# Patient Record
Sex: MALE | Race: WHITE | Employment: FULL TIME | URBAN - NONMETROPOLITAN AREA
[De-identification: names, ages, dates, MRNs, and addresses within clinical notes are randomized per-mention and may not be internally consistent; named-entity substitution may affect disease eponyms.]

---

## 2017-06-14 PROBLEM — I83.90 VARICOSE VEIN OF LEG: Status: ACTIVE | Noted: 2017-06-14

## 2017-12-04 ENCOUNTER — HOSPITAL ENCOUNTER (OUTPATIENT)
Dept: NON INVASIVE DIAGNOSTICS | Age: 52
Discharge: HOME | End: 2017-12-04
Payer: OTHER GOVERNMENT

## 2017-12-04 ENCOUNTER — TELEPHONE (OUTPATIENT)
Dept: CARDIOLOGY | Age: 52
End: 2017-12-04

## 2017-12-04 DIAGNOSIS — I35.9 AORTIC VALVE DISORDER: ICD-10-CM

## 2017-12-04 DIAGNOSIS — R01.1 HEART MURMUR: ICD-10-CM

## 2017-12-04 DIAGNOSIS — Z98.890 H/O ASCENDING AORTA REPAIR: ICD-10-CM

## 2017-12-04 LAB
LV EF: 53 %
LVEF MODALITY: NORMAL

## 2017-12-04 PROCEDURE — 93306 TTE W/DOPPLER COMPLETE: CPT

## 2017-12-18 ENCOUNTER — OFFICE VISIT (OUTPATIENT)
Dept: CARDIOLOGY | Age: 52
End: 2017-12-18
Payer: OTHER GOVERNMENT

## 2017-12-18 VITALS
WEIGHT: 199 LBS | OXYGEN SATURATION: 98 % | HEIGHT: 69 IN | RESPIRATION RATE: 20 BRPM | DIASTOLIC BLOOD PRESSURE: 71 MMHG | SYSTOLIC BLOOD PRESSURE: 127 MMHG | BODY MASS INDEX: 29.47 KG/M2 | HEART RATE: 70 BPM

## 2017-12-18 DIAGNOSIS — I35.1 NONRHEUMATIC AORTIC VALVE INSUFFICIENCY: ICD-10-CM

## 2017-12-18 DIAGNOSIS — Z98.890 H/O ASCENDING AORTA REPAIR: ICD-10-CM

## 2017-12-18 DIAGNOSIS — I48.91 ATRIAL FIBRILLATION, CONTROLLED (HCC): Primary | ICD-10-CM

## 2017-12-18 PROCEDURE — 99213 OFFICE O/P EST LOW 20 MIN: CPT | Performed by: INTERNAL MEDICINE

## 2017-12-18 PROCEDURE — 93000 ELECTROCARDIOGRAM COMPLETE: CPT | Performed by: INTERNAL MEDICINE

## 2017-12-18 NOTE — PROGRESS NOTES
SHON Gant am scribing for and in the presence of Dr. Georgette Stinson     Patient: Jose Pascal  : 1965  Date of Visit: 2017    REASON FOR VISIT / CONSULTATION: 1 Year Follow Up (ECG in office today, Echo done 17, Hx Afib/flutter, Ascending aorta repair, ablation [facility] 8/19/15, Denies CP/pressure, papitations, sob, dizziness/lighheadedness. )      Dear Dr. Lucia Naik MD    I had the opportunity to see Jose Pascal at the office today for follow up. He has a history of thoracic aorta surgery at the [facility] in . He said he had a repair with no artificial grafts put in. He had a heart catheterization prior to the aorta surgery and was told his coronary arteries were fine. No history of MI or angina. He does have a history apparently of atrial fibrillation/Atypical flutter which predates his thoracic aorta surgery but after his thoracic aorta surgery he had a couple of recurrences, multiple cardioversion and finally underwent atypical flutter ablation on 2015 at [facility]. He did great over the last year. Denies any chest pain, pressure or tightness. No  shortness of breath. No orthopnea or PND. No palpitations, dizziness or lightheadedness,syncope or near syncope He is sleeping fine without his CPAP. Still smoking he is planing to quit. Drinks about 2 cups of coffee a day and does drink some alcohol. ECG today showed sinus rhythm with left atrial enlargement and incomplete right bundle branch block. Single PVC. No definite ischemic changes.       Past Medical History:   Diagnosis Date    Aortic valve disorders     Atrial fibrillation (HCC)     Atrial flutter ([address]     Encounter for cardioversion procedure 2015    Dr Joanne Solo    Encounter for cardioversion procedure 2015    Airam Garcia/Juanis    Frequent urination     Pt states he's developed frequent urination with 3 times in last yr noting blood in urine as well.  H/O echocardiogram 12/04/2017    EF 17-23% Global LV systolic function appears preserved. Mildly increaseed LV wall thinckness with normal LV carvity size, No definite specific wall motion abnormalities identified, LA is moderately dilated (34-39) with LA volume index of 36ml/m2, Mild mitral and tricuspid regurgitation, moderate aortic regurgitation, Evidence of mild diastolic dysfunction seen    Hemorrhage of rectum and anus     History of 24 hour EKG monitoring 4/3/14    Unremarkable except for mild increased number of PVC's with 3 ventricular couplets, asymptomatic     History of echocardiogram 4/10/14    EF 60%, mild to mod LVH, moderate AI    History of heart surgery 2008    enlarged aorta    History of PFTs 6/25/15    Consistent with mild obstructive ventilatory impairment. Lung volumes are normal,except mild increase in residual volume,which could be suggestive of airway trapping.  Diffusion capacity is normal.    Hx of transesophageal echocardiography (SKYLAR) for monitoring 5/5/2015    Dr Edgardo Contreras    Impaired fasting glucose     Lipoma     Obstructive sleep apnea (adult) (pediatric)     Paroxysmal supraventricular tachycardia (HCC)     Tobacco use disorder     Unspecified essential hypertension     Urinary urgency        Past Surgical History:   Procedure Laterality Date    ACHILLES TENDON SURGERY  2010    CARDIAC SURGERY  2008    Enlarged aorta    COLONOSCOPY  06/13/2013    FOOT SURGERY      reconstructive for club foot (right)    VASECTOMY         Family History   Problem Relation Age of Onset    Colon Cancer Mother     Heart Surgery Father      enlarged aorta    Kidney Cancer Father        Social History   Substance Use Topics    Smoking status: Current Every Day Smoker     Packs/day: 1.00     Years: 20.00     Types: Cigarettes     Last attempt to quit: 2/13/2013    Smokeless tobacco: Never Used    Alcohol use Yes      Comment: 3-4 times

## 2017-12-27 ENCOUNTER — HOSPITAL ENCOUNTER (OUTPATIENT)
Dept: VASCULAR LAB | Age: 52
Discharge: HOME | End: 2017-12-27
Payer: OTHER GOVERNMENT

## 2017-12-27 DIAGNOSIS — M79.604 PAIN OF RIGHT LOWER EXTREMITY: ICD-10-CM

## 2017-12-27 PROCEDURE — 93971 EXTREMITY STUDY: CPT

## 2018-06-29 PROBLEM — I83.91 VARICOSE VEINS OF RIGHT LOWER EXTREMITY: Status: ACTIVE | Noted: 2017-06-14

## 2018-10-05 PROBLEM — K42.9 UMBILICAL HERNIA WITHOUT OBSTRUCTION AND WITHOUT GANGRENE: Status: ACTIVE | Noted: 2018-10-05

## 2018-10-08 ENCOUNTER — OFFICE VISIT (OUTPATIENT)
Dept: SURGERY | Age: 53
End: 2018-10-08
Payer: OTHER GOVERNMENT

## 2018-10-08 VITALS
HEIGHT: 68 IN | TEMPERATURE: 98.8 F | SYSTOLIC BLOOD PRESSURE: 125 MMHG | RESPIRATION RATE: 18 BRPM | BODY MASS INDEX: 31.27 KG/M2 | DIASTOLIC BLOOD PRESSURE: 81 MMHG | WEIGHT: 206.3 LBS | HEART RATE: 75 BPM

## 2018-10-08 DIAGNOSIS — Z13.0 SCREENING FOR DISORDER OF BLOOD AND BLOOD-FORMING ORGANS: ICD-10-CM

## 2018-10-08 DIAGNOSIS — K42.9 UMBILICAL HERNIA WITHOUT OBSTRUCTION AND WITHOUT GANGRENE: Primary | ICD-10-CM

## 2018-10-08 PROCEDURE — 99203 OFFICE O/P NEW LOW 30 MIN: CPT | Performed by: SURGERY

## 2018-10-08 NOTE — PROGRESS NOTES
with Betadine soap daily for the time being and finish his antibiotics preoperatively. Plan:  Scheduled for umbilical hernia repair    1.  Umbilical hernia without obstruction and without gangrene          Electronically signed by Ravinder Stinson MD on 10/8/2018 at 12:16 PM

## 2018-10-18 ENCOUNTER — HOSPITAL ENCOUNTER (OUTPATIENT)
Dept: PREADMISSION TESTING | Age: 53
Discharge: HOME | End: 2018-10-22
Payer: OTHER GOVERNMENT

## 2018-10-18 VITALS
WEIGHT: 207 LBS | SYSTOLIC BLOOD PRESSURE: 132 MMHG | HEART RATE: 68 BPM | DIASTOLIC BLOOD PRESSURE: 70 MMHG | RESPIRATION RATE: 18 BRPM | BODY MASS INDEX: 31.37 KG/M2 | HEIGHT: 68 IN | TEMPERATURE: 98 F | OXYGEN SATURATION: 98 %

## 2018-10-18 DIAGNOSIS — K42.9 UMBILICAL HERNIA WITHOUT OBSTRUCTION AND WITHOUT GANGRENE: ICD-10-CM

## 2018-10-18 DIAGNOSIS — Z13.0 SCREENING FOR DISORDER OF BLOOD AND BLOOD-FORMING ORGANS: ICD-10-CM

## 2018-10-18 LAB
ANION GAP SERPL CALCULATED.3IONS-SCNC: 11 MMOL/L (ref 9–17)
BUN BLDV-MCNC: 21 MG/DL (ref 6–20)
BUN/CREAT BLD: 27 (ref 9–20)
CALCIUM SERPL-MCNC: 8.9 MG/DL (ref 8.6–10.4)
CHLORIDE BLD-SCNC: 97 MMOL/L (ref 98–107)
CO2: 27 MMOL/L (ref 20–31)
CREAT SERPL-MCNC: 0.79 MG/DL (ref 0.7–1.2)
EKG ATRIAL RATE: 54 BPM
EKG P AXIS: 31 DEGREES
EKG P-R INTERVAL: 158 MS
EKG Q-T INTERVAL: 466 MS
EKG QRS DURATION: 100 MS
EKG QTC CALCULATION (BAZETT): 441 MS
EKG R AXIS: -5 DEGREES
EKG T AXIS: 32 DEGREES
EKG VENTRICULAR RATE: 54 BPM
GFR AFRICAN AMERICAN: >60 ML/MIN
GFR NON-AFRICAN AMERICAN: >60 ML/MIN
GFR SERPL CREATININE-BSD FRML MDRD: ABNORMAL ML/MIN/{1.73_M2}
GFR SERPL CREATININE-BSD FRML MDRD: ABNORMAL ML/MIN/{1.73_M2}
GLUCOSE BLD-MCNC: 112 MG/DL (ref 70–99)
HCT VFR BLD CALC: 44.1 % (ref 40.7–50.3)
HEMOGLOBIN: 14.8 G/DL (ref 13–17)
MCH RBC QN AUTO: 31.8 PG (ref 25.2–33.5)
MCHC RBC AUTO-ENTMCNC: 33.6 G/DL (ref 28.4–34.8)
MCV RBC AUTO: 94.6 FL (ref 82.6–102.9)
NRBC AUTOMATED: 0 PER 100 WBC
PDW BLD-RTO: 12.1 % (ref 11.8–14.4)
PLATELET # BLD: 197 K/UL (ref 138–453)
PMV BLD AUTO: 9.7 FL (ref 8.1–13.5)
POTASSIUM SERPL-SCNC: 4.8 MMOL/L (ref 3.7–5.3)
RBC # BLD: 4.66 M/UL (ref 4.21–5.77)
SODIUM BLD-SCNC: 135 MMOL/L (ref 135–144)
WBC # BLD: 5.8 K/UL (ref 3.5–11.3)

## 2018-10-18 PROCEDURE — 93005 ELECTROCARDIOGRAM TRACING: CPT

## 2018-10-18 PROCEDURE — 80048 BASIC METABOLIC PNL TOTAL CA: CPT

## 2018-10-18 PROCEDURE — 36415 COLL VENOUS BLD VENIPUNCTURE: CPT

## 2018-10-18 PROCEDURE — 85027 COMPLETE CBC AUTOMATED: CPT

## 2018-10-18 NOTE — PROGRESS NOTES
Location Verified: Yes  Patient Language: English  Medical History Reviewed: Yes  NPO Status Reinforced: Yes  Ride and Caregiver Arranged: Yes  Ride Caregiver Provider: friend  Patient Knows to Bring Current Medications: Yes    Pre-AdmissionTesting Checklist  Patient has been to this health system before?: Yes, W/in last 6 months  Does patient refuse blood?: No  Pre-existing DNR Comfort Care/DNR Arrest/DNI Order: No  Healthcare Directive: No, patient does not have an advance directive for healthcare treatment   needed: No  Patient can read and write?: Yes  History given by: Patient  Providing self care at home?: Yes  Discharge transport (for same day patients): Family    Patient instructed on the pre-operative, intra-operative, and post-operative process? Yes  Medication instructions reviewed with patient? Yes  Pre operative instruction sheet reviewed and given to patient in PAT?   Yes

## 2018-10-23 ENCOUNTER — ANESTHESIA EVENT (OUTPATIENT)
Dept: OPERATING ROOM | Age: 53
End: 2018-10-23
Payer: OTHER GOVERNMENT

## 2018-10-24 ENCOUNTER — ANESTHESIA (OUTPATIENT)
Dept: OPERATING ROOM | Age: 53
End: 2018-10-24
Payer: OTHER GOVERNMENT

## 2018-10-24 ENCOUNTER — HOSPITAL ENCOUNTER (OUTPATIENT)
Age: 53
Setting detail: OUTPATIENT SURGERY
Discharge: HOME | End: 2018-10-24
Attending: SURGERY | Admitting: SURGERY
Payer: OTHER GOVERNMENT

## 2018-10-24 VITALS
DIASTOLIC BLOOD PRESSURE: 52 MMHG | OXYGEN SATURATION: 97 % | TEMPERATURE: 98.8 F | SYSTOLIC BLOOD PRESSURE: 100 MMHG | RESPIRATION RATE: 1 BRPM

## 2018-10-24 VITALS
RESPIRATION RATE: 16 BRPM | SYSTOLIC BLOOD PRESSURE: 124 MMHG | DIASTOLIC BLOOD PRESSURE: 68 MMHG | HEIGHT: 68 IN | TEMPERATURE: 98.6 F | HEART RATE: 65 BPM | OXYGEN SATURATION: 97 % | BODY MASS INDEX: 31.37 KG/M2 | WEIGHT: 207 LBS

## 2018-10-24 DIAGNOSIS — G89.18 POSTOPERATIVE PAIN: Primary | ICD-10-CM

## 2018-10-24 PROCEDURE — 6370000000 HC RX 637 (ALT 250 FOR IP): Performed by: SURGERY

## 2018-10-24 PROCEDURE — 7100000010 HC PHASE II RECOVERY - FIRST 15 MIN: Performed by: SURGERY

## 2018-10-24 PROCEDURE — 3600000003 HC SURGERY LEVEL 3 BASE: Performed by: SURGERY

## 2018-10-24 PROCEDURE — 3600000013 HC SURGERY LEVEL 3 ADDTL 15MIN: Performed by: SURGERY

## 2018-10-24 PROCEDURE — 88302 TISSUE EXAM BY PATHOLOGIST: CPT

## 2018-10-24 PROCEDURE — 7100000011 HC PHASE II RECOVERY - ADDTL 15 MIN: Performed by: SURGERY

## 2018-10-24 PROCEDURE — 49585 REPAIR UMBILICAL HERN,5+Y/O,REDUC: CPT | Performed by: SURGERY

## 2018-10-24 PROCEDURE — 2500000003 HC RX 250 WO HCPCS: Performed by: SURGERY

## 2018-10-24 PROCEDURE — 6360000002 HC RX W HCPCS: Performed by: SURGERY

## 2018-10-24 PROCEDURE — 6360000002 HC RX W HCPCS: Performed by: NURSE ANESTHETIST, CERTIFIED REGISTERED

## 2018-10-24 PROCEDURE — 2500000003 HC RX 250 WO HCPCS: Performed by: NURSE ANESTHETIST, CERTIFIED REGISTERED

## 2018-10-24 PROCEDURE — 2709999900 HC NON-CHARGEABLE SUPPLY: Performed by: SURGERY

## 2018-10-24 PROCEDURE — 7100000000 HC PACU RECOVERY - FIRST 15 MIN: Performed by: SURGERY

## 2018-10-24 PROCEDURE — 7100000001 HC PACU RECOVERY - ADDTL 15 MIN: Performed by: SURGERY

## 2018-10-24 PROCEDURE — 3700000001 HC ADD 15 MINUTES (ANESTHESIA): Performed by: SURGERY

## 2018-10-24 PROCEDURE — 2580000003 HC RX 258: Performed by: SURGERY

## 2018-10-24 PROCEDURE — 3700000000 HC ANESTHESIA ATTENDED CARE: Performed by: SURGERY

## 2018-10-24 PROCEDURE — 88304 TISSUE EXAM BY PATHOLOGIST: CPT

## 2018-10-24 RX ORDER — SODIUM CHLORIDE, SODIUM LACTATE, POTASSIUM CHLORIDE, CALCIUM CHLORIDE 600; 310; 30; 20 MG/100ML; MG/100ML; MG/100ML; MG/100ML
INJECTION, SOLUTION INTRAVENOUS CONTINUOUS
Status: DISCONTINUED | OUTPATIENT
Start: 2018-10-24 | End: 2018-10-24 | Stop reason: HOSPADM

## 2018-10-24 RX ORDER — LIDOCAINE HYDROCHLORIDE 10 MG/ML
INJECTION, SOLUTION EPIDURAL; INFILTRATION; INTRACAUDAL; PERINEURAL PRN
Status: DISCONTINUED | OUTPATIENT
Start: 2018-10-24 | End: 2018-10-24 | Stop reason: HOSPADM

## 2018-10-24 RX ORDER — FENTANYL CITRATE 50 UG/ML
INJECTION, SOLUTION INTRAMUSCULAR; INTRAVENOUS PRN
Status: DISCONTINUED | OUTPATIENT
Start: 2018-10-24 | End: 2018-10-24 | Stop reason: SDUPTHER

## 2018-10-24 RX ORDER — PROPOFOL 10 MG/ML
INJECTION, EMULSION INTRAVENOUS PRN
Status: DISCONTINUED | OUTPATIENT
Start: 2018-10-24 | End: 2018-10-24 | Stop reason: SDUPTHER

## 2018-10-24 RX ORDER — MEPERIDINE HYDROCHLORIDE 50 MG/ML
12.5 INJECTION INTRAMUSCULAR; INTRAVENOUS; SUBCUTANEOUS EVERY 5 MIN PRN
Status: DISCONTINUED | OUTPATIENT
Start: 2018-10-24 | End: 2018-10-24 | Stop reason: HOSPADM

## 2018-10-24 RX ORDER — DIMENHYDRINATE 50 MG/1
50 TABLET ORAL ONCE
Status: COMPLETED | OUTPATIENT
Start: 2018-10-24 | End: 2018-10-24

## 2018-10-24 RX ORDER — FENTANYL CITRATE 50 UG/ML
50 INJECTION, SOLUTION INTRAMUSCULAR; INTRAVENOUS EVERY 5 MIN PRN
Status: DISCONTINUED | OUTPATIENT
Start: 2018-10-24 | End: 2018-10-24 | Stop reason: HOSPADM

## 2018-10-24 RX ORDER — BUPIVACAINE HYDROCHLORIDE 5 MG/ML
INJECTION, SOLUTION EPIDURAL; INTRACAUDAL PRN
Status: DISCONTINUED | OUTPATIENT
Start: 2018-10-24 | End: 2018-10-24 | Stop reason: HOSPADM

## 2018-10-24 RX ORDER — ONDANSETRON 2 MG/ML
4 INJECTION INTRAMUSCULAR; INTRAVENOUS
Status: DISCONTINUED | OUTPATIENT
Start: 2018-10-24 | End: 2018-10-24 | Stop reason: HOSPADM

## 2018-10-24 RX ORDER — FENTANYL CITRATE 50 UG/ML
25 INJECTION, SOLUTION INTRAMUSCULAR; INTRAVENOUS EVERY 5 MIN PRN
Status: DISCONTINUED | OUTPATIENT
Start: 2018-10-24 | End: 2018-10-24 | Stop reason: HOSPADM

## 2018-10-24 RX ORDER — LIDOCAINE HYDROCHLORIDE 20 MG/ML
INJECTION, SOLUTION INFILTRATION; PERINEURAL PRN
Status: DISCONTINUED | OUTPATIENT
Start: 2018-10-24 | End: 2018-10-24 | Stop reason: SDUPTHER

## 2018-10-24 RX ORDER — HYDROCODONE BITARTRATE AND ACETAMINOPHEN 5; 325 MG/1; MG/1
1 TABLET ORAL EVERY 6 HOURS PRN
Qty: 20 TABLET | Refills: 0 | Status: SHIPPED | OUTPATIENT
Start: 2018-10-24 | End: 2018-10-29

## 2018-10-24 RX ORDER — LABETALOL HYDROCHLORIDE 5 MG/ML
5 INJECTION, SOLUTION INTRAVENOUS EVERY 10 MIN PRN
Status: DISCONTINUED | OUTPATIENT
Start: 2018-10-24 | End: 2018-10-24 | Stop reason: HOSPADM

## 2018-10-24 RX ORDER — DEXAMETHASONE SODIUM PHOSPHATE 4 MG/ML
INJECTION, SOLUTION INTRA-ARTICULAR; INTRALESIONAL; INTRAMUSCULAR; INTRAVENOUS; SOFT TISSUE PRN
Status: DISCONTINUED | OUTPATIENT
Start: 2018-10-24 | End: 2018-10-24 | Stop reason: SDUPTHER

## 2018-10-24 RX ORDER — ACETAMINOPHEN 325 MG/1
650 TABLET ORAL ONCE
Status: COMPLETED | OUTPATIENT
Start: 2018-10-24 | End: 2018-10-24

## 2018-10-24 RX ORDER — ONDANSETRON 2 MG/ML
INJECTION INTRAMUSCULAR; INTRAVENOUS PRN
Status: DISCONTINUED | OUTPATIENT
Start: 2018-10-24 | End: 2018-10-24 | Stop reason: SDUPTHER

## 2018-10-24 RX ORDER — HYDROCODONE BITARTRATE AND ACETAMINOPHEN 5; 325 MG/1; MG/1
2 TABLET ORAL ONCE
Status: COMPLETED | OUTPATIENT
Start: 2018-10-24 | End: 2018-10-24

## 2018-10-24 RX ADMIN — HYDROCODONE BITARTRATE AND ACETAMINOPHEN 2 TABLET: 5; 325 TABLET ORAL at 13:22

## 2018-10-24 RX ADMIN — DEXAMETHASONE SODIUM PHOSPHATE 4 MG: 4 INJECTION, SOLUTION INTRAMUSCULAR; INTRAVENOUS at 10:38

## 2018-10-24 RX ADMIN — DIMENHYDRINATE 50 MG: 50 TABLET ORAL at 08:49

## 2018-10-24 RX ADMIN — ONDANSETRON 4 MG: 2 INJECTION INTRAMUSCULAR; INTRAVENOUS at 10:42

## 2018-10-24 RX ADMIN — ACETAMINOPHEN 650 MG: 325 TABLET ORAL at 08:50

## 2018-10-24 RX ADMIN — FENTANYL CITRATE 100 MCG: 50 INJECTION INTRAMUSCULAR; INTRAVENOUS at 10:34

## 2018-10-24 RX ADMIN — LIDOCAINE HYDROCHLORIDE 60 MG: 20 INJECTION, SOLUTION INFILTRATION; PERINEURAL at 10:37

## 2018-10-24 RX ADMIN — FENTANYL CITRATE 25 MCG: 50 INJECTION INTRAMUSCULAR; INTRAVENOUS at 13:00

## 2018-10-24 RX ADMIN — PROPOFOL 180 MG: 10 INJECTION, EMULSION INTRAVENOUS at 10:37

## 2018-10-24 RX ADMIN — SODIUM CHLORIDE, POTASSIUM CHLORIDE, SODIUM LACTATE AND CALCIUM CHLORIDE: 600; 310; 30; 20 INJECTION, SOLUTION INTRAVENOUS at 09:04

## 2018-10-24 ASSESSMENT — PULMONARY FUNCTION TESTS
PIF_VALUE: 3
PIF_VALUE: 1
PIF_VALUE: 3
PIF_VALUE: 1
PIF_VALUE: 3
PIF_VALUE: 3
PIF_VALUE: 4
PIF_VALUE: 4
PIF_VALUE: 3
PIF_VALUE: 4
PIF_VALUE: 2
PIF_VALUE: 3
PIF_VALUE: 19
PIF_VALUE: 3
PIF_VALUE: 3
PIF_VALUE: 4
PIF_VALUE: 3
PIF_VALUE: 3
PIF_VALUE: 20
PIF_VALUE: 3
PIF_VALUE: 4
PIF_VALUE: 3
PIF_VALUE: 29
PIF_VALUE: 3
PIF_VALUE: 3
PIF_VALUE: 4
PIF_VALUE: 3
PIF_VALUE: 3
PIF_VALUE: 4
PIF_VALUE: 2
PIF_VALUE: 3
PIF_VALUE: 1
PIF_VALUE: 3
PIF_VALUE: 3
PIF_VALUE: 4
PIF_VALUE: 4
PIF_VALUE: 3
PIF_VALUE: 0
PIF_VALUE: 3
PIF_VALUE: 4
PIF_VALUE: 3
PIF_VALUE: 3
PIF_VALUE: 4
PIF_VALUE: 3
PIF_VALUE: 1
PIF_VALUE: 1
PIF_VALUE: 19
PIF_VALUE: 3
PIF_VALUE: 4
PIF_VALUE: 22
PIF_VALUE: 3
PIF_VALUE: 0
PIF_VALUE: 1
PIF_VALUE: 3
PIF_VALUE: 20
PIF_VALUE: 3
PIF_VALUE: 2
PIF_VALUE: 3
PIF_VALUE: 1
PIF_VALUE: 4
PIF_VALUE: 3
PIF_VALUE: 3
PIF_VALUE: 12
PIF_VALUE: 3
PIF_VALUE: 4
PIF_VALUE: 3
PIF_VALUE: 3
PIF_VALUE: 12
PIF_VALUE: 3
PIF_VALUE: 3
PIF_VALUE: 4
PIF_VALUE: 4
PIF_VALUE: 3
PIF_VALUE: 3
PIF_VALUE: 14
PIF_VALUE: 3
PIF_VALUE: 1
PIF_VALUE: 3
PIF_VALUE: 4
PIF_VALUE: 4
PIF_VALUE: 3
PIF_VALUE: 22
PIF_VALUE: 13
PIF_VALUE: 3
PIF_VALUE: 4
PIF_VALUE: 3
PIF_VALUE: 17
PIF_VALUE: 3
PIF_VALUE: 4
PIF_VALUE: 19
PIF_VALUE: 19
PIF_VALUE: 3
PIF_VALUE: 4
PIF_VALUE: 25
PIF_VALUE: 1

## 2018-10-24 ASSESSMENT — PAIN DESCRIPTION - LOCATION
LOCATION: ABDOMEN

## 2018-10-24 ASSESSMENT — PAIN DESCRIPTION - PAIN TYPE
TYPE: SURGICAL PAIN

## 2018-10-24 ASSESSMENT — PAIN DESCRIPTION - ORIENTATION
ORIENTATION: MID

## 2018-10-24 ASSESSMENT — PAIN DESCRIPTION - PROGRESSION
CLINICAL_PROGRESSION: GRADUALLY IMPROVING
CLINICAL_PROGRESSION: GRADUALLY IMPROVING

## 2018-10-24 ASSESSMENT — PAIN DESCRIPTION - DESCRIPTORS
DESCRIPTORS: SHARP
DESCRIPTORS: SORE

## 2018-10-24 ASSESSMENT — PAIN SCALES - GENERAL
PAINLEVEL_OUTOF10: 3
PAINLEVEL_OUTOF10: 5
PAINLEVEL_OUTOF10: 7
PAINLEVEL_OUTOF10: 5
PAINLEVEL_OUTOF10: 7
PAINLEVEL_OUTOF10: 7

## 2018-10-24 ASSESSMENT — PAIN - FUNCTIONAL ASSESSMENT: PAIN_FUNCTIONAL_ASSESSMENT: 0-10

## 2018-10-24 ASSESSMENT — LIFESTYLE VARIABLES: SMOKING_STATUS: 1

## 2018-10-24 ASSESSMENT — COPD QUESTIONNAIRES: CAT_SEVERITY: MILD

## 2018-10-24 ASSESSMENT — PAIN DESCRIPTION - FREQUENCY: FREQUENCY: CONTINUOUS

## 2018-10-24 NOTE — OP NOTE
Postoperative Note    Margarita Williamson  YOB: 1965  [id number]    Pre-operative Diagnosis: Umbilical hernia without obstruction    Post-operative Diagnosis: Umbilical hernia and supraumbilical hernia with incarcerated omentum    Procedure: Repair of umbilical hernia, partial omentectomy    Anesthesia: General    Surgeons/Assistants: Jocelyn Dickinson    Estimated Blood Loss: Less than 25 ML    Complications: None    Specimens: Was Obtained: Umbilical hernia sac, omentum, umbilical skin. Findings: There were 2 defects the larger one is 4 cm in diameter through the umbilical ring and a smaller defect above and left to the umbilicus approximately 2 cm in diameter both contain basal omentum attached to the inner surface of the hernia sac. The overall  Umbilical skin was stretched and the past to the umbilical sac and part of it has to be removed to reconstruct the umbilicus. The procedure: The patient was put in the supine position and given general anesthesia with LMA and the abdomen was prepped with chlorhexidine alcohol prep and draped in usual manner as exposing the hernia the umbilicus in the midline above and below. The skin soft and she she were infiltrated with 1% Xylocaine solution 0.5% Marcaine solution in 1-1 proportion utilizing approximately 25 ML. Skin incision was made on the midline above the umbilicus approximately 2 cm and extended left to the umbilicus and below it for approximately 2 cm. The subcutaneous tissue was opened and the hernia sac was identified and the umbilical area and extending below the umbilicus the second hernia sac was identified in the subcutaneous tissue extending from the defect above the left to the umbilicus. Both hernia sacs were dissected free of the subcutaneous tissue down to the fascia and the 2 defects were connected above the umbilicus and 2 hernia sacs were removed.   During the removal of the hernia sac it was necessary to remove parts of the incarcerated omentum by clamping and dividing and tying the omentum with 2-0 Vicryl ligatures. The omentum was reduced into the abdominal cavity and the hernia defect was closed in an up-and-down fashion utilizing #1 continuous Vicryl suture applied to the peritoneum and interrupted #0 Nurolon figure-of-eight sutures for the fascia. The repair was reinforced utilizing a continuous interlocking #0 Vicryl suture. Past factory repair was obtained in this fashion. The subcutaneous tissue was closed with a continuous #3-0 Vicryl suture and the skin was closed with a continuous #4-0 nylon suture proper sterile dry dressing was applied and the patient was transferred to the recovery room in satisfactory condition. Recommendations: Use abdominal binder. He is discharged on David Soda. Every 6 hours when necessary for pain and he may take Advil in between the Norco doses. He is to ambulate at home avoid driving for the first week and avoid heavy lifting for 6 weeks postop. He may shower and leave the dressing on until he comes back after 1 week for removal of the skin sutures. .    Electronically signed by Louis Perez MD on 10/24/2018 at 12:39 PM

## 2018-10-24 NOTE — ANESTHESIA POSTPROCEDURE EVALUATION
Department of Anesthesiology  Postprocedure Note    Patient: Jerry Quiñonez  MRN: [id number]  YOB: 1965  Date of evaluation: 10/24/2018  Time:  1:52 PM     Procedure Summary     Date:  10/24/18 Room / Location:  [address] [place]    Anesthesia Start:  1034 Anesthesia Stop:  4377    Procedure: HERNIA UMBILICAL REPAIR (N/A Abdomen) Diagnosis:  (UMBILICAL HERNIA)    Surgeon:  José Luis Herrera MD Responsible Provider:  AMADOR Melchor CRNA    Anesthesia Type:  general ASA Status:  3          Anesthesia Type: general    Kirsty Phase I: Kirsty Score: 9    Kirsty Phase II:      Last vitals: Reviewed and per EMR flowsheets.        Anesthesia Post Evaluation    Patient location during evaluation: PACU  Patient participation: complete - patient participated  Level of consciousness: awake and alert  Pain score: 5  Airway patency: patent  Nausea & Vomiting: no nausea and no vomiting  Complications: no  Cardiovascular status: hemodynamically stable  Respiratory status: acceptable and spontaneous ventilation  Hydration status: stable

## 2018-10-26 LAB — SURGICAL PATHOLOGY REPORT: NORMAL

## 2018-11-01 ENCOUNTER — OFFICE VISIT (OUTPATIENT)
Dept: SURGERY | Age: 53
End: 2018-11-01

## 2018-11-01 VITALS
WEIGHT: 210 LBS | TEMPERATURE: 96.5 F | DIASTOLIC BLOOD PRESSURE: 79 MMHG | SYSTOLIC BLOOD PRESSURE: 123 MMHG | BODY MASS INDEX: 31.83 KG/M2 | HEART RATE: 71 BPM | HEIGHT: 68 IN | RESPIRATION RATE: 20 BRPM

## 2018-11-01 DIAGNOSIS — Z09 STATUS POST UMBILICAL HERNIA REPAIR, FOLLOW-UP EXAM: Primary | ICD-10-CM

## 2018-11-01 PROCEDURE — 99024 POSTOP FOLLOW-UP VISIT: CPT | Performed by: SURGERY

## 2018-11-01 RX ORDER — ACETAMINOPHEN 500 MG
500 TABLET ORAL EVERY 6 HOURS PRN
Status: ON HOLD | COMMUNITY
End: 2020-03-06 | Stop reason: HOSPADM

## 2018-12-20 ENCOUNTER — OFFICE VISIT (OUTPATIENT)
Dept: CARDIOLOGY | Age: 53
End: 2018-12-20
Payer: OTHER GOVERNMENT

## 2018-12-20 VITALS
OXYGEN SATURATION: 98 % | BODY MASS INDEX: 31.73 KG/M2 | HEIGHT: 69 IN | SYSTOLIC BLOOD PRESSURE: 132 MMHG | RESPIRATION RATE: 16 BRPM | HEART RATE: 77 BPM | DIASTOLIC BLOOD PRESSURE: 71 MMHG | WEIGHT: 214.2 LBS

## 2018-12-20 DIAGNOSIS — Z86.79 HISTORY OF ATRIAL FIBRILLATION: ICD-10-CM

## 2018-12-20 DIAGNOSIS — Z98.890 HISTORY OF CARDIAC RADIOFREQUENCY ABLATION: ICD-10-CM

## 2018-12-20 DIAGNOSIS — Z72.0 TOBACCO ABUSE: ICD-10-CM

## 2018-12-20 DIAGNOSIS — Z71.6 TOBACCO ABUSE COUNSELING: ICD-10-CM

## 2018-12-20 DIAGNOSIS — Z98.890 H/O ASCENDING AORTA REPAIR: ICD-10-CM

## 2018-12-20 DIAGNOSIS — E66.9 OBESITY (BMI 30.0-34.9): ICD-10-CM

## 2018-12-20 DIAGNOSIS — I35.9 AORTIC VALVE DISORDER: Primary | ICD-10-CM

## 2018-12-20 PROCEDURE — 99213 OFFICE O/P EST LOW 20 MIN: CPT | Performed by: INTERNAL MEDICINE

## 2018-12-20 RX ORDER — METOPROLOL SUCCINATE 100 MG/1
TABLET, EXTENDED RELEASE ORAL
Qty: 90 TABLET | Refills: 3 | Status: SHIPPED | OUTPATIENT
Start: 2018-12-20 | End: 2019-04-12 | Stop reason: SDUPTHER

## 2019-01-07 ENCOUNTER — HOSPITAL ENCOUNTER (OUTPATIENT)
Dept: NON INVASIVE DIAGNOSTICS | Age: 54
Discharge: HOME | DRG: 310 | End: 2019-01-07
Payer: OTHER GOVERNMENT

## 2019-01-07 ENCOUNTER — HOSPITAL ENCOUNTER (INPATIENT)
Age: 54
LOS: 1 days | Discharge: HOME | DRG: 310 | End: 2019-01-08
Attending: EMERGENCY MEDICINE | Admitting: FAMILY MEDICINE
Payer: OTHER GOVERNMENT

## 2019-01-07 ENCOUNTER — APPOINTMENT (OUTPATIENT)
Dept: GENERAL RADIOLOGY | Age: 54
DRG: 310 | End: 2019-01-07
Payer: OTHER GOVERNMENT

## 2019-01-07 DIAGNOSIS — Z98.890 HISTORY OF CARDIAC RADIOFREQUENCY ABLATION: ICD-10-CM

## 2019-01-07 DIAGNOSIS — Z86.79 HISTORY OF ATRIAL FIBRILLATION: ICD-10-CM

## 2019-01-07 DIAGNOSIS — I48.3 TYPICAL ATRIAL FLUTTER (HCC): Primary | ICD-10-CM

## 2019-01-07 PROBLEM — I48.92 ATRIAL FLUTTER WITH RAPID VENTRICULAR RESPONSE (HCC): Status: ACTIVE | Noted: 2019-01-07

## 2019-01-07 LAB
ABSOLUTE EOS #: 0.26 K/UL (ref 0–0.44)
ABSOLUTE IMMATURE GRANULOCYTE: <0.03 K/UL (ref 0–0.3)
ABSOLUTE LYMPH #: 1.6 K/UL (ref 1.1–3.7)
ABSOLUTE MONO #: 0.62 K/UL (ref 0.1–1.2)
ALBUMIN SERPL-MCNC: 3.7 G/DL (ref 3.5–5.2)
ALBUMIN/GLOBULIN RATIO: 1.6 (ref 1–2.5)
ALP BLD-CCNC: 82 U/L (ref 40–129)
ALT SERPL-CCNC: 19 U/L (ref 5–41)
ANION GAP SERPL CALCULATED.3IONS-SCNC: 10 MMOL/L (ref 9–17)
ANION GAP SERPL CALCULATED.3IONS-SCNC: 13 MMOL/L (ref 9–17)
AST SERPL-CCNC: 16 U/L
BASOPHILS # BLD: 1 % (ref 0–2)
BASOPHILS ABSOLUTE: 0.05 K/UL (ref 0–0.2)
BILIRUB SERPL-MCNC: 0.5 MG/DL (ref 0.3–1.2)
BNP INTERPRETATION: ABNORMAL
BUN BLDV-MCNC: 25 MG/DL (ref 6–20)
BUN BLDV-MCNC: 26 MG/DL (ref 6–20)
BUN/CREAT BLD: 27 (ref 9–20)
BUN/CREAT BLD: 28 (ref 9–20)
CALCIUM SERPL-MCNC: 8.8 MG/DL (ref 8.6–10.4)
CALCIUM SERPL-MCNC: 9 MG/DL (ref 8.6–10.4)
CHLORIDE BLD-SCNC: 103 MMOL/L (ref 98–107)
CHLORIDE BLD-SCNC: 99 MMOL/L (ref 98–107)
CO2: 25 MMOL/L (ref 20–31)
CO2: 26 MMOL/L (ref 20–31)
CREAT SERPL-MCNC: 0.92 MG/DL (ref 0.7–1.2)
CREAT SERPL-MCNC: 0.93 MG/DL (ref 0.7–1.2)
DIFFERENTIAL TYPE: NORMAL
EOSINOPHILS RELATIVE PERCENT: 4 % (ref 1–4)
GFR AFRICAN AMERICAN: >60 ML/MIN
GFR AFRICAN AMERICAN: >60 ML/MIN
GFR NON-AFRICAN AMERICAN: >60 ML/MIN
GFR NON-AFRICAN AMERICAN: >60 ML/MIN
GFR SERPL CREATININE-BSD FRML MDRD: ABNORMAL ML/MIN/{1.73_M2}
GLUCOSE BLD-MCNC: 118 MG/DL (ref 70–99)
GLUCOSE BLD-MCNC: 85 MG/DL (ref 70–99)
HCT VFR BLD CALC: 49.6 % (ref 40.7–50.3)
HEMOGLOBIN: 17 G/DL (ref 13–17)
IMMATURE GRANULOCYTES: 0 %
LV EF: 50 %
LVEF MODALITY: NORMAL
LYMPHOCYTES # BLD: 24 % (ref 24–43)
MCH RBC QN AUTO: 31.6 PG (ref 25.2–33.5)
MCHC RBC AUTO-ENTMCNC: 34.3 G/DL (ref 28.4–34.8)
MCV RBC AUTO: 92.2 FL (ref 82.6–102.9)
MONOCYTES # BLD: 9 % (ref 3–12)
NRBC AUTOMATED: 0 PER 100 WBC
PDW BLD-RTO: 11.9 % (ref 11.8–14.4)
PLATELET # BLD: 226 K/UL (ref 138–453)
PLATELET ESTIMATE: NORMAL
PMV BLD AUTO: 9.6 FL (ref 8.1–13.5)
POTASSIUM SERPL-SCNC: 3.8 MMOL/L (ref 3.7–5.3)
POTASSIUM SERPL-SCNC: 4.4 MMOL/L (ref 3.7–5.3)
PRO-BNP: 1568 PG/ML
RBC # BLD: 5.38 M/UL (ref 4.21–5.77)
RBC # BLD: NORMAL 10*6/UL
SEG NEUTROPHILS: 62 % (ref 36–65)
SEGMENTED NEUTROPHILS ABSOLUTE COUNT: 4.26 K/UL (ref 1.5–8.1)
SODIUM BLD-SCNC: 137 MMOL/L (ref 135–144)
SODIUM BLD-SCNC: 139 MMOL/L (ref 135–144)
THYROXINE, FREE: 1.31 NG/DL (ref 0.93–1.7)
TOTAL PROTEIN: 6 G/DL (ref 6.4–8.3)
TROPONIN INTERP: NORMAL
TROPONIN T: <0.03 NG/ML
TROPONIN, HIGH SENSITIVITY: NORMAL NG/L (ref 0–22)
TSH SERPL DL<=0.05 MIU/L-ACNC: 0.99 MIU/L (ref 0.3–5)
WBC # BLD: 6.8 K/UL (ref 3.5–11.3)
WBC # BLD: NORMAL 10*3/UL

## 2019-01-07 PROCEDURE — 84484 ASSAY OF TROPONIN QUANT: CPT

## 2019-01-07 PROCEDURE — 84443 ASSAY THYROID STIM HORMONE: CPT

## 2019-01-07 PROCEDURE — 93005 ELECTROCARDIOGRAM TRACING: CPT

## 2019-01-07 PROCEDURE — 36415 COLL VENOUS BLD VENIPUNCTURE: CPT

## 2019-01-07 PROCEDURE — 80048 BASIC METABOLIC PNL TOTAL CA: CPT

## 2019-01-07 PROCEDURE — 93225 XTRNL ECG REC<48 HRS REC: CPT

## 2019-01-07 PROCEDURE — 96372 THER/PROPH/DIAG INJ SC/IM: CPT

## 2019-01-07 PROCEDURE — 80053 COMPREHEN METABOLIC PANEL: CPT

## 2019-01-07 PROCEDURE — 2000000000 HC ICU R&B

## 2019-01-07 PROCEDURE — 6370000000 HC RX 637 (ALT 250 FOR IP): Performed by: INTERNAL MEDICINE

## 2019-01-07 PROCEDURE — 2580000003 HC RX 258: Performed by: PHYSICIAN ASSISTANT

## 2019-01-07 PROCEDURE — 71045 X-RAY EXAM CHEST 1 VIEW: CPT

## 2019-01-07 PROCEDURE — 2580000003 HC RX 258: Performed by: EMERGENCY MEDICINE

## 2019-01-07 PROCEDURE — 99285 EMERGENCY DEPT VISIT HI MDM: CPT

## 2019-01-07 PROCEDURE — 96365 THER/PROPH/DIAG IV INF INIT: CPT

## 2019-01-07 PROCEDURE — 85025 COMPLETE CBC W/AUTO DIFF WBC: CPT

## 2019-01-07 PROCEDURE — 2500000003 HC RX 250 WO HCPCS: Performed by: EMERGENCY MEDICINE

## 2019-01-07 PROCEDURE — 96366 THER/PROPH/DIAG IV INF ADDON: CPT

## 2019-01-07 PROCEDURE — 6360000002 HC RX W HCPCS: Performed by: EMERGENCY MEDICINE

## 2019-01-07 PROCEDURE — 2500000003 HC RX 250 WO HCPCS: Performed by: PHYSICIAN ASSISTANT

## 2019-01-07 PROCEDURE — 96376 TX/PRO/DX INJ SAME DRUG ADON: CPT

## 2019-01-07 PROCEDURE — 6370000000 HC RX 637 (ALT 250 FOR IP): Performed by: PHYSICIAN ASSISTANT

## 2019-01-07 PROCEDURE — 83880 ASSAY OF NATRIURETIC PEPTIDE: CPT

## 2019-01-07 PROCEDURE — 99253 IP/OBS CNSLTJ NEW/EST LOW 45: CPT | Performed by: INTERNAL MEDICINE

## 2019-01-07 PROCEDURE — 84439 ASSAY OF FREE THYROXINE: CPT

## 2019-01-07 RX ORDER — ASPIRIN 81 MG/1
81 TABLET, CHEWABLE ORAL DAILY
Status: DISCONTINUED | OUTPATIENT
Start: 2019-01-07 | End: 2019-01-08 | Stop reason: HOSPADM

## 2019-01-07 RX ORDER — IBUPROFEN 200 MG
400 TABLET ORAL EVERY 6 HOURS PRN
Status: ON HOLD | COMMUNITY
End: 2019-01-08 | Stop reason: HOSPADM

## 2019-01-07 RX ORDER — SODIUM CHLORIDE 9 MG/ML
250 INJECTION, SOLUTION INTRAVENOUS CONTINUOUS
Status: DISCONTINUED | OUTPATIENT
Start: 2019-01-07 | End: 2019-01-07

## 2019-01-07 RX ORDER — ONDANSETRON 2 MG/ML
4 INJECTION INTRAMUSCULAR; INTRAVENOUS EVERY 6 HOURS PRN
Status: DISCONTINUED | OUTPATIENT
Start: 2019-01-07 | End: 2019-01-08 | Stop reason: HOSPADM

## 2019-01-07 RX ORDER — SODIUM CHLORIDE 0.9 % (FLUSH) 0.9 %
10 SYRINGE (ML) INJECTION EVERY 12 HOURS SCHEDULED
Status: DISCONTINUED | OUTPATIENT
Start: 2019-01-07 | End: 2019-01-08 | Stop reason: HOSPADM

## 2019-01-07 RX ORDER — DILTIAZEM HYDROCHLORIDE 5 MG/ML
10 INJECTION INTRAVENOUS ONCE
Status: COMPLETED | OUTPATIENT
Start: 2019-01-07 | End: 2019-01-07

## 2019-01-07 RX ORDER — SODIUM CHLORIDE 9 MG/ML
250 INJECTION, SOLUTION INTRAVENOUS CONTINUOUS
Status: DISCONTINUED | OUTPATIENT
Start: 2019-01-07 | End: 2019-01-07 | Stop reason: ALTCHOICE

## 2019-01-07 RX ORDER — SODIUM CHLORIDE 0.9 % (FLUSH) 0.9 %
10 SYRINGE (ML) INJECTION PRN
Status: DISCONTINUED | OUTPATIENT
Start: 2019-01-07 | End: 2019-01-08 | Stop reason: HOSPADM

## 2019-01-07 RX ORDER — ACETAMINOPHEN 500 MG
500 TABLET ORAL EVERY 6 HOURS PRN
Status: DISCONTINUED | OUTPATIENT
Start: 2019-01-07 | End: 2019-01-08 | Stop reason: HOSPADM

## 2019-01-07 RX ORDER — METOPROLOL SUCCINATE 100 MG/1
100 TABLET, EXTENDED RELEASE ORAL DAILY
Status: DISCONTINUED | OUTPATIENT
Start: 2019-01-08 | End: 2019-01-08 | Stop reason: HOSPADM

## 2019-01-07 RX ORDER — ASPIRIN 81 MG/1
81 TABLET ORAL DAILY
Status: DISCONTINUED | OUTPATIENT
Start: 2019-01-07 | End: 2019-01-07

## 2019-01-07 RX ADMIN — DILTIAZEM HYDROCHLORIDE 10 MG: 5 INJECTION INTRAVENOUS at 08:53

## 2019-01-07 RX ADMIN — DILTIAZEM HYDROCHLORIDE 5 MG/HR: 5 INJECTION INTRAVENOUS at 19:25

## 2019-01-07 RX ADMIN — SODIUM CHLORIDE 250 ML/HR: 9 INJECTION, SOLUTION INTRAVENOUS at 08:46

## 2019-01-07 RX ADMIN — SODIUM CHLORIDE 250 ML/HR: 9 INJECTION, SOLUTION INTRAVENOUS at 13:06

## 2019-01-07 RX ADMIN — DILTIAZEM HYDROCHLORIDE 30 MG: 30 TABLET, FILM COATED ORAL at 20:07

## 2019-01-07 RX ADMIN — DILTIAZEM HYDROCHLORIDE 10 MG/HR: 5 INJECTION INTRAVENOUS at 08:55

## 2019-01-07 RX ADMIN — APIXABAN 5 MG: 5 TABLET, FILM COATED ORAL at 20:07

## 2019-01-07 RX ADMIN — ENOXAPARIN SODIUM 100 MG: 100 INJECTION SUBCUTANEOUS at 08:57

## 2019-01-07 ASSESSMENT — PAIN SCALES - GENERAL
PAINLEVEL_OUTOF10: 0
PAINLEVEL_OUTOF10: 0

## 2019-01-07 ASSESSMENT — ENCOUNTER SYMPTOMS
ABDOMINAL PAIN: 0
COLOR CHANGE: 0
ABDOMINAL DISTENTION: 0
WHEEZING: 0
TROUBLE SWALLOWING: 0
COUGH: 0
DIARRHEA: 0
BACK PAIN: 0
NAUSEA: 0
SHORTNESS OF BREATH: 1
CONSTIPATION: 0
CHOKING: 0

## 2019-01-08 ENCOUNTER — APPOINTMENT (OUTPATIENT)
Dept: NON INVASIVE DIAGNOSTICS | Age: 54
DRG: 310 | End: 2019-01-08
Payer: OTHER GOVERNMENT

## 2019-01-08 VITALS
BODY MASS INDEX: 31.46 KG/M2 | HEART RATE: 81 BPM | SYSTOLIC BLOOD PRESSURE: 101 MMHG | TEMPERATURE: 98.4 F | OXYGEN SATURATION: 94 % | HEIGHT: 69 IN | RESPIRATION RATE: 14 BRPM | DIASTOLIC BLOOD PRESSURE: 65 MMHG | WEIGHT: 212.4 LBS

## 2019-01-08 LAB
ABSOLUTE EOS #: 0.28 K/UL (ref 0–0.44)
ABSOLUTE IMMATURE GRANULOCYTE: <0.03 K/UL (ref 0–0.3)
ABSOLUTE LYMPH #: 1.89 K/UL (ref 1.1–3.7)
ABSOLUTE MONO #: 0.53 K/UL (ref 0.1–1.2)
ALBUMIN SERPL-MCNC: 3.8 G/DL (ref 3.5–5.2)
ALBUMIN/GLOBULIN RATIO: 1.5 (ref 1–2.5)
ALP BLD-CCNC: 78 U/L (ref 40–129)
ALT SERPL-CCNC: 19 U/L (ref 5–41)
ANION GAP SERPL CALCULATED.3IONS-SCNC: 10 MMOL/L (ref 9–17)
AST SERPL-CCNC: 14 U/L
BASOPHILS # BLD: 1 % (ref 0–2)
BASOPHILS ABSOLUTE: 0.05 K/UL (ref 0–0.2)
BILIRUB SERPL-MCNC: 0.32 MG/DL (ref 0.3–1.2)
BUN BLDV-MCNC: 27 MG/DL (ref 6–20)
BUN/CREAT BLD: 31 (ref 9–20)
CALCIUM SERPL-MCNC: 8.9 MG/DL (ref 8.6–10.4)
CHLORIDE BLD-SCNC: 101 MMOL/L (ref 98–107)
CO2: 29 MMOL/L (ref 20–31)
CREAT SERPL-MCNC: 0.88 MG/DL (ref 0.7–1.2)
DIFFERENTIAL TYPE: ABNORMAL
EKG ATRIAL RATE: 282 BPM
EKG ATRIAL RATE: 284 BPM
EKG P AXIS: -93 DEGREES
EKG P AXIS: -98 DEGREES
EKG Q-T INTERVAL: 274 MS
EKG Q-T INTERVAL: 422 MS
EKG QRS DURATION: 124 MS
EKG QRS DURATION: 96 MS
EKG QTC CALCULATION (BAZETT): 419 MS
EKG QTC CALCULATION (BAZETT): 458 MS
EKG R AXIS: -11 DEGREES
EKG R AXIS: -18 DEGREES
EKG T AXIS: -60 DEGREES
EKG T AXIS: 70 DEGREES
EKG VENTRICULAR RATE: 141 BPM
EKG VENTRICULAR RATE: 71 BPM
EOSINOPHILS RELATIVE PERCENT: 5 % (ref 1–4)
GFR AFRICAN AMERICAN: >60 ML/MIN
GFR NON-AFRICAN AMERICAN: >60 ML/MIN
GFR SERPL CREATININE-BSD FRML MDRD: ABNORMAL ML/MIN/{1.73_M2}
GFR SERPL CREATININE-BSD FRML MDRD: ABNORMAL ML/MIN/{1.73_M2}
GLUCOSE BLD-MCNC: 104 MG/DL (ref 70–99)
HCT VFR BLD CALC: 46.6 % (ref 40.7–50.3)
HEMOGLOBIN: 15.2 G/DL (ref 13–17)
IMMATURE GRANULOCYTES: 0 %
LV EF: 50 %
LVEF MODALITY: NORMAL
LYMPHOCYTES # BLD: 31 % (ref 24–43)
MAGNESIUM: 2.3 MG/DL (ref 1.6–2.6)
MCH RBC QN AUTO: 30.6 PG (ref 25.2–33.5)
MCHC RBC AUTO-ENTMCNC: 32.6 G/DL (ref 28.4–34.8)
MCV RBC AUTO: 94 FL (ref 82.6–102.9)
MONOCYTES # BLD: 9 % (ref 3–12)
NRBC AUTOMATED: 0 PER 100 WBC
PDW BLD-RTO: 11.9 % (ref 11.8–14.4)
PLATELET # BLD: 183 K/UL (ref 138–453)
PLATELET ESTIMATE: ABNORMAL
PMV BLD AUTO: 9.7 FL (ref 8.1–13.5)
POTASSIUM SERPL-SCNC: 3.9 MMOL/L (ref 3.7–5.3)
RBC # BLD: 4.96 M/UL (ref 4.21–5.77)
RBC # BLD: ABNORMAL 10*6/UL
SEG NEUTROPHILS: 54 % (ref 36–65)
SEGMENTED NEUTROPHILS ABSOLUTE COUNT: 3.37 K/UL (ref 1.5–8.1)
SODIUM BLD-SCNC: 140 MMOL/L (ref 135–144)
TOTAL PROTEIN: 6.3 G/DL (ref 6.4–8.3)
WBC # BLD: 6.1 K/UL (ref 3.5–11.3)
WBC # BLD: ABNORMAL 10*3/UL

## 2019-01-08 PROCEDURE — 6360000002 HC RX W HCPCS: Performed by: INTERNAL MEDICINE

## 2019-01-08 PROCEDURE — 2580000003 HC RX 258: Performed by: INTERNAL MEDICINE

## 2019-01-08 PROCEDURE — 93312 ECHO TRANSESOPHAGEAL: CPT

## 2019-01-08 PROCEDURE — 5A2204Z RESTORATION OF CARDIAC RHYTHM, SINGLE: ICD-10-PCS | Performed by: INTERNAL MEDICINE

## 2019-01-08 PROCEDURE — 80053 COMPREHEN METABOLIC PANEL: CPT

## 2019-01-08 PROCEDURE — 6370000000 HC RX 637 (ALT 250 FOR IP): Performed by: PHYSICIAN ASSISTANT

## 2019-01-08 PROCEDURE — 93312 ECHO TRANSESOPHAGEAL: CPT | Performed by: INTERNAL MEDICINE

## 2019-01-08 PROCEDURE — 36415 COLL VENOUS BLD VENIPUNCTURE: CPT

## 2019-01-08 PROCEDURE — 6370000000 HC RX 637 (ALT 250 FOR IP): Performed by: INTERNAL MEDICINE

## 2019-01-08 PROCEDURE — B24BZZ4 ULTRASONOGRAPHY OF HEART WITH AORTA, TRANSESOPHAGEAL: ICD-10-PCS | Performed by: INTERNAL MEDICINE

## 2019-01-08 PROCEDURE — 2580000003 HC RX 258: Performed by: PHYSICIAN ASSISTANT

## 2019-01-08 PROCEDURE — 85025 COMPLETE CBC W/AUTO DIFF WBC: CPT

## 2019-01-08 PROCEDURE — 92960 CARDIOVERSION ELECTRIC EXT: CPT | Performed by: INTERNAL MEDICINE

## 2019-01-08 PROCEDURE — 83735 ASSAY OF MAGNESIUM: CPT

## 2019-01-08 PROCEDURE — 6370000000 HC RX 637 (ALT 250 FOR IP): Performed by: FAMILY MEDICINE

## 2019-01-08 RX ORDER — MIDAZOLAM HYDROCHLORIDE 1 MG/ML
INJECTION INTRAMUSCULAR; INTRAVENOUS
Status: COMPLETED | OUTPATIENT
Start: 2019-01-08 | End: 2019-01-08

## 2019-01-08 RX ORDER — SODIUM CHLORIDE 0.9 % (FLUSH) 0.9 %
10 SYRINGE (ML) INJECTION EVERY 12 HOURS SCHEDULED
Status: DISCONTINUED | OUTPATIENT
Start: 2019-01-08 | End: 2019-01-08 | Stop reason: HOSPADM

## 2019-01-08 RX ORDER — FENTANYL CITRATE 50 UG/ML
INJECTION, SOLUTION INTRAMUSCULAR; INTRAVENOUS
Status: COMPLETED | OUTPATIENT
Start: 2019-01-08 | End: 2019-01-08

## 2019-01-08 RX ORDER — SODIUM CHLORIDE 9 MG/ML
INJECTION, SOLUTION INTRAVENOUS CONTINUOUS
Status: DISCONTINUED | OUTPATIENT
Start: 2019-01-08 | End: 2019-01-08 | Stop reason: HOSPADM

## 2019-01-08 RX ADMIN — Medication 10 ML: at 10:27

## 2019-01-08 RX ADMIN — MIDAZOLAM HYDROCHLORIDE 1 MG: 1 INJECTION, SOLUTION INTRAMUSCULAR; INTRAVENOUS at 12:40

## 2019-01-08 RX ADMIN — ASPIRIN 81 MG CHEWABLE TABLET 81 MG: 81 TABLET CHEWABLE at 08:22

## 2019-01-08 RX ADMIN — Medication 10 ML: at 08:22

## 2019-01-08 RX ADMIN — SODIUM CHLORIDE: 9 INJECTION, SOLUTION INTRAVENOUS at 10:28

## 2019-01-08 RX ADMIN — MIDAZOLAM HYDROCHLORIDE 1 MG: 1 INJECTION, SOLUTION INTRAMUSCULAR; INTRAVENOUS at 12:35

## 2019-01-08 RX ADMIN — MIDAZOLAM HYDROCHLORIDE 1 MG: 1 INJECTION, SOLUTION INTRAMUSCULAR; INTRAVENOUS at 12:32

## 2019-01-08 RX ADMIN — METOPROLOL SUCCINATE 100 MG: 100 TABLET, FILM COATED, EXTENDED RELEASE ORAL at 08:22

## 2019-01-08 RX ADMIN — BENZOCAINE 1 EACH: 200 SPRAY DENTAL; ORAL; PERIODONTAL at 12:26

## 2019-01-08 RX ADMIN — FENTANYL CITRATE 25 MCG: 50 INJECTION INTRAMUSCULAR; INTRAVENOUS at 12:29

## 2019-01-08 RX ADMIN — DILTIAZEM HYDROCHLORIDE 30 MG: 30 TABLET, FILM COATED ORAL at 07:02

## 2019-01-08 RX ADMIN — BENZOCAINE 1 EACH: 200 SPRAY DENTAL; ORAL; PERIODONTAL at 12:27

## 2019-01-08 RX ADMIN — MIDAZOLAM HYDROCHLORIDE 1 MG: 1 INJECTION, SOLUTION INTRAMUSCULAR; INTRAVENOUS at 12:31

## 2019-01-08 RX ADMIN — MIDAZOLAM HYDROCHLORIDE 1 MG: 1 INJECTION, SOLUTION INTRAMUSCULAR; INTRAVENOUS at 12:28

## 2019-01-08 RX ADMIN — APIXABAN 5 MG: 5 TABLET, FILM COATED ORAL at 08:22

## 2019-01-08 ASSESSMENT — PAIN SCALES - GENERAL
PAINLEVEL_OUTOF10: 0

## 2019-01-09 LAB
EKG ATRIAL RATE: 79 BPM
EKG P AXIS: 9 DEGREES
EKG P-R INTERVAL: 154 MS
EKG Q-T INTERVAL: 408 MS
EKG QRS DURATION: 98 MS
EKG QTC CALCULATION (BAZETT): 467 MS
EKG R AXIS: -19 DEGREES
EKG T AXIS: 34 DEGREES
EKG VENTRICULAR RATE: 79 BPM

## 2019-10-02 ENCOUNTER — HOSPITAL ENCOUNTER (OUTPATIENT)
Age: 54
Discharge: HOME | End: 2019-10-04
Payer: OTHER GOVERNMENT

## 2019-10-02 ENCOUNTER — HOSPITAL ENCOUNTER (OUTPATIENT)
Dept: GENERAL RADIOLOGY | Age: 54
Discharge: HOME | End: 2019-10-04
Payer: OTHER GOVERNMENT

## 2019-10-02 DIAGNOSIS — M25.562 ACUTE PAIN OF LEFT KNEE: ICD-10-CM

## 2019-10-02 PROCEDURE — 73562 X-RAY EXAM OF KNEE 3: CPT

## 2019-11-01 ENCOUNTER — HOSPITAL ENCOUNTER (OUTPATIENT)
Dept: MRI IMAGING | Age: 54
Discharge: HOME | End: 2019-11-03
Payer: OTHER GOVERNMENT

## 2019-11-01 DIAGNOSIS — M25.562 ACUTE PAIN OF LEFT KNEE: ICD-10-CM

## 2019-11-01 PROCEDURE — 73721 MRI JNT OF LWR EXTRE W/O DYE: CPT

## 2020-01-09 ENCOUNTER — TELEPHONE (OUTPATIENT)
Dept: CARDIOLOGY | Age: 55
End: 2020-01-09

## 2020-01-10 ENCOUNTER — OFFICE VISIT (OUTPATIENT)
Dept: SURGERY | Age: 55
End: 2020-01-10
Payer: OTHER GOVERNMENT

## 2020-01-10 VITALS
HEIGHT: 68 IN | SYSTOLIC BLOOD PRESSURE: 108 MMHG | DIASTOLIC BLOOD PRESSURE: 75 MMHG | BODY MASS INDEX: 33.34 KG/M2 | HEART RATE: 91 BPM | WEIGHT: 220 LBS | TEMPERATURE: 98.1 F

## 2020-01-10 PROCEDURE — 99203 OFFICE O/P NEW LOW 30 MIN: CPT | Performed by: SURGERY

## 2020-01-10 NOTE — PROGRESS NOTES
used smokeless tobacco. He reports current alcohol use. He reports that he does not use drugs. Family History: family history includes Colon Cancer in his mother; Heart Surgery in his father; Kidney Cancer in his father. Review of Systems:   General: Denies fever, chills, night sweats, weight loss, malaise, fatigue  HEENT: Denies sore throat, sinus problems, allergic rhinosinusitis  Card: Denies chest pain, palpitations, orthopnea/PND. HTN. Afib/flutter/dysrhythmias, CV/ablations  Pulm: Denies cough, shortness of breath, dyspnea on exertion. Smoker. MARIA TERESA  GI:  denies  : Denies polyuria, dysuria, hematuria. Hx urinary issues  Endo: denies  Heme: neg  Neuro: Denies h/o CVA, TIA  Skin: Denies rashes, ulcers  Musculoskeletal: no gross motor dysfunction    Allergies: Patient has no known allergies. Current Meds:  Current Outpatient Medications:     apixaban (ELIQUIS) 5 MG TABS tablet, Take 1 tablet by mouth 2 times daily, Disp: 60 tablet, Rfl: 2    metoprolol succinate (TOPROL XL) 100 MG extended release tablet, TAKE 1 TABLET BY MOUTH ONCE DAILY, Disp: 90 tablet, Rfl: 3    acetaminophen (TYLENOL) 500 MG tablet, Take 500 mg by mouth every 6 hours as needed for Pain, Disp: , Rfl:     aspirin EC 81 MG EC tablet, Take 81 mg by mouth, Disp: , Rfl:     Physical Exam:  Vital signs and Nurse's note reviewed  Gen:  A&Ox3, NAD. Pleasant and cooperative.   HEENT: PERRLA, EOMI, no scleral icterus  Neck:  no goiter  CVS: Regular rate, hx afib/flutter  Resp: Good bilateral air entry, no active wheezing, no labored breathing  Abd: soft, recurrent ventral incisional hernia with contents herniating to the right of the incision, partially reducible, no peritonitis, umbilicus has been modified from last surgery, BMI 33.4  Ext: Moves all extremities, no gross focal motor deficits  Skin: No erythema or ulcerations     Labs:   Lab Results   Component Value Date    WBC 6.1 01/08/2019    HGB 15.2 01/08/2019    HCT 46.6 01/08/2019

## 2020-01-11 ENCOUNTER — HOSPITAL ENCOUNTER (INPATIENT)
Age: 55
LOS: 2 days | Discharge: HOME | DRG: 310 | End: 2020-01-13
Attending: EMERGENCY MEDICINE | Admitting: INTERNAL MEDICINE
Payer: OTHER GOVERNMENT

## 2020-01-11 ENCOUNTER — APPOINTMENT (OUTPATIENT)
Dept: GENERAL RADIOLOGY | Age: 55
DRG: 310 | End: 2020-01-11
Payer: OTHER GOVERNMENT

## 2020-01-11 PROBLEM — I48.91 ATRIAL FIBRILLATION WITH RAPID VENTRICULAR RESPONSE (HCC): Status: ACTIVE | Noted: 2020-01-11

## 2020-01-11 LAB
ABSOLUTE EOS #: 0.18 K/UL (ref 0–0.44)
ABSOLUTE IMMATURE GRANULOCYTE: <0.03 K/UL (ref 0–0.3)
ABSOLUTE LYMPH #: 1.57 K/UL (ref 1.1–3.7)
ABSOLUTE MONO #: 0.55 K/UL (ref 0.1–1.2)
ANION GAP SERPL CALCULATED.3IONS-SCNC: 13 MMOL/L (ref 9–17)
BASOPHILS # BLD: 0 % (ref 0–2)
BASOPHILS ABSOLUTE: 0.03 K/UL (ref 0–0.2)
BUN BLDV-MCNC: 23 MG/DL (ref 6–20)
BUN/CREAT BLD: 31 (ref 9–20)
CALCIUM SERPL-MCNC: 9.2 MG/DL (ref 8.6–10.4)
CHLORIDE BLD-SCNC: 101 MMOL/L (ref 98–107)
CO2: 24 MMOL/L (ref 20–31)
CREAT SERPL-MCNC: 0.74 MG/DL (ref 0.7–1.2)
DIFFERENTIAL TYPE: ABNORMAL
EKG ATRIAL RATE: 141 BPM
EKG Q-T INTERVAL: 324 MS
EKG QRS DURATION: 104 MS
EKG QTC CALCULATION (BAZETT): 463 MS
EKG R AXIS: -16 DEGREES
EKG T AXIS: 66 DEGREES
EKG VENTRICULAR RATE: 123 BPM
EOSINOPHILS RELATIVE PERCENT: 3 % (ref 1–4)
GFR AFRICAN AMERICAN: >60 ML/MIN
GFR NON-AFRICAN AMERICAN: >60 ML/MIN
GFR SERPL CREATININE-BSD FRML MDRD: ABNORMAL ML/MIN/{1.73_M2}
GFR SERPL CREATININE-BSD FRML MDRD: ABNORMAL ML/MIN/{1.73_M2}
GLUCOSE BLD-MCNC: 123 MG/DL (ref 70–99)
HCT VFR BLD CALC: 46.7 % (ref 40.7–50.3)
HEMOGLOBIN: 15.7 G/DL (ref 13–17)
IMMATURE GRANULOCYTES: 0 %
LYMPHOCYTES # BLD: 21 % (ref 24–43)
MCH RBC QN AUTO: 31.2 PG (ref 25.2–33.5)
MCHC RBC AUTO-ENTMCNC: 33.6 G/DL (ref 28.4–34.8)
MCV RBC AUTO: 92.7 FL (ref 82.6–102.9)
MONOCYTES # BLD: 8 % (ref 3–12)
NRBC AUTOMATED: 0 PER 100 WBC
PDW BLD-RTO: 12 % (ref 11.8–14.4)
PLATELET # BLD: 219 K/UL (ref 138–453)
PLATELET ESTIMATE: ABNORMAL
PMV BLD AUTO: 10 FL (ref 8.1–13.5)
POTASSIUM SERPL-SCNC: 4.5 MMOL/L (ref 3.7–5.3)
RBC # BLD: 5.04 M/UL (ref 4.21–5.77)
RBC # BLD: ABNORMAL 10*6/UL
SEG NEUTROPHILS: 68 % (ref 36–65)
SEGMENTED NEUTROPHILS ABSOLUTE COUNT: 4.98 K/UL (ref 1.5–8.1)
SODIUM BLD-SCNC: 138 MMOL/L (ref 135–144)
TROPONIN INTERP: NORMAL
TROPONIN T: <0.03 NG/ML
TROPONIN, HIGH SENSITIVITY: NORMAL NG/L (ref 0–22)
WBC # BLD: 7.3 K/UL (ref 3.5–11.3)
WBC # BLD: ABNORMAL 10*3/UL

## 2020-01-11 PROCEDURE — 2580000003 HC RX 258: Performed by: INTERNAL MEDICINE

## 2020-01-11 PROCEDURE — 84484 ASSAY OF TROPONIN QUANT: CPT

## 2020-01-11 PROCEDURE — 2580000003 HC RX 258: Performed by: EMERGENCY MEDICINE

## 2020-01-11 PROCEDURE — 2000000000 HC ICU R&B

## 2020-01-11 PROCEDURE — 99285 EMERGENCY DEPT VISIT HI MDM: CPT

## 2020-01-11 PROCEDURE — 93010 ELECTROCARDIOGRAM REPORT: CPT | Performed by: FAMILY MEDICINE

## 2020-01-11 PROCEDURE — 85025 COMPLETE CBC W/AUTO DIFF WBC: CPT

## 2020-01-11 PROCEDURE — 6370000000 HC RX 637 (ALT 250 FOR IP): Performed by: INTERNAL MEDICINE

## 2020-01-11 PROCEDURE — 71045 X-RAY EXAM CHEST 1 VIEW: CPT

## 2020-01-11 PROCEDURE — 96376 TX/PRO/DX INJ SAME DRUG ADON: CPT

## 2020-01-11 PROCEDURE — 80048 BASIC METABOLIC PNL TOTAL CA: CPT

## 2020-01-11 PROCEDURE — 96365 THER/PROPH/DIAG IV INF INIT: CPT

## 2020-01-11 PROCEDURE — 2500000003 HC RX 250 WO HCPCS: Performed by: EMERGENCY MEDICINE

## 2020-01-11 PROCEDURE — 93005 ELECTROCARDIOGRAM TRACING: CPT | Performed by: EMERGENCY MEDICINE

## 2020-01-11 PROCEDURE — 96361 HYDRATE IV INFUSION ADD-ON: CPT

## 2020-01-11 RX ORDER — ACETAMINOPHEN 500 MG
500 TABLET ORAL EVERY 6 HOURS PRN
Status: DISCONTINUED | OUTPATIENT
Start: 2020-01-11 | End: 2020-01-13 | Stop reason: HOSPADM

## 2020-01-11 RX ORDER — FAMOTIDINE 20 MG/1
20 TABLET, FILM COATED ORAL 2 TIMES DAILY
Status: DISCONTINUED | OUTPATIENT
Start: 2020-01-11 | End: 2020-01-13 | Stop reason: HOSPADM

## 2020-01-11 RX ORDER — ASPIRIN 81 MG/1
81 TABLET ORAL DAILY
Status: DISCONTINUED | OUTPATIENT
Start: 2020-01-11 | End: 2020-01-13 | Stop reason: HOSPADM

## 2020-01-11 RX ORDER — METOPROLOL SUCCINATE 100 MG/1
100 TABLET, EXTENDED RELEASE ORAL DAILY
Status: DISCONTINUED | OUTPATIENT
Start: 2020-01-11 | End: 2020-01-13 | Stop reason: HOSPADM

## 2020-01-11 RX ORDER — SODIUM CHLORIDE 0.9 % (FLUSH) 0.9 %
10 SYRINGE (ML) INJECTION PRN
Status: DISCONTINUED | OUTPATIENT
Start: 2020-01-11 | End: 2020-01-13 | Stop reason: HOSPADM

## 2020-01-11 RX ORDER — ONDANSETRON 2 MG/ML
4 INJECTION INTRAMUSCULAR; INTRAVENOUS EVERY 6 HOURS PRN
Status: DISCONTINUED | OUTPATIENT
Start: 2020-01-11 | End: 2020-01-13 | Stop reason: HOSPADM

## 2020-01-11 RX ORDER — IBUPROFEN 200 MG
400 TABLET ORAL EVERY MORNING
COMMUNITY
End: 2020-03-04 | Stop reason: ALTCHOICE

## 2020-01-11 RX ORDER — DILTIAZEM HYDROCHLORIDE 5 MG/ML
10 INJECTION INTRAVENOUS ONCE
Status: COMPLETED | OUTPATIENT
Start: 2020-01-11 | End: 2020-01-11

## 2020-01-11 RX ORDER — SODIUM CHLORIDE 9 MG/ML
INJECTION, SOLUTION INTRAVENOUS CONTINUOUS
Status: DISCONTINUED | OUTPATIENT
Start: 2020-01-11 | End: 2020-01-13 | Stop reason: HOSPADM

## 2020-01-11 RX ORDER — SODIUM CHLORIDE 0.9 % (FLUSH) 0.9 %
10 SYRINGE (ML) INJECTION EVERY 12 HOURS SCHEDULED
Status: DISCONTINUED | OUTPATIENT
Start: 2020-01-11 | End: 2020-01-13 | Stop reason: HOSPADM

## 2020-01-11 RX ADMIN — SODIUM CHLORIDE: 9 INJECTION, SOLUTION INTRAVENOUS at 14:57

## 2020-01-11 RX ADMIN — FAMOTIDINE 20 MG: 20 TABLET ORAL at 20:22

## 2020-01-11 RX ADMIN — SODIUM CHLORIDE 500 ML: 9 INJECTION, SOLUTION INTRAVENOUS at 14:22

## 2020-01-11 RX ADMIN — SODIUM CHLORIDE: 9 INJECTION, SOLUTION INTRAVENOUS at 23:47

## 2020-01-11 RX ADMIN — SODIUM CHLORIDE 500 ML: 9 INJECTION, SOLUTION INTRAVENOUS at 16:00

## 2020-01-11 RX ADMIN — DILTIAZEM HYDROCHLORIDE 10 MG: 5 INJECTION INTRAVENOUS at 14:26

## 2020-01-11 RX ADMIN — DILTIAZEM HYDROCHLORIDE 10 MG/HR: 5 INJECTION INTRAVENOUS at 14:33

## 2020-01-11 RX ADMIN — APIXABAN 5 MG: 5 TABLET, FILM COATED ORAL at 20:22

## 2020-01-11 ASSESSMENT — ENCOUNTER SYMPTOMS
WHEEZING: 0
DIARRHEA: 0
ABDOMINAL DISTENTION: 0
BACK PAIN: 0
SHORTNESS OF BREATH: 0
CHEST TIGHTNESS: 0
TROUBLE SWALLOWING: 0
ABDOMINAL PAIN: 0
COLOR CHANGE: 0
VOMITING: 0
CHOKING: 0
COUGH: 0
CONSTIPATION: 0
NAUSEA: 0

## 2020-01-11 ASSESSMENT — PAIN SCALES - GENERAL
PAINLEVEL_OUTOF10: 0

## 2020-01-11 NOTE — ED NOTES
Dr. Cait Meyers called per Dr. Singh Mccrary, left message on answering service.      Sangeetha Rosado  01/11/20 1709

## 2020-01-11 NOTE — ED NOTES
Provider aware of pt BP readings. Pt denies feelings of dizziness, lightheadness or chest pain.       Lise Pandey, RN  01/11/20 1320 [facility], RN  01/11/20 5641

## 2020-01-11 NOTE — ED PROVIDER NOTES
Forced sexual activity: None   Other Topics Concern    None   Social History Narrative    None       SCREENINGS    Izaiah Coma Scale  Eye Opening: Spontaneous  Best Verbal Response: Oriented  Best Motor Response: Obeys commands  [place] Coma Scale Score: 15        PHYSICAL EXAM  (up to 7 for level 4, 8 or more for level 5)     ED Triage Vitals   BP Temp Temp src Pulse Resp SpO2 Height Weight   01/11/20 1409 01/11/20 1408 -- 01/11/20 1409 01/11/20 1408 01/11/20 1409 01/11/20 1408 01/11/20 1408   137/81 98 °F (36.7 °C)  125 18 100 % 5' 8\" (1.727 m) 215 lb (97.5 kg)       Physical Exam  Constitutional:       General: He is not in acute distress. Appearance: Normal appearance. He is normal weight. He is not ill-appearing, toxic-appearing or diaphoretic. HENT:      Head: Normocephalic and atraumatic. Eyes:      Extraocular Movements: Extraocular movements intact. Conjunctiva/sclera: Conjunctivae normal.      Pupils: Pupils are equal, round, and reactive to light. Neck:      Musculoskeletal: Normal range of motion and neck supple. Cardiovascular:      Rate and Rhythm: Tachycardia present. Rhythm irregular. Pulses: Normal pulses. Heart sounds: Normal heart sounds. Pulmonary:      Effort: Pulmonary effort is normal.      Breath sounds: Normal breath sounds. Abdominal:      General: Abdomen is flat. Bowel sounds are normal.      Palpations: Abdomen is soft. Tenderness: There is no tenderness. There is no guarding. Musculoskeletal: Normal range of motion. General: No swelling or tenderness. Right lower leg: No edema. Left lower leg: No edema. Skin:     General: Skin is warm and dry. Neurological:      General: No focal deficit present. Mental Status: He is alert and oriented to person, place, and time. Psychiatric:         Mood and Affect: Mood normal.         Behavior: Behavior normal.         Thought Content:  Thought content normal.         Judgment:

## 2020-01-12 PROBLEM — K42.9 UMBILICAL HERNIA WITHOUT OBSTRUCTION AND WITHOUT GANGRENE: Status: RESOLVED | Noted: 2018-10-05 | Resolved: 2020-01-12

## 2020-01-12 PROBLEM — I48.3 TYPICAL ATRIAL FLUTTER (HCC): Status: RESOLVED | Noted: 2019-01-07 | Resolved: 2020-01-12

## 2020-01-12 LAB
ABSOLUTE EOS #: 0.23 K/UL (ref 0–0.44)
ABSOLUTE IMMATURE GRANULOCYTE: <0.03 K/UL (ref 0–0.3)
ABSOLUTE LYMPH #: 1.53 K/UL (ref 1.1–3.7)
ABSOLUTE MONO #: 0.44 K/UL (ref 0.1–1.2)
ANION GAP SERPL CALCULATED.3IONS-SCNC: 8 MMOL/L (ref 9–17)
BASOPHILS # BLD: 1 % (ref 0–2)
BASOPHILS ABSOLUTE: 0.03 K/UL (ref 0–0.2)
BUN BLDV-MCNC: 17 MG/DL (ref 6–20)
BUN/CREAT BLD: 24 (ref 9–20)
CALCIUM SERPL-MCNC: 8.2 MG/DL (ref 8.6–10.4)
CHLORIDE BLD-SCNC: 103 MMOL/L (ref 98–107)
CO2: 25 MMOL/L (ref 20–31)
CREAT SERPL-MCNC: 0.72 MG/DL (ref 0.7–1.2)
DIFFERENTIAL TYPE: NORMAL
EOSINOPHILS RELATIVE PERCENT: 4 % (ref 1–4)
GFR AFRICAN AMERICAN: >60 ML/MIN
GFR NON-AFRICAN AMERICAN: >60 ML/MIN
GFR SERPL CREATININE-BSD FRML MDRD: ABNORMAL ML/MIN/{1.73_M2}
GFR SERPL CREATININE-BSD FRML MDRD: ABNORMAL ML/MIN/{1.73_M2}
GLUCOSE BLD-MCNC: 106 MG/DL (ref 70–99)
HCT VFR BLD CALC: 41.8 % (ref 40.7–50.3)
HEMOGLOBIN: 13.9 G/DL (ref 13–17)
IMMATURE GRANULOCYTES: 0 %
LYMPHOCYTES # BLD: 29 % (ref 24–43)
MCH RBC QN AUTO: 31.1 PG (ref 25.2–33.5)
MCHC RBC AUTO-ENTMCNC: 33.3 G/DL (ref 28.4–34.8)
MCV RBC AUTO: 93.5 FL (ref 82.6–102.9)
MONOCYTES # BLD: 8 % (ref 3–12)
NRBC AUTOMATED: 0 PER 100 WBC
PDW BLD-RTO: 12 % (ref 11.8–14.4)
PLATELET # BLD: 155 K/UL (ref 138–453)
PLATELET ESTIMATE: NORMAL
PMV BLD AUTO: 9.7 FL (ref 8.1–13.5)
POTASSIUM SERPL-SCNC: 4.1 MMOL/L (ref 3.7–5.3)
RBC # BLD: 4.47 M/UL (ref 4.21–5.77)
RBC # BLD: NORMAL 10*6/UL
SEG NEUTROPHILS: 58 % (ref 36–65)
SEGMENTED NEUTROPHILS ABSOLUTE COUNT: 2.98 K/UL (ref 1.5–8.1)
SODIUM BLD-SCNC: 136 MMOL/L (ref 135–144)
WBC # BLD: 5.2 K/UL (ref 3.5–11.3)
WBC # BLD: NORMAL 10*3/UL

## 2020-01-12 PROCEDURE — 36415 COLL VENOUS BLD VENIPUNCTURE: CPT

## 2020-01-12 PROCEDURE — 85025 COMPLETE CBC W/AUTO DIFF WBC: CPT

## 2020-01-12 PROCEDURE — 2580000003 HC RX 258: Performed by: INTERNAL MEDICINE

## 2020-01-12 PROCEDURE — 80048 BASIC METABOLIC PNL TOTAL CA: CPT

## 2020-01-12 PROCEDURE — 6370000000 HC RX 637 (ALT 250 FOR IP): Performed by: INTERNAL MEDICINE

## 2020-01-12 PROCEDURE — 2000000000 HC ICU R&B

## 2020-01-12 RX ADMIN — FAMOTIDINE 20 MG: 20 TABLET ORAL at 20:07

## 2020-01-12 RX ADMIN — SODIUM CHLORIDE: 9 INJECTION, SOLUTION INTRAVENOUS at 07:42

## 2020-01-12 RX ADMIN — APIXABAN 5 MG: 5 TABLET, FILM COATED ORAL at 20:07

## 2020-01-12 RX ADMIN — APIXABAN 5 MG: 5 TABLET, FILM COATED ORAL at 08:06

## 2020-01-12 RX ADMIN — ASPIRIN 81 MG: 81 TABLET, COATED ORAL at 08:06

## 2020-01-12 RX ADMIN — FAMOTIDINE 20 MG: 20 TABLET ORAL at 08:06

## 2020-01-12 RX ADMIN — METOPROLOL SUCCINATE 100 MG: 100 TABLET, EXTENDED RELEASE ORAL at 08:06

## 2020-01-12 RX ADMIN — SODIUM CHLORIDE: 9 INJECTION, SOLUTION INTRAVENOUS at 15:31

## 2020-01-12 ASSESSMENT — PAIN SCALES - GENERAL
PAINLEVEL_OUTOF10: 0

## 2020-01-12 NOTE — H&P
Michelle Franco M.D. Internal Medicine History and Physical 1/12/20    Patient:  Omega Berger  MRN: [id number]    Chief Complaint:  palpitations    History Obtained From:  patient, electronic medical record  PCP: June Fuentes MD    History of Present Illness: The patient is a 54 y.o. male with known h/o atrial fibrillation and atrial flutter, on Metoprolol for rate control and Eliquis for stroke prophylaxis, presented to the ER with palpitations which started 4 days ago. He also complained of becoming easily fatigued but denied chest pain, shortness of breath, syncope or near-syncope. He denied any GI or  symptoms. In ER he was found to be in atrial fibrillation with RVR. He was started on a diltiazem drip and HR responded down to the 70's-90's fairly quickly. Past Medical History:        Diagnosis Date    Aortic valve disorders     Atrial fibrillation (HCC)     Atrial flutter ([address]     Encounter for cardioversion procedure 5/5/2015    Dr Vladislav Zimmerman    Encounter for cardioversion procedure 6/5/2015    Airam Garcia/Juanis    Frequent urination     Pt states he's developed frequent urination with 3 times in last yr noting blood in urine as well.  H/O echocardiogram 12/04/2017    EF 97-96% Global LV systolic function appears preserved.  Mildly increaseed LV wall thinckness with normal LV carvity size, No definite specific wall motion abnormalities identified, LA is moderately dilated (34-39) with LA volume index of 36ml/m2, Mild mitral and tricuspid regurgitation, moderate aortic regurgitation, Evidence of mild diastolic dysfunction seen    Hemorrhage of rectum and anus     History of 24 hour EKG monitoring 4/3/14    Unremarkable except for mild increased number of PVC's with 3 ventricular couplets, asymptomatic     History of echocardiogram 4/10/14    EF 60%, mild to mod LVH, moderate AI    History of heart surgery 2008    enlarged aorta    History of PFTs 6/25/15 Consistent with mild obstructive ventilatory impairment. Lung volumes are normal,except mild increase in residual volume,which could be suggestive of airway trapping. Diffusion capacity is normal.    Hx of transesophageal echocardiography (SKYLAR) for monitoring 5/5/2015    Dr Risa Blas    Hypertension     Impaired fasting glucose     Lipoma     Obesity (BMI 30.0-34. 9)     Obstructive sleep apnea (adult) (pediatric)     Paroxysmal supraventricular tachycardia ([address]     Tobacco use disorder     Urinary urgency        Past Surgical History:        Procedure Laterality Date    ABLATION OF DYSRHYTHMIC FOCUS  06/2015    ACHILLES TENDON SURGERY Right 2010   [facility] CARDIAC SURGERY  2008    Enlarged aorta; repaired aortic valve    COLONOSCOPY  06/13/2013    FOOT SURGERY      reconstructive for club foot (right)    HERNIA REPAIR      REPAIR UMBILICAL XCNN,6+I/H,DFGRC N/A 10/81/3604    HERNIA UMBILICAL REPAIR performed by Arabella Alvarez MD at . [facility]  10/24/2018    VASECTOMY         Medications Prior to Admission:    Prior to Admission medications    Medication Sig Start Date End Date Taking? Authorizing Provider   ibuprofen (MOTRIN IB) 200 MG tablet Take 400 mg by mouth every morning   Yes Historical Provider, MD   apixaban (ELIQUIS) 5 MG TABS tablet Take 1 tablet by mouth 2 times daily 7/18/19  Yes Emma Huang MD   metoprolol succinate (TOPROL XL) 100 MG extended release tablet TAKE 1 TABLET BY MOUTH ONCE DAILY  Patient taking differently: Take 100 mg by mouth daily TAKE 1 TABLET BY MOUTH ONCE DAILY 7/18/19  Yes Emma Huang MD   aspirin EC 81 MG EC tablet Take 81 mg by mouth 2/5/16  Yes Historical Provider, MD   acetaminophen (TYLENOL) 500 MG tablet Take 500 mg by mouth every 6 hours as needed for Pain    Historical Provider, MD       Allergies:  Patient has no known allergies. Social History:   TOBACCO:   reports that he has been smoking cigarettes.

## 2020-01-12 NOTE — PLAN OF CARE
Able to verbalize need to for assistance, and encourage to express needs/concerns. Will continue to assist with daily care as verbalized and encourage independence with out sacrificing safety. Call light and personal belongings within reach. Aileen Vasquez RN      Problem: Pain:  Goal: Patient's pain/discomfort is manageable  Description  Patient's pain/discomfort is manageable  Outcome: Ongoing  Note:   Assess patients pain every four hours and as needed using the 1-10 pain scale. Patient encouraged to reposition every two hours and as needed. Medications as prescribed by physician. Alternatives to pain medications provided as tolerated. Will continue to monitor.

## 2020-01-12 NOTE — PLAN OF CARE
lightheadedness, dyspnea, or palpitations. No peripheral edema. 1/12/2020 0403 by Sita Chau RN  Outcome: Ongoing  Goal: Hemodynamic stability will improve  Description  Hemodynamic stability will improve  1/12/2020 0934 by Phillip Alston RN  Outcome: Ongoing  Note:   Monitoring telemetry and frequent blood pressures. MAP within acceptable limits. 1/12/2020 0403 by Sita Chau RN  Outcome: Ongoing     Problem: Safety:  Goal: Free from accidental physical injury  Description  Free from accidental physical injury  1/12/2020 0934 by Phillip Aslton RN  Outcome: Ongoing  Note:   Joseline Velez fall score calculated on admission and daily at midnight and shows pt at 20 risk for fall. Bed in lowest position at all times with wheels locked. Call light and bedside table with in reach. ID information verified as correct and visible. Will continue to monitor and intervene as necessary. Phillip Alston RN   1/12/2020 0403 by Sita Chau RN  Outcome: Ongoing  Note:   Patient is alert and oriented and has demonstrated the use of using the call light for assistance before getting up. Education has been provided to defer the use of the bed alarm and/or restraint free alarm as the patient has shown competency in calling for assistance and verbalizing the risk. Falling star program in use with fall band in place. Non skid socks are worn by patient. Call light, and bed side table along with personal belongings are within reach of patient. Will continue to monitor. Problem: Cardiac:  Goal: Risk factors for ineffective tissue perfusion will decrease  Description  Risk factors for ineffective tissue perfusion will decrease  1/12/2020 0934 by Phillip Alston RN  Outcome: Completed  Note:   Heart rate controlled with home medications.   Cardizem drip off since 01:30.  1/12/2020 0403 by Sita Chau RN  Outcome: Ongoing     Problem: Safety:  Goal: Free from intentional harm  Description  Free from intentional harm  Outcome: Completed  Note:   Not at risk for intentional harm. Problem: Pain:  Goal: Patient's pain/discomfort is manageable  Description  Patient's pain/discomfort is manageable  1/12/2020 0934 by Jameel Weiner RN  Outcome: Completed  Note:   Patient denies pain. 1/12/2020 0403 by Estela Colorado RN  Outcome: Ongoing  Note:   Assess patients pain every four hours and as needed using the 1-10 pain scale. Patient encouraged to reposition every two hours and as needed. Medications as prescribed by physician. Alternatives to pain medications provided as tolerated. Will continue to monitor.

## 2020-01-13 ENCOUNTER — APPOINTMENT (OUTPATIENT)
Dept: NON INVASIVE DIAGNOSTICS | Age: 55
DRG: 310 | End: 2020-01-13
Payer: OTHER GOVERNMENT

## 2020-01-13 VITALS
HEART RATE: 69 BPM | HEIGHT: 68 IN | BODY MASS INDEX: 33.95 KG/M2 | DIASTOLIC BLOOD PRESSURE: 65 MMHG | RESPIRATION RATE: 18 BRPM | WEIGHT: 224 LBS | SYSTOLIC BLOOD PRESSURE: 106 MMHG | TEMPERATURE: 98 F | OXYGEN SATURATION: 97 %

## 2020-01-13 LAB
EKG ATRIAL RATE: 286 BPM
EKG ATRIAL RATE: 71 BPM
EKG P AXIS: -84 DEGREES
EKG P AXIS: 41 DEGREES
EKG P-R INTERVAL: 174 MS
EKG Q-T INTERVAL: 416 MS
EKG Q-T INTERVAL: 428 MS
EKG QRS DURATION: 102 MS
EKG QRS DURATION: 96 MS
EKG QTC CALCULATION (BAZETT): 442 MS
EKG QTC CALCULATION (BAZETT): 465 MS
EKG R AXIS: -19 DEGREES
EKG R AXIS: -5 DEGREES
EKG T AXIS: 23 DEGREES
EKG T AXIS: 9 DEGREES
EKG VENTRICULAR RATE: 68 BPM
EKG VENTRICULAR RATE: 71 BPM

## 2020-01-13 PROCEDURE — 93005 ELECTROCARDIOGRAM TRACING: CPT | Performed by: INTERNAL MEDICINE

## 2020-01-13 PROCEDURE — 93312 ECHO TRANSESOPHAGEAL: CPT

## 2020-01-13 PROCEDURE — 5A2204Z RESTORATION OF CARDIAC RHYTHM, SINGLE: ICD-10-PCS | Performed by: INTERNAL MEDICINE

## 2020-01-13 PROCEDURE — 6370000000 HC RX 637 (ALT 250 FOR IP): Performed by: INTERNAL MEDICINE

## 2020-01-13 PROCEDURE — 93312 ECHO TRANSESOPHAGEAL: CPT | Performed by: INTERNAL MEDICINE

## 2020-01-13 PROCEDURE — 93010 ELECTROCARDIOGRAM REPORT: CPT | Performed by: INTERNAL MEDICINE

## 2020-01-13 PROCEDURE — B24BZZ4 ULTRASONOGRAPHY OF HEART WITH AORTA, TRANSESOPHAGEAL: ICD-10-PCS | Performed by: INTERNAL MEDICINE

## 2020-01-13 PROCEDURE — 92960 CARDIOVERSION ELECTRIC EXT: CPT

## 2020-01-13 PROCEDURE — 99222 1ST HOSP IP/OBS MODERATE 55: CPT | Performed by: INTERNAL MEDICINE

## 2020-01-13 PROCEDURE — 6360000002 HC RX W HCPCS: Performed by: INTERNAL MEDICINE

## 2020-01-13 PROCEDURE — 92960 CARDIOVERSION ELECTRIC EXT: CPT | Performed by: INTERNAL MEDICINE

## 2020-01-13 RX ORDER — MIDAZOLAM HYDROCHLORIDE 1 MG/ML
INJECTION INTRAMUSCULAR; INTRAVENOUS DAILY PRN
Status: COMPLETED | OUTPATIENT
Start: 2020-01-13 | End: 2020-01-13

## 2020-01-13 RX ORDER — ASPIRIN 81 MG/1
81 TABLET ORAL DAILY
Qty: 30 TABLET | Refills: 3 | Status: SHIPPED | OUTPATIENT
Start: 2020-01-14 | End: 2021-02-25

## 2020-01-13 RX ORDER — FENTANYL CITRATE 50 UG/ML
INJECTION, SOLUTION INTRAMUSCULAR; INTRAVENOUS DAILY PRN
Status: COMPLETED | OUTPATIENT
Start: 2020-01-13 | End: 2020-01-13

## 2020-01-13 RX ADMIN — MIDAZOLAM 1 MG: 1 INJECTION INTRAMUSCULAR; INTRAVENOUS at 12:37

## 2020-01-13 RX ADMIN — METOPROLOL SUCCINATE 100 MG: 100 TABLET, EXTENDED RELEASE ORAL at 09:29

## 2020-01-13 RX ADMIN — FENTANYL CITRATE 50 MCG: 50 INJECTION INTRAMUSCULAR; INTRAVENOUS at 12:26

## 2020-01-13 RX ADMIN — APIXABAN 5 MG: 5 TABLET, FILM COATED ORAL at 09:29

## 2020-01-13 RX ADMIN — ASPIRIN 81 MG: 81 TABLET, COATED ORAL at 09:29

## 2020-01-13 RX ADMIN — MIDAZOLAM 2 MG: 1 INJECTION INTRAMUSCULAR; INTRAVENOUS at 12:30

## 2020-01-13 RX ADMIN — FAMOTIDINE 20 MG: 20 TABLET ORAL at 09:29

## 2020-01-13 RX ADMIN — MIDAZOLAM 2 MG: 1 INJECTION INTRAMUSCULAR; INTRAVENOUS at 12:25

## 2020-01-13 NOTE — OP NOTE
SKYLAR Post-op Note    Patient: Mr. Ayad Irvin      Procedure(s) Performed: SKYLAR    Anesthesia type: Conscious sedation    Patient location: ICU    Post-op pain: Adequate analgesia    Post-op assessment: no apparent anesthetic complications    Post-op Diagnosis: No evidence of left instrumentation thrombi. See Echo report for full details. Last Vitals:   Vitals:    01/13/20 1242   BP: (!) 174/76   Pulse: 70   Resp: 14   Temp:    SpO2: 98%       Post-op vital signs: stable    Level of consciousness: awake    Complications: none    Deann Corea MD, MS, F.A.C.C.   [facility]) [facility], [address] [facility], [address]  Phone: [phone], Fax: [phone]

## 2020-01-13 NOTE — PLAN OF CARE
Problem: Cardiac:  Goal: Ability to maintain an adequate cardiac output will improve  Description  Ability to maintain an adequate cardiac output will improve  1/12/2020 2016 by Lake Martinez RN  Note:   Denies any chest pain. HR in the 80's. Denies any SOB skin color normal, strong radial pulses. Problem: Cardiac:  Goal: Complications related to the disease process, condition or treatment will be avoided or minimized  Description  Complications related to the disease process, condition or treatment will be avoided or minimized  1/12/2020 0934 by Oliver Baker RN  Outcome: Ongoing  Note:   Monitor shows atrial flutter with controlled rate. Patient denies chest discomfort, dizziness or lightheadedness, dyspnea, or palpitations. No peripheral edema. Problem: Cardiac:  Goal: Hemodynamic stability will improve  Description  Hemodynamic stability will improve  1/12/2020 2016 by Lake Martinez RN  Note:   A- flutter rates in the 80's SBP trends in the low 100's     Problem: Fluid Volume:  Goal: Will show no signs or symptoms of fluid imbalance  Description  Will show no signs or symptoms of fluid imbalance  1/12/2020 2016 by Lake Martinez RN  Note:   Taking PO fluids has a maint IV at 125ml/hr.

## 2020-01-13 NOTE — SEDATION DOCUMENTATION
Cardioversion done at this time. Patient shocked 200 J and synchronized. Patient converted to NSR. EKG done to confirm. Dr. Minh Jones at bedside to verify. Patient tolerated fairly well. Patient awake and alert but drowsy.

## 2020-01-13 NOTE — SEDATION DOCUMENTATION
Patient in bed at this time. Consents signed and procedures explained by Dr. Yair Araya, RN supervisor, Aleyda Álvarez, RT, and writer at bedside. Cardiac monitor and patches in place. Vital signs stable.

## 2020-01-13 NOTE — DISCHARGE SUMMARY
01/12/2020     01/12/2020    CO2 25 01/12/2020    LABGLOM >60 01/12/2020       Lab Results   Component Value Date    WBCUA 0 TO 2 03/30/2015    RBCUA 0 TO 2 03/30/2015    EPITHUA 0 TO 2 03/30/2015    LEUKOCYTESUR NEGATIVE 03/30/2015    SPECGRAV 1.015 03/30/2015    GLUCOSEU NEGATIVE 03/30/2015    KETUA NEGATIVE 03/30/2015    PROTEINU NEGATIVE 03/30/2015    HGBUR NEGATIVE 03/30/2015    CASTUA NOT REPORTED 03/30/2015    CRYSTUA NOT REPORTED 03/30/2015    BACTERIA NOT REPORTED 03/30/2015    YEAST NOT REPORTED 03/30/2015       Xr Chest 1 Vw    Result Date: 1/11/2020  EXAMINATION: ONE XRAY VIEW OF THE CHEST 1/11/2020 2:50 pm COMPARISON: Chest x-ray 01/07/2019 HISTORY: ORDERING SYSTEM PROVIDED HISTORY: Atrial fibrillation TECHNOLOGIST PROVIDED HISTORY: Atrial fibrillation FINDINGS: The lungs are clear. Costophrenic angles are clear. Cardiac silhouette is unchanged. Sternotomy wires are present. Old right rib fractures. No evidence of acute cardiopulmonary disease.        Discharge Diagnoses:    Principal Problem:    Atrial fibrillation with rapid ventricular response (HCC)  Active Problems:    Typical atrial flutter (HCC)    Tobacco abuse    Impaired fasting glucose    Hypertension  Resolved Problems:    Paroxysmal atrial fibrillation with [facility])      Active Hospital Problems    Diagnosis Date Noted    Impaired fasting glucose [R73.01]     Hypertension [I10]     Atrial fibrillation with rapid ventricular response ([address] [I48.91] 01/11/2020    Tobacco abuse [Z72.0]     Typical atrial flutter ([address] [I48.3] 01/07/2019       Discharge Medications:       Mechele Matters   Home Medication Instructions LVF:[id number]    Printed on:01/13/20 3812   Medication Information                      acetaminophen (TYLENOL) 500 MG tablet  Take 500 mg by mouth every 6 hours as needed for Pain             apixaban (ELIQUIS) 5 MG TABS tablet  Take 1 tablet by mouth 2 times daily             aspirin 81 MG EC tablet  Take 1 tablet by mouth daily             ibuprofen (MOTRIN IB) 200 MG tablet  Take 400 mg by mouth every morning             metoprolol succinate (TOPROL XL) 100 MG extended release tablet  TAKE 1 TABLET BY MOUTH ONCE DAILY                 Patient Instructions: Activity: activity as tolerated  Diet: cardiac diet and low fat, low cholesterol diet  Wound Care: none needed  Other: Continue Eliquis for 6 weeks per Dr. Ngoc Dela Cruz. Remain on preadmission Toprol XL 100mg daily. He will follow up with Cardiology in 1 month.        Disposition:   Discharge to Home    Follow up:  Patient will be followed by June Fuentes MD in 1-2 weeks    CORE MEASURES on Discharge (if applicable)  ACE/ARB in CHF: NA  Statin in MI: NA  ASA in MI: NA  Statin in CVA: NA  Antiplatelet in CVA: NA    Total time spent on discharge services: 45 minutes    Including the following activities:  Evaluation and Management of patient  Discussion with patient and/or surrogate about current care plan  Coordination with Case Management and/or   Coordination of care with Consultants (if applicable)   Coordination of care with Receiving Facility Physician (if applicable)  Completion of DME forms (if applicable)  Preparation of Discharge Summary  Preparation of Medication Reconciliation  Preparation of Discharge Prescriptions    Signed:  AMADOR Herrera, NP-C  1/13/2020, 2:31 PM

## 2020-01-13 NOTE — PROGRESS NOTES
Beau Hernandez notified of cardiology consult. No new orders presently.   Attempted to fax cardiology consult fax sheet to office, got busy signal.
Dr. Erika Reid at bedside.
Instructed patient on policy and procedure of a SKYLAR.
Patient discharged at this time. Reviewed medications with patient and made follow up appointments.
Patient resting soundly at this time. HR 60-70's while at rest in atrial flutter. Respirations are easy and unlabored. Will continue to monitor.
Progress Note    SUBJECTIVE:  Follow up on irregular heartbeat    OBJECTIVE:  Resting in bed watching TV. Denies CP, SOB. Denies feeling palpitations this time. Awaiting Dr. Nam Brewster to evaluate for possible cardioversion today. Vitals:   Vitals:    01/13/20 0700   BP: (!) 130/93   Pulse: 70   Resp: 18   Temp: 97.8 °F (36.6 °C)   SpO2: 95%     Weight: 224 lb (101.6 kg)   Height: 5' 8\" (172.7 cm)   -----------------------------------------------------------------  Exam:    CONSTITUTIONAL:  awake, alert, cooperative, no apparent distress, and appears stated age  EYES:  Lids and lashes normal, pupils equal, round and reactive to light, extra ocular muscles intact, sclera clear, conjunctiva normal  NECK:  Supple, symmetrical, trachea midline, no adenopathy, thyroid symmetric, not enlarged and no tenderness, skin normal  HEMATOLOGIC/LYMPHATICS:  no cervical lymphadenopathy and no supraclavicular lymphadenopathy  BACK:  Symmetric, no curvature, spinous processes are non-tender on palpation, paraspinous muscles are non-tender on palpation, no costal vertebral tenderness  LUNGS:  No increased work of breathing, good air exchange, clear to auscultation bilaterally, no crackles or wheezing  CARDIOVASCULAR:  normal S1 and S2, no edema and irregular HR--Atrial Flutter  ABDOMEN:  No scars, normal bowel sounds, soft, non-distended, non-tender, no masses palpated, no hepatosplenomegally  MUSCULOSKELETAL:  There is no redness, warmth, or swelling of the joints. Full range of motion noted. Motor strength is 5 out of 5 all extremities bilaterally. Tone is normal.  NEUROLOGIC:  Awake, alert, oriented to name, place and time.   Gait is normal.  SKIN:  no bruising or bleeding, normal skin color, texture, turgor, no redness, warmth, or swelling, no rashes and no lesions  EXT:     no cyanosis, clubbing or edema present    -----------------------------------------------------------------  Diagnostic Data: Reviewed    ASSESSMENT:
Classification: BMI 30.0 - 34.9 Obese Class I    Lab Results   Component Value Date     01/12/2020    K 4.1 01/12/2020     01/12/2020    CO2 25 01/12/2020    BUN 17 01/12/2020    CREATININE 0.72 01/12/2020    GLUCOSE 106 (H) 01/12/2020    CALCIUM 8.2 (L) 01/12/2020    PROT 6.3 (L) 01/08/2019    LABALBU 3.8 01/08/2019    BILITOT 0.32 01/08/2019    ALKPHOS 78 01/08/2019    AST 14 01/08/2019    ALT 19 01/08/2019    LABGLOM >60 01/12/2020    GFRAA >60 01/12/2020    GLOB NOT REPORTED 04/03/2014     Nutrition Interventions:   Continue current diet  Continued Inpatient Monitoring, Coordination of Care, Education Initiated    Nutrition Evaluation:   · Evaluation: Goals set   · Goals: PO>75% meals with heart healthy choices    · Monitoring: Meal Intake, Pertinent Labs, Weight, I&O, Patient/Family Education    Electronically signed by Mallika Barry RD, LD on 1/13/20 at 1:08 PM    Contact Number: [id number]
No

## 2020-01-13 NOTE — CONSULTS
Patient: Yovany Winslow  : 1965  Date of Admission: 2020  Primary Care Physician: Shaggy Lizama  Today's Date: 2020    REASON FOR CONSULTATION: Palpitations (Onset 4 days ago . )    Mr. Rebecca Muhammad is 47year-old male, known to me from prior office visits his cardiovascular history includes,    1-A history of thoracic aorta surgery at the [facility] clinic in . He said he had a repair with no artificial grafts put in. He had a heart catheterization prior to the aorta surgery and was told his coronary arteries were fine. No history of MI or angina.     2-History of atrial fibrillation/Atypical flutter which predates his thoracic aorta surgery but after his thoracic aorta surgery he had a couple of recurrences, multiple cardioversion and finally underwent atypical 3-ablation on 2015 at [facility]. He is not on anticoagulation since his ablation procedure back in . 3-An admission to [facility] on 2019 with persistent atrial flutter, underwent SKYLAR and cardioversion. 4-He presented to the emergency room on 2020 with palpitations for the past 4 days. Found to have atrial flutter with rapid ventricular response. Was complaining of easy fatigue and exertional shortness of breath. Denied any chest pain, pressure or tightness. No dizziness or lightheadedness. No history of syncope or near syncopal episodes. He is currently heart rate controlled with medications. Feeling fine at rest but complaining of exertional shortness of breath and fatigue as a stated above. Denies any chest pain, pressure or tightness. complaining of shortness of breath with exertion. No orthopnea or PND. No dizziness or lightheadedness, no syncope or near syncope He is sleeping fine without his CPAP. Still smoking he is planing to quit. He drinks occasionally.   He is not sure if his drinking is causing the palpitations.     Currently heart rate is controlled with abuse counseling     Paroxysmal atrial flutter ([facility] 01/07/2019    Varicose veins of right lower extremity 06/14/2017    Aneurysm of thoracic aorta ([facility]     H/O ascending aorta repair 04/03/2014    Hx of amiodarone therapy 04/03/2014    Family history of colon cancer 06/24/2013       PLAN:  Long history of atrial fibrillation/flutter. Multiple cardioversion and ablation ×2. Admitted with atrial flutter with rapid ventricular response. Not on anticoagulation \"low CHDS-Vasc score  Symptoms started 4 days prior to hospital admission. I had long discussion with the patient regarding optimal management of his atrial flutter. We discussed rate control and referral to [facility] clinic for redo ablation procedure versus SKYLAR/cardioversion. Patient elected to go for cardioversion and outpatient referral for EP consultation. Counseled extensively regarding possible trigger for his atrial fibrillation, particularly smoking and alcohol intake. Discussed details of the transesophageal echo and cardioversion procedure. Discussed benefits, risks and alternatives. Patient is well aware of both procedures and agreeing to proceed. We will arrange for the procedure today afternoon. Once again, thank you for allowing me to participate in this patients care. Please do not hesitate to contact me could I be of further assistance. Sincerely,  Vince Arteaga MD, MS, F.A.C.C.   [facility]) [facility], [address] [facility], [address]  Phone: [phone], Fax: [phone]

## 2020-01-15 ENCOUNTER — HOSPITAL ENCOUNTER (OUTPATIENT)
Dept: CT IMAGING | Age: 55
Discharge: HOME | End: 2020-01-17
Payer: OTHER GOVERNMENT

## 2020-01-15 PROCEDURE — 74176 CT ABD & PELVIS W/O CONTRAST: CPT

## 2020-01-16 ENCOUNTER — TELEPHONE (OUTPATIENT)
Dept: SURGERY | Age: 55
End: 2020-01-16

## 2020-01-16 NOTE — TELEPHONE ENCOUNTER
----- Message from Shakira Martinez DO sent at 1/16/2020  7:07 AM EST -----  I reviewed his CT scan. His hernia is more involved compared to his examination. He will need to come in to the office to further discuss options and I want to show him his CT scan images in the office.

## 2020-01-20 ENCOUNTER — TELEPHONE (OUTPATIENT)
Dept: SURGERY | Age: 55
End: 2020-01-20

## 2020-01-20 NOTE — TELEPHONE ENCOUNTER
----- Message from Abdoulaye Moore DO sent at 1/16/2020  7:07 AM EST -----  I reviewed his CT scan. His hernia is more involved compared to his examination. He will need to come in to the office to further discuss options and I want to show him his CT scan images in the office.

## 2020-01-22 ENCOUNTER — OFFICE VISIT (OUTPATIENT)
Dept: SURGERY | Age: 55
End: 2020-01-22
Payer: OTHER GOVERNMENT

## 2020-01-22 VITALS
BODY MASS INDEX: 33.8 KG/M2 | DIASTOLIC BLOOD PRESSURE: 82 MMHG | WEIGHT: 223 LBS | SYSTOLIC BLOOD PRESSURE: 124 MMHG | HEIGHT: 68 IN

## 2020-01-22 PROCEDURE — 99213 OFFICE O/P EST LOW 20 MIN: CPT | Performed by: SURGERY

## 2020-01-22 NOTE — Clinical Note
Bonita/Coretta- this is a [facility] patient that has [facility]. Dr. Nohemi Wetzel already knows about this patient. Seeing him for ventral hernia.  Please call patient to schedule office appt with Dr. Young Saravia, thank you

## 2020-01-22 NOTE — PROGRESS NOTES
Organs: Liver, spleen pancreas, adrenal glands and gallbladder show no   significant abnormalities.  7 mm posterior subcapsular right lobe of liver   hypodensity is unchanged from 2013 favoring cyst.       GI/Bowel: There is limited evaluation due to absence of oral contrast.       The stomach shows no focal lesions.  Small bowel loops normal in caliber   showing no focal abnormalities.       Normal appendix. Evaluation of the colon shows no acute process.       Pelvis: Urinary bladder grossly normal.  No suspicious pelvic mass.       Peritoneum/Retroperitoneum: No free intraperitoneal fluid or significant   lymphadenopathy.  Atherosclerotic disease.       Bones/Soft Tissues: No acute bony abnormality.  There is   umbilical/supraumbilical hernia with 1 neck and 2 sacs containing   nonobstructed small bowel loops.  Interval increase in size of the sac   compared to 2013 and additionally the previous exam only contained fat and no   bowel loops.           Impression   1. Interval increase in size of umbilical/supraumbilical hernia with 1 large   neck and 2 sacs now containing multiple nonobstructed small bowel loops   whereas previously it contains only fat. 2. No acute infective or inflammatory process. -----------------------------------------------------------------------------------    Original office visit 1/10/2020    GENERAL SURGERY CONSULTATION/OFFICE VISIT      Patient's Name/ Date of Birth/ Gender: Denis Arroyo / 1965 (54 y.o.) / male     PCP: Angel Ruiz MD    History of present Illness:  Patient is a pleasant 54 y.o. male  kindly referred by Angel Ruiz MD for ventral hernia. He has a history of incarcerated ventral hernia involving umbilicus and extending superiorly, he had open repair primarily without mesh by Dr. Zuly Brown in 2018. He has recurred. Risk factors discussed. Hernia is getting larger and symptomatic again.  He is on blood thinners for a-fib, had 7 cardioversions, polyuria, dysuria, hematuria. Hx urinary issues  Endo: denies  Heme: neg  Neuro: Denies h/o CVA, TIA  Skin: Denies rashes, ulcers  Musculoskeletal: no gross motor dysfunction    Allergies: Patient has no known allergies. Current Meds:  Current Outpatient Medications:     apixaban (ELIQUIS) 5 MG TABS tablet, Take 1 tablet by mouth 2 times daily Indications: Atrial Flutter Electrically Shocked to Normal Rhythm, Continue for 6 weeks per Dr. Rosalie Perez, Disp: 60 tablet, Rfl: 2    aspirin 81 MG EC tablet, Take 1 tablet by mouth daily, Disp: 30 tablet, Rfl: 3    ibuprofen (MOTRIN IB) 200 MG tablet, Take 400 mg by mouth every morning, Disp: , Rfl:     metoprolol succinate (TOPROL XL) 100 MG extended release tablet, TAKE 1 TABLET BY MOUTH ONCE DAILY (Patient taking differently: Take 100 mg by mouth daily TAKE 1 TABLET BY MOUTH ONCE DAILY), Disp: 90 tablet, Rfl: 3    acetaminophen (TYLENOL) 500 MG tablet, Take 500 mg by mouth every 6 hours as needed for Pain, Disp: , Rfl:     Physical Exam:  Vital signs and Nurse's note reviewed  Gen:  A&Ox3, NAD. Pleasant and cooperative.   HEENT: PERRLA, EOMI, no scleral icterus  Neck:  no goiter  CVS: Regular rate, hx afib/flutter  Resp: Good bilateral air entry, no active wheezing, no labored breathing  Abd: soft, recurrent ventral incisional hernia with contents herniating to the right of the incision, partially reducible, no peritonitis, umbilicus has been modified from last surgery, BMI 33.4  Ext: Moves all extremities, no gross focal motor deficits  Skin: No erythema or ulcerations     Labs:   Lab Results   Component Value Date    WBC 5.2 01/12/2020    HGB 13.9 01/12/2020    HCT 41.8 01/12/2020    MCV 93.5 01/12/2020     01/12/2020     Lab Results   Component Value Date     01/12/2020    K 4.1 01/12/2020     01/12/2020    CO2 25 01/12/2020    BUN 17 01/12/2020    CREATININE 0.72 01/12/2020    GLUCOSE 106 01/12/2020    GLUCOSE 115 10/01/2011    CALCIUM 8.2 01/12/2020     Lab Results   Component Value Date    ALKPHOS 78 01/08/2019    ALT 19 01/08/2019    AST 14 01/08/2019    PROT 6.3 01/08/2019    BILITOT 0.32 01/08/2019    BILIDIR <0.08 10/30/2014    LABALBU 3.8 01/08/2019    LABALBU 4.9 10/01/2011     No results found for: LACTA  Lab Results   Component Value Date    AMYLASE 40 12/03/2014     Lab Results   Component Value Date    LIPASE 32 12/03/2014     Lab Results   Component Value Date    INR 1.7 12/16/2015     [facility]  YOB: 1965  [id number]     Pre-operative Diagnosis: Umbilical hernia without obstruction     Post-operative Diagnosis: Umbilical hernia and supraumbilical hernia with incarcerated omentum     Procedure: Repair of umbilical hernia, partial omentectomy     Surgeons/Assistants: Kimberly Tam     Specimens: Was Obtained: Umbilical hernia sac, omentum, umbilical skin.     Findings: There were 2 defects the larger one is 4 cm in diameter through the umbilical ring and a smaller defect above and left to the umbilicus approximately 2 cm in diameter both contain basal omentum attached to the inner surface of the hernia sac. The overall  Umbilical skin was stretched and the past to the umbilical sac and part of it has to be removed to reconstruct the umbilicus.     Skin incision was made on the midline above the umbilicus approximately 2 cm and extended left to the umbilicus and below it for approximately 2 cm. The subcutaneous tissue was opened and the hernia sac was identified and the umbilical area and extending below the umbilicus the second hernia sac was identified in the subcutaneous tissue extending from the defect above the left to the umbilicus. Both hernia sacs were dissected free of the subcutaneous tissue down to the fascia and the 2 defects were connected above the umbilicus and 2 hernia sacs were removed.   During the removal of the hernia sac it was necessary to remove parts of the incarcerated omentum by clamping and dividing and tying the omentum with 2-0 Vicryl ligatures. The omentum was reduced into the abdominal cavity and the hernia defect was closed in an up-and-down fashion utilizing #1 continuous Vicryl suture applied to the peritoneum and interrupted #0 Nurolon figure-of-eight sutures for the fascia. The repair was reinforced utilizing a continuous interlocking #0 Vicryl suture. Past factory repair was obtained in this fashion. The subcutaneous tissue was closed with a continuous #3-0 Vicryl suture and the skin was closed with a continuous #4-0 nylon suture proper sterile dry dressing was applied and the patient was transferred to the recovery room in satisfactory condition.       Electronically signed by Jenn Marcial MD on 10/24/2018 at 12:39 PM                   Radiologic Studies:  ordered    Impressions/Recommendations:     Recurrent ventral incisional hernia,large. Reducible. Prior repair without mesh Dr. Noel To 2 years ago. Smoker. BMI 33.4. . On eliquis and asa for heart dysrhythmias. CT scan ordered to assess defects. Robotic repair considered but if larger, may need the [place] model in [place] as it can accommodate larger hernias more efficiently. May need open repair. Will call after CT reviewed. Cardiac clearance for holding blood thinners. Check BMP and magnesium. May need lovenox bridge. Thank you Nuris Hernandez MD for allowing me to participate in the care of your patients.      Peter Funes DO, MPH, [facility] [place] [address] Surgery, [address] [place] [address] office [phone]  [place] office [phone]

## 2020-02-13 ENCOUNTER — OFFICE VISIT (OUTPATIENT)
Dept: CARDIOLOGY | Age: 55
End: 2020-02-13
Payer: OTHER GOVERNMENT

## 2020-02-13 VITALS
WEIGHT: 223 LBS | SYSTOLIC BLOOD PRESSURE: 115 MMHG | BODY MASS INDEX: 33.8 KG/M2 | DIASTOLIC BLOOD PRESSURE: 66 MMHG | OXYGEN SATURATION: 99 % | HEIGHT: 68 IN | RESPIRATION RATE: 18 BRPM | HEART RATE: 77 BPM

## 2020-02-13 PROCEDURE — 99214 OFFICE O/P EST MOD 30 MIN: CPT | Performed by: INTERNAL MEDICINE

## 2020-02-13 PROCEDURE — 93000 ELECTROCARDIOGRAM COMPLETE: CPT | Performed by: INTERNAL MEDICINE

## 2020-02-13 NOTE — PROGRESS NOTES
Jhonatan Walters am scribing for and in the presence of Seema Miller MD, F.A.C.C..    Patient: Shannan Espino  : 1965  Date of Visit: 2020    REASON FOR VISIT / CONSULTATION: 1 Month Follow-Up (HX:Aortic valve disorder, H/O AAA repair, AFib,Tobacco, obesity Pt was admitted on / for AFib he had cardioversion he states he is feeling well since discharge. He is having hernia surgery eith Dr Marisol Yanez due to bowel coming through hernia not scheduled. Denies: CP,SOB,lightheaded/dizziness,palpitations); Follow-Up from Hospital; and Cardiac Clearance      Dear Dr. Joao Washington MD and Dr Marisol Yanez,    I had the opportunity to see Shannan Espino at the office today for follow up. He has a history of thoracic aorta surgery at the [facility] clinic in . He said he had a repair with no artificial grafts put in. He had a heart catheterization prior to the aorta surgery and was told his coronary arteries were fine. No history of MI or angina. He does have a history of atrial fibrillation/Atypical flutter which predates his thoracic aorta surgery but after his thoracic aorta surgery he had a couple of recurrences, multiple cardioversion and finally [facility]. Mr. Travis Jessica is here for recent hospital admission on 2020 for A-Fib and cardioversion. He states he is doing well since discharge. He did prior recent hernia repair by Dr Girish Wheat in 2018 and now needs to have it repaired due to bowel coming through hernia. Denies any chest pain, pressure or tightness. No  shortness of breath. No orthopnea or PND. No palpitations, dizziness or lightheadedness, no syncope or near syncope He is sleeping fine without his CPAP. Still smoking but trying  Quit. Continues to drink alcohol daily. Most likely his atrial fibrillation episodes are precipitated by alcohol intake.     Echocardiogram done 17 EF 55-60% Mildly increased left transesophageal echocardiography (SKYLAR) for monitoring 2015    Dr Curtis Soulier    Hypertension     Impaired fasting glucose     Lipoma     Obesity (BMI 30.0-34. 9)     Obstructive sleep apnea (adult) (pediatric)     Paroxysmal supraventricular tachycardia ([address]     Tobacco use disorder     Urinary urgency        Past Surgical History:   Procedure Laterality Date    ABLATION OF DYSRHYTHMIC FOCUS  2015    ACHILLES TENDON SURGERY Right 2010   Lorence Sartorius CARDIAC SURGERY  2008    Enlarged aorta; repaired aortic valve    COLONOSCOPY  2013    FOOT SURGERY      reconstructive for club foot (right)    HERNIA REPAIR      REPAIR UMBILICAL YZUO,9+O/C,GYODJ N/A     HERNIA UMBILICAL REPAIR performed by Nasreen Jung MD at [address]  10/24/2018    VASECTOMY         Family History   Problem Relation Age of Onset    Colon Cancer Mother     Heart Surgery Father         enlarged aorta    Kidney Cancer Father        Social History     Tobacco Use    Smoking status: Current Every Day Smoker     Packs/day: 0.75     Years: 20.00     Pack years: 15.00     Types: Cigarettes     Last attempt to quit: 2013     Years since quittin.0    Smokeless tobacco: Never Used   Substance Use Topics    Alcohol use: Yes     Comment: 3-4 times / week   \"A few beers\".     Drug use: No       Current Outpatient Medications   Medication Sig Dispense Refill    apixaban (ELIQUIS) 5 MG TABS tablet Take 1 tablet by mouth 2 times daily Indications: Atrial Flutter Electrically Shocked to Normal Rhythm, Continue for 6 weeks per Dr. Isabella Lehman 60 tablet 2    aspirin 81 MG EC tablet Take 1 tablet by mouth daily 30 tablet 3    ibuprofen (MOTRIN IB) 200 MG tablet Take 400 mg by mouth every morning      metoprolol succinate (TOPROL XL) 100 MG extended release tablet TAKE 1 TABLET BY MOUTH ONCE DAILY (Patient taking differently: Take 100 mg by mouth daily TAKE 1 TABLET BY MOUTH ONCE DAILY) 90 tablet 3    acetaminophen (TYLENOL) 500 MG tablet Take 500 mg by mouth every 6 hours as needed for Pain       No current facility-administered medications for this visit. No Known Allergies    ROS: 14 systems reviewed and negative except for pertinent positives as noted above. PHYSICAL EXAM:   /66 (Site: Left Upper Arm, Position: Sitting, Cuff Size: Medium Adult)   Pulse 77   Resp 18   Ht 5' 8\" (1.727 m)   Wt 223 lb (101.2 kg)   SpO2 99%   BMI 33.91 kg/m²  Body mass index is 33.91 kg/m². Constitutional: He is oriented to person, place, and time. He appears well-developed and well-nourished. In no acute distress. Obese   HEENT: Normocephalic and atraumatic. No JVD present. Carotid bruit is not present. No mass and no thyromegaly present. No lymphadenopathy present. Cardiovascular: Normal rate, regular rhythm, normal heart sounds. Exam reveals no gallop and no friction rubs. 2/6 short systolic murmur and 2/6 diastolic murmur heard best at 2nd Right upper sternal border. Pulmonary/Chest: Effort normal and breath sounds normal. No respiratory distress. He has no wheezes, rhonchi or rales. Abdominal: Soft, non-tender. Bowel sounds and aorta are normal. He exhibits no organomegaly, mass or bruit. Extremities: No edema. No cyanosis and no clubbing. Pulses are 2+ radial and carotid pulses. 2+ dorsalis pedis and posterior tibial pulses bilaterally. Neurological: He is alert and oriented to person, place, and time. No evidence of gross cranial nerve deficit. Coordination appeared normal.   Skin: Skin is warm and dry. There is no rash or diaphoresis. Psychiatric: He has a normal mood and affect.  His speech is normal and behavior is normal.          Lab Results   Component Value Date    WBC 5.2 01/12/2020    HGB 13.9 01/12/2020    HCT 41.8 01/12/2020     01/12/2020    CHOL 163 12/22/2016    TRIG 41 12/22/2016    HDL 96 12/22/2016    ALT 19 01/08/2019    AST 14 01/08/2019     01/12/2020    K 4.1 01/12/2020     01/12/2020    CREATININE 0.72 01/12/2020    BUN 17 01/12/2020    CO2 25 01/12/2020    TSH 0.99 01/07/2019    PSA 1.58 12/22/2016    INR 1.7 12/16/2015        Diagnosis Orders   1. PAF (paroxysmal atrial fibrillation) ([facility])  EKG 12 lead   2. Preop cardiovascular exam     3. Moderate aortic regurgitation     4. Tobacco abuse counseling       [facility]  [facility] Fibrillation:  S/P ablation of atypical atrial flutter on 8/19/2015. S/P SKYLAR and cardioversion of atrial flutter on 1/11/2020. Currently maintaining sinus rhythm. Beta Blocker: Continue metoprolol succinate (Toprol XL) 100 mg once daily. Stroke Risk: His CHADS2-VASc score is 0/9 ( 0% stroke risk)  Anticoagulation: Not indicated , Continue aspirin 81 mg daily. · Moderate aortic regurgitation:  · Currently asymptomatic. No  Signs of volume overload. Will consider repeat Echo on follow up. · Pre-Op Clearance:   · Pre-Operative Risk assessment using 2014 ACC/AHA guidelines   · Emergent procedure No  · Active Cardiac Condition No (decompensated HF, Arrhythmia, MI <3 weeks, severe valve disease)  · Risk Level of Procedure Intermediate Risk (intraperitoneal, intrathoracic, HENT, orthopedic, or carotid endarterectomy, etc.)  · Revised Cardiac Risk Index Risk factors: None  · Measurement of Exercise Tolerance before Surgery >4 Yes  · According to the 2014 ACC/AHA pre-operative risk assessment guidelines Aiden Lee is at low to intermediate risk for major cardiac complications during a intermediate risk procedure and may continue as planned. Specific medication recommendations are listed below. Medications recommended to continue should be taken with a sip of water even when NPO.  Medical management to reduce perioperative risk:   Antiplatelet Agent: STOP aspirin 5 days before procedure.  Anticoagulant Agent: Discontinue Eliquis as outlined above.   Patient anticoagulated for 4 weeks post

## 2020-02-13 NOTE — PATIENT INSTRUCTIONS
SURVEY:    You may be receiving a survey from [employer] regarding your visit today. Please complete the survey to enable us to provide the highest quality of care to you and your family. If you cannot score us a very good on any question, please call the office to discuss how we could have made your experience a very good one. Thank you.

## 2020-03-06 ENCOUNTER — ANESTHESIA (OUTPATIENT)
Dept: OPERATING ROOM | Age: 55
End: 2020-03-06
Payer: OTHER GOVERNMENT

## 2020-03-06 ENCOUNTER — ANESTHESIA EVENT (OUTPATIENT)
Dept: OPERATING ROOM | Age: 55
End: 2020-03-06
Payer: OTHER GOVERNMENT

## 2020-03-06 ENCOUNTER — HOSPITAL ENCOUNTER (OUTPATIENT)
Age: 55
Setting detail: OUTPATIENT SURGERY
Discharge: HOME | End: 2020-03-06
Attending: SURGERY | Admitting: SURGERY
Payer: OTHER GOVERNMENT

## 2020-03-06 VITALS — OXYGEN SATURATION: 98 % | TEMPERATURE: 97.5 F | SYSTOLIC BLOOD PRESSURE: 149 MMHG | DIASTOLIC BLOOD PRESSURE: 76 MMHG

## 2020-03-06 VITALS
WEIGHT: 228 LBS | HEART RATE: 75 BPM | TEMPERATURE: 98.2 F | HEIGHT: 68 IN | OXYGEN SATURATION: 93 % | SYSTOLIC BLOOD PRESSURE: 174 MMHG | BODY MASS INDEX: 34.56 KG/M2 | RESPIRATION RATE: 16 BRPM | DIASTOLIC BLOOD PRESSURE: 70 MMHG

## 2020-03-06 LAB
GFR NON-AFRICAN AMERICAN: >60 ML/MIN
GFR SERPL CREATININE-BSD FRML MDRD: >60 ML/MIN
GFR SERPL CREATININE-BSD FRML MDRD: NORMAL ML/MIN/{1.73_M2}
GLUCOSE BLD-MCNC: 111 MG/DL (ref 74–100)
POC CREATININE: 0.92 MG/DL (ref 0.51–1.19)
POC POTASSIUM: 3.9 MMOL/L (ref 3.5–4.5)

## 2020-03-06 PROCEDURE — 6360000002 HC RX W HCPCS: Performed by: SURGERY

## 2020-03-06 PROCEDURE — C1760 CLOSURE DEV, VASC: HCPCS | Performed by: SURGERY

## 2020-03-06 PROCEDURE — 2580000003 HC RX 258: Performed by: SURGERY

## 2020-03-06 PROCEDURE — 7100000000 HC PACU RECOVERY - FIRST 15 MIN: Performed by: SURGERY

## 2020-03-06 PROCEDURE — 2580000003 HC RX 258

## 2020-03-06 PROCEDURE — 3700000001 HC ADD 15 MINUTES (ANESTHESIA): Performed by: SURGERY

## 2020-03-06 PROCEDURE — 7100000041 HC SPAR PHASE II RECOVERY - ADDTL 15 MIN: Performed by: SURGERY

## 2020-03-06 PROCEDURE — 2500000003 HC RX 250 WO HCPCS

## 2020-03-06 PROCEDURE — C1781 MESH (IMPLANTABLE): HCPCS | Performed by: SURGERY

## 2020-03-06 PROCEDURE — 6370000000 HC RX 637 (ALT 250 FOR IP): Performed by: ANESTHESIOLOGY

## 2020-03-06 PROCEDURE — 2709999900 HC NON-CHARGEABLE SUPPLY: Performed by: SURGERY

## 2020-03-06 PROCEDURE — 7100000040 HC SPAR PHASE II RECOVERY - FIRST 15 MIN: Performed by: SURGERY

## 2020-03-06 PROCEDURE — 6360000002 HC RX W HCPCS: Performed by: ANESTHESIOLOGY

## 2020-03-06 PROCEDURE — 82565 ASSAY OF CREATININE: CPT

## 2020-03-06 PROCEDURE — 7100000001 HC PACU RECOVERY - ADDTL 15 MIN: Performed by: SURGERY

## 2020-03-06 PROCEDURE — 6360000002 HC RX W HCPCS

## 2020-03-06 PROCEDURE — 82947 ASSAY GLUCOSE BLOOD QUANT: CPT

## 2020-03-06 PROCEDURE — 84132 ASSAY OF SERUM POTASSIUM: CPT

## 2020-03-06 PROCEDURE — 3600000019 HC SURGERY ROBOT ADDTL 15MIN: Performed by: SURGERY

## 2020-03-06 PROCEDURE — 2500000003 HC RX 250 WO HCPCS: Performed by: SURGERY

## 2020-03-06 PROCEDURE — 3700000000 HC ANESTHESIA ATTENDED CARE: Performed by: SURGERY

## 2020-03-06 PROCEDURE — S2900 ROBOTIC SURGICAL SYSTEM: HCPCS | Performed by: SURGERY

## 2020-03-06 PROCEDURE — 3600000009 HC SURGERY ROBOT BASE: Performed by: SURGERY

## 2020-03-06 DEVICE — MESH SURG DIA6IN CIR W ECHO 2 POS SYS VENTRALIGHT: Type: IMPLANTABLE DEVICE | Site: ABDOMEN | Status: FUNCTIONAL

## 2020-03-06 RX ORDER — MIDAZOLAM HYDROCHLORIDE 1 MG/ML
INJECTION INTRAMUSCULAR; INTRAVENOUS PRN
Status: DISCONTINUED | OUTPATIENT
Start: 2020-03-06 | End: 2020-03-06 | Stop reason: SDUPTHER

## 2020-03-06 RX ORDER — OXYCODONE HYDROCHLORIDE 5 MG/1
5 TABLET ORAL
Status: COMPLETED | OUTPATIENT
Start: 2020-03-06 | End: 2020-03-06

## 2020-03-06 RX ORDER — IBUPROFEN 400 MG/1
800 TABLET ORAL EVERY 8 HOURS PRN
Qty: 40 TABLET | Refills: 0 | Status: SHIPPED | OUTPATIENT
Start: 2020-03-06 | End: 2021-03-05

## 2020-03-06 RX ORDER — BUPIVACAINE HYDROCHLORIDE 2.5 MG/ML
INJECTION, SOLUTION EPIDURAL; INFILTRATION; INTRACAUDAL PRN
Status: DISCONTINUED | OUTPATIENT
Start: 2020-03-06 | End: 2020-03-06 | Stop reason: ALTCHOICE

## 2020-03-06 RX ORDER — FENTANYL CITRATE 50 UG/ML
25 INJECTION, SOLUTION INTRAMUSCULAR; INTRAVENOUS EVERY 5 MIN PRN
Status: DISCONTINUED | OUTPATIENT
Start: 2020-03-06 | End: 2020-03-06 | Stop reason: HOSPADM

## 2020-03-06 RX ORDER — ONDANSETRON 2 MG/ML
4 INJECTION INTRAMUSCULAR; INTRAVENOUS
Status: DISCONTINUED | OUTPATIENT
Start: 2020-03-06 | End: 2020-03-06 | Stop reason: HOSPADM

## 2020-03-06 RX ORDER — DEXAMETHASONE SODIUM PHOSPHATE 10 MG/ML
INJECTION, SOLUTION INTRAMUSCULAR; INTRAVENOUS PRN
Status: DISCONTINUED | OUTPATIENT
Start: 2020-03-06 | End: 2020-03-06 | Stop reason: SDUPTHER

## 2020-03-06 RX ORDER — GLYCOPYRROLATE 1 MG/5 ML
SYRINGE (ML) INTRAVENOUS PRN
Status: DISCONTINUED | OUTPATIENT
Start: 2020-03-06 | End: 2020-03-06 | Stop reason: SDUPTHER

## 2020-03-06 RX ORDER — OXYCODONE HYDROCHLORIDE 5 MG/1
5 TABLET ORAL EVERY 6 HOURS PRN
Qty: 12 TABLET | Refills: 0 | Status: SHIPPED | OUTPATIENT
Start: 2020-03-06 | End: 2020-03-09

## 2020-03-06 RX ORDER — SODIUM CHLORIDE, SODIUM LACTATE, POTASSIUM CHLORIDE, CALCIUM CHLORIDE 600; 310; 30; 20 MG/100ML; MG/100ML; MG/100ML; MG/100ML
INJECTION, SOLUTION INTRAVENOUS CONTINUOUS
Status: DISCONTINUED | OUTPATIENT
Start: 2020-03-06 | End: 2020-03-06 | Stop reason: HOSPADM

## 2020-03-06 RX ORDER — ROCURONIUM BROMIDE 10 MG/ML
INJECTION, SOLUTION INTRAVENOUS PRN
Status: DISCONTINUED | OUTPATIENT
Start: 2020-03-06 | End: 2020-03-06 | Stop reason: SDUPTHER

## 2020-03-06 RX ORDER — PROPOFOL 10 MG/ML
INJECTION, EMULSION INTRAVENOUS PRN
Status: DISCONTINUED | OUTPATIENT
Start: 2020-03-06 | End: 2020-03-06 | Stop reason: SDUPTHER

## 2020-03-06 RX ORDER — MEPERIDINE HYDROCHLORIDE 50 MG/ML
12.5 INJECTION INTRAMUSCULAR; INTRAVENOUS; SUBCUTANEOUS EVERY 5 MIN PRN
Status: DISCONTINUED | OUTPATIENT
Start: 2020-03-06 | End: 2020-03-06 | Stop reason: HOSPADM

## 2020-03-06 RX ORDER — ONDANSETRON 2 MG/ML
INJECTION INTRAMUSCULAR; INTRAVENOUS PRN
Status: DISCONTINUED | OUTPATIENT
Start: 2020-03-06 | End: 2020-03-06 | Stop reason: SDUPTHER

## 2020-03-06 RX ORDER — DIAZEPAM 2 MG/1
2 TABLET ORAL EVERY 8 HOURS PRN
Qty: 21 TABLET | Refills: 0 | Status: SHIPPED | OUTPATIENT
Start: 2020-03-06 | End: 2020-03-13

## 2020-03-06 RX ORDER — BUPIVACAINE HYDROCHLORIDE AND EPINEPHRINE 5; 5 MG/ML; UG/ML
INJECTION, SOLUTION EPIDURAL; INTRACAUDAL; PERINEURAL PRN
Status: DISCONTINUED | OUTPATIENT
Start: 2020-03-06 | End: 2020-03-06 | Stop reason: ALTCHOICE

## 2020-03-06 RX ORDER — NEOSTIGMINE METHYLSULFATE 5 MG/5 ML
SYRINGE (ML) INTRAVENOUS PRN
Status: DISCONTINUED | OUTPATIENT
Start: 2020-03-06 | End: 2020-03-06 | Stop reason: SDUPTHER

## 2020-03-06 RX ORDER — IBUPROFEN 200 MG
400 TABLET ORAL DAILY PRN
Status: ON HOLD | COMMUNITY
End: 2020-03-06 | Stop reason: HOSPADM

## 2020-03-06 RX ORDER — FENTANYL CITRATE 50 UG/ML
50 INJECTION, SOLUTION INTRAMUSCULAR; INTRAVENOUS EVERY 5 MIN PRN
Status: COMPLETED | OUTPATIENT
Start: 2020-03-06 | End: 2020-03-06

## 2020-03-06 RX ORDER — MAGNESIUM HYDROXIDE 1200 MG/15ML
LIQUID ORAL CONTINUOUS PRN
Status: COMPLETED | OUTPATIENT
Start: 2020-03-06 | End: 2020-03-06

## 2020-03-06 RX ORDER — DOCUSATE SODIUM 100 MG/1
100 CAPSULE, LIQUID FILLED ORAL 2 TIMES DAILY PRN
Qty: 20 CAPSULE | Refills: 0 | Status: SHIPPED | OUTPATIENT
Start: 2020-03-06 | End: 2021-04-26

## 2020-03-06 RX ORDER — FENTANYL CITRATE 50 UG/ML
INJECTION, SOLUTION INTRAMUSCULAR; INTRAVENOUS PRN
Status: DISCONTINUED | OUTPATIENT
Start: 2020-03-06 | End: 2020-03-06 | Stop reason: SDUPTHER

## 2020-03-06 RX ORDER — SODIUM CHLORIDE, SODIUM LACTATE, POTASSIUM CHLORIDE, CALCIUM CHLORIDE 600; 310; 30; 20 MG/100ML; MG/100ML; MG/100ML; MG/100ML
INJECTION, SOLUTION INTRAVENOUS CONTINUOUS PRN
Status: DISCONTINUED | OUTPATIENT
Start: 2020-03-06 | End: 2020-03-06 | Stop reason: SDUPTHER

## 2020-03-06 RX ORDER — LIDOCAINE HYDROCHLORIDE 10 MG/ML
INJECTION, SOLUTION EPIDURAL; INFILTRATION; INTRACAUDAL; PERINEURAL PRN
Status: DISCONTINUED | OUTPATIENT
Start: 2020-03-06 | End: 2020-03-06 | Stop reason: SDUPTHER

## 2020-03-06 RX ORDER — PHENYLEPHRINE HYDROCHLORIDE 10 MG/ML
INJECTION INTRAVENOUS PRN
Status: DISCONTINUED | OUTPATIENT
Start: 2020-03-06 | End: 2020-03-06 | Stop reason: SDUPTHER

## 2020-03-06 RX ADMIN — Medication 0.4 MG: at 11:13

## 2020-03-06 RX ADMIN — FENTANYL CITRATE 50 MCG: 50 INJECTION, SOLUTION INTRAMUSCULAR; INTRAVENOUS at 09:21

## 2020-03-06 RX ADMIN — FENTANYL CITRATE 50 MCG: 50 INJECTION, SOLUTION INTRAMUSCULAR; INTRAVENOUS at 11:45

## 2020-03-06 RX ADMIN — FENTANYL CITRATE 50 MCG: 50 INJECTION, SOLUTION INTRAMUSCULAR; INTRAVENOUS at 08:48

## 2020-03-06 RX ADMIN — DEXAMETHASONE SODIUM PHOSPHATE 10 MG: 10 INJECTION, SOLUTION INTRAMUSCULAR; INTRAVENOUS at 10:00

## 2020-03-06 RX ADMIN — FENTANYL CITRATE 50 MCG: 50 INJECTION, SOLUTION INTRAMUSCULAR; INTRAVENOUS at 11:50

## 2020-03-06 RX ADMIN — PHENYLEPHRINE HYDROCHLORIDE 100 MCG: 10 INJECTION INTRAVENOUS at 09:04

## 2020-03-06 RX ADMIN — CEFAZOLIN 2 G: 1 INJECTION, POWDER, FOR SOLUTION INTRAMUSCULAR; INTRAVENOUS at 08:59

## 2020-03-06 RX ADMIN — FENTANYL CITRATE 50 MCG: 50 INJECTION, SOLUTION INTRAMUSCULAR; INTRAVENOUS at 11:40

## 2020-03-06 RX ADMIN — FENTANYL CITRATE 50 MCG: 50 INJECTION, SOLUTION INTRAMUSCULAR; INTRAVENOUS at 09:27

## 2020-03-06 RX ADMIN — FENTANYL CITRATE 50 MCG: 50 INJECTION, SOLUTION INTRAMUSCULAR; INTRAVENOUS at 10:19

## 2020-03-06 RX ADMIN — OXYCODONE HYDROCHLORIDE 5 MG: 5 TABLET ORAL at 13:28

## 2020-03-06 RX ADMIN — ROCURONIUM BROMIDE 20 MG: 10 INJECTION INTRAVENOUS at 10:13

## 2020-03-06 RX ADMIN — Medication 3 MG: at 11:13

## 2020-03-06 RX ADMIN — FENTANYL CITRATE 50 MCG: 50 INJECTION, SOLUTION INTRAMUSCULAR; INTRAVENOUS at 10:01

## 2020-03-06 RX ADMIN — MIDAZOLAM HYDROCHLORIDE 2 MG: 1 INJECTION, SOLUTION INTRAMUSCULAR; INTRAVENOUS at 08:43

## 2020-03-06 RX ADMIN — PROPOFOL 200 MG: 10 INJECTION, EMULSION INTRAVENOUS at 08:48

## 2020-03-06 RX ADMIN — ROCURONIUM BROMIDE 20 MG: 10 INJECTION INTRAVENOUS at 09:49

## 2020-03-06 RX ADMIN — SODIUM CHLORIDE, POTASSIUM CHLORIDE, SODIUM LACTATE AND CALCIUM CHLORIDE: 600; 310; 30; 20 INJECTION, SOLUTION INTRAVENOUS at 08:42

## 2020-03-06 RX ADMIN — SODIUM CHLORIDE, POTASSIUM CHLORIDE, SODIUM LACTATE AND CALCIUM CHLORIDE: 600; 310; 30; 20 INJECTION, SOLUTION INTRAVENOUS at 12:12

## 2020-03-06 RX ADMIN — PHENYLEPHRINE HYDROCHLORIDE 100 MCG: 10 INJECTION INTRAVENOUS at 09:09

## 2020-03-06 RX ADMIN — FENTANYL CITRATE 50 MCG: 50 INJECTION, SOLUTION INTRAMUSCULAR; INTRAVENOUS at 10:34

## 2020-03-06 RX ADMIN — FENTANYL CITRATE 50 MCG: 50 INJECTION, SOLUTION INTRAMUSCULAR; INTRAVENOUS at 09:13

## 2020-03-06 RX ADMIN — ROCURONIUM BROMIDE 30 MG: 10 INJECTION INTRAVENOUS at 09:20

## 2020-03-06 RX ADMIN — LIDOCAINE HYDROCHLORIDE 50 MG: 10 INJECTION, SOLUTION EPIDURAL; INFILTRATION; INTRACAUDAL; PERINEURAL at 08:48

## 2020-03-06 RX ADMIN — PHENYLEPHRINE HYDROCHLORIDE 100 MCG: 10 INJECTION INTRAVENOUS at 09:16

## 2020-03-06 RX ADMIN — FENTANYL CITRATE 50 MCG: 50 INJECTION, SOLUTION INTRAMUSCULAR; INTRAVENOUS at 11:35

## 2020-03-06 RX ADMIN — SODIUM CHLORIDE, POTASSIUM CHLORIDE, SODIUM LACTATE AND CALCIUM CHLORIDE: 600; 310; 30; 20 INJECTION, SOLUTION INTRAVENOUS at 08:02

## 2020-03-06 RX ADMIN — ONDANSETRON 4 MG: 2 INJECTION, SOLUTION INTRAMUSCULAR; INTRAVENOUS at 11:06

## 2020-03-06 RX ADMIN — ROCURONIUM BROMIDE 50 MG: 10 INJECTION INTRAVENOUS at 08:48

## 2020-03-06 ASSESSMENT — PULMONARY FUNCTION TESTS
PIF_VALUE: 22
PIF_VALUE: 29
PIF_VALUE: 26
PIF_VALUE: 30
PIF_VALUE: 22
PIF_VALUE: 16
PIF_VALUE: 22
PIF_VALUE: 22
PIF_VALUE: 15
PIF_VALUE: 23
PIF_VALUE: 21
PIF_VALUE: 29
PIF_VALUE: 26
PIF_VALUE: 30
PIF_VALUE: 0
PIF_VALUE: 1
PIF_VALUE: 26
PIF_VALUE: 0
PIF_VALUE: 2
PIF_VALUE: 35
PIF_VALUE: 29
PIF_VALUE: 16
PIF_VALUE: 30
PIF_VALUE: 29
PIF_VALUE: 22
PIF_VALUE: 24
PIF_VALUE: 29
PIF_VALUE: 26
PIF_VALUE: 16
PIF_VALUE: 27
PIF_VALUE: 30
PIF_VALUE: 26
PIF_VALUE: 22
PIF_VALUE: 26
PIF_VALUE: 30
PIF_VALUE: 30
PIF_VALUE: 1
PIF_VALUE: 16
PIF_VALUE: 23
PIF_VALUE: 21
PIF_VALUE: 16
PIF_VALUE: 26
PIF_VALUE: 26
PIF_VALUE: 0
PIF_VALUE: 30
PIF_VALUE: 26
PIF_VALUE: 23
PIF_VALUE: 29
PIF_VALUE: 26
PIF_VALUE: 22
PIF_VALUE: 26
PIF_VALUE: 26
PIF_VALUE: 16
PIF_VALUE: 24
PIF_VALUE: 23
PIF_VALUE: 29
PIF_VALUE: 26
PIF_VALUE: 23
PIF_VALUE: 22
PIF_VALUE: 29
PIF_VALUE: 30
PIF_VALUE: 22
PIF_VALUE: 15
PIF_VALUE: 30
PIF_VALUE: 29
PIF_VALUE: 28
PIF_VALUE: 16
PIF_VALUE: 1
PIF_VALUE: 26
PIF_VALUE: 29
PIF_VALUE: 30
PIF_VALUE: 16
PIF_VALUE: 29
PIF_VALUE: 22
PIF_VALUE: 23
PIF_VALUE: 22
PIF_VALUE: 16
PIF_VALUE: 27
PIF_VALUE: 15
PIF_VALUE: 27
PIF_VALUE: 30
PIF_VALUE: 16
PIF_VALUE: 30
PIF_VALUE: 29
PIF_VALUE: 22
PIF_VALUE: 21
PIF_VALUE: 29
PIF_VALUE: 30
PIF_VALUE: 22
PIF_VALUE: 26
PIF_VALUE: 26
PIF_VALUE: 23
PIF_VALUE: 23
PIF_VALUE: 26
PIF_VALUE: 22
PIF_VALUE: 21
PIF_VALUE: 22
PIF_VALUE: 26
PIF_VALUE: 26
PIF_VALUE: 5
PIF_VALUE: 16
PIF_VALUE: 22
PIF_VALUE: 26
PIF_VALUE: 25
PIF_VALUE: 22
PIF_VALUE: 22
PIF_VALUE: 30
PIF_VALUE: 29
PIF_VALUE: 24
PIF_VALUE: 27
PIF_VALUE: 29
PIF_VALUE: 22
PIF_VALUE: 26
PIF_VALUE: 23
PIF_VALUE: 27
PIF_VALUE: 22
PIF_VALUE: 0
PIF_VALUE: 27
PIF_VALUE: 26
PIF_VALUE: 16
PIF_VALUE: 24
PIF_VALUE: 17
PIF_VALUE: 22
PIF_VALUE: 1
PIF_VALUE: 22
PIF_VALUE: 26
PIF_VALUE: 1
PIF_VALUE: 16
PIF_VALUE: 27
PIF_VALUE: 22
PIF_VALUE: 22
PIF_VALUE: 16
PIF_VALUE: 22
PIF_VALUE: 26
PIF_VALUE: 20
PIF_VALUE: 26
PIF_VALUE: 24
PIF_VALUE: 21
PIF_VALUE: 23
PIF_VALUE: 28
PIF_VALUE: 26
PIF_VALUE: 30
PIF_VALUE: 3
PIF_VALUE: 29
PIF_VALUE: 27
PIF_VALUE: 22
PIF_VALUE: 25
PIF_VALUE: 1
PIF_VALUE: 30
PIF_VALUE: 26
PIF_VALUE: 5
PIF_VALUE: 25
PIF_VALUE: 26
PIF_VALUE: 26
PIF_VALUE: 27
PIF_VALUE: 22
PIF_VALUE: 23
PIF_VALUE: 30

## 2020-03-06 ASSESSMENT — PAIN DESCRIPTION - LOCATION
LOCATION: ABDOMEN

## 2020-03-06 ASSESSMENT — COPD QUESTIONNAIRES: CAT_SEVERITY: MILD

## 2020-03-06 ASSESSMENT — PAIN DESCRIPTION - PAIN TYPE
TYPE: SURGICAL PAIN

## 2020-03-06 ASSESSMENT — PAIN SCALES - GENERAL
PAINLEVEL_OUTOF10: 7
PAINLEVEL_OUTOF10: 9
PAINLEVEL_OUTOF10: 6
PAINLEVEL_OUTOF10: 4
PAINLEVEL_OUTOF10: 9

## 2020-03-06 ASSESSMENT — PAIN - FUNCTIONAL ASSESSMENT: PAIN_FUNCTIONAL_ASSESSMENT: 0-10

## 2020-03-06 ASSESSMENT — LIFESTYLE VARIABLES: SMOKING_STATUS: 1

## 2020-03-06 ASSESSMENT — PAIN DESCRIPTION - DESCRIPTORS: DESCRIPTORS: STABBING

## 2020-03-06 NOTE — ANESTHESIA PRE PROCEDURE
mouth every 8 hours as needed (Muscle spasm) for up to 7 days. 21 tablet 0    oxyCODONE (ROXICODONE) 5 MG immediate release tablet Take 1 tablet by mouth every 6 hours as needed for Pain for up to 3 days. Intended supply: 3 days. Take lowest dose possible to manage pain 12 tablet 0    docusate sodium (COLACE) 100 MG capsule Take 1 capsule by mouth 2 times daily as needed for Constipation 20 capsule 0     Facility-Administered Medications Ordered in Other Visits   Medication Dose Route Frequency Provider Last Rate Last Dose    ceFAZolin (ANCEF) 2 g in dextrose 5 % 50 mL IVPB  2 g Intravenous Once [employer] IV, DO        lactated ringers infusion   Intravenous Continuous Omega Pako Canos IV, DO           Allergies:  No Known Allergies    Problem List:    Patient Active Problem List   Diagnosis Code    Family history of colon cancer Z80.0    H/O ascending aorta repair Z98.890    Hx of amiodarone therapy Z92.29    Aneurysm of thoracic aorta (HCC) I71.2    Varicose veins of right lower extremity I83.91    Typical atrial flutter (HCC) I48.3    Paroxysmal atrial flutter (HCC) I48.92    Moderate aortic regurgitation I35.1    Tobacco abuse Z72.0    Tobacco abuse counseling Z71.6    Atrial fibrillation with rapid ventricular response (HCC) I48.91    Impaired fasting glucose R73.01    Hypertension I10       Past Medical History:        Diagnosis Date    Aortic valve disorders     dr Chiara Calloway cardiologist appt cc 2/13/20    Atrial fibrillation (HCC)     Atrial flutter ([address]     Encounter for cardioversion procedure 5/5/2015    Dr Davion Childers Encounter for cardioversion procedure     [facility]/ Dr. Kevon Arana, states 7 times, most recent 1/2020    Frequent urination     Pt states he's developed frequent urination with 3 times in last yr noting blood in urine as well.  H/O echocardiogram 12/04/2017    EF 43-54% Global LV systolic function appears preserved.  Mildly increaseed LV ventricular size and systolic function, GJ>64%. No evidence of left atrial appendage thrombi. S/P Cardioversion, resumed sinus rhythm with no immediate post procedure  Complication      EKG 0/6169  SR with occasional PVC's      Anesthesia Evaluation  Patient summary reviewed and Nursing notes reviewed no history of anesthetic complications:   Airway: Mallampati: III  TM distance: >3 FB   Neck ROM: full  Mouth opening: > = 3 FB Dental: normal exam         Pulmonary: breath sounds clear to auscultation  (+) COPD (per PFTs): mild,  sleep apnea: on noncompliant,  current smoker          Patient smoked on day of surgery. Cardiovascular:  Exercise tolerance: good (>4 METS),   (+) hypertension: mild, valvular problems/murmurs (mild TR): AI and MR, dysrhythmias: atrial fibrillation, SVT and atrial flutter, CHF: diastolic, murmur,         Rhythm: regular  Rate: abnormal           Beta Blocker:  Dose within 24 Hrs      ROS comment: S/p Ascending Aortic repair     EF 50-55%    PE comment: bradycardia   Neuro/Psych:   Negative Neuro/Psych ROS              GI/Hepatic/Renal:            ROS comment: Umbilical hernia . Endo/Other: Negative Endo/Other ROS                    Abdominal:   (+) obese,     Abdomen: soft. Vascular:   + PVD, aortic or cerebral (severe venous insufficiency ), . Anesthesia Plan      general and regional     ASA 3     (Patient is for TAP block)  Induction: intravenous. MIPS: Postoperative opioids intended and Prophylactic antiemetics administered. Anesthetic plan and risks discussed with patient. Plan discussed with CRNA.               Hung Hobson MD   3/6/2020

## 2020-03-06 NOTE — H&P
General Surgery: H&P        PATIENT NAME: Jayla SEVERINO [facility]   YOB: 1965    ADMISSION DATE: 3/6/2020  6:49 AM     TODAY'S DATE: 3/6/2020    Chief Complaint:  Ventral hernia    HISTORY OF PRESENT ILLNESS:  The patient is a 54 y.o. male who presents for robotic repair of ventral hernia. Patient has primary repair of umbilical hernia a year ago. Hernia recurred shortly after surgery. Patient is started to complain of sharp abdominal pain. Denies nausea or vomiting. Denies fevers, chills, or sweats. Denies changes in bowel habits. Past Medical History:        Diagnosis Date    Aortic valve disorders     dr Linette Ordaz cardiologist appt cc 2/13/20    Atrial fibrillation ([address]     Atrial flutter ([address]     Encounter for cardioversion procedure 5/5/2015    Dr Katie Carrington Encounter for cardioversion procedure     [facility]/ Dr. Wilkinson Greek, states 7 times, most recent 1/2020    Frequent urination     Pt states he's developed frequent urination with 3 times in last yr noting blood in urine as well.  H/O echocardiogram 12/04/2017    EF 51-92% Global LV systolic function appears preserved. Mildly increaseed LV wall thinckness with normal LV carvity size, No definite specific wall motion abnormalities identified, LA is moderately dilated (34-39) with LA volume index of 36ml/m2, Mild mitral and tricuspid regurgitation, moderate aortic regurgitation, Evidence of mild diastolic dysfunction seen    Hemorrhage of rectum and anus 2013    polypectomy    History of 24 hour EKG monitoring 4/3/14    Unremarkable except for mild increased number of PVC's with 3 ventricular couplets, asymptomatic     History of echocardiogram 4/10/14    EF 60%, mild to mod LVH, moderate AI    History of heart surgery 2008    enlarged aorta    History of PFTs 6/25/15    Consistent with mild obstructive ventilatory impairment.  Lung volumes are normal,except mild increase in residual volume,which could be palpation; reducible umbilical hernia   MUSCULOSKELETAL: Muscle strength intact in all extremities bilaterally. NEUROLOGIC: CN II- XII intact. Gross motor intact without focal weakness. SKIN: No cyanosis, rashes, or edema noted. Pertinent Radiology:   CT OF THE ABDOMEN AND PELVIS WITHOUT CONTRAST 1/15/2020 2:02 pm       TECHNIQUE:   CT of the abdomen and pelvis was performed without the administration of   intravenous contrast. Multiplanar reformatted images are provided for review. Dose modulation, iterative reconstruction, and/or weight based adjustment of   the mA/kV was utilized to reduce the radiation dose to as low as reasonably   achievable.       COMPARISON:   06/13/2013       HISTORY:   ORDERING SYSTEM PROVIDED HISTORY: Ventral hernia, recurrent   TECHNOLOGIST PROVIDED HISTORY:       no oral or IV contrast needed. large ventral hernia evaluation. abdominal   pain right abd wall       FINDINGS:   Lower Chest: The visualized heart and lungs show no acute abnormalities.       Organs: Liver, spleen pancreas, adrenal glands and gallbladder show no   significant abnormalities.  7 mm posterior subcapsular right lobe of liver   hypodensity is unchanged from 2013 favoring cyst.       GI/Bowel: There is limited evaluation due to absence of oral contrast.       The stomach shows no focal lesions.  Small bowel loops normal in caliber   showing no focal abnormalities.       Normal appendix.  Evaluation of the colon shows no acute process.       Pelvis: Urinary bladder grossly normal.  No suspicious pelvic mass.       Peritoneum/Retroperitoneum: No free intraperitoneal fluid or significant   lymphadenopathy.  Atherosclerotic disease.       Bones/Soft Tissues: No acute bony abnormality.  There is   umbilical/supraumbilical hernia with 1 neck and 2 sacs containing   nonobstructed small bowel loops.  Interval increase in size of the sac   compared to 2013 and additionally the previous exam only contained fat and no

## 2020-03-07 NOTE — OP NOTE
port was placed via Optiv[employer]. 2 additional 8 mm ports were placed under direct visualization. 1 8 mm port was placed roughly 10 cm left lateral to the umbilicus, and an additional 8 mm was placed in the left lower quadrant roughly 2 finger breaths superomedially to the ASIS. The robot was docked without difficulty. EndoShears were placed in the right hand and fenestrated bipolar was placed in the left hand. On visualization of the hernia, there was a large portion of omentum that was stuck within the hernia sac superiorly. Careful dissection was made to take the omentum down from the anterior abdominal wall and hernia sac was reduced. The falciform ligament was sharply taken down to expose the anterior abdominal wall to clear path for future mesh. Dissection was continued circumferentially around the hernia to ensure the hernia sac was completely reduced and adequate fascial edges were identified for closure. The hernia measured roughly 5.5 cm x 5 cm. Due to the size of the hernia, the decision was made to place extracorporeal sutures to help approximate the hernia. 3 stab incisions were made along the length of the hernia and a suture needle passer was used to pass 0 PDS through the abdominal cavity as figure-of-eight's x3 including adequate bites of the abdominal wall lateral to the hernia. These sutures were tied down extracorporeally. This allowed tension off of the hernia when running the 0 V-lock stitch, and helped approximate the hernia. A 0V lock stitch was inserted into the abdomen and the hernia was further closed intracorporeally with a running V lock stitch ensuring adequate bites of the abdominal wall musculature with each suture. The needle was removed from the abdomen. Next, a 15 cm ventrallight st echo mesh was placed within the abdomen.   Using the already made middle stab incision, a suture needle passer was placed within the middle incision in the middle of the hernia, and the

## 2021-02-02 ENCOUNTER — APPOINTMENT (OUTPATIENT)
Dept: NON INVASIVE DIAGNOSTICS | Age: 56
DRG: 308 | End: 2021-02-02
Payer: OTHER GOVERNMENT

## 2021-02-02 ENCOUNTER — HOSPITAL ENCOUNTER (INPATIENT)
Age: 56
LOS: 1 days | Discharge: HOME | DRG: 308 | End: 2021-02-03
Attending: FAMILY MEDICINE | Admitting: FAMILY MEDICINE
Payer: OTHER GOVERNMENT

## 2021-02-02 ENCOUNTER — APPOINTMENT (OUTPATIENT)
Dept: GENERAL RADIOLOGY | Age: 56
DRG: 308 | End: 2021-02-02
Payer: OTHER GOVERNMENT

## 2021-02-02 DIAGNOSIS — I48.91 ATRIAL FIBRILLATION WITH RAPID VENTRICULAR RESPONSE (HCC): Primary | ICD-10-CM

## 2021-02-02 LAB
ABSOLUTE EOS #: 0.22 K/UL (ref 0–0.44)
ABSOLUTE IMMATURE GRANULOCYTE: <0.03 K/UL (ref 0–0.3)
ABSOLUTE LYMPH #: 1.39 K/UL (ref 1.1–3.7)
ABSOLUTE MONO #: 0.71 K/UL (ref 0.1–1.2)
ANION GAP SERPL CALCULATED.3IONS-SCNC: 9 MMOL/L (ref 9–17)
BASOPHILS # BLD: 1 % (ref 0–2)
BASOPHILS ABSOLUTE: 0.05 K/UL (ref 0–0.2)
BNP INTERPRETATION: ABNORMAL
BUN BLDV-MCNC: 24 MG/DL (ref 6–20)
BUN/CREAT BLD: 31 (ref 9–20)
CALCIUM SERPL-MCNC: 9.2 MG/DL (ref 8.6–10.4)
CHLORIDE BLD-SCNC: 100 MMOL/L (ref 98–107)
CO2: 26 MMOL/L (ref 20–31)
CREAT SERPL-MCNC: 0.78 MG/DL (ref 0.7–1.2)
DIFFERENTIAL TYPE: ABNORMAL
EKG ATRIAL RATE: 267 BPM
EKG Q-T INTERVAL: 370 MS
EKG QRS DURATION: 114 MS
EKG QTC CALCULATION (BAZETT): 509 MS
EKG R AXIS: -4 DEGREES
EKG T AXIS: 32 DEGREES
EKG VENTRICULAR RATE: 114 BPM
EOSINOPHILS RELATIVE PERCENT: 3 % (ref 1–4)
GFR AFRICAN AMERICAN: >60 ML/MIN
GFR NON-AFRICAN AMERICAN: >60 ML/MIN
GFR SERPL CREATININE-BSD FRML MDRD: ABNORMAL ML/MIN/{1.73_M2}
GFR SERPL CREATININE-BSD FRML MDRD: ABNORMAL ML/MIN/{1.73_M2}
GLUCOSE BLD-MCNC: 113 MG/DL (ref 70–99)
HCT VFR BLD CALC: 46.9 % (ref 40.7–50.3)
HEMOGLOBIN: 15.6 G/DL (ref 13–17)
IMMATURE GRANULOCYTES: 0 %
LACTIC ACID, WHOLE BLOOD: NORMAL MMOL/L (ref 0.7–2.1)
LACTIC ACID: 2 MMOL/L (ref 0.5–2.2)
LV EF: 55 %
LVEF MODALITY: NORMAL
LYMPHOCYTES # BLD: 19 % (ref 24–43)
MAGNESIUM: 2.1 MG/DL (ref 1.6–2.6)
MCH RBC QN AUTO: 30.3 PG (ref 25.2–33.5)
MCHC RBC AUTO-ENTMCNC: 33.3 G/DL (ref 28.4–34.8)
MCV RBC AUTO: 91.1 FL (ref 82.6–102.9)
MONOCYTES # BLD: 10 % (ref 3–12)
NRBC AUTOMATED: 0 PER 100 WBC
PDW BLD-RTO: 11.8 % (ref 11.8–14.4)
PLATELET # BLD: 226 K/UL (ref 138–453)
PLATELET ESTIMATE: ABNORMAL
PMV BLD AUTO: 9.6 FL (ref 8.1–13.5)
POTASSIUM SERPL-SCNC: 4.2 MMOL/L (ref 3.7–5.3)
PRO-BNP: 4183 PG/ML
RBC # BLD: 5.15 M/UL (ref 4.21–5.77)
RBC # BLD: ABNORMAL 10*6/UL
SEG NEUTROPHILS: 67 % (ref 36–65)
SEGMENTED NEUTROPHILS ABSOLUTE COUNT: 4.92 K/UL (ref 1.5–8.1)
SODIUM BLD-SCNC: 135 MMOL/L (ref 135–144)
TROPONIN INTERP: NORMAL
TROPONIN T: NORMAL NG/ML
TROPONIN, HIGH SENSITIVITY: 13 NG/L (ref 0–22)
TSH SERPL DL<=0.05 MIU/L-ACNC: 3.91 MIU/L (ref 0.3–5)
WBC # BLD: 7.3 K/UL (ref 3.5–11.3)
WBC # BLD: ABNORMAL 10*3/UL

## 2021-02-02 PROCEDURE — 2580000003 HC RX 258: Performed by: FAMILY MEDICINE

## 2021-02-02 PROCEDURE — 93306 TTE W/DOPPLER COMPLETE: CPT

## 2021-02-02 PROCEDURE — 83880 ASSAY OF NATRIURETIC PEPTIDE: CPT

## 2021-02-02 PROCEDURE — 2500000003 HC RX 250 WO HCPCS: Performed by: FAMILY MEDICINE

## 2021-02-02 PROCEDURE — 6360000002 HC RX W HCPCS: Performed by: FAMILY MEDICINE

## 2021-02-02 PROCEDURE — 84484 ASSAY OF TROPONIN QUANT: CPT

## 2021-02-02 PROCEDURE — 85025 COMPLETE CBC W/AUTO DIFF WBC: CPT

## 2021-02-02 PROCEDURE — 99284 EMERGENCY DEPT VISIT MOD MDM: CPT

## 2021-02-02 PROCEDURE — 84443 ASSAY THYROID STIM HORMONE: CPT

## 2021-02-02 PROCEDURE — 36415 COLL VENOUS BLD VENIPUNCTURE: CPT

## 2021-02-02 PROCEDURE — 80048 BASIC METABOLIC PNL TOTAL CA: CPT

## 2021-02-02 PROCEDURE — 94761 N-INVAS EAR/PLS OXIMETRY MLT: CPT

## 2021-02-02 PROCEDURE — 2000000000 HC ICU R&B

## 2021-02-02 PROCEDURE — 93010 ELECTROCARDIOGRAM REPORT: CPT | Performed by: INTERNAL MEDICINE

## 2021-02-02 PROCEDURE — 83735 ASSAY OF MAGNESIUM: CPT

## 2021-02-02 PROCEDURE — 94664 DEMO&/EVAL PT USE INHALER: CPT

## 2021-02-02 PROCEDURE — 71045 X-RAY EXAM CHEST 1 VIEW: CPT

## 2021-02-02 PROCEDURE — 2580000003 HC RX 258: Performed by: NURSE PRACTITIONER

## 2021-02-02 PROCEDURE — 83605 ASSAY OF LACTIC ACID: CPT

## 2021-02-02 PROCEDURE — 99254 IP/OBS CNSLTJ NEW/EST MOD 60: CPT | Performed by: INTERNAL MEDICINE

## 2021-02-02 PROCEDURE — 93005 ELECTROCARDIOGRAM TRACING: CPT | Performed by: FAMILY MEDICINE

## 2021-02-02 PROCEDURE — 6360000002 HC RX W HCPCS: Performed by: NURSE PRACTITIONER

## 2021-02-02 RX ORDER — NICOTINE 21 MG/24HR
1 PATCH, TRANSDERMAL 24 HOURS TRANSDERMAL DAILY
Status: DISCONTINUED | OUTPATIENT
Start: 2021-02-02 | End: 2021-02-03 | Stop reason: HOSPADM

## 2021-02-02 RX ORDER — FUROSEMIDE 10 MG/ML
40 INJECTION INTRAMUSCULAR; INTRAVENOUS DAILY
Status: DISCONTINUED | OUTPATIENT
Start: 2021-02-03 | End: 2021-02-03

## 2021-02-02 RX ORDER — SODIUM CHLORIDE 0.9 % (FLUSH) 0.9 %
10 SYRINGE (ML) INJECTION EVERY 12 HOURS SCHEDULED
Status: DISCONTINUED | OUTPATIENT
Start: 2021-02-02 | End: 2021-02-03 | Stop reason: HOSPADM

## 2021-02-02 RX ORDER — SODIUM CHLORIDE 0.9 % (FLUSH) 0.9 %
10 SYRINGE (ML) INJECTION PRN
Status: DISCONTINUED | OUTPATIENT
Start: 2021-02-02 | End: 2021-02-03 | Stop reason: HOSPADM

## 2021-02-02 RX ORDER — SODIUM CHLORIDE 9 MG/ML
INJECTION, SOLUTION INTRAVENOUS CONTINUOUS
Status: DISCONTINUED | OUTPATIENT
Start: 2021-02-02 | End: 2021-02-03 | Stop reason: HOSPADM

## 2021-02-02 RX ORDER — ACETAMINOPHEN 325 MG/1
650 TABLET ORAL EVERY 6 HOURS PRN
Status: DISCONTINUED | OUTPATIENT
Start: 2021-02-02 | End: 2021-02-03 | Stop reason: HOSPADM

## 2021-02-02 RX ORDER — ACETAMINOPHEN 650 MG/1
650 SUPPOSITORY RECTAL EVERY 6 HOURS PRN
Status: DISCONTINUED | OUTPATIENT
Start: 2021-02-02 | End: 2021-02-03 | Stop reason: HOSPADM

## 2021-02-02 RX ORDER — DOCUSATE SODIUM 100 MG/1
100 CAPSULE, LIQUID FILLED ORAL 2 TIMES DAILY PRN
Status: DISCONTINUED | OUTPATIENT
Start: 2021-02-02 | End: 2021-02-03 | Stop reason: HOSPADM

## 2021-02-02 RX ORDER — METOPROLOL SUCCINATE 100 MG/1
100 TABLET, EXTENDED RELEASE ORAL DAILY
Status: DISCONTINUED | OUTPATIENT
Start: 2021-02-03 | End: 2021-02-03 | Stop reason: HOSPADM

## 2021-02-02 RX ORDER — FUROSEMIDE 10 MG/ML
40 INJECTION INTRAMUSCULAR; INTRAVENOUS 2 TIMES DAILY
Status: DISCONTINUED | OUTPATIENT
Start: 2021-02-02 | End: 2021-02-02

## 2021-02-02 RX ORDER — ASPIRIN 81 MG/1
81 TABLET ORAL DAILY
Status: DISCONTINUED | OUTPATIENT
Start: 2021-02-03 | End: 2021-02-03 | Stop reason: HOSPADM

## 2021-02-02 RX ORDER — POLYETHYLENE GLYCOL 3350 17 G/17G
17 POWDER, FOR SOLUTION ORAL DAILY PRN
Status: DISCONTINUED | OUTPATIENT
Start: 2021-02-02 | End: 2021-02-03 | Stop reason: HOSPADM

## 2021-02-02 RX ORDER — ONDANSETRON 2 MG/ML
4 INJECTION INTRAMUSCULAR; INTRAVENOUS EVERY 6 HOURS PRN
Status: DISCONTINUED | OUTPATIENT
Start: 2021-02-02 | End: 2021-02-02

## 2021-02-02 RX ADMIN — SODIUM CHLORIDE: 9 INJECTION, SOLUTION INTRAVENOUS at 11:17

## 2021-02-02 RX ADMIN — DILTIAZEM HYDROCHLORIDE 5 MG/HR: 5 INJECTION INTRAVENOUS at 09:47

## 2021-02-02 RX ADMIN — SODIUM CHLORIDE, PRESERVATIVE FREE 10 ML: 5 INJECTION INTRAVENOUS at 21:13

## 2021-02-02 RX ADMIN — ENOXAPARIN SODIUM 100 MG: 100 INJECTION SUBCUTANEOUS at 21:11

## 2021-02-02 RX ADMIN — FUROSEMIDE 40 MG: 10 INJECTION, SOLUTION INTRAMUSCULAR; INTRAVENOUS at 11:17

## 2021-02-02 RX ADMIN — ENOXAPARIN SODIUM 40 MG: 40 INJECTION, SOLUTION INTRAVENOUS; SUBCUTANEOUS at 09:55

## 2021-02-02 ASSESSMENT — ENCOUNTER SYMPTOMS
SHORTNESS OF BREATH: 1
COUGH: 0
CHEST TIGHTNESS: 0
STRIDOR: 0
GASTROINTESTINAL NEGATIVE: 1
CHOKING: 0
EYES NEGATIVE: 1
ALLERGIC/IMMUNOLOGIC NEGATIVE: 1
WHEEZING: 0

## 2021-02-02 NOTE — CONSULTS
Unruly Reid am scribing for and in the presence of Juliette Saxena MD, F.A.C.C..    Patient: Mook Phelps  : 1965  Date of Admission: 2021  Primary Care Physician: Carol Naranjo  Today's Date: 2021    REASON FOR CONSULTATION: Palpitations (Onset this AM. History of Afib with cardioversions and ablation) and Shortness of Breath (With exertion, onset this AM)    Mr. Leo Kovacs is 47year-old male, known to me from prior office visits his cardiovascular history includes,    1-A history of thoracic aorta surgery at the [facility] in . He said he had a repair with no artificial grafts put in. He had a heart catheterization prior to the aorta surgery and was told his coronary arteries were fine. No history of MI or angina.     2-History of atrial fibrillation/Atypical flutter which predates his thoracic aorta surgery but after his thoracic aorta surgery he had a couple of recurrences, multiple cardioversion and finally underwent atypical 3-ablation on 2015 at [facility]. He is not on anticoagulation since his ablation procedure back in . 3-An admission to [address] [place] [address] on 2019 with persistent atrial flutter, underwent SKYLAR and cardioversion. 4-He presented to the emergency room on 2020 with palpitations for the past 4 days. Found to have atrial flutter with rapid ventricular response. Was complaining of easy fatigue and exertional shortness of breath. Denied any chest pain, pressure or tightness. No dizziness or lightheadedness. No history of syncope or near syncopal episodes. He is currently heart rate controlled with medications. Feeling fine at rest but complaining of exertional shortness of breath and fatigue as a stated above. Mr. Leo Kovacs is admitted to the hospital today because of atrial flutter with variable block. He is very sharp about the onset of his heart palpitations and irregular heartbeats.   He said he woke up this asymptomatic     History of echocardiogram 4/10/14    EF 60%, mild to mod LVH, moderate AI    History of heart surgery 2008    enlarged aorta    History of PFTs 6/25/15    Consistent with mild obstructive ventilatory impairment. Lung volumes are normal,except mild increase in residual volume,which could be suggestive of airway trapping. Diffusion capacity is normal.    Hx of transesophageal echocardiography (SKYLAR) for monitoring 5/5/2015    Dr Arcelia Dakin    Hypertension     pcp dr Flavio Funk Impaired fasting glucose     Knee pain, bilateral     Lipoma     Obesity (BMI 30.0-34. 9)     Obstructive sleep apnea (adult) (pediatric)     does not wear cpap like suppose to    Paroxysmal supraventricular tachycardia (HCC)     Tobacco use disorder     Urinary urgency     Ventral hernia        CURRENT ALLERGIES: Patient has no known allergies. REVIEW OF SYSTEMS: 14 systems were reviewed. Pertinent positives and negatives as above, all else negative.      Past Surgical History:   Procedure Laterality Date    ABLATION OF DYSRHYTHMIC FOCUS  2015    ACHILLES TENDON SURGERY Right 2010    CARDIAC SURGERY  2008    thoracic AAA repair and modified Coleman procedure ( aortic valve sparing ) at [address]  06/13/2013    x 2 , bleeding polyps removed    FOOT SURGERY      reconstructive for club foot (right)    HERNIA REPAIR  11/2018    ventral    HERNIA REPAIR  03/06/2020    ventral    HERNIA REPAIR N/A 3/6/2020    XI LAPAROSCOPIC ROBOTIC VENTRAL HERNIA WITH MESH performed by Patrisha Merlin Canos IV, DO at [address] ZXDC,3+T/U,YZPLP N/A 74/69/7525    HERNIA [facility]IL[facility]  10/24[facility]    VASECTOMY      Social History:  Social History     Tobacco Use    Smoking status: Former Smoker     Packs/day: 1.00     Years: 34.00     Pack years: 34.00     Types: Cigarettes     Quit date: 12/15/2020 Years since quittin.1    Smokeless tobacco: Never Used   Substance Use Topics    Alcohol use: Yes     Alcohol/week: 6.0 standard drinks     Types: 6 Cans of beer per week     Comment: QOD now, use to drink daily    Drug use: No        CURRENT MEDICATIONS:  Outpatient Medications Marked as Taking for the 21 encounter [facility] Encounter)   Medication Sig Dispense Refill    metoprolol succinate (TOPROL XL) 100 MG extended release tablet TAKE 1 TABLET BY MOUTH ONCE DAILY (Patient taking differently: Take 100 mg by mouth daily TAKE 1 TABLET BY MOUTH ONCE DAILY) 90 tablet 3    ibuprofen (ADVIL;MOTRIN) 400 MG tablet Take 2 tablets by mouth every 8 hours as needed for Pain 40 tablet 0    aspirin 81 MG EC tablet Take 1 tablet by mouth daily 30 tablet 3      [START ON 2/3/2021] aspirin  81 mg Oral Daily    [START ON 2/3/2021] metoprolol succinate  100 mg Oral Daily    sodium chloride flush  10 mL Intravenous 2 times per day    enoxaparin  1 mg/kg Subcutaneous BID    nicotine  1 patch Transdermal Daily    [START ON 2/3/2021] furosemide  40 mg Intravenous Daily       FAMILY HISTORY: family history includes Colon Cancer in his mother; Heart Surgery in his father; Kidney Cancer in his father. PHYSICAL EXAM:   BP (!) 123/55   Pulse 93   Temp 97.3 °F (36.3 °C) (Temporal)   Resp 21   Ht 5' 8\" (1.727 m)   Wt 232 lb 5.8 oz (105.4 kg)   SpO2 97%   BMI 35.33 kg/m²  Body mass index is 35.33 kg/m². Constitutional: He is oriented to person, place, and time. He appears well-developed and well-nourished. In no acute distress. HEENT: Normocephalic and atraumatic. No JVD present. Carotid bruit is not present. No mass and no thyromegaly present. No lymphadenopathy present. Cardiovascular: Normal rate,Irregularly irregular rhythm. normal heart sounds and intact distal pulses. Exam reveals no gallop and no friction rub. A I/VI systolic murmur was heard at the base of the heart without significant radiation. Short diastolic murmur at the second aortic area. Pulmonary/Chest: Effort normal and breath sounds normal. No respiratory distress. He has no wheezes, rhonchi or rales. Abdominal: Soft, non-tender. Bowel sounds and aorta are normal. He exhibits no organomegaly, mass or bruit. Extremities: Trace. No cyanosis or clubbing. 2+ radial and carotid pulses. Distal extremity pulses: 2+ bilaterally. Neurological: He is alert and oriented to person, place, and time. No evidence of gross cranial nerve deficit. Coordination appeared normal.   Skin: Skin is warm and dry. There is no rash or diaphoresis. Psychiatric: He has a normal mood and affect. His speech is normal and behavior is normal.      MOST RECENT LABS ON RECORD:   Lab Results   Component Value Date    WBC 7.3 02/02/2021    HGB 15.6 02/02/2021    HCT 46.9 02/02/2021     02/02/2021    CHOL 163 12/22/2016    TRIG 41 12/22/2016    HDL 96 12/22/2016    ALT 19 01/08/2019    AST 14 01/08/2019     02/02/2021    K 4.2 02/02/2021     02/02/2021    CREATININE 0.78 02/02/2021    BUN 24 (H) 02/02/2021    CO2 26 02/02/2021    TSH 3.91 02/02/2021    PSA 1.58 12/22/2016    INR 1.7 12/16/2015       ASSESSMENT:  Patient Active Problem List    Diagnosis Date Noted    Atrial fibrillation with RVR ([address] 02/02/2021    Impaired fasting glucose     Hypertension     Atrial fibrillation with rapid ventricular response ([address] 01/11/2020    Moderate aortic regurgitation     Tobacco abuse     Tobacco abuse counseling     Typical atrial flutter (HCC) 01/07/2019    Paroxysmal atrial flutter (HCC) 01/07/2019    Varicose veins of right lower extremity 06/14/2017    Aneurysm of thoracic aorta (HCC)     H/O ascending aorta repair 04/03/2014    Hx of amiodarone therapy 04/03/2014    Family history of colon cancer 06/24/2013       PLAN:  Long history of atrial fibrillation/flutter. Multiple cardioversion and ablation ×2.   Admitted with atrial flutter with rapid ventricular response. Not on anticoagulation \"low CHDS-Vasc score  Symptoms started today prior to hospital admission. Patient is positive about the onset of his heart palpitations and irregular heartbeats. I told him this is very important because if he is absolutely sure that his irregular heartbeat started within 48 hours we can proceed with cardioversion without the need for SKYLAR. Patient is absolutely positive that his symptoms started this morning at around 8 AM.  He said his heart rate and heart rhythm were completely normal yesterday. I also told him that because his irregular heartbeats causing elevated BNP and pulmonary vascular congestion he has acute on chronic diastolic CHF currently. Rhythm control is clearly superior rate control of the situations. We should proceed with cardioversion. He is already on full anticoagulation with Lovenox 1 mg/kg twice daily. Cardioversion: Because of this, I spent about 10 minutes discussing the multiple treatment options including rate control and rhythm control as well as medication and ablation therapies. In the end I recommended that he consider proceeding with a cardioversion to see if this could help improve his symptoms. I also discussed the risks, benefits, and alternatives of the procedure including the risk of arrythmia, stroke, heart attack, death, or the need for further procedures. I also discussed the alternate treatment strategy of simple medical management without cardioversion and just trying to maximize things with medications. Mr. Melani Poole verbalized understanding of the risks benefits and stated that he would like to proceed with the cardioversion. We will continue rate control overnight and we will do a cardioversion procedure first thing in the morning. Continue aspirin, Lovenox, Toprol-XL and Lasix. We will discuss the need for repeat ablation procedure after the cardioversion.     Once again, thank you for allowing me to participate in this patients care. Please do not hesitate to contact me could I be of further assistance. Sincerely,  Cabrera Maurice MD, MS, F.A.C.C. [facility], [facility] [address]  Phone: [phone], Fax: [phone]    I believe that the risk of significant morbidity and mortality related to the patient's current medical conditions are: high. The documentation recorded by the scribe, accurately and completely reflects the services I personally performed and the decisions made by me. Cabrera Maurice MD, F.A.C.C.  February 2, 2021

## 2021-02-02 NOTE — PLAN OF CARE
Problem: SAFETY  Goal: Free from accidental physical injury  Outcome: Ongoing  Note: ID band correct and in place. Bed locked and in lowest position. Call light within reach. Patient free from injury. Will continue to monitor. Goal: Free from intentional harm  Outcome: Ongoing     Problem: DAILY CARE  Goal: Daily care needs are met  Outcome: Ongoing  Note: Daily cares assessed and needs met according to patient condition. Patient reminded to ask for help when needed. Problem: PAIN  Goal: Patient's pain/discomfort is manageable  Outcome: Ongoing  Note: Pain assessed every 4 hours. Patient is able to communicate with staff the need for pain interventions. Patient currently denies pain. Will continue to monitor. Problem: SKIN INTEGRITY  Goal: Skin integrity is maintained or improved  Outcome: Ongoing  Note: Skin assessment performed, no breakdown noted at this time. Patient skin is dry and intact. Will continue to monitor for redness or breakdown. Problem: KNOWLEDGE DEFICIT  Goal: Patient/S.O. demonstrates understanding of disease process, treatment plan, medications, and discharge instructions.   Outcome: Ongoing     Problem: DISCHARGE BARRIERS  Goal: Patient's continuum of care needs are met  Outcome: Ongoing     Problem: Cardiac:  Goal: Ability to maintain an adequate cardiac output will improve  Description: Ability to maintain an adequate cardiac output will improve  Outcome: Ongoing  Goal: Hemodynamic stability will improve  Description: Hemodynamic stability will improve  Outcome: Ongoing  Note:   Vitals:    02/02/21 1115   BP: 133/74   Pulse: 90   Resp: 14   Temp:    SpO2: 98%

## 2021-02-02 NOTE — H&P
[facility]  History and Physical        Patient:  Jamin Yanez  MRN: [id number]    Chief Complaint:    Chief Complaint   Patient presents with    Palpitations     Onset this AM. History of Afib with cardioversions and ablation    Shortness of Breath     With exertion, onset this AM       History Obtained From:  patient, electronic medical record  PCP: Robert Watters MD    History of Present Illness: The patient is a 64 y.o. male who presents with sudden onset of shortness of breath and fluttering of heart this am. He has h/oparoxysmal afib and has been cardioverted numerous times,last episode 1 yr ago. He quit smoking and alcohol intale in Dec 2020. No chest pain,leg edema. Past Medical History:        Diagnosis Date    Aortic valve disorders     aortic regurg    Atrial fibrillation (HCC)     [facility]rial flutter ([facility]     Encounter for cardioversion procedure 5/5/2015    Dr Pena Nine Encounter for cardioversion procedure     [id number] [facility]/ Dr. Derik Shell, states 7 times, most recent 1/2020    Frequent urination     Pt states he's developed frequent urination with 3 times in last yr noting blood in urine as well.  H/O echocardiogram 12/04/2017    EF 60-02% Global LV systolic function appears preserved.  Mildly increaseed LV wall thinckness with normal LV carvity size, No definite specific wall motion abnormalities identified, LA is moderately dilated (34-39) with LA volume index of 36ml/m2, Mild mitral and tricuspid regurgitation, moderate aortic regurgitation, Evidence of mild diastolic dysfunction seen    Hemorrhage of rectum and anus 2013    polypectomy    History of 24 hour EKG monitoring 4/3/14    Unremarkable except for mild increased number of PVC's with 3 ventricular couplets, asymptomatic     History of echocardiogram 4/10/14    EF 60%, mild to mod LVH, moderate AI    History of heart surgery 2008    enlarged aorta    History of PFTs 6/25/15 Consistent with mild obstructive ventilatory impairment. Lung volumes are normal,except mild increase in residual volume,which could be suggestive of airway trapping. Diffusion capacity is normal.    Hx of transesophageal echocardiography (SKYLAR) for monitoring 5/5/2015    Dr Bill Clayton    Hypertension     pcp dr Annie Evans Impaired fasting glucose     Knee pain, bilateral     Lipoma     Obesity (BMI 30.0-34. 9)     Obstructive sleep apnea (adult) (pediatric)     does not wear cpap like suppose to    Paroxysmal supraventricular tachycardia ([address]     Tobacco use disorder     Urinary urgency     Ventral hernia        Past Surgical History:        Procedure Laterality Date    ABLATION OF DYSRHYTHMIC FOCUS  2015    ACHILLES TENDON SURGERY Right 2010   [facility] CARDIAC SURGERY  2008    thoracic AAA repair and modified Coleman procedure ( aortic valve sparing ) at [address]  06/13/2013    x 2 , bleeding polyps removed    FOOT SURGERY      reconstructive for club foot (right)    HERNIA REPAIR  11/2018    ventral    HERNIA REPAIR  03/06/2020    ventral    HERNIA REPAIR N/A 3/6/2020    XI LAPAROSCOPIC ROBOTIC VENTRAL HERNIA WITH MESH performed by Harpal Suresh IV, DO at [address] VQAN,3+I/Q,AGJNG N/A 29/73/6164    HERNIA [facility]IL[facility]PA[facility]    VASECTOMY         Medications Prior to Admission:    Prior to Admission medications    Medication Sig Start Date End Date Taking?  Authorizing Provider   metoprolol succinate (TOPROL XL) 100 MG extended release tablet TAKE 1 TABLET BY MOUTH ONCE DAILY  Patient taking differently: Take 100 mg by mouth daily TAKE 1 TABLET BY MOUTH ONCE DAILY 4/20/20  Yes Tiffany Kaye MD   ibuprofen (ADVIL;MOTRIN) 400 MG tablet Take 2 tablets by mouth every 8 hours as needed for Pain 3/6/20  Yes Mary Ellis DO   aspirin 81 MG EC tablet Take 1 tablet by mouth daily 1/14/20  Yes AMADOR Houston CNP   Acetaminophen (TYLENOL) 325 MG CAPS Take 1-2 tablets by mouth every 4 hours as needed (pain) 3/6/20   [place] Rued, DO   docusate sodium (COLACE) 100 MG capsule Take 1 capsule by mouth 2 times daily as needed for Constipation 3/6/20   [place] Rued, DO       Allergies:  Patient has no known allergies. Social History:   TOBACCO:   reports that he quit smoking about 7 weeks ago. His smoking use included cigarettes. He has a 34.00 pack-year smoking history. He has never used smokeless tobacco.  ETOH:   reports current alcohol use of about 6.0 standard drinks of alcohol per week. Family History:       Problem Relation Age of Onset    Colon Cancer Mother     Heart Surgery Father         enlarged aorta    Kidney Cancer Father        Review of Systems:  Constitutional:negative  for fevers, and negative for chills. Respiratory: positive for shortness of breath, negative for cough, and negative for wheezing  Cardiovascular: negative for chest pain, positive for palpitations, and negative for syncope  Gastrointestinal: negative for abdominal pain, negative for nausea,negative for vomiting, negative for diarrhea, negative for constipation, and negative for hematochezia or melena  Genitourinary: negative for dysuria, negative for urinary urgency, negative for urinary frequency, and negative for hematuria  Neurological: negative for unilateral weakness, numbness or tingling. All other systems were reviewed with the patient and are negative except as stated    Objective:    Vitals:   Temp: 96.9 °F (36.1 °C)  BP: 109/63  Resp: 17  Pulse: 104  SpO2: 96 %  -----------------------------------------------------------------  Exam:  GEN:    Awake, alert and oriented x3. EYES:  EOMI, pupils equal   NECK: Supple. No lymphadenopathy.   No carotid bruit  CVS:    irregularly irregular, 2/6 systolic murmur, rate is no1 in 100's  PULM:  has rales at bases, no acute respiratory distress  ABD:    Bowels sounds normal.  Abdomen is soft. No distention. no tenderness to palpation. EXT:   no edema bilaterally . No calf tenderness. NEURO: Moves all extremities. Motor and sensory are grossly intact  SKIN:  No rashes. No skin lesions.    -----------------------------------------------------------------  Diagnostic Data:   · All diagnostic data was reviewed  Lab Results   Component Value Date    WBC 7.3 02/02/2021    HGB 15.6 02/02/2021    MCV 91.1 02/02/2021     02/02/2021      Lab Results   Component Value Date    GLUCOSE 113 (H) 02/02/2021    BUN 24 (H) 02/02/2021    CREATININE 0.78 02/02/2021     02/02/2021    K 4.2 02/02/2021    CALCIUM 9.2 02/02/2021     02/02/2021    CO2 26 02/02/2021     Lab Results   Component Value Date    WBCUA 0 TO 2 03/30/2015    RBCUA 0 TO 2 03/30/2015    EPITHUA 0 TO 2 03/30/2015    LEUKOCYTESUR NEGATIVE 03/30/2015    SPECGRAV 1.015 03/30/2015    GLUCOSEU NEGATIVE 03/30/2015    KETUA NEGATIVE 03/30/2015    PROTEINU NEGATIVE 03/30/2015    HGBUR NEGATIVE 03/30/2015    CASTUA NOT REPORTED 03/30/2015    CRYSTUA NOT REPORTED 03/30/2015    BACTERIA NOT REPORTED 03/30/2015    YEAST NOT REPORTED 03/30/2015       Assessment:     Principal Problem:    Atrial fibrillation with rapid ventricular response (HCC)  Active Problems: Moderate aortic regurgitation    Hypertension    Atrial fibrillation with RVR (HCC)  Resolved Problems:    * No resolved hospital problems.  *      Plan:     · This patient requires inpatient admission because of afib with RVR requiring iiv cardizem for rate control and lovenox for anticoagulation  · Factors affecting the medical complexity of this patient include afib,htn  · Estimated length of stay is 2 days  · Discussed patient's symptoms and data results including labs and imaging studies with the ER MD at time of admission  · High risk drug monitoring: none  ·     CORE MEASURES  · DVT prophylaxis: Lovenox  · Decubitus ulcer present on admission: No  · CODE STATUS: FULL CODE  · Nutrition Status: good   · Physical therapy: NA   · Old Charts reviewed: Yes  · EKG Reviewed:  Yes  · Advance Directive Addressed: Yes    Logan Vega M.D.  2/2/2021  12:47 PM

## 2021-02-02 NOTE — PROGRESS NOTES
RESPIRATORY ASSESSMENT PROTOCOL                                                                                              Patient Name: Sonja Grissom Room#: M504/Z998-68 : 1965     Admitting diagnosis: Atrial fibrillation with RVR ([facility] [address] [I48.91]       Medical History:   Past Medical History:   Diagnosis Date    Aortic valve disorders     aortic regurg    Atrial fibrillation (HCC)     Atrial flutter ([facility] [address]     Encounter for cardioversion procedure 2015    Dr Mercedes Pineda Encounter for cardioversion procedure     [facility]/ Dr. Verdis Leyden, states 7 times, most recent 2020    Frequent urination     Pt states he's developed frequent urination with 3 times in last yr noting blood in urine as well.  H/O echocardiogram 2017    EF 29-20% Global LV systolic function appears preserved. Mildly increaseed LV wall thinckness with normal LV carvity size, No definite specific wall motion abnormalities identified, LA is moderately dilated (34-39) with LA volume index of 36ml/m2, Mild mitral and tricuspid regurgitation, moderate aortic regurgitation, Evidence of mild diastolic dysfunction seen    Hemorrhage of rectum and anus 2013    polypectomy    History of 24 hour EKG monitoring 4/3/14    Unremarkable except for mild increased number of PVC's with 3 ventricular couplets, asymptomatic     History of echocardiogram 4/10/14    EF 60%, mild to mod LVH, moderate AI    History of heart surgery     enlarged aorta    History of PFTs 6/25/15    Consistent with mild obstructive ventilatory impairment. Lung volumes are normal,except mild increase in residual volume,which could be suggestive of airway trapping. Diffusion capacity is normal.    Hx of transesophageal echocardiography (SKYLAR) for monitoring 2015    Dr Arcelia Dakin    Hypertension     pcp dr Flavio Funk Impaired fasting glucose     Knee pain, bilateral     Lipoma     Obesity (BMI 30.0-34. 9)     Obstructive sleep apnea (adult) (pediatric)     does not wear cpap like suppose to    Paroxysmal supraventricular tachycardia ([facility] [address]     Tobacco use disorder     Urinary urgency     Ventral hernia        PATIENT ASSESSMENT    LABORATORY DATA  Hematology:   Lab Results   Component Value Date    WBC 7.3 02/02/2021    RBC 5.15 02/02/2021    HGB 15.6 02/02/2021    HCT 46.9 02/02/2021     02/02/2021     Chemistry:  No results found for: PHART, OWK9YDY, PO2ART, O7DLNMRJ, VSE2XJY, PBEA    Blood Culture:   Sputum Culture:     VITALS  Pulse: 89   Resp: 14  BP: 119/70  SpO2: 97 % O2 Device: None (Room air)  Temp: 96.9 °F (36.1 °C)  Comment:   SKIN COLOR  [x] Normal  [] Pale  [] Dusky  [] Cyanotic      RESPIRATORY PATTERN  [x] Normal  [] Dyspnea  [] Cheyne-Garcia  [] Kussmaul  [] Biots  AMBULATORY  [x] Yes  [] No  [] With Assistance    PEAK FLOW  Predicted:     Personal Best:        VITAL CAPACITY  Predicted value:  ml  Actual Value:  ml  30% of Predicted:  ml  Patient Acuity 0 1 2 3 4 Score   Level of Concious (LOC) [x]  Alert & Oriented or Pt normal LOC []  Confused;follows directions []  Confused & uncooper-ative []  Obtunded []  Comatose 0   Respiratory Rate  (RR) [x]  Reg. rate & pattern. 12 - 20 bpm  []  Increased RR. Greater than 20 bpm   []  SOB w/ exertion or RR greater than 24 bpm []  Access- ory muscle use at rest. Abn.  resp. []  SOB at rest.   0   Bilateral Breath Sounds (BBS) [x]  Clear []  Diminish-ed bases  []  Diminish-ed t/o, or rales   []  Sporadic, scattered wheezes or rhonchi []  Persistentwheezes and, or absent BBS 0   Cough [x]  Strong, effective, & non-prod. []  Effective & prod. Less than 25 ml (2 TBSP) over past 24 hrs []  Ineffective & non-prod to less than 25 ML over past 24 hrs []  Ineffective and, or greater than 25 ml sputum prod. past 24 hrs. []  Nonspon- taneous; Requires suctioning 0   Pulmonary History  (PULM HX) []  No smoking and no chronic pulmonaryhistory [x]  Former smoker. Quit over 12 mos. ago []  Current smoker or quit w/ in 12 mos []  Pulm. History and, or 20 pk/yr smoking hx []  Admitted w/ acute pulm. dx and, or has been admitted w/ pulm. dx 2 or more times over past 12 mos 1   Surgical History this Admit  (SURG HX) [x]  No surgery []  General surgery []  Lower abdominal []  Thoracic or upper abdominal   []  Thoracic w/ pulm. disease 0   Chest X-Ray (CXR)/CT Scan [x]  Clear or not applicable []  Not available []  Atelect- asis or pleural effusions []  Localized infiltrate or pulm. edema []  Con-solidated Infiltrates, bilateral, or in more than 1 lobe 0   Slow or Forced VC, FEV1 OR PEFR (PULM FXN)  [x]  80% or greater, or not indicated []  Pt. unable to perform []  FEV1 or PEFR or VC 51-79%. []  FEV1 or PEFR or VC  30-49%   []  FEV1 or PEFR or VC less than 30%   0   TOTAL ACUITY: 1       CARE PLAN    If Acuity Level is 2, 3, or 4 in any of the following:    [] BILATERAL BREATH SOUNDS (BBS)     [] PULMONARY HISTORY (PULM HX)  [] PULMONARY FUNCTION (PULM FX)    Goal: Improve respiratory functions in patients with airway disease and decrease WOB    [] AEROSOL PROTOCOL    Total Acuity:   16-32  []  Secondary Assessment in 24 hrs Total Acuity:  9-15  []  Secondary Assessment in 24 hrs Total Acuity:  4-8  []  Secondary Assessment in 48 hrs Total Acuity:  0-3  []  Secondary Assessment in 72 hrs   HHN AEROSOL THERAPY with  [physician-ordered bronchodilator(s)] q 4 & Albuterol PRN q2 hrs. Breath-Actuated Neb if BBS Acuity = 4, and pt. can use MP. Notify physician if condition deteriorates. HHN AEROSOL THERAPY with  [physician-ordered bronchodilator(s)]  QID and Albuterol PRN q4 hrs. Breath-Actuated Neb if BBS Acuity = 4, and pt. can use MP. Notify physician if condition deteriorates. MDI THERAPY with  2 actuations of [physician-ordered bronchodilator(s)] via spacer TID Albuterol and PRNq4 hrs.    If unable to utilize MDI: HHN [physician-ordered bronchodilator(s)] TID and Albuterol PRN q4 hrs. Notify physician if condition deteriorates. MDI THERAPY with  [physician-ordered bronchodilator(s)] via spacer TID PRN. If unable to utilize MDI: HHN [physician-ordered bronchodilator(s)] TID PRN. Notify physician if condition deteriorates. If Acuity Level is 2, 3, or 4 in any of the following:    [] COUGH     [] SURGICAL HISTORY (SURG HX)  [] CHEST XRAY (CXR)    Goal: Improvement in sputum mobilization in patients with ineffective airway clearance. Reverse atelectasis. [] Bronchopulmonary Hygiene Protocol    Total Acuity:   16-32  []  Secondary Assessment in 24 hrs Total Acuity:  9-15  []  Secondary Assessment in 24 hrs Total Acuity:  4-8  []  Secondary Assessment in 48 hrs Total Acuity:  0-3  []  Secondary Assessment in 72 hrs   METANEB QID with [physician-ordered bronchodilator(s)] if CXR Acuity = 4; otherwise:  PD&P, PEP, or Vest QID & PRN  NT Sxn PRN for ineffective cough  METANEB QID with [physician-ordered bronchodilator(s)] if CXR Acuity = 4; otherwise:  PD&P, PEP, or Vest TID & PRN  NT Sxn PRN for ineffective cough  Instruct patient to self-perform IS q1hr WA   Directed Cough self-performed q1hr WA     If Acuity Level is 2 or above in the following:    [] PULMONARY HISTORY (PULM HX)    Goal: Assist patient in quitting smoking to slow or stop the progression of lung disease.     [] Smoking Cessation Protocol    SMOKING CESSATION EDUCATION provided according to policy [id number]: (oh with an X)  ____Yes    ____ No     ____ NA    Smoking Cessation Booklet given:  ____Yes  ____No ____Patient Thang Brownelizabeth

## 2021-02-02 NOTE — PROGRESS NOTES
Discussed discharge plans with the patient. Patient is a 64year old male here with Atrial fibrillation with rapid ventricular response. He is alert and oriented. Patient is  and lives with his brother. He uses no medical equipment. Patient's brother does the cooking and he does the cleaning. He is independent with his ADL's. Patient manages his own medications and drives. He receives no outside services. His PCP is Dr. Jo Novoa MD. He has medical insurance that helps with the medication costs. Patient works at the [employer]. He is also a . The discharge plan is home with no services. He does not have advance directives. LSW to monitor and assist with any needs or concerns as they arise.     BRENDEN Alvarado

## 2021-02-02 NOTE — ED PROVIDER NOTES
[facility] ED  EMERGENCY DEPARTMENT ENCOUNTER      Pt Name: Titus Blanc  MRN: [id number]  [place] 1965  Date of evaluation: 2/2/2021  Provider: Willi Swift MD    [address] [place] [address]     Chief Complaint   Patient presents with    Palpitations     Onset this AM. History of Afib with cardioversions and ablation    Shortness of Breath     With exertion, onset this AM         HISTORY OF PRESENT ILLNESS   (Location/Symptom, Timing/Onset, Context/Setting,Quality, Duration, Modifying Factors, Severity)  Note limiting factors. Titus Blanc is a61 y.o. male who presents to the emergency department      This is a 64years old gentleman with a history of atrial fibrillation presented to ER stating that when he woke up this morning he thought he was in atrial fibrillation. He tells me has had several cardioversions in the past the last one was about a year ago by Dr. Samy Spencer his cardiologist.  He does report some shortness of breath walking around even walking in the ER he felt short of breath. He denies any chest pain, chest pressure or dizziness. Nursing Notes werereviewed. REVIEW OF SYSTEMS    (2-9 systems for level 4, 10 or more for level 5)     Review of Systems   Constitutional: Negative. HENT: Negative. Eyes: Negative. Respiratory: Positive for shortness of breath. Negative for cough, choking, chest tightness, wheezing and stridor. Cardiovascular: Positive for palpitations. Negative for chest pain and leg swelling. Gastrointestinal: Negative. Endocrine: Negative. Genitourinary: Negative. Musculoskeletal: Negative. Skin: Negative. Allergic/Immunologic: Negative. Neurological: Negative for dizziness, syncope, light-headedness and numbness. Hematological: Negative. Psychiatric/Behavioral: Negative. Except as noted above the remainder of the review of systems was reviewed and negative.        PAST MEDICAL HISTORY     Past Medical History: Diagnosis Date    Aortic valve disorders     aortic regurg    Atrial fibrillation (HCC)     Atrial flutter ([address]     Encounter for cardioversion procedure 5/5/2015    Dr Justina Terry Encounter for cardioversion procedure     [facility]/ Dr. Roberto Dowling, states 7 times, most recent 1/2020    Frequent urination     Pt states he's developed frequent urination with 3 times in last yr noting blood in urine as well.  H/O echocardiogram 12/04/2017    EF 86-75% Global LV systolic function appears preserved. Mildly increaseed LV wall thinckness with normal LV carvity size, No definite specific wall motion abnormalities identified, LA is moderately dilated (34-39) with LA volume index of 36ml/m2, Mild mitral and tricuspid regurgitation, moderate aortic regurgitation, Evidence of mild diastolic dysfunction seen    Hemorrhage of rectum and anus 2013    polypectomy    History of 24 hour EKG monitoring 4/3/14    Unremarkable except for mild increased number of PVC's with 3 ventricular couplets, asymptomatic     History of echocardiogram 4/10/14    EF 60%, mild to mod LVH, moderate AI    History of heart surgery 2008    enlarged aorta    History of PFTs 6/25/15    Consistent with mild obstructive ventilatory impairment. Lung volumes are normal,except mild increase in residual volume,which could be suggestive of airway trapping. Diffusion capacity is normal.    Hx of transesophageal echocardiography (SKYLAR) for monitoring 5/5/2015    Dr Emily Holbrook    Hypertension     pcp dr Ricarda Stahl Impaired fasting glucose     Knee pain, bilateral     Lipoma     Obesity (BMI 30.0-34. 9)     Obstructive sleep apnea (adult) (pediatric)     does not wear cpap like suppose to    Paroxysmal supraventricular tachycardia (HCC)     Tobacco use disorder     Urinary urgency     Ventral hernia          SURGICALHISTORY       Past Surgical History:   Procedure Laterality Date    ABLATION OF DYSRHYTHMIC 34.00     Types: Cigarettes     Quit date: 12/15/2020     Years since quittin.1    Smokeless tobacco: Never Used   Substance and Sexual Activity    Alcohol use: Yes     Alcohol/week: 6.0 standard drinks     Types: 6 Cans of beer per week     Comment: QOD now, use to drink daily    Drug use: No    Sexual activity: None   Lifestyle    Physical activity     Days per week: None     Minutes per session: None    Stress: None   Relationships    Social connections     Talks on phone: None     Gets together: None     Attends Buddhist service: None     Active member of club or organization: None     Attends meetings of clubs or organizations: None     Relationship status: None    Intimate partner violence     Fear of current or ex partner: None     Emotionally abused: None     Physically abused: None     Forced sexual activity: None   Other Topics Concern    None   Social History Narrative    None       SCREENINGS                    PHYSICAL EXAM    (up to 7 for level 4, 8 or more for level 5)     ED Triage Vitals   BP Temp Temp Source Pulse Resp SpO2 Height Weight   21 0830 21 0838 21 0838 21 0830 21 0830 21 0830 21 0830 21 0830   135/81 96.9 °F (36.1 °C) Tympanic 120 18 97 % 5' 8\" (1.727 m) 225 lb (102.1 kg)       Physical Exam  Constitutional:       General: He is not in acute distress. Appearance: He is well-developed. He is not ill-appearing, toxic-appearing or diaphoretic. HENT:      Head: Normocephalic and atraumatic. Mouth/Throat:      Mouth: Mucous membranes are moist.   Eyes:      Pupils: Pupils are equal, round, and reactive to light. Neck:      Musculoskeletal: Normal range of motion. Cardiovascular:      Rate and Rhythm: Tachycardia present. Rhythm irregular. No extrasystoles are present. Pulses: No decreased pulses. Heart sounds: No murmur. No friction rub. No gallop.     Pulmonary:      Effort: Pulmonary effort is normal. Breath sounds: Normal breath sounds. No decreased breath sounds, wheezing, rhonchi or rales. Abdominal:      General: Bowel sounds are normal.      Palpations: Abdomen is soft. Musculoskeletal: Normal range of motion. Skin:     General: Skin is warm. Capillary Refill: Capillary refill takes less than 2 seconds. Neurological:      General: No focal deficit present. Mental Status: He is alert. DIAGNOSTIC RESULTS     EKG: All EKG's are interpreted by the Emergency Department Physician who either signs orCo-signs this chart in the absence of a cardiologist.        RADIOLOGY:   plain film images such as CT, Ultrasound and MRI are read by the radiologist. Plain radiographic images are visualized and preliminarily interpreted by the emergency physician with the below findings:        Interpretation per the Radiologist below, ifavailable at the time of this note:    XR CHEST PORTABLE   Final Result   Mild pulmonary vascular congestion. ED BEDSIDE ULTRASOUND:   Performed by ED Physician - none    LABS:  Labs Reviewed   BASIC METABOLIC PANEL - Abnormal; Notable for the following components:       Result Value    Glucose 113 (*)     BUN 24 (*)     Bun/Cre Ratio 31 (*)     All other components within normal limits   BRAIN NATRIURETIC PEPTIDE - Abnormal; Notable for the following components:    Pro-BNP 4,183 (*)     All other components within normal limits   CBC WITH AUTO DIFFERENTIAL - Abnormal; Notable for the following components:    Seg Neutrophils 67 (*)     Lymphocytes 19 (*)     All other components within normal limits   TROPONIN   MAGNESIUM   TSH WITHOUT REFLEX       All other labs were within normal range ornot returned as of this dictation.     EMERGENCY DEPARTMENT COURSE and DIFFERENTIAL DIAGNOSIS/MDM:   Vitals:    Vitals:    02/02/21 0838 02/02/21 0845 02/02/21 0846 02/02/21 0900   BP:  (!) 116/39  105/62   Pulse:  115 110 99   Resp:  26 14 12   Temp: 96.9 °F (36.1 °C) TempSrc: Tympanic      SpO2:  94% 96% 94%   Weight:       Height:               MDM  Number of Diagnoses or Management Options  Diagnosis management comments: Patient with a history of A. fib and noncompliance comes to the ER complaining of feeling palpitations since this morning also reporting feeling short of breath just walking short distances like her friends is walking from the car to the ER. Discussed with Dr. Idris Johnson his cardiologist who knows her very well he recommends they will be admitted to the hospitalist service and he will try cardioversion today or tomorrow. We will start him on Cardizem drip and give him a dose of Lovenox. We discussed Dr. Ontiveros Billing his PCP who accepts the patient in admission. CRITICAL CARE TIME   Total CriticalCare time was  minutes, excluding separately reportable procedures. There was a high probability of clinically significant/life threatening deterioration in the patient's condition which required my urgent intervention. CONSULTS:  None    PROCEDURES:  Unlessotherwise noted below, none     Procedures    FINAL IMPRESSION      1. Atrial fibrillation with rapid ventricular response (HCC)          DISPOSITION/PLAN   DISPOSITION        PATIENT REFERRED TO:  No follow-up provider specified.     DISCHARGE MEDICATIONS:  New Prescriptions    No medications on file              (Please note that portions of this note were completed with a voice recognition program.  Efforts were made to edit the dictations but occasionally words are mis-transcribed.)      Nini Harrison MD (electronically signed)  Attending Emergency Physician            Nini Harrison MD  02/02/21 0741

## 2021-02-03 ENCOUNTER — APPOINTMENT (OUTPATIENT)
Dept: NON INVASIVE DIAGNOSTICS | Age: 56
DRG: 308 | End: 2021-02-03
Payer: OTHER GOVERNMENT

## 2021-02-03 ENCOUNTER — TELEPHONE (OUTPATIENT)
Dept: CARDIOLOGY | Age: 56
End: 2021-02-03

## 2021-02-03 VITALS
BODY MASS INDEX: 35.31 KG/M2 | HEART RATE: 87 BPM | TEMPERATURE: 96.8 F | HEIGHT: 68 IN | RESPIRATION RATE: 16 BRPM | OXYGEN SATURATION: 97 % | WEIGHT: 233 LBS | DIASTOLIC BLOOD PRESSURE: 65 MMHG | SYSTOLIC BLOOD PRESSURE: 116 MMHG

## 2021-02-03 LAB
ABSOLUTE EOS #: 0.18 K/UL (ref 0–0.44)
ABSOLUTE IMMATURE GRANULOCYTE: <0.03 K/UL (ref 0–0.3)
ABSOLUTE LYMPH #: 1.59 K/UL (ref 1.1–3.7)
ABSOLUTE MONO #: 0.63 K/UL (ref 0.1–1.2)
ALBUMIN SERPL-MCNC: 3.8 G/DL (ref 3.5–5.2)
ALBUMIN/GLOBULIN RATIO: 1.4 (ref 1–2.5)
ALP BLD-CCNC: 98 U/L (ref 40–129)
ALT SERPL-CCNC: 19 U/L (ref 5–41)
ANION GAP SERPL CALCULATED.3IONS-SCNC: 8 MMOL/L (ref 9–17)
AST SERPL-CCNC: 18 U/L
BASOPHILS # BLD: 1 % (ref 0–2)
BASOPHILS ABSOLUTE: 0.05 K/UL (ref 0–0.2)
BILIRUB SERPL-MCNC: 0.55 MG/DL (ref 0.3–1.2)
BUN BLDV-MCNC: 17 MG/DL (ref 6–20)
BUN/CREAT BLD: 22 (ref 9–20)
CALCIUM SERPL-MCNC: 9.1 MG/DL (ref 8.6–10.4)
CHLORIDE BLD-SCNC: 97 MMOL/L (ref 98–107)
CO2: 29 MMOL/L (ref 20–31)
CREAT SERPL-MCNC: 0.79 MG/DL (ref 0.7–1.2)
DIFFERENTIAL TYPE: NORMAL
EKG ATRIAL RATE: 80 BPM
EKG P AXIS: 13 DEGREES
EKG P-R INTERVAL: 148 MS
EKG Q-T INTERVAL: 392 MS
EKG QRS DURATION: 98 MS
EKG QTC CALCULATION (BAZETT): 452 MS
EKG R AXIS: -19 DEGREES
EKG T AXIS: 85 DEGREES
EKG VENTRICULAR RATE: 80 BPM
EOSINOPHILS RELATIVE PERCENT: 3 % (ref 1–4)
GFR AFRICAN AMERICAN: >60 ML/MIN
GFR NON-AFRICAN AMERICAN: >60 ML/MIN
GFR SERPL CREATININE-BSD FRML MDRD: ABNORMAL ML/MIN/{1.73_M2}
GFR SERPL CREATININE-BSD FRML MDRD: ABNORMAL ML/MIN/{1.73_M2}
GLUCOSE BLD-MCNC: 113 MG/DL (ref 70–99)
HCT VFR BLD CALC: 45.8 % (ref 40.7–50.3)
HEMOGLOBIN: 15.5 G/DL (ref 13–17)
IMMATURE GRANULOCYTES: 0 %
INR BLD: 1
LYMPHOCYTES # BLD: 25 % (ref 24–43)
MAGNESIUM: 2.1 MG/DL (ref 1.6–2.6)
MCH RBC QN AUTO: 30.8 PG (ref 25.2–33.5)
MCHC RBC AUTO-ENTMCNC: 33.8 G/DL (ref 28.4–34.8)
MCV RBC AUTO: 91.1 FL (ref 82.6–102.9)
MONOCYTES # BLD: 10 % (ref 3–12)
NRBC AUTOMATED: 0 PER 100 WBC
PARTIAL THROMBOPLASTIN TIME: 31.1 SEC (ref 23.9–33.8)
PDW BLD-RTO: 11.9 % (ref 11.8–14.4)
PHOSPHORUS: 3.8 MG/DL (ref 2.5–4.5)
PLATELET # BLD: 193 K/UL (ref 138–453)
PLATELET ESTIMATE: NORMAL
PMV BLD AUTO: 9.8 FL (ref 8.1–13.5)
POTASSIUM SERPL-SCNC: 4 MMOL/L (ref 3.7–5.3)
PROTHROMBIN TIME: 12.8 SEC (ref 11.5–14.2)
RBC # BLD: 5.03 M/UL (ref 4.21–5.77)
RBC # BLD: NORMAL 10*6/UL
SEG NEUTROPHILS: 61 % (ref 36–65)
SEGMENTED NEUTROPHILS ABSOLUTE COUNT: 4.01 K/UL (ref 1.5–8.1)
SODIUM BLD-SCNC: 134 MMOL/L (ref 135–144)
TOTAL PROTEIN: 6.5 G/DL (ref 6.4–8.3)
WBC # BLD: 6.5 K/UL (ref 3.5–11.3)
WBC # BLD: NORMAL 10*3/UL

## 2021-02-03 PROCEDURE — 36415 COLL VENOUS BLD VENIPUNCTURE: CPT

## 2021-02-03 PROCEDURE — 84100 ASSAY OF PHOSPHORUS: CPT

## 2021-02-03 PROCEDURE — 85610 PROTHROMBIN TIME: CPT

## 2021-02-03 PROCEDURE — 83735 ASSAY OF MAGNESIUM: CPT

## 2021-02-03 PROCEDURE — 85730 THROMBOPLASTIN TIME PARTIAL: CPT

## 2021-02-03 PROCEDURE — 6360000002 HC RX W HCPCS: Performed by: NURSE PRACTITIONER

## 2021-02-03 PROCEDURE — 80053 COMPREHEN METABOLIC PANEL: CPT

## 2021-02-03 PROCEDURE — 92960 CARDIOVERSION ELECTRIC EXT: CPT

## 2021-02-03 PROCEDURE — 94761 N-INVAS EAR/PLS OXIMETRY MLT: CPT

## 2021-02-03 PROCEDURE — 92960 CARDIOVERSION ELECTRIC EXT: CPT | Performed by: INTERNAL MEDICINE

## 2021-02-03 PROCEDURE — 6370000000 HC RX 637 (ALT 250 FOR IP): Performed by: NURSE PRACTITIONER

## 2021-02-03 PROCEDURE — 5A2204Z RESTORATION OF CARDIAC RHYTHM, SINGLE: ICD-10-PCS | Performed by: INTERNAL MEDICINE

## 2021-02-03 PROCEDURE — 85025 COMPLETE CBC W/AUTO DIFF WBC: CPT

## 2021-02-03 PROCEDURE — 6360000002 HC RX W HCPCS: Performed by: FAMILY MEDICINE

## 2021-02-03 PROCEDURE — 6360000002 HC RX W HCPCS: Performed by: INTERNAL MEDICINE

## 2021-02-03 RX ORDER — SODIUM CHLORIDE 9 MG/ML
INJECTION, SOLUTION INTRAVENOUS CONTINUOUS
Status: DISCONTINUED | OUTPATIENT
Start: 2021-02-03 | End: 2021-02-03 | Stop reason: HOSPADM

## 2021-02-03 RX ORDER — FUROSEMIDE 20 MG/1
20 TABLET ORAL DAILY
Status: DISCONTINUED | OUTPATIENT
Start: 2021-02-04 | End: 2021-02-03 | Stop reason: HOSPADM

## 2021-02-03 RX ORDER — SODIUM CHLORIDE 0.9 % (FLUSH) 0.9 %
10 SYRINGE (ML) INJECTION EVERY 12 HOURS SCHEDULED
Status: DISCONTINUED | OUTPATIENT
Start: 2021-02-03 | End: 2021-02-03 | Stop reason: HOSPADM

## 2021-02-03 RX ORDER — MIDAZOLAM HYDROCHLORIDE 1 MG/ML
INJECTION INTRAMUSCULAR; INTRAVENOUS DAILY PRN
Status: COMPLETED | OUTPATIENT
Start: 2021-02-03 | End: 2021-02-03

## 2021-02-03 RX ORDER — FENTANYL CITRATE 50 UG/ML
INJECTION, SOLUTION INTRAMUSCULAR; INTRAVENOUS DAILY PRN
Status: COMPLETED | OUTPATIENT
Start: 2021-02-03 | End: 2021-02-03

## 2021-02-03 RX ORDER — SODIUM CHLORIDE 0.9 % (FLUSH) 0.9 %
10 SYRINGE (ML) INJECTION PRN
Status: DISCONTINUED | OUTPATIENT
Start: 2021-02-03 | End: 2021-02-03 | Stop reason: HOSPADM

## 2021-02-03 RX ADMIN — FUROSEMIDE 40 MG: 10 INJECTION, SOLUTION INTRAMUSCULAR; INTRAVENOUS at 08:52

## 2021-02-03 RX ADMIN — METOPROLOL SUCCINATE 100 MG: 100 TABLET, EXTENDED RELEASE ORAL at 09:45

## 2021-02-03 RX ADMIN — ENOXAPARIN SODIUM 100 MG: 100 INJECTION SUBCUTANEOUS at 08:51

## 2021-02-03 RX ADMIN — MIDAZOLAM 1 MG: 1 INJECTION INTRAMUSCULAR; INTRAVENOUS at 08:16

## 2021-02-03 RX ADMIN — MIDAZOLAM 2 MG: 1 INJECTION INTRAMUSCULAR; INTRAVENOUS at 08:10

## 2021-02-03 RX ADMIN — FENTANYL CITRATE 50 MCG: 50 INJECTION, SOLUTION INTRAMUSCULAR; INTRAVENOUS at 08:11

## 2021-02-03 RX ADMIN — ASPIRIN 81 MG: 81 TABLET, COATED ORAL at 08:51

## 2021-02-03 NOTE — PROGRESS NOTES
Patient is resting in bed watching television. Heart rate is in the mid to high 90s. Denies needs at this time.

## 2021-02-03 NOTE — PROGRESS NOTES
Patient went into a-flutter around 1900 2/2/21 and remains in a-flutter. Heart rate has been staying 70s-90s. Patient's blood pressures decreased so cardizem was decreased to 2.5 at 0033. Pressures continue to be taken every fifteen minutes. Will continue to monitor and assess.

## 2021-02-03 NOTE — TELEPHONE ENCOUNTER
Eliquis samples given to patient before discharge from hospital (2 boxes) Lot #[id number] Exp: 1/23. Pt aware how to take them.

## 2021-02-03 NOTE — PLAN OF CARE
Problem: SAFETY  Goal: Free from accidental physical injury  2/3/2021 0906 by Fernie Anaya RN  Outcome: Ongoing     Problem: DAILY CARE  Goal: Daily care needs are met  2/3/2021 0906 by Fernie Anaya RN  Outcome: Ongoing     Problem: PAIN  Goal: Patient's pain/discomfort is manageable  2/3/2021 0906 by Fernie Anaya RN  Outcome: Ongoing     Problem: SKIN INTEGRITY  Goal: Skin integrity is maintained or improved  2/3/2021 0906 by Fernie Anaya RN  Outcome: Ongoing

## 2021-02-03 NOTE — PLAN OF CARE
Problem: SAFETY  Goal: Free from accidental physical injury  2/3/2021 0120 by Tamica Eldridge RN  Outcome: Ongoing  Note: Pt A&Ox4. Pt uses call light appropriately and call light and bedside table remain in reach. Rails up x2 bed in lowest position. Bed alarm is on. Pt wears nonskid socks when standing or ambulating. Fall sign in place. Room remains free of clutter. Problem: SAFETY  Goal: Free from intentional harm  2/3/2021 0120 by Tamica Eldridge RN  Outcome: Ongoing     Problem: DAILY CARE  Goal: Daily care needs are met  2/3/2021 0120 by Tamica Eldridge RN  Outcome: Ongoing  Note: Daily care needs are being met. Items for personal hygiene are supplied and help provided with bathing. Problem: PAIN  Goal: Patient's pain/discomfort is manageable  2/3/2021 0120 by Tamica Eldridge RN  Outcome: Ongoing  Note: Pt's pain is assessed using 0-10 scale. Pt has PRN pain medications available. Patient has been educated on use and possible side effects of pain medications. Patient understands to ask for pain medications before pain becomes too unbearable but has also been educated and nonpharmacological options such as deep breathing, distraction, and repositioning. Problem: SKIN INTEGRITY  Goal: Skin integrity is maintained or improved  2/3/2021 0120 by Tamica Eldridge RN  Outcome: Ongoing  Note: Patient is educated on the importance of turning and repositioning often. Skin remains warm dry and intact with no skin issues noted. Tung scale is assessed each shift. Will continue to monitor. Problem: KNOWLEDGE DEFICIT  Goal: Patient/S.O. demonstrates understanding of disease process, treatment plan, medications, and discharge instructions.   2/3/2021 0120 by Tamica Eldridge RN  Outcome: Ongoing    Problem: DISCHARGE BARRIERS  Goal: Patient's continuum of care needs are met  2/3/2021 0120 by Tamica Eldridge RN  Outcome: Ongoing     Problem: Cardiac:  Goal: Ability to maintain an adequate cardiac output will improve  Description: Ability to maintain an adequate cardiac output will improve  2/3/2021 0120 by Anita Clement RN  Outcome: Ongoing  Note: Vitals and pulses are checked twice per shift and PRN. Patient remains on Telemetry. Labs are being drawn daily. Cardiology has been consulted. Problem: Cardiac:  Goal: Hemodynamic stability will improve  Description: Hemodynamic stability will improve  2/3/2021 0120 by Anita Clement RN  Outcome: Ongoing  Note: Vitals and pulses are checked twice per shift and PRN. Patient remains on Telemetry. IV fluids ordered. Labs being drawn daily. Cardiology on consult.

## 2021-02-03 NOTE — PROCEDURES
Postoperative Note    Mr. Hinton   YOB: 1965   [id number]    Procedure: Cardioversion    Pre-operative Diagnosis: Atrial flutter with rapid ventricular response. Onset of atrial flutter less than 48 hours. Post-operative Diagnosis: Successful cardioversion to NSR with 200J biphasic    Anesthesia: conscious sedation    Surgeons/Assistants: Juanis    Estimated Blood Loss: None    Complications: None    I asked Mr. Hinton to call my office if he had any problems, but otherwise to follow up with me in about a month as well as follow up with his primary care physician as previously scheduled.

## 2021-02-03 NOTE — PRE SEDATION
Sedation Pre-Procedure Note    Patient Name: Charito Green [facility]   YOB: 1965  Room/Bed: I305/I305-01  Medical Record Number: [id number]  Date: 2/3/2021   Time: 8:56 AM       Indication: Atrial flutter with rapid ventricular response. Symptomatic with acute on chronic diastolic CHF    Consent: I have discussed with the patient and/or the patient representative the indication, alternatives, and the possible risks and/or complications of the planned procedure and the anesthesia methods. The patient and/or patient representative appear to understand and agree to proceed. Vital Signs:   Vitals:    02/03/21 0850   BP: (!) 103/55   Pulse: 92   Resp: 20   Temp:    SpO2:        Past Medical History:   has a past medical history of Aortic valve disorders, Atrial fibrillation (HCC), Atrial flutter ([address] Encounter for cardioversion procedure, Encounter for cardioversion procedure, Frequent urination, H/O echocardiogram, Hemorrhage of rectum and anus, History of 24 hour EKG monitoring, History of echocardiogram, History of heart surgery, History of PFTs, Hx of transesophageal echocardiography (SKYLAR) for monitoring, Hypertension, Impaired fasting glucose, Knee pain, bilateral, Lipoma, Obesity (BMI 30.0-34.9), Obstructive sleep apnea (adult) (pediatric), Paroxysmal supraventricular tachycardia ([address] Tobacco use disorder, Urinary urgency, and Ventral hernia. Past Surgical History:   has a past surgical history that includes Achilles tendon surgery (Right, 2010); Vasectomy; Foot surgery; ablation of dysrhythmic focus (2015); Umbilical hernia repair (62/65/3034); repair umbilical ZEAV,7+G/D,DKBYV (N/A, 10/24/2018); hernia repair (11/2018); Colonoscopy (06/13/2013); Cardiac surgery (2008); hernia repair (03/06/2020); and hernia repair (N/A, 3/6/2020).     Medications:   Scheduled Meds:    aspirin  81 mg Oral Daily    metoprolol succinate  100 mg Oral Daily    sodium chloride flush  10 mL Intravenous 2 times per day    enoxaparin  1 mg/kg Subcutaneous BID    nicotine  1 patch Transdermal Daily    furosemide  40 mg Intravenous Daily     Continuous Infusions:    dilTIAZem (CARDIZEM) 125 mg in dextrose 5% 125 mL infusion 2.5 mg/hr (02/03/21 0033)    sodium chloride 30 mL/hr at 02/02/21 1117     PRN Meds: docusate sodium, sodium chloride flush, polyethylene glycol, acetaminophen **OR** acetaminophen  Home Meds:   Prior to Admission medications    Medication Sig Start Date End Date Taking? Authorizing Provider   metoprolol succinate (TOPROL XL) 100 MG extended release tablet TAKE 1 TABLET BY MOUTH ONCE DAILY  Patient taking differently: Take 100 mg by mouth daily TAKE 1 TABLET BY MOUTH ONCE DAILY 4/20/20  Yes Dacia Pace MD   ibuprofen (ADVIL;MOTRIN) 400 MG tablet Take 2 tablets by mouth every 8 hours as needed for Pain 3/6/20  Yes Cameron Francois DO   aspirin 81 MG EC tablet Take 1 tablet by mouth daily 1/14/20  Yes AMADOR Kemp - CNP   Acetaminophen (TYLENOL) 325 MG CAPS Take 1-2 tablets by mouth every 4 hours as needed (pain) 3/6/20   Cameron Francois DO   docusate sodium (COLACE) 100 MG capsule Take 1 capsule by mouth 2 times daily as needed for Constipation 3/6/20   Cameron Francois DO     Coumadin Use Last 7 Days:  no  Antiplatelet drug therapy use last 7 days: yes -aspirin 81 mg daily  Other anticoagulant use last 7 days: no  Additional Medication Information: Please see medications above. Pre-Sedation Documentation and Exam:   I have personally completed a history, physical exam & review of systems for this patient (see notes).     Mallampati Airway Assessment:  Mallampati Class II - (soft palate, fauces & uvula are visible)    Prior History of Anesthesia Complications:   none    ASA Classification:  Class 2 - A normal healthy patient with mild systemic disease    Sedation/ Anesthesia Plan:   intravenous sedation    Medications Planned:   midazolam (Versed) intravenously and fentanyl intravenously    Patient is an appropriate candidate for plan of sedation: yes    Electronically signed by Ilya Espino MD on 2/3/2021

## 2021-02-03 NOTE — DISCHARGE SUMMARY
no cyanosis, clubbing or edema present    NEURO: follows commands, DAIGLE, no deficits  SKIN:  no rashes or significant lesions    Significant Diagnostic Studies:   Lab Results   Component Value Date    WBC 6.5 02/03/2021    HGB 15.5 02/03/2021     02/03/2021       Lab Results   Component Value Date    BUN 17 02/03/2021    CREATININE 0.79 02/03/2021     (L) 02/03/2021    K 4.0 02/03/2021    CALCIUM 9.1 02/03/2021    CL 97 (L) 02/03/2021    CO2 29 02/03/2021    LABGLOM >60 02/03/2021       Lab Results   Component Value Date    WBCUA 0 TO 2 03/30/2015    RBCUA 0 TO 2 03/30/2015    EPITHUA 0 TO 2 03/30/2015    LEUKOCYTESUR NEGATIVE 03/30/2015    SPECGRAV 1.015 03/30/2015    GLUCOSEU NEGATIVE 03/30/2015    KETUA NEGATIVE 03/30/2015    PROTEINU NEGATIVE 03/30/2015    HGBUR NEGATIVE 03/30/2015    CASTUA NOT REPORTED 03/30/2015    CRYSTUA NOT REPORTED 03/30/2015    BACTERIA NOT REPORTED 03/30/2015    YEAST NOT REPORTED 03/30/2015       Echocardiogram Complete 2d With Doppler With Color    Result Date: 2/2/2021  [facility] Transthoracic Echocardiography Report (TTE)  Patient Name [facility]     Date of Study               02/02/2021               Andreas Gusman   Date of      1965  Gender                      Male  Birth   Age          64 year(s)  Race                           Room Number  I305        Height:                     68 inch, 172.72 cm   Corporate ID [id number]    Weight:                     225 pounds, 102.1 kg  #   Patient Acct [de-identified]   BSA:          2.15 m^2      BMI:      34.21  #                                                              kg/m^2   MR #         [id number]      Sonographer                 Work,Promise   Accession #  [id number]  Interpreting Physician      Marco Olivarez   Fellow                   Referring Nurse             Brandi Stahl CNP                           Practitioner   Interpreting             Referring Physician  Fellow  Type of Study   TTE procedure:2D Echocardiogram, M-Mode, Doppler, Color Doppler. Procedure Date Date: 02/02/2021 Start: 11:15 AM Study Location: [facility] Indications:Atrial fibrillation. History / Tech. Comments: Dx: atrial fibrillation/ atrial flutter Hx: aortic valve repair, atrial fibrillation/atrial flutter, HTN, ablation, cardioversion, AAA repair Patient Status: Outpatient Height: 68 inches Weight: 225 pounds BSA: 2.15 m^2 BMI: 34.21 kg/m^2 BP: 107/63 mmHg CONCLUSIONS Summary Global left ventricular systolic function appears preserved with an estimated ejection fraction of 55%. Mildly increased left ventricular wall thickness with a normal left ventricular cavity size. The patient has a sigmoid interventricular septum. S/P aortic valve repair with mild to moderate aortic regurgitation. Diastology cannot be properly assessed due to the patients rhythm. The aortic root is mildly dilated when corrected for body surface area. Compared to the previous study of 1/7/2019, no significant change was seen. Signature ----------------------------------------------------------------------------  Electronically signed by Promise Farrell(Sonographer) on 02/02/2021 01:23 PM ---------------------------------------------------------------------------- ----------------------------------------------------------------------------  Electronically signed by Maira OlivarezInterpreting physician) on 02/02/2021  03:11 PM ---------------------------------------------------------------------------- FINDINGS Left Atrium Left atrium is normal in size. Left Ventricle Global left ventricular systolic function appears preserved with an estimated ejection fraction of 55%. Mildly increased left ventricular wall thickness with a normal left ventricular cavity size. The patient has a sigmoid interventricular septum. Right Atrium Right atrium is normal in size. Right Ventricle Normal right ventricular size and function.  Mitral Valve Normal mitral valve structure and function. Aortic Valve S/P aortic valve repair with mild to moderate aortic regurgitation. Tricuspid Valve Normal tricuspid valve structure and function. Pulmonic Valve Normal pulmonic valve structure with trivial pulmonic regurgitation. Miscellaneous Diastology cannot be properly assessed due to the patients rhythm. The aortic root is mildly dilated when corrected for body surface area. M-mode / 2D Measurements & Calculations:   LVIDd:5.39 cm(3.7 - 5.6 cm)      Diastolic LKNDOX:628.1 ml  LVIDs:4.09 cm(2.2 - 4.0 cm)      Systolic SXHBSS:78.64 ml  IVSd:1.58 cm(0.6 - 1.1 cm)       Aortic Root:4.26 cm(2.0 - 3.7 cm)  LVPWd:1.33 cm(0.6 - 1.1 cm)      LA Dimension: 3.76 cm(1.9 - 4.0 cm)  Fractional Shortenin.12 %    LA volume/Index: 48.4 ml /23m^2  Calculated LVEF (%): 57.1 %      AV Cusp Separation: 2.49 cm   Mitral:                               Aortic   Valve Area (P1/2-Time): 4 cm^2        Peak Velocity: 1.39 m/s  Peak E-Wave: 0.52 m/s                 Mean Velocity: 0.96 m/s                                        Peak Gradient: 7.77 mmHg  Peak Gradient: 1.09 mmHg              Mean Gradient: 4.15 mmHg   P1/2t: 54.99 msec                     Acceleration Time: 44.2 msec                                         AV VTI: 20.58 cm  Diastology / Tissue Doppler Lateral Wall E' velocity:0.20 m/s Lateral Wall E/E':2.68    Xr Chest Portable    Result Date: 2021  EXAMINATION: ONE XRAY VIEW OF THE CHEST 2021 8:38 am COMPARISON: 2020 HISTORY: ORDERING SYSTEM PROVIDED HISTORY: dyspnea TECHNOLOGIST PROVIDED HISTORY: dyspnea FINDINGS: Stable cardiomediastinal silhouette. No focal consolidation. Mild vascular prominence. No pleural effusion. No pneumothorax. Median sternotomy noted. Mild pulmonary vascular congestion.        Assessment and Plan:  Patient Active Problem List    Diagnosis Date Noted    Acute on chronic diastolic CHF (congestive heart failure), NYHA class 3 ([facility])      Priority: High    Atrial fibrillation with RVR ([facility] 02/02/2021    Impaired fasting glucose     Hypertension     [facility] with [facility] 01/11/2020    Moderate aortic regurgitation     Tobacco abuse     Tobacco abuse counseling     [facility] 01/07/2019    Atrial flutter with rapid ventricular response ([facility] 01/07/2019    Varicose veins of right lower extremity 06/14/2017    Aneurysm of thoracic aorta (HCC)     H/O ascending aorta repair 04/03/2014    Hx of amiodarone therapy 04/03/2014    Family history of colon cancer 06/24/2013        Discharge Medications:       Lauren Smith   Home Medication Instructions DTN:[id number]    Printed on:02/03/21 1142   Medication Information                      Acetaminophen (TYLENOL) 325 MG CAPS  Take 1-2 tablets by mouth every 4 hours as needed (pain)             apixaban (ELIQUIS) 5 MG TABS tablet  Take 1 tablet by mouth 2 times daily             aspirin 81 MG EC tablet  Take 1 tablet by mouth daily             docusate sodium (COLACE) 100 MG capsule  Take 1 capsule by mouth 2 times daily as needed for Constipation             ibuprofen (ADVIL;MOTRIN) 400 MG tablet  Take 2 tablets by mouth every 8 hours as needed for Pain             metoprolol succinate (TOPROL XL) 100 MG extended release tablet  TAKE 1 TABLET BY MOUTH ONCE DAILY                 Patient Instructions:    Activity: activity as tolerated  Diet: cardiac diet  Wound Care: none needed  Other: None    Disposition:   Discharge to Home    Follow up:  Patient will be followed by Dacia Pace MD in 1-2 weeks    CORE MEASURES on Discharge (if applicable)  ACE/ARB in CHF: NA  Statin in MI: NA  ASA in MI: NA  Statin in CVA: NA  Antiplatelet in CVA: NA    Total time spent on discharge services: 40 minutes    Including the following activities:  Evaluation and Management of patient  Discussion with patient and/or surrogate about current care plan  Coordination with Case Management and/or   Coordination of care with Consultants (if applicable)   Coordination of care with Receiving Facility Physician (if applicable)  Completion of DME forms (if applicable)  Preparation of Discharge Summary  Preparation of Medication Reconciliation  Preparation of Discharge Prescriptions    Signed:  AMADOR Montes, NP-C  2/3/2021, 11:42 AM

## 2021-02-03 NOTE — CONSULTS
disease; Total care; intake reduced; LOCfull/confusion  ___20%  Bed Bound; Extensive disease; Total care; intake minimal; Drowsy/coma  ___10%  Bed Bound; Extensive disease; Total care; Mouth care only; Drowsy/coma  ___0       Death    Readmission Risk:  7%    Notes:   Amos Jones is resting in bed during my visit. He is presently in ICU with atrial fib w/RVR. Had a cardioversion this morning. Tells me that this is his 10th cardioversion. Has had an ablation in the past in the [facility] that \"lasted 4 years\". States that he had had surgery in the past on his aorta and has issues with atrial fib since that time. Renetta Hug me that he is still working for the [employer]. Lives with his brother and has 3 adult children. Can tell when his heart goes out of rhythm. States Dr. Rody Jimenez wanting him to see a specialist in [facility] for perhaps another ablation. Willing to do so. Denies any issues. States that dietitian gave him some suggestions for meal planning. Tells me that he recently quit smoking in December and that \"I'm doing good with that, so time for another change\". Encouragement given. ACP discussion. States that his oldest son knows his wishes and that he completed the paperwork at the 's office. Will bring in a copy next appt in the hospital.  ACP note questions asked. Denies other needs or concerns. Encouragement and support given. Will follow and support as needed.      Palliative Care Plan:  Education/support to patient  Providing support for coping/adaptation/distress of patient  Continue with current plan of care  Code status clarified: Full Code  Palliative Care Goals:  live longer, improve or maintain function/quality of life, remain at home and preserve independence/autonomy/control  Visit focus:  Routine meeting  Discuss goals of care  Listen to patient/family concerns  Interdiscplinary collaboration  Build trust  Elicit patient's goals and values, and use these to establish or modify goals of care     Sanaz Dutta RN, [facility] Nurse Coordinator  2/3/2021 10:29 AM

## 2021-02-03 NOTE — ACP (ADVANCE CARE PLANNING)
Advance Care Planning     Advance Care Planning Activator (Inpatient)  Conversation Note      Date of ACP Conversation: 2/2/2021    Conversation Conducted with: Patient with Decision Making Capacity    ACP Activator: [address]  Decision Maker:     Current Designated Health Care Decision Maker:     Primary Decision Maker: Hammad [facility] - [phone]    Today we documented Decision Maker(s). The patient will provide ACP documents. Care Preferences    Ventilation: \"If you were in your present state of health and suddenly became very ill and were unable to breathe on your own, what would your preference be about the use of a ventilator (breathing machine) if it were available to you? \"      Would the patient desire the use of ventilator (breathing machine)?: yes    \"If your health worsens and it becomes clear that your chance of recovery is unlikely, what would your preference be about the use of a ventilator (breathing machine) if it were available to you? \"     Would the patient desire the use of ventilator (breathing machine)?: No      Resuscitation  \"CPR works best to restart the heart when there is a sudden event, like a heart attack, in someone who is otherwise healthy. Unfortunately, CPR does not typically restart the heart for people who have serious health conditions or who are very sick. \"    \"In the event your heart stopped as a result of an underlying serious health condition, would you want attempts to be made to restart your heart (answer \"yes\" for attempt to resuscitate) or would you prefer a natural death (answer \"no\" for do not attempt to resuscitate)? \" yes       [x] Yes   [] No   Educated Patient / Vicci Ores regarding differences between Advance Directives and portable DNR orders.     Length of ACP Conversation in minutes:      Conversation Outcomes:  [x] ACP discussion completed  [] Existing advance directive reviewed with patient; no changes to patient's previously recorded wishes  [] New Advance Directive completed  [] Portable Do Not Rescitate prepared for Provider review and signature  [] POLST/POST/MOLST/MOST prepared for Provider review and signature      Follow-up plan:    [] Schedule follow-up conversation to continue planning  [] Referred individual to Provider for additional questions/concerns   [] Advised patient/agent/surrogate to review completed ACP document and update if needed with changes in condition, patient preferences or care setting    [x] This note routed to one or more involved healthcare providers

## 2021-02-03 NOTE — PROGRESS NOTES
Progress Note  Ben Tian MD    OBJECTIVE:    Patient seen for f/u of Atrial fibrillation with rapid ventricular response ([address] He is on crdizem drip,continues to be in afib. Shortness of breath better     ROS:   Constitutional: negative  for fevers, and negative for chills. Respiratory: negative for shortness of breath, negative for cough, and negative for wheezing  Cardiovascular: negative for chest pain, and negative for palpitations  Gastrointestinal: negative for abdominal pain, negative for nausea,negative for vomiting, negative for diarrhea, and negative for constipation     All other systems were reviewed with the patient and are negative unless otherwise stated in HPI    OBJECTIVE:  Vitals:   Temp: 97.5 °F (36.4 °C)  BP: (!) 115/55  Resp: 15  Pulse: 109  SpO2: 96 %    24HR INTAKE/OUTPUT:      Intake/Output Summary (Last 24 hours) at 2/3/2021 0749  Last data filed at 2/2/2021 2357  Gross per 24 hour   Intake 472 ml   Output 2175 ml   Net -1703 ml     -----------------------------------------------------------------  Exam:  GEN:    Awake, alert and oriented x3. EYES:  EOMI, pupils equal   NECK: Supple. No lymphadenopathy. No carotid bruit  CVS:    irregularly irregular, 2/6 systolic murmur  PULM:  CTA, no wheezes, rales or rhonchi, no acute respiratory distress  ABD:    Bowels sounds normal.  Abdomen is soft. No distention. no tenderness to palpation. EXT:   no edema bilaterally . No calf tenderness. NEURO: Moves all extremities. Motor and sensory are grossly intact  SKIN:  No rashes.   No skin lesions.    -----------------------------------------------------------------  Diagnostic Data:    · All available data reviewed  Lab Results   Component Value Date    WBC 6.5 02/03/2021    HGB 15.5 02/03/2021    MCV 91.1 02/03/2021     02/03/2021      Lab Results   Component Value Date    GLUCOSE 113 (H) 02/03/2021    BUN 17 02/03/2021    CREATININE 0.79 02/03/2021     (L) 02/03/2021 K 4.0 02/03/2021    CALCIUM 9.1 02/03/2021    CL 97 (L) 02/03/2021    CO2 29 02/03/2021       PROBLEM LIST:  Principal Problem:    Atrial fibrillation with rapid ventricular response (HCC)  Active Problems:    Acute on chronic diastolic CHF (congestive heart failure), NYHA class 3 (HCC)    Atrial flutter with rapid ventricular response (HCC)    Moderate aortic regurgitation    Hypertension    Atrial fibrillation with RVR ([address]  Resolved Problems:    * No resolved hospital problems. *      ASSESSMENT / PLAN:  Atrial fibrillation with rapid ventricular response (HCC)  · Continue current therapy  · await cardioversion  · chf -improved,continue diuresis,monitor electrolytes  · Nutrition status:  Well developed, well nourished with no malnutrition  · DVT prophylaxis: Lovenox   · High risk medications: none   · Disposition:  Discharge plan is home    Loura Kocher , M.D.  2/3/2021  7:49 AM

## 2021-02-17 ENCOUNTER — OFFICE VISIT (OUTPATIENT)
Dept: CARDIOLOGY | Age: 56
End: 2021-02-17
Payer: OTHER GOVERNMENT

## 2021-02-17 VITALS
OXYGEN SATURATION: 98 % | WEIGHT: 235 LBS | RESPIRATION RATE: 16 BRPM | HEART RATE: 79 BPM | DIASTOLIC BLOOD PRESSURE: 78 MMHG | SYSTOLIC BLOOD PRESSURE: 136 MMHG | BODY MASS INDEX: 35.61 KG/M2 | HEIGHT: 68 IN

## 2021-02-17 DIAGNOSIS — I35.1 MODERATE AORTIC REGURGITATION: ICD-10-CM

## 2021-02-17 DIAGNOSIS — Z79.01 CURRENT USE OF LONG TERM ANTICOAGULATION: ICD-10-CM

## 2021-02-17 DIAGNOSIS — I48.92 PAROXYSMAL ATRIAL FLUTTER (HCC): Primary | ICD-10-CM

## 2021-02-17 DIAGNOSIS — I48.3 TYPICAL ATRIAL FLUTTER (HCC): ICD-10-CM

## 2021-02-17 DIAGNOSIS — G47.33 OSA (OBSTRUCTIVE SLEEP APNEA): ICD-10-CM

## 2021-02-17 PROCEDURE — 99214 OFFICE O/P EST MOD 30 MIN: CPT | Performed by: INTERNAL MEDICINE

## 2021-02-17 PROCEDURE — 93000 ELECTROCARDIOGRAM COMPLETE: CPT | Performed by: INTERNAL MEDICINE

## 2021-02-17 NOTE — PROGRESS NOTES
Then repeat Cardioversion done on 2/3/1096-Uoauxafgtf-gttoqkzucdv normal sinus rhythm    ECG today showed sinus rhythm     Mr. Hinton reports doing fairly well since he has been discharged from the hospital and denies any symptoms. He continues to work the [employer] and stays active with this. He also says that he has stopped smoking and has been breathing a lot better since this and has not smoked a cigarette since December 2020. She denies any chest pain, pressure or tightness. No  shortness of breath. No orthopnea or PND. No palpitations, dizziness or lightheadedness, no syncope or near syncope. Fairly active with no significant exertional symptoms. Past Medical History:   Diagnosis Date    Aortic valve disorders     aortic regurg    Atrial fibrillation (HCC)     Atrial flutter ([address]     Encounter for cardioversion procedure 5/5/2015    Dr Alfredito Ritter Encounter for cardioversion procedure     [facility]/ Dr. Tammie Antoine, states 7 times, most recent 1/2020    Frequent urination     Pt states he's developed frequent urination with 3 times in last yr noting blood in urine as well.  H/O echocardiogram 12/04/2017    EF 10-91% Global LV systolic function appears preserved.  Mildly increaseed LV wall thinckness with normal LV carvity size, No definite specific wall motion abnormalities identified, LA is moderately dilated (34-39) with LA volume index of 36ml/m2, Mild mitral and tricuspid regurgitation, moderate aortic regurgitation, Evidence of mild diastolic dysfunction seen    Hemorrhage of rectum and anus 2013    polypectomy    History of 24 hour EKG monitoring 4/3/14    Unremarkable except for mild increased number of PVC's with 3 ventricular couplets, asymptomatic     History of echocardiogram 4/10/14    EF 60%, mild to mod LVH, moderate AI    History of heart surgery 2008    enlarged aorta    History of PFTs 6/25/15 Consistent with mild obstructive ventilatory impairment. Lung volumes are normal,except mild increase in residual volume,which could be suggestive of airway trapping. Diffusion capacity is normal.    Hx of transesophageal echocardiography (SKYLAR) for monitoring 2015    Dr Jessenia Valente    Hypertension     pcp dr Sandi Wells Impaired fasting glucose     Knee pain, bilateral     Lipoma     Obesity (BMI 30.0-34. 9)     Obstructive sleep apnea (adult) (pediatric)     does not wear cpap like suppose to    Paroxysmal supraventricular tachycardia ([address]     Tobacco use disorder     Urinary urgency     Ventral hernia        Past Surgical History:   Procedure Laterality Date    ABLATION OF DYSRHYTHMIC FOCUS  2015    ACHILLES TENDON SURGERY Right 2010   [facility] CARDIAC SURGERY  2008    thoracic AAA repair and modified Coleman procedure ( aortic valve sparing ) at [address]  06/13/2013    x 2 , bleeding polyps removed    FOOT SURGERY      reconstructive for club foot (right)    HERNIA REPAIR  2018    ventral    HERNIA REPAIR  2020    ventral    HERNIA REPAIR N/A 3/6/2020    XI LAPAROSCOPIC ROBOTIC VENTRAL HERNIA WITH MESH performed by Louis Rg DO at [address] [facility] HSZP,9+S/L,MOPRQ N/A     HERNIA UMBILICAL REPAIR performed by Sandra Lyon MD at [address]  10/24/2018    VASECTOMY         Family History   Problem Relation Age of Onset    Colon Cancer Mother     Heart Surgery Father         enlarged aorta    Kidney Cancer Father        Social History     Tobacco Use    Smoking status: Former Smoker     Packs/day: 1.00     Years: 34.00     Pack years: 34.00     Types: Cigarettes     Quit date: 12/15/2020     Years since quittin.1    Smokeless tobacco: Never Used   Substance Use Topics    Alcohol use:  Yes     Alcohol/week: 6.0 standard drinks     Types: 6 Cans of beer per week Comment: QOD now, use to drink daily    Drug use: No       Current Outpatient Medications   Medication Sig Dispense Refill    apixaban (ELIQUIS) 5 MG TABS tablet Take 1 tablet by mouth 2 times daily 60 tablet 5    metoprolol succinate (TOPROL XL) 100 MG extended release tablet TAKE 1 TABLET BY MOUTH ONCE DAILY (Patient taking differently: Take 100 mg by mouth daily TAKE 1 TABLET BY MOUTH ONCE DAILY) 90 tablet 3    Acetaminophen (TYLENOL) 325 MG CAPS Take 1-2 tablets by mouth every 4 hours as needed (pain) 30 capsule 0    ibuprofen (ADVIL;MOTRIN) 400 MG tablet Take 2 tablets by mouth every 8 hours as needed for Pain 40 tablet 0    docusate sodium (COLACE) 100 MG capsule Take 1 capsule by mouth 2 times daily as needed for Constipation 20 capsule 0    aspirin 81 MG EC tablet Take 1 tablet by mouth daily (Patient not taking: Reported on 2/17/2021) 30 tablet 3     No current facility-administered medications for this visit. No Known Allergies    ROS: 14 systems reviewed and negative except for pertinent positives as noted above. PHYSICAL EXAM:   /78 (Site: Left Upper Arm, Position: Sitting, Cuff Size: Medium Adult)   Pulse 79   Resp 16   Ht 5' 8\" (1.727 m)   Wt 235 lb (106.6 kg)   SpO2 98%   BMI 35.73 kg/m²  Body mass index is 35.73 kg/m². Constitutional: He is oriented to person, place, and time. He appears well-developed and well-nourished. In no acute distress. Obese   HEENT: Normocephalic and atraumatic. No JVD present. Carotid bruit is not present. No mass and no thyromegaly present. No lymphadenopathy present. Cardiovascular: Normal rate, regular rhythm, normal heart sounds. Exam reveals no gallop and no friction rubs. 2/6 short systolic murmur and 2/6 diastolic murmur heard best at 2nd Right upper sternal border. Pulmonary/Chest: Effort normal and breath sounds normal. No respiratory distress. He has no wheezes, rhonchi or rales. Abdominal: Soft, non-tender. Bowel sounds and aorta are normal. He exhibits no organomegaly, mass or bruit. Extremities: No edema. No cyanosis and no clubbing. Pulses are 2+ radial and carotid pulses. 2+ dorsalis pedis and posterior tibial pulses bilaterally. Neurological: He is alert and oriented to person, place, and time. No evidence of gross cranial nerve deficit. Coordination appeared normal.   Skin: Skin is warm and dry. There is no rash or diaphoresis. Psychiatric: He has a normal mood and affect. His speech is normal and behavior is normal.          Lab Results   Component Value Date    WBC 6.5 02/03/2021    HGB 15.5 02/03/2021    HCT 45.8 02/03/2021     02/03/2021    CHOL 163 12/22/2016    TRIG 41 12/22/2016    HDL 96 12/22/2016    ALT 19 02/03/2021    AST 18 02/03/2021     (L) 02/03/2021    K 4.0 02/03/2021    CL 97 (L) 02/03/2021    CREATININE 0.79 02/03/2021    BUN 17 02/03/2021    CO2 29 02/03/2021    TSH 3.91 02/02/2021    PSA 1.58 12/22/2016    INR 1.0 02/03/2021        Diagnosis Orders   1. Paroxysmal atrial flutter (HCC)  EKG 12 lead   2. Moderate aortic regurgitation  EKG 12 lead   3. MARIA TERESA (obstructive sleep apnea)  EKG 12 lead   4. Typical atrial flutter ([address]     5. Current use of long term anticoagulation       Plan:   · Paroxymal Atrial Flutter/Atrial Fibrillation:Ablation done 8/19/15   · SKYLAR and cardioversion of atrial flutter on 1/11/2020. · Then repeat Cardioversion done on 2/3/7057-Icwyrwafjm-aavdwagbvlo normal sinus rhythm  Beta Blocker: Continue metoprolol succinate (Toprol XL) 100 mg once daily. Stroke Risk: His CHADS2-VASc score is 0/9 ( 0% stroke risk)  Anticoagulation: Continue Apixaban (Eliquis) 5 mg every 12 hours. Referred to Dr. Sena Camargo for ablation to be done at [facility]. Discussed over the phone with Dr. Sena Camargo, he will need both flutter and A. fib ablation. I discussed with the patient procedure details, benefits, risks and alternatives and answered all of his questions. · Moderate aortic regurgitation-Currently asymptomatic: No  Signs of volume overload      · Obstructive Sleep Apnea:  · Refuses CPAP machine. Finally, I recommended that he continue his other medications and follow up with you as previously scheduled. FOLLOW UP:  I told Mr. Hinton to call my office if he had any problems, but otherwise told him to Return in about 3 months (around 5/17/2021). However, I would be happy to see him sooner should the need arise. Sincerely,  Sallie Caldwell MD  [facility] [address], [address] [facility]  Phone: [phone]; Fax: [phone]    I believe that the risk of significant morbidity and mortality related to the patient's current medical conditions are: Intermediate. The documentation recorded by the scribe, accurately and completely reflects the services I personally performed and the decisions made by me. Sallie Caldwell MD, F.A.C.C.  February 17, 2021

## 2021-02-17 NOTE — PATIENT INSTRUCTIONS
SURVEY:    You may be receiving a survey from [employer] regarding your visit today. Please complete the survey to enable us to provide the highest quality of care to you and your family. If you cannot score us a very good on any question, please call the office to discuss how we could have made your experience a very good one. Thank you.

## 2021-02-25 ENCOUNTER — HOSPITAL ENCOUNTER (OUTPATIENT)
Age: 56
Setting detail: OBSERVATION
LOS: 1 days | Discharge: ANOTHER HOSPITAL | End: 2021-02-26
Attending: INTERNAL MEDICINE | Admitting: FAMILY MEDICINE
Payer: OTHER GOVERNMENT

## 2021-02-25 ENCOUNTER — HOSPITAL ENCOUNTER (OUTPATIENT)
Dept: NON INVASIVE DIAGNOSTICS | Age: 56
Discharge: HOME | End: 2021-02-25
Attending: INTERNAL MEDICINE
Payer: OTHER GOVERNMENT

## 2021-02-25 ENCOUNTER — OFFICE VISIT (OUTPATIENT)
Dept: CARDIOLOGY | Age: 56
End: 2021-02-25
Payer: OTHER GOVERNMENT

## 2021-02-25 VITALS
SYSTOLIC BLOOD PRESSURE: 109 MMHG | HEART RATE: 138 BPM | RESPIRATION RATE: 18 BRPM | DIASTOLIC BLOOD PRESSURE: 74 MMHG | BODY MASS INDEX: 35.46 KG/M2 | HEIGHT: 68 IN | WEIGHT: 234 LBS

## 2021-02-25 DIAGNOSIS — Z78.9 INTOLERANCE OF CONTINUOUS POSITIVE AIRWAY PRESSURE (CPAP) VENTILATION: ICD-10-CM

## 2021-02-25 DIAGNOSIS — I48.92 ATRIAL FLUTTER WITH RAPID VENTRICULAR RESPONSE (HCC): Primary | ICD-10-CM

## 2021-02-25 DIAGNOSIS — G47.33 OSA (OBSTRUCTIVE SLEEP APNEA): ICD-10-CM

## 2021-02-25 PROBLEM — E87.6 HYPOKALEMIA: Status: ACTIVE | Noted: 2021-02-25

## 2021-02-25 LAB
BNP INTERPRETATION: ABNORMAL
EKG ATRIAL RATE: 163 BPM
EKG ATRIAL RATE: 272 BPM
EKG P AXIS: -103 DEGREES
EKG P AXIS: 31 DEGREES
EKG P-R INTERVAL: 150 MS
EKG Q-T INTERVAL: 362 MS
EKG Q-T INTERVAL: 378 MS
EKG QRS DURATION: 148 MS
EKG QRS DURATION: 96 MS
EKG QTC CALCULATION (BAZETT): 462 MS
EKG QTC CALCULATION (BAZETT): 544 MS
EKG R AXIS: -2 DEGREES
EKG R AXIS: -3 DEGREES
EKG T AXIS: -58 DEGREES
EKG T AXIS: 69 DEGREES
EKG VENTRICULAR RATE: 136 BPM
EKG VENTRICULAR RATE: 90 BPM
PRO-BNP: 2762 PG/ML
TROPONIN INTERP: NORMAL
TROPONIN T: NORMAL NG/ML
TROPONIN, HIGH SENSITIVITY: 12 NG/L (ref 0–22)

## 2021-02-25 PROCEDURE — 6370000000 HC RX 637 (ALT 250 FOR IP): Performed by: FAMILY MEDICINE

## 2021-02-25 PROCEDURE — 6370000000 HC RX 637 (ALT 250 FOR IP): Performed by: INTERNAL MEDICINE

## 2021-02-25 PROCEDURE — 92960 CARDIOVERSION ELECTRIC EXT: CPT

## 2021-02-25 PROCEDURE — 83880 ASSAY OF NATRIURETIC PEPTIDE: CPT

## 2021-02-25 PROCEDURE — 93000 ELECTROCARDIOGRAM COMPLETE: CPT | Performed by: INTERNAL MEDICINE

## 2021-02-25 PROCEDURE — 94761 N-INVAS EAR/PLS OXIMETRY MLT: CPT

## 2021-02-25 PROCEDURE — 84484 ASSAY OF TROPONIN QUANT: CPT

## 2021-02-25 PROCEDURE — 96374 THER/PROPH/DIAG INJ IV PUSH: CPT

## 2021-02-25 PROCEDURE — 6360000002 HC RX W HCPCS: Performed by: INTERNAL MEDICINE

## 2021-02-25 PROCEDURE — 92960 CARDIOVERSION ELECTRIC EXT: CPT | Performed by: INTERNAL MEDICINE

## 2021-02-25 PROCEDURE — G0378 HOSPITAL OBSERVATION PER HR: HCPCS

## 2021-02-25 PROCEDURE — 2580000003 HC RX 258: Performed by: INTERNAL MEDICINE

## 2021-02-25 PROCEDURE — 93005 ELECTROCARDIOGRAM TRACING: CPT | Performed by: INTERNAL MEDICINE

## 2021-02-25 PROCEDURE — 93010 ELECTROCARDIOGRAM REPORT: CPT | Performed by: INTERNAL MEDICINE

## 2021-02-25 PROCEDURE — 36415 COLL VENOUS BLD VENIPUNCTURE: CPT

## 2021-02-25 PROCEDURE — 99214 OFFICE O/P EST MOD 30 MIN: CPT | Performed by: INTERNAL MEDICINE

## 2021-02-25 RX ORDER — ACETAMINOPHEN 325 MG/1
650 TABLET ORAL EVERY 6 HOURS PRN
Status: DISCONTINUED | OUTPATIENT
Start: 2021-02-25 | End: 2021-02-25

## 2021-02-25 RX ORDER — PROMETHAZINE HYDROCHLORIDE 25 MG/1
12.5 TABLET ORAL EVERY 6 HOURS PRN
Status: DISCONTINUED | OUTPATIENT
Start: 2021-02-25 | End: 2021-02-25

## 2021-02-25 RX ORDER — POLYETHYLENE GLYCOL 3350 17 G/17G
17 POWDER, FOR SOLUTION ORAL DAILY PRN
Status: DISCONTINUED | OUTPATIENT
Start: 2021-02-25 | End: 2021-02-26 | Stop reason: HOSPADM

## 2021-02-25 RX ORDER — FENTANYL CITRATE 50 UG/ML
INJECTION, SOLUTION INTRAMUSCULAR; INTRAVENOUS
Status: COMPLETED | OUTPATIENT
Start: 2021-02-25 | End: 2021-02-25

## 2021-02-25 RX ORDER — SODIUM CHLORIDE 0.9 % (FLUSH) 0.9 %
10 SYRINGE (ML) INJECTION PRN
Status: DISCONTINUED | OUTPATIENT
Start: 2021-02-25 | End: 2021-02-26 | Stop reason: HOSPADM

## 2021-02-25 RX ORDER — ONDANSETRON 2 MG/ML
4 INJECTION INTRAMUSCULAR; INTRAVENOUS EVERY 6 HOURS PRN
Status: DISCONTINUED | OUTPATIENT
Start: 2021-02-25 | End: 2021-02-25

## 2021-02-25 RX ORDER — SODIUM CHLORIDE 0.9 % (FLUSH) 0.9 %
10 SYRINGE (ML) INJECTION EVERY 12 HOURS SCHEDULED
Status: DISCONTINUED | OUTPATIENT
Start: 2021-02-25 | End: 2021-02-26 | Stop reason: HOSPADM

## 2021-02-25 RX ORDER — DOCUSATE SODIUM 100 MG/1
100 CAPSULE, LIQUID FILLED ORAL 2 TIMES DAILY PRN
Status: DISCONTINUED | OUTPATIENT
Start: 2021-02-25 | End: 2021-02-26 | Stop reason: HOSPADM

## 2021-02-25 RX ORDER — METOPROLOL SUCCINATE 100 MG/1
100 TABLET, EXTENDED RELEASE ORAL DAILY
Status: DISCONTINUED | OUTPATIENT
Start: 2021-02-26 | End: 2021-02-26 | Stop reason: HOSPADM

## 2021-02-25 RX ORDER — ACETAMINOPHEN 650 MG/1
650 SUPPOSITORY RECTAL EVERY 6 HOURS PRN
Status: DISCONTINUED | OUTPATIENT
Start: 2021-02-25 | End: 2021-02-25

## 2021-02-25 RX ORDER — ACETAMINOPHEN 650 MG/1
650 SUPPOSITORY RECTAL EVERY 6 HOURS PRN
Status: DISCONTINUED | OUTPATIENT
Start: 2021-02-25 | End: 2021-02-26 | Stop reason: HOSPADM

## 2021-02-25 RX ORDER — ACETAMINOPHEN 325 MG/1
650 TABLET ORAL EVERY 6 HOURS PRN
Status: DISCONTINUED | OUTPATIENT
Start: 2021-02-25 | End: 2021-02-26 | Stop reason: HOSPADM

## 2021-02-25 RX ORDER — SODIUM CHLORIDE 0.9 % (FLUSH) 0.9 %
10 SYRINGE (ML) INJECTION PRN
Status: DISCONTINUED | OUTPATIENT
Start: 2021-02-25 | End: 2021-02-25

## 2021-02-25 RX ORDER — PROMETHAZINE HYDROCHLORIDE 25 MG/1
12.5 TABLET ORAL EVERY 6 HOURS PRN
Status: DISCONTINUED | OUTPATIENT
Start: 2021-02-25 | End: 2021-02-26 | Stop reason: HOSPADM

## 2021-02-25 RX ORDER — MIDAZOLAM HYDROCHLORIDE 1 MG/ML
INJECTION INTRAMUSCULAR; INTRAVENOUS
Status: COMPLETED | OUTPATIENT
Start: 2021-02-25 | End: 2021-02-25

## 2021-02-25 RX ORDER — POLYETHYLENE GLYCOL 3350 17 G/17G
17 POWDER, FOR SOLUTION ORAL DAILY PRN
Status: DISCONTINUED | OUTPATIENT
Start: 2021-02-25 | End: 2021-02-25

## 2021-02-25 RX ORDER — ONDANSETRON 2 MG/ML
4 INJECTION INTRAMUSCULAR; INTRAVENOUS EVERY 6 HOURS PRN
Status: DISCONTINUED | OUTPATIENT
Start: 2021-02-25 | End: 2021-02-26 | Stop reason: HOSPADM

## 2021-02-25 RX ORDER — SODIUM CHLORIDE 0.9 % (FLUSH) 0.9 %
10 SYRINGE (ML) INJECTION EVERY 12 HOURS SCHEDULED
Status: DISCONTINUED | OUTPATIENT
Start: 2021-02-25 | End: 2021-02-25

## 2021-02-25 RX ADMIN — FENTANYL CITRATE 50 MCG: 50 INJECTION, SOLUTION INTRAMUSCULAR; INTRAVENOUS at 15:31

## 2021-02-25 RX ADMIN — MIDAZOLAM 1 MG: 1 INJECTION INTRAMUSCULAR; INTRAVENOUS at 15:30

## 2021-02-25 RX ADMIN — ACETAMINOPHEN 650 MG: 325 TABLET ORAL at 18:48

## 2021-02-25 RX ADMIN — AMIODARONE HYDROCHLORIDE 150 MG: 50 INJECTION, SOLUTION INTRAVENOUS at 16:15

## 2021-02-25 RX ADMIN — MIDAZOLAM 1 MG: 1 INJECTION INTRAMUSCULAR; INTRAVENOUS at 15:34

## 2021-02-25 RX ADMIN — MIDAZOLAM 1 MG: 1 INJECTION INTRAMUSCULAR; INTRAVENOUS at 15:38

## 2021-02-25 RX ADMIN — APIXABAN 5 MG: 5 TABLET, FILM COATED ORAL at 20:17

## 2021-02-25 RX ADMIN — AMIODARONE HYDROCHLORIDE 1 MG/MIN: 50 INJECTION, SOLUTION INTRAVENOUS at 16:26

## 2021-02-25 ASSESSMENT — PAIN SCALES - GENERAL
PAINLEVEL_OUTOF10: 5
PAINLEVEL_OUTOF10: 3
PAINLEVEL_OUTOF10: 0

## 2021-02-25 ASSESSMENT — PAIN DESCRIPTION - FREQUENCY: FREQUENCY: INTERMITTENT

## 2021-02-25 ASSESSMENT — PAIN DESCRIPTION - LOCATION: LOCATION: BACK

## 2021-02-25 NOTE — SEDATION DOCUMENTATION
on monitor, pt returned to a-fib on monitor. Pt denies any palpitations or SOB. Will update Dr Jessi Vergara.

## 2021-02-25 NOTE — PROGRESS NOTES
Call received from access center. Pt is being transferred to [place] [id number]. They will arrange transport.

## 2021-02-25 NOTE — PRE SEDATION
Sedation Pre-Procedure Note    Patient Name: Sasha Reynoso [facility]   YOB: 1965  Room/Bed: K806/Q630-46  Medical Record Number: [id number]  Date: 2/25/2021   Time: 5:32 PM       Indication: Atrial flutter with rapid ventricular response      Consent: I have discussed with the patient and/or the patient representative the indication, alternatives, and the possible risks and/or complications of the planned procedure and the anesthesia methods. The patient and/or patient representative appear to understand and agree to proceed. Vital Signs:   Vitals:    02/25/21 1645   BP: (!) 110/56   Pulse: 87   Resp: 14   Temp:    SpO2: 95%       Past Medical History:   has a past medical history of Aortic valve disorders, Atrial fibrillation (HCC), Atrial flutter ([address] Encounter for cardioversion procedure, Encounter for cardioversion procedure, Frequent urination, H/O echocardiogram, H/O echocardiogram, Hemorrhage of rectum and anus, History of 24 hour EKG monitoring, History of echocardiogram, History of heart surgery, History of PFTs, Hx of transesophageal echocardiography (SKYLAR) for monitoring, Hypertension, Impaired fasting glucose, Knee pain, bilateral, Lipoma, Obesity (BMI 30.0-34.9), Obstructive sleep apnea (adult) (pediatric), Paroxysmal supraventricular tachycardia ([address] Tobacco use disorder, Urinary urgency, and Ventral hernia. Past Surgical History:   has a past surgical history that includes Achilles tendon surgery (Right, 2010); Vasectomy; Foot surgery; ablation of dysrhythmic focus (2015); Umbilical hernia repair (59/90/2872); repair umbilical SNKR,6+S/P,JFHIU (N/A, 10/24/2018); hernia repair (11/2018); Colonoscopy (06/13/2013); Cardiac surgery (2008); hernia repair (03/06/2020); and hernia repair (N/A, 3/6/2020).     Medications:   Scheduled Meds:    sodium chloride flush  10 mL Intravenous 2 times per day    enoxaparin  40 mg Subcutaneous Daily     Continuous Infusions:    amiodarone 1 mg/min (02/25/21 5386)    Followed by   Taty Childers amiodarone       PRN Meds: sodium chloride flush, promethazine **OR** ondansetron, polyethylene glycol, acetaminophen **OR** acetaminophen  Home Meds:   Prior to Admission medications    Medication Sig Start Date End Date Taking? Authorizing Provider   apixaban (ELIQUIS) 5 MG TABS tablet Take 1 tablet by mouth 2 times daily 2/3/21  Yes AMADOR Swanson - CNP   metoprolol succinate (TOPROL XL) 100 MG extended release tablet TAKE 1 TABLET BY MOUTH ONCE DAILY  Patient taking differently: Take 100 mg by mouth daily TAKE 1 TABLET BY MOUTH ONCE DAILY 4/20/20  Yes Ascencion Leary MD   ibuprofen (ADVIL;MOTRIN) 400 MG tablet Take 2 tablets by mouth every 8 hours as needed for Pain 3/6/20  Yes Paloma Herman DO   docusate sodium (COLACE) 100 MG capsule Take 1 capsule by mouth 2 times daily as needed for Constipation 3/6/20  Yes Paloma Herman DO   Acetaminophen (TYLENOL) 325 MG CAPS Take 1-2 tablets by mouth every 4 hours as needed (pain)  Patient not taking: Reported on 2/25/2021 3/6/20   Paloma Herman DO     Coumadin Use Last 7 Days:  no  Antiplatelet drug therapy use last 7 days: no  Other anticoagulant use last 7 days: yes -Eliquis 5 mg twice daily  Additional Medication Information: Please see above medications      Pre-Sedation Documentation and Exam:   I have personally completed a history, physical exam & review of systems for this patient (see notes).     Mallampati Airway Assessment:  Mallampati Class II - (soft palate, fauces & uvula are visible)    Prior History of Anesthesia Complications:   none    ASA Classification:  Class 2 - A normal healthy patient with mild systemic disease    Sedation/ Anesthesia Plan:   intravenous sedation    Medications Planned:   midazolam (Versed) intravenously and fentanyl intravenously    Patient is an appropriate candidate for plan of sedation: yes    Electronically signed by Michelle Garces MD on 2/25/2021

## 2021-02-25 NOTE — H&P
(TOPROL XL) 100 MG extended release tablet TAKE 1 TABLET BY MOUTH ONCE DAILY  Patient taking differently: Take 100 mg by mouth daily TAKE 1 TABLET BY MOUTH ONCE DAILY 4/20/20  Yes Mary Sparks MD   ibuprofen (ADVIL;MOTRIN) 400 MG tablet Take 2 tablets by mouth every 8 hours as needed for Pain 3/6/20  Yes Sukhwinder Silverio DO   docusate sodium (COLACE) 100 MG capsule Take 1 capsule by mouth 2 times daily as needed for Constipation 3/6/20  Yes Sukhwinder Silverio DO   Acetaminophen (TYLENOL) 325 MG CAPS Take 1-2 tablets by mouth every 4 hours as needed (pain)  Patient not taking: Reported on 2/25/2021 3/6/20   Sukhwinder Silverio DO       Allergies:  Patient has no known allergies. Social History:   TOBACCO:   reports that he quit smoking about 2 months ago. His smoking use included cigarettes. He has a 34.00 pack-year smoking history. He has never used smokeless tobacco.  ETOH:   reports current alcohol use of about 6.0 standard drinks of alcohol per week. Family History:       Problem Relation Age of Onset    Colon Cancer Mother     Heart Surgery Father         enlarged aorta    Kidney Cancer Father        Review of Systems:  Constitutional:negative  for fevers, and negative for chills. Respiratory: negative for shortness of breath, negative for cough, and negative for wheezing. Has chest wall pain  Cardiovascular: negative for chest pain, positive for palpitations, and negative for syncope  Gastrointestinal: negative for abdominal pain, negative for nausea,negative for vomiting, negative for diarrhea, negative for constipation, and negative for hematochezia or melena  Genitourinary: negative for dysuria, negative for urinary urgency, negative for urinary frequency, and negative for hematuria  Neurological: negative for unilateral weakness, numbness or tingling.     All other systems were reviewed with the patient and are negative except as stated    Objective:    Vitals:   Temp: 97.5 °F (36.4 °C)  BP: 112/69  Resp: 25  Pulse: 135  SpO2: 95 %  -----------------------------------------------------------------  Exam:  GEN:    Awake, alert and oriented x3. EYES:  EOMI, pupils equal   NECK: Supple. No lymphadenopathy. No carotid bruit  CVS:    tachycardic, 2/6 systolic murmur  PULM:  CTA, no wheezes, rales or rhonchi, no acute respiratory distress, has tenderness of lt post chest,no crepitus  ABD:    Bowels sounds normal.  Abdomen is soft. No distention. no tenderness to palpation. EXT:   no edema bilaterally . No calf tenderness. NEURO: Moves all extremities. Motor and sensory are grossly intact  SKIN:  No rashes. No skin lesions.    -----------------------------------------------------------------  Diagnostic Data:   · All diagnostic data was reviewed  Lab Results   Component Value Date    WBC 6.5 02/03/2021    HGB 15.5 02/03/2021    MCV 91.1 02/03/2021     02/03/2021      Lab Results   Component Value Date    GLUCOSE 113 (H) 02/03/2021    BUN 17 02/03/2021    CREATININE 0.79 02/03/2021     (L) 02/03/2021    K 4.0 02/03/2021    CALCIUM 9.1 02/03/2021    CL 97 (L) 02/03/2021    CO2 29 02/03/2021     Lab Results   Component Value Date    WBCUA 0 TO 2 03/30/2015    RBCUA 0 TO 2 03/30/2015    EPITHUA 0 TO 2 03/30/2015    LEUKOCYTESUR NEGATIVE 03/30/2015    SPECGRAV 1.015 03/30/2015    GLUCOSEU NEGATIVE 03/30/2015    KETUA NEGATIVE 03/30/2015    PROTEINU NEGATIVE 03/30/2015    HGBUR NEGATIVE 03/30/2015    CASTUA NOT REPORTED 03/30/2015    CRYSTUA NOT REPORTED 03/30/2015    BACTERIA NOT REPORTED 03/30/2015    YEAST NOT REPORTED 03/30/2015       Assessment:     Principal Problem:    Atrial flutter with rapid ventricular response (HCC)  Active Problems: Moderate aortic regurgitation    Hypertension  Resolved Problems:    * No resolved hospital problems.  *      Plan:     · This patient requires overnight observation because of atrial flutter with rvr requiring iv amidarone and ablation  · Factors affecting the medical complexity of this patient include atrial flutter ,htn  · Estimated length of stay is 1 days  · Discussed patient's symptoms and data results including labs and imaging studies with the ER MD at time of admission  · High risk drug monitoring: amiodarone, eliquis  ·     CORE MEASURES  · DVT prophylaxis: already on chronic anticoagulation  · Decubitus ulcer present on admission: No  · CODE STATUS: FULL CODE  · Nutrition Status: good   · Physical therapy: NA   · Old Charts reviewed: Yes  · EKG Reviewed:  Yes  · Advance Directive Addressed: Yes    Consuelo Velasquez M.D.  2/25/2021  5:45 PM

## 2021-02-25 NOTE — PATIENT INSTRUCTIONS
SURVEY:    You may be receiving a survey from [employer] regarding your visit today. Please complete the survey to enable us to provide the highest quality of care to you and your family. If you cannot score us a very good on any question, please call the office to discuss how we could have made your experience a very good one. Thank you.

## 2021-02-25 NOTE — PROGRESS NOTES
Abdon Guillermo am scribing for and in the presence of Valeri Valenzuela MD, F.A.C.C..    Patient: Cali Carver  : 1965  Date of Visit: 2021    REASON FOR VISIT / CONSULTATION: Other (Pt fell on ice  and came to see PCP and EKG showed sinus tach he has pain behind heart all that way to back)      Dear Dr. Suraj Alvarado MD     I had the opportunity to see Cali Carver at the office today for follow up. 1-He has a history of thoracic aorta surgery at the [facility] in . He said he had a repair with no artificial grafts put in. He had a heart catheterization prior to the aorta surgery and was told his coronary arteries were fine. No history of MI or angina. 2-He does have a history of atrial fibrillation/Atypical flutter which predates his thoracic aorta surgery but after his thoracic aorta surgery he had a couple of recurrences, multiple cardioversion and finally underwent atypical flutter ablation on 2015 at [facility]. 3-Echocardiogram done 17 EF 55-60% Mildly increased left ventricular wall thickness with a normal left ventricular cavity size. No definite specific wall motion abnormalities were identified. The left atrium is moderately dilated (34-39) with a left atrial volume index of 36 ml/m2. Mild mitral and tricuspid regurgitation. Moderate aortic regurgitation. Evidence of mild diastolic dysfunction is seen     4-Another SKYLAR and cardioversion of atrial flutter on 2020. This episode seems to be precipitated by binge drinking. 5-he presented again to [facility] on 2021 with atrial flutter with rapid ventricular response. We ended up doing a cardioversion on 2/3/2021. No SKYLAR was needed because patient symptoms were less than 48 hours. Patient discharged home that day on anticoagulation. Mr. Elver Sandoval reports not doing very well. He did have a fall on , he was carrying a box of papers and fell.  He has been having chest pain and left lower rib pain. He thinks he cracked a rib. No shortness of breath. No orthopnea or PND. No palpitations, dizziness or lightheadedness, no syncope or near syncope. Fairly active with no significant exertional symptoms. EKG done in office today showed atrial flutter with heart rate of 135 to 140 bpm without any heart rate variability. Past Medical History:   Diagnosis Date    Aortic valve disorders     aortic regurg    Atrial fibrillation (HCC)     Atrial flutter ([address]     Encounter for cardioversion procedure 05/05/2015    Dr Alyce Markham Encounter for cardioversion procedure     [place]/ Dr. Claudene Simons, states 7 times, most recent 1/2020    Frequent urination     Pt states he's developed frequent urination with 3 times in last yr noting blood in urine as well.  H/O echocardiogram 12/04/2017    EF 63-65% Global LV systolic function appears preserved. Mildly increaseed LV wall thinckness with normal LV carvity size, No definite specific wall motion abnormalities identified, LA is moderately dilated (34-39) with LA volume index of 36ml/m2, Mild mitral and tricuspid regurgitation, moderate aortic regurgitation, Evidence of mild diastolic dysfunction seen    H/O echocardiogram 02/02/2021    EF 55% patient has sigmoid interventricular septim S/P aortic vlave repair with mild to mod aortic regurg Diastoligy cannot be properly assessed due to pts rhtyhm Aortic root is mildly dilated when corrected for body surface area    Hemorrhage of rectum and anus 2013    polypectomy    History of 24 hour EKG monitoring 04/03/2014    Unremarkable except for mild increased number of PVC's with 3 ventricular couplets, asymptomatic     History of echocardiogram 04/10/2014    EF 60%, mild to mod LVH, moderate AI    History of heart surgery 2008    enlarged aorta    History of PFTs 06/25/2015    Consistent with mild obstructive ventilatory impairment.  Lung volumes are normal,except mild increase in residual volume,which could be suggestive of airway trapping. Diffusion capacity is normal.    Hx of transesophageal echocardiography (SKYLAR) for monitoring 2015    Dr Wade Im    Hypertension     pcp dr Amilcar Balderas Impaired fasting glucose     Knee pain, bilateral     Lipoma     Obesity (BMI 30.0-34. 9)     Obstructive sleep apnea (adult) (pediatric)     does not wear cpap like suppose to    Paroxysmal supraventricular tachycardia ([address]     Tobacco use disorder     Urinary urgency     Ventral hernia        Past Surgical History:   Procedure Laterality Date    ABLATION OF DYSRHYTHMIC FOCUS  2015    ACHILLES TENDON SURGERY Right 2010   Polly Cushing CARDIAC SURGERY  2008    thoracic AAA repair and modified Coleman procedure ( aortic valve sparing ) at [address]  06/13/2013    x 2 , bleeding polyps removed    FOOT SURGERY      reconstructive for club foot (right)    HERNIA REPAIR  2018    ventral    HERNIA REPAIR  2020    ventral    HERNIA REPAIR N/A 3/6/2020    XI LAPAROSCOPIC ROBOTIC VENTRAL HERNIA WITH MESH performed by Alicia Shell DO at [address] [facility],7+S/O,JLI N/A     HERNIA UMBILICAL REPAIR performed by Martha Blanco MD at . [facility]  10/24/2018    VASECTOMY         Family History   Problem Relation Age of Onset    Colon Cancer Mother     Heart Surgery Father         enlarged aorta    Kidney Cancer Father        Social History     Tobacco Use    Smoking status: Former Smoker     Packs/day: 1.00     Years: 34.00     Pack years: 34.00     Types: Cigarettes     Quit date: 12/15/2020     Years since quittin.1    Smokeless tobacco: Never Used   Substance Use Topics    Alcohol use:  Yes     Alcohol/week: 6.0 standard drinks     Types: 6 Cans of beer per week     Comment: QOD now, use to drink daily    Drug use: No       Current Outpatient Medications Medication Sig Dispense Refill    apixaban (ELIQUIS) 5 MG TABS tablet Take 1 tablet by mouth 2 times daily 60 tablet 5    metoprolol succinate (TOPROL XL) 100 MG extended release tablet TAKE 1 TABLET BY MOUTH ONCE DAILY (Patient taking differently: Take 100 mg by mouth daily TAKE 1 TABLET BY MOUTH ONCE DAILY) 90 tablet 3    ibuprofen (ADVIL;MOTRIN) 400 MG tablet Take 2 tablets by mouth every 8 hours as needed for Pain 40 tablet 0    docusate sodium (COLACE) 100 MG capsule Take 1 capsule by mouth 2 times daily as needed for Constipation 20 capsule 0    Acetaminophen (TYLENOL) 325 MG CAPS Take 1-2 tablets by mouth every 4 hours as needed (pain) (Patient not taking: Reported on 2/25/2021) 30 capsule 0     No current facility-administered medications for this visit. No Known Allergies    ROS: 14 systems reviewed and negative except for pertinent positives as noted above. PHYSICAL EXAM:   /74 (Site: Left Upper Arm, Position: Sitting, Cuff Size: Large Adult)   Pulse 138   Resp 18   Ht 5' 8\" (1.727 m)   Wt 234 lb (106.1 kg)   BMI 35.58 kg/m²  Body mass index is 35.58 kg/m². Constitutional: He is oriented to person, place, and time. He appears well-developed and well-nourished. In no acute distress. Obese   HEENT: Normocephalic and atraumatic. No JVD present. Carotid bruit is not present. No mass and no thyromegaly present. No lymphadenopathy present. Cardiovascular: Normal rate, regular rhythm, normal heart sounds. Exam reveals no gallop and no friction rubs. 2/6 short systolic murmur and 2/6 diastolic murmur heard best at 2nd Right upper sternal border. Pulmonary/Chest: Effort normal and breath sounds normal. No respiratory distress. He has no wheezes, rhonchi or rales. Abdominal: Soft, non-tender. Bowel sounds and aorta are normal. He exhibits no organomegaly, mass or bruit. Extremities: No edema. No cyanosis and no clubbing. Pulses are 2+ radial and carotid pulses.  2+ dorsalis pedis of ablation procedure because he had an ablation done at [facility] back in 2015. · Moderate aortic regurgitation-Currently asymptomatic: No  Signs of volume overload    · Obstructive Sleep Apnea:  · Refuses CPAP machine. Finally, I recommended that he continue his other medications and follow up with you as previously scheduled. FOLLOW UP:  I told Mr. Hinton to call my office if he had any problems, but otherwise told him to No follow-ups on file. However, I would be happy to see him sooner should the need arise. Sincerely,  Marcelo Chauhan MD  [address], [facility], [address] [place] [address]  Phone: [phone]; Fax: [phone]    I believe that the risk of significant morbidity and mortality related to the patient's current medical conditions are: intermediate-high. >45 minutes were spent during prep work, discussion and exam of the patient, and follow up documentation and all of their questions were answered. The documentation recorded by the scribe, accurately and completely reflects the services I personally performed and the decisions made by me. Marcelo Chauhan MD, F.A.C.C.  February 25, 2021

## 2021-02-26 ENCOUNTER — HOSPITAL ENCOUNTER (INPATIENT)
Age: 56
LOS: 1 days | Discharge: HOME | DRG: 274 | End: 2021-02-27
Attending: NURSE PRACTITIONER | Admitting: INTERNAL MEDICINE
Payer: OTHER GOVERNMENT

## 2021-02-26 ENCOUNTER — APPOINTMENT (OUTPATIENT)
Dept: CARDIAC CATH/INVASIVE PROCEDURES | Age: 56
DRG: 274 | End: 2021-02-26
Attending: NURSE PRACTITIONER
Payer: OTHER GOVERNMENT

## 2021-02-26 VITALS
DIASTOLIC BLOOD PRESSURE: 61 MMHG | TEMPERATURE: 97.7 F | SYSTOLIC BLOOD PRESSURE: 106 MMHG | HEART RATE: 87 BPM | RESPIRATION RATE: 21 BRPM | OXYGEN SATURATION: 96 %

## 2021-02-26 PROBLEM — I48.91 A-FIB (HCC): Status: ACTIVE | Noted: 2021-02-26

## 2021-02-26 LAB
ABO: NORMAL
ANION GAP SERPL CALCULATED.3IONS-SCNC: 6 MEQ/L (ref 8–16)
ANION GAP SERPL CALCULATED.3IONS-SCNC: 8 MEQ/L (ref 8–16)
ANTIBODY SCREEN: NORMAL
APTT: 29.6 SECONDS (ref 22–38)
BUN BLDV-MCNC: 17 MG/DL (ref 7–22)
BUN BLDV-MCNC: 18 MG/DL (ref 7–22)
CALCIUM SERPL-MCNC: 8.6 MG/DL (ref 8.5–10.5)
CALCIUM SERPL-MCNC: 8.7 MG/DL (ref 8.5–10.5)
CHLORIDE BLD-SCNC: 101 MEQ/L (ref 98–111)
CHLORIDE BLD-SCNC: 102 MEQ/L (ref 98–111)
CO2: 26 MEQ/L (ref 23–33)
CO2: 29 MEQ/L (ref 23–33)
CREAT SERPL-MCNC: 0.6 MG/DL (ref 0.4–1.2)
CREAT SERPL-MCNC: 0.7 MG/DL (ref 0.4–1.2)
EKG ATRIAL RATE: 264 BPM
EKG Q-T INTERVAL: 438 MS
EKG QRS DURATION: 90 MS
EKG QTC CALCULATION (BAZETT): 505 MS
EKG R AXIS: 12 DEGREES
EKG T AXIS: 79 DEGREES
EKG VENTRICULAR RATE: 80 BPM
ERYTHROCYTE [DISTWIDTH] IN BLOOD BY AUTOMATED COUNT: 12.1 % (ref 11.5–14.5)
ERYTHROCYTE [DISTWIDTH] IN BLOOD BY AUTOMATED COUNT: 12.1 % (ref 11.5–14.5)
ERYTHROCYTE [DISTWIDTH] IN BLOOD BY AUTOMATED COUNT: 41.1 FL (ref 35–45)
ERYTHROCYTE [DISTWIDTH] IN BLOOD BY AUTOMATED COUNT: 41.6 FL (ref 35–45)
GFR SERPL CREATININE-BSD FRML MDRD: > 90 ML/MIN/1.73M2
GFR SERPL CREATININE-BSD FRML MDRD: > 90 ML/MIN/1.73M2
GLUCOSE BLD-MCNC: 106 MG/DL (ref 70–108)
GLUCOSE BLD-MCNC: 111 MG/DL (ref 70–108)
HCT VFR BLD CALC: 41.9 % (ref 42–52)
HCT VFR BLD CALC: 43.8 % (ref 42–52)
HEMOGLOBIN: 13.7 GM/DL (ref 14–18)
HEMOGLOBIN: 14.5 GM/DL (ref 14–18)
INR BLD: 1.02 (ref 0.85–1.13)
MCH RBC QN AUTO: 30.5 PG (ref 26–33)
MCH RBC QN AUTO: 30.5 PG (ref 26–33)
MCHC RBC AUTO-ENTMCNC: 32.7 GM/DL (ref 32.2–35.5)
MCHC RBC AUTO-ENTMCNC: 33.1 GM/DL (ref 32.2–35.5)
MCV RBC AUTO: 92.2 FL (ref 80–94)
MCV RBC AUTO: 93.3 FL (ref 80–94)
PLATELET # BLD: 187 THOU/MM3 (ref 130–400)
PLATELET # BLD: 189 THOU/MM3 (ref 130–400)
PMV BLD AUTO: 9.8 FL (ref 9.4–12.4)
PMV BLD AUTO: 9.8 FL (ref 9.4–12.4)
POTASSIUM SERPL-SCNC: 3.8 MEQ/L (ref 3.5–5.2)
POTASSIUM SERPL-SCNC: 3.9 MEQ/L (ref 3.5–5.2)
RBC # BLD: 4.49 MILL/MM3 (ref 4.7–6.1)
RBC # BLD: 4.75 MILL/MM3 (ref 4.7–6.1)
RH FACTOR: NORMAL
SODIUM BLD-SCNC: 136 MEQ/L (ref 135–145)
SODIUM BLD-SCNC: 136 MEQ/L (ref 135–145)
WBC # BLD: 6.1 THOU/MM3 (ref 4.8–10.8)
WBC # BLD: 6.5 THOU/MM3 (ref 4.8–10.8)

## 2021-02-26 PROCEDURE — 6360000002 HC RX W HCPCS: Performed by: INTERNAL MEDICINE

## 2021-02-26 PROCEDURE — 86901 BLOOD TYPING SEROLOGIC RH(D): CPT

## 2021-02-26 PROCEDURE — 6370000000 HC RX 637 (ALT 250 FOR IP): Performed by: INTERNAL MEDICINE

## 2021-02-26 PROCEDURE — 2140000000 HC CCU INTERMEDIATE R&B

## 2021-02-26 PROCEDURE — 93653 COMPRE EP EVAL TX SVT: CPT | Performed by: INTERNAL MEDICINE

## 2021-02-26 PROCEDURE — 80048 BASIC METABOLIC PNL TOTAL CA: CPT

## 2021-02-26 PROCEDURE — 2580000003 HC RX 258: Performed by: PHYSICIAN ASSISTANT

## 2021-02-26 PROCEDURE — 85730 THROMBOPLASTIN TIME PARTIAL: CPT

## 2021-02-26 PROCEDURE — C1732 CATH, EP, DIAG/ABL, 3D/VECT: HCPCS

## 2021-02-26 PROCEDURE — 93613 INTRACARDIAC EPHYS 3D MAPG: CPT | Performed by: INTERNAL MEDICINE

## 2021-02-26 PROCEDURE — C1894 INTRO/SHEATH, NON-LASER: HCPCS

## 2021-02-26 PROCEDURE — 86900 BLOOD TYPING SEROLOGIC ABO: CPT

## 2021-02-26 PROCEDURE — 85027 COMPLETE CBC AUTOMATED: CPT

## 2021-02-26 PROCEDURE — 99253 IP/OBS CNSLTJ NEW/EST LOW 45: CPT | Performed by: INTERNAL MEDICINE

## 2021-02-26 PROCEDURE — 6360000002 HC RX W HCPCS

## 2021-02-26 PROCEDURE — G0378 HOSPITAL OBSERVATION PER HR: HCPCS

## 2021-02-26 PROCEDURE — 93010 ELECTROCARDIOGRAM REPORT: CPT | Performed by: NUCLEAR MEDICINE

## 2021-02-26 PROCEDURE — 2709999900 HC NON-CHARGEABLE SUPPLY

## 2021-02-26 PROCEDURE — C1730 CATH, EP, 19 OR FEW ELECT: HCPCS

## 2021-02-26 PROCEDURE — 96376 TX/PRO/DX INJ SAME DRUG ADON: CPT

## 2021-02-26 PROCEDURE — 93005 ELECTROCARDIOGRAM TRACING: CPT | Performed by: INTERNAL MEDICINE

## 2021-02-26 PROCEDURE — 02583ZZ DESTRUCTION OF CONDUCTION MECHANISM, PERCUTANEOUS APPROACH: ICD-10-PCS | Performed by: INTERNAL MEDICINE

## 2021-02-26 PROCEDURE — 2500000003 HC RX 250 WO HCPCS: Performed by: INTERNAL MEDICINE

## 2021-02-26 PROCEDURE — 85610 PROTHROMBIN TIME: CPT

## 2021-02-26 PROCEDURE — 6370000000 HC RX 637 (ALT 250 FOR IP): Performed by: PHYSICIAN ASSISTANT

## 2021-02-26 PROCEDURE — 36415 COLL VENOUS BLD VENIPUNCTURE: CPT

## 2021-02-26 PROCEDURE — 2580000003 HC RX 258: Performed by: INTERNAL MEDICINE

## 2021-02-26 PROCEDURE — 2500000003 HC RX 250 WO HCPCS

## 2021-02-26 PROCEDURE — 99222 1ST HOSP IP/OBS MODERATE 55: CPT | Performed by: PHYSICIAN ASSISTANT

## 2021-02-26 PROCEDURE — 93005 ELECTROCARDIOGRAM TRACING: CPT | Performed by: PHYSICIAN ASSISTANT

## 2021-02-26 PROCEDURE — 86850 RBC ANTIBODY SCREEN: CPT

## 2021-02-26 RX ORDER — POLYETHYLENE GLYCOL 3350 17 G/17G
17 POWDER, FOR SOLUTION ORAL DAILY PRN
Status: DISCONTINUED | OUTPATIENT
Start: 2021-02-26 | End: 2021-02-27 | Stop reason: HOSPADM

## 2021-02-26 RX ORDER — POTASSIUM CHLORIDE 7.45 MG/ML
10 INJECTION INTRAVENOUS PRN
Status: DISCONTINUED | OUTPATIENT
Start: 2021-02-26 | End: 2021-02-27 | Stop reason: HOSPADM

## 2021-02-26 RX ORDER — SODIUM CHLORIDE 9 MG/ML
INJECTION, SOLUTION INTRAVENOUS CONTINUOUS
Status: DISCONTINUED | OUTPATIENT
Start: 2021-02-26 | End: 2021-02-27 | Stop reason: HOSPADM

## 2021-02-26 RX ORDER — ACETAMINOPHEN 325 MG/1
650 TABLET ORAL EVERY 6 HOURS PRN
Status: DISCONTINUED | OUTPATIENT
Start: 2021-02-26 | End: 2021-02-27 | Stop reason: HOSPADM

## 2021-02-26 RX ORDER — SODIUM CHLORIDE 0.9 % (FLUSH) 0.9 %
10 SYRINGE (ML) INJECTION PRN
Status: DISCONTINUED | OUTPATIENT
Start: 2021-02-26 | End: 2021-02-27 | Stop reason: HOSPADM

## 2021-02-26 RX ORDER — SODIUM CHLORIDE 0.9 % (FLUSH) 0.9 %
10 SYRINGE (ML) INJECTION EVERY 12 HOURS SCHEDULED
Status: DISCONTINUED | OUTPATIENT
Start: 2021-02-26 | End: 2021-02-27 | Stop reason: HOSPADM

## 2021-02-26 RX ORDER — POTASSIUM CHLORIDE 20 MEQ/1
40 TABLET, EXTENDED RELEASE ORAL PRN
Status: DISCONTINUED | OUTPATIENT
Start: 2021-02-26 | End: 2021-02-27 | Stop reason: HOSPADM

## 2021-02-26 RX ORDER — MAGNESIUM SULFATE IN WATER 40 MG/ML
2000 INJECTION, SOLUTION INTRAVENOUS PRN
Status: DISCONTINUED | OUTPATIENT
Start: 2021-02-26 | End: 2021-02-27 | Stop reason: HOSPADM

## 2021-02-26 RX ORDER — LIDOCAINE 4 G/G
1 PATCH TOPICAL DAILY PRN
Status: DISCONTINUED | OUTPATIENT
Start: 2021-02-26 | End: 2021-02-27 | Stop reason: HOSPADM

## 2021-02-26 RX ORDER — PROMETHAZINE HYDROCHLORIDE 25 MG/1
12.5 TABLET ORAL EVERY 6 HOURS PRN
Status: DISCONTINUED | OUTPATIENT
Start: 2021-02-26 | End: 2021-02-27 | Stop reason: HOSPADM

## 2021-02-26 RX ORDER — DIPHENHYDRAMINE HYDROCHLORIDE 50 MG/ML
50 INJECTION INTRAMUSCULAR; INTRAVENOUS ONCE
Status: DISCONTINUED | OUTPATIENT
Start: 2021-02-26 | End: 2021-02-27 | Stop reason: HOSPADM

## 2021-02-26 RX ORDER — SODIUM CHLORIDE 0.9 % (FLUSH) 0.9 %
10 SYRINGE (ML) INJECTION PRN
Status: DISCONTINUED | OUTPATIENT
Start: 2021-02-26 | End: 2021-02-26 | Stop reason: SDUPTHER

## 2021-02-26 RX ORDER — SODIUM CHLORIDE 0.9 % (FLUSH) 0.9 %
10 SYRINGE (ML) INJECTION EVERY 12 HOURS SCHEDULED
Status: DISCONTINUED | OUTPATIENT
Start: 2021-02-26 | End: 2021-02-26 | Stop reason: SDUPTHER

## 2021-02-26 RX ORDER — METOPROLOL SUCCINATE 100 MG/1
100 TABLET, EXTENDED RELEASE ORAL DAILY
Status: DISCONTINUED | OUTPATIENT
Start: 2021-02-26 | End: 2021-02-27 | Stop reason: HOSPADM

## 2021-02-26 RX ORDER — ACETAMINOPHEN 650 MG/1
650 SUPPOSITORY RECTAL EVERY 6 HOURS PRN
Status: DISCONTINUED | OUTPATIENT
Start: 2021-02-26 | End: 2021-02-27 | Stop reason: HOSPADM

## 2021-02-26 RX ORDER — ONDANSETRON 2 MG/ML
4 INJECTION INTRAMUSCULAR; INTRAVENOUS EVERY 6 HOURS PRN
Status: DISCONTINUED | OUTPATIENT
Start: 2021-02-26 | End: 2021-02-27 | Stop reason: HOSPADM

## 2021-02-26 RX ADMIN — AMIODARONE HYDROCHLORIDE 0.5 MG/MIN: 1.8 INJECTION, SOLUTION INTRAVENOUS at 05:52

## 2021-02-26 RX ADMIN — SODIUM CHLORIDE: 9 INJECTION, SOLUTION INTRAVENOUS at 04:45

## 2021-02-26 RX ADMIN — METOPROLOL SUCCINATE 100 MG: 100 TABLET, FILM COATED, EXTENDED RELEASE ORAL at 10:02

## 2021-02-26 RX ADMIN — ACETAMINOPHEN 650 MG: 325 TABLET ORAL at 05:58

## 2021-02-26 RX ADMIN — APIXABAN 5 MG: 5 TABLET, FILM COATED ORAL at 20:52

## 2021-02-26 RX ADMIN — APIXABAN 5 MG: 5 TABLET, FILM COATED ORAL at 10:02

## 2021-02-26 RX ADMIN — Medication 5 MG: at 21:25

## 2021-02-26 RX ADMIN — AMIODARONE HYDROCHLORIDE 0.5 MG/MIN: 50 INJECTION, SOLUTION INTRAVENOUS at 00:22

## 2021-02-26 RX ADMIN — SODIUM CHLORIDE, PRESERVATIVE FREE 10 ML: 5 INJECTION INTRAVENOUS at 20:52

## 2021-02-26 ASSESSMENT — PAIN SCALES - GENERAL: PAINLEVEL_OUTOF10: 5

## 2021-02-26 ASSESSMENT — PULMONARY FUNCTION TESTS
PIF_VALUE: 0

## 2021-02-26 ASSESSMENT — PAIN DESCRIPTION - ORIENTATION: ORIENTATION: LEFT

## 2021-02-26 ASSESSMENT — PAIN DESCRIPTION - DESCRIPTORS: DESCRIPTORS: STABBING

## 2021-02-26 ASSESSMENT — ENCOUNTER SYMPTOMS
ALLERGIC/IMMUNOLOGIC NEGATIVE: 1
RESPIRATORY NEGATIVE: 1
SHORTNESS OF BREATH: 0
GASTROINTESTINAL NEGATIVE: 1
EYES NEGATIVE: 1

## 2021-02-26 ASSESSMENT — PAIN DESCRIPTION - FREQUENCY: FREQUENCY: INTERMITTENT

## 2021-02-26 ASSESSMENT — PAIN DESCRIPTION - LOCATION: LOCATION: RIB CAGE

## 2021-02-26 NOTE — PROGRESS NOTES
Pt admitted to  3B30 via cart/stretcher from [facility].   Complaints: afib  IV amiodarone infusing into the left AC at a rate of 16.7mls/ hour. IV site free of s/s of infection or infiltration. Vital signs obtained. Assessment and data collection initiated. Two nurse skin assessment performed by Baron Yo SOLORIO and Rosas Story. Oriented to room. Policies and procedures for 3B explained. Sherly SOLORIO discussed hourly rounding with patient addressing 5 P's. Fall prevention and safety brochure discussed with patient. Bed alarm on. Call light in reach. Explained patients right to have family, representative or physician notified of their admission. Patient has Declined for physician to be notified as he is the one who sent him to ER. Patient has Declined for family/representative to be notified. All questions answered with no further questions at this time.

## 2021-02-26 NOTE — CONSULTS
[address] [facility]  Dept: [phone]          CARDIAC ELECTROPHYSIOLOGY: CONSULTATION NOTE    PATIENT DEMOGRAPHICS:  Date:   2/26/2021  Patient name:              Ramy Shell  YOB: 1965  Sex: male   MRN:   [id number]    PRIMARY CARE PHYSICIAN:   Katia Mixon MD    REFERRING PROVIDER:  Harika Akbar MD, [facility]    REASON FOR CONSULTATION:  Typical atrial flutter. HISTORY OF PRESENT ILLNESS:  Ace Ayala is a very pleasant 49-year-old gentleman with history of atrial fibrillation/atypical atrial flutter ablation at [facility] clinic on 8/19/2015. He remained well for about 4 years and started to have intermittent palpitation (1-2 times per month, brief (lasting for seconds to minutes) without associated chest pain, shortness of breath, dizziness or syncope. In December 2020 he fell on ice and during evaluation was noted to be in atrial flutter with rapid ventricular rate (130 bpm). He underwent electrocardioversion with restoration of the sinus rhythm and was started on Toprol XL. He had recurrences requiring repeat cardioversion on 2/3/2021 with early recurrences. The dose of metoprolol was increased. He presented again with palpitation yesterday and was noted to be in rapid atrial flutter. Was started on intravenous amiodarone and transferred here for evaluation of atrial flutter ablation. He is currently anticoagulated with apixaban. His medical comorbidities include: Thoracic aortic aneurysm repair in 2008 in [facility] [employer] clinic, obesity (BMI 31 kg/m²) and sleep apnea. He has normal left ankle size and function, ejection fraction 55 to 60%. REVIEW OF SYSTEMS:    Constitutional: Negative for chills and fever  HENT: Negative for congestion, sinus pressure, sneezing and sore throat. Eyes: Negative for pain, discharge, redness and itching.    Respiratory: Negative for apnea, cough  Gastrointestinal: Negative for blood in stool, constipation, diarrhea   Endocrine: Negative for cold intolerance, heat intolerance, polydipsia. Genitourinary: Negative for dysuria, enuresis, flank pain and hematuria. Musculoskeletal: Negative for arthralgias, joint swelling and neck pain. Neurological: Negative for numbness and headaches. Psychiatric/Behavioral: Negative for agitation, confusion, decreased concentration and dysphoric mood. PAST MEDICAL HISTORY:  Past Medical History:   Diagnosis Date    Aortic valve disorders     aortic regurg    Atrial fibrillation (HCC)     [address] fl[facility] ([facility]     Encounter for cardioversion procedure 05/05/2015    Dr Lupe Velasquez Encounter for cardioversion procedure     [id number] [facility] [place]/ Dr. Hao Whitten, states 7 times, most recent 1/2020    Frequent urination     Pt states he's developed frequent urination with 3 times in last yr noting blood in urine as well.  H/O echocardiogram 12/04/2017    EF 31-80% Global LV systolic function appears preserved.  Mildly increaseed LV wall thinckness with normal LV carvity size, No definite specific wall motion abnormalities identified, LA is moderately dilated (34-39) with LA volume index of 36ml/m2, Mild mitral and tricuspid regurgitation, moderate aortic regurgitation, Evidence of mild diastolic dysfunction seen    H/O echocardiogram 02/02/2021    EF 55% patient has sigmoid interventricular septim S/P aortic vlave repair with mild to mod aortic regurg Diastoligy cannot be properly assessed due to pts rhtyhm Aortic root is mildly dilated when corrected for body surface area    Hemorrhage of rectum and anus 2013    polypectomy    History of 24 hour EKG monitoring 04/03/2014    Unremarkable except for mild increased number of PVC's with 3 ventricular couplets, asymptomatic     History of echocardiogram 04/10/2014    EF 60%, mild to mod LVH, moderate AI    History of heart surgery 2008    enlarged aorta    History of PFTs 06/25/2015  Financial resource strain: None    Food insecurity     Worry: None     Inability: None    Transportation needs     Medical: None     Non-medical: None   Tobacco Use    Smoking status: Former Smoker     Packs/day: 1.00     Years: 34.00     Pack years: 34.00     Types: Cigarettes     Quit date: 12/15/2020     Years since quittin.2    Smokeless tobacco: Never Used   Substance and Sexual Activity    Alcohol use:  Yes     Alcohol/week: 6.0 standard drinks     Types: 6 Cans of beer per week     Comment: QOD now, use to drink daily    Drug use: No    Sexual activity: None   Lifestyle    Physical activity     Days per week: None     Minutes per session: None    Stress: None   Relationships    Social connections     Talks on phone: None     Gets together: None     Attends Rastafarian service: None     Active member of club or organization: None     Attends meetings of clubs or organizations: None     Relationship status: None    Intimate partner violence     Fear of current or ex partner: None     Emotionally abused: None     Physically abused: None     Forced sexual activity: None   Other Topics Concern    None   Social History Narrative    None        ALLERGY HISTORY:  No Known Allergies     MEDICATIONS:  Current Facility-Administered Medications   Medication Dose Route Frequency Provider Last Rate Last Admin    influenza quadrivalent split vaccine (FLUZONE;FLUARIX;FLULAVAL;AFLURIA) injection 0.5 mL  0.5 mL Intramuscular Prior to discharge Jerrica Bajwa MD        [Held by provider] apixaban (ELIQUIS) tablet 5 mg  5 mg Oral BID Annie South PA-C        metoprolol succinate (TOPROL XL) extended release tablet 100 mg  100 mg Oral Daily Annie South PA-C        sodium chloride flush 0.9 % injection 10 mL  10 mL Intravenous 2 times per day Annie South PA-C        sodium chloride flush 0.9 % injection 10 mL  10 mL Intravenous PRN Salome Herrera PA-C        promethazine (PHENERGAN) tablet 12.5 mg  12.5 mg Oral Q6H PRN Char Gelbayron, PA-C        Or    ondansetron (ZOFRAN) injection 4 mg  4 mg Intravenous Q6H PRN Salome Adame PA-C        polyethylene glycol (GLYCOLAX) packet 17 g  17 g Oral Daily PRN Char Gelineau, PA-C        acetaminophen (TYLENOL) tablet 650 mg  650 mg Oral Q6H PRN Char Gelineau, PA-C   650 mg at 02/26/21 2964    Or    acetaminophen (TYLENOL) suppository 650 mg  650 mg Rectal Q6H PRN Char Gelbayron, PA-C        0.9 % sodium chloride infusion   Intravenous Continuous Char Gelineau, PA-C 75 mL/hr at 02/26/21 0445 New Bag at 02/26/21 0445    potassium chloride (KLOR-CON M) extended release tablet 40 mEq  40 mEq Oral PRN Char Gelinearemigio, PA-C        Or    potassium bicarb-citric acid (EFFER-K) effervescent tablet 40 mEq  40 mEq Oral PRN Char Gelineau, PA-C        Or    potassium chloride 10 mEq/100 mL IVPB (Peripheral Line)  10 mEq Intravenous PRN Char Gelineau, PA-C        magnesium sulfate 2000 mg in 50 mL IVPB premix  2,000 mg Intravenous PRN TRESA Guajardo-MACARIO           PHYSICAL EXAM:  BP (!) 143/67   Pulse 65   Temp 97.5 °F (36.4 °C) (Oral)   Resp 20   Ht 5' 8\" (1.727 m)   Wt 236 lb 6.4 oz (107.2 kg)   SpO2 97%   BMI 35.94 kg/m²   No intake or output data in the 24 hours ending 02/26/21 0802  Patient Vitals for the past 96 hrs (Last 3 readings):   Weight   02/26/21 0251 236 lb 6.4 oz (107.2 kg)     GENERAL: Alert and oriented. No distress. EYES: No pallor or icterus. ENT: No cyanosis. No thyromegaly. VESSELS: No jugular venous distension or carotid bruits. HEART: Irregular heart sounds. No murmur, rub or gallop. LUNGS: Clear to auscultation. ABDOMEN: Soft and non-tender. EXTREMITIES: No lower extremity edema. Feet are warm. NEUROLOGICAL: Grossly intact.      LABORATORY DATA AND DIAGNOSTIC DATA:  I have personally reviewed and interpreted the results of the following diagnostic testing    Lab Results   Component Value Date    WBC 6.5 02/26/2021    HGB 13.7 (L) 02/26/2021    HCT 41.9 (L) 02/26/2021     02/26/2021    CHOL 163 12/22/2016    TRIG 41 12/22/2016    HDL 96 12/22/2016    ALT 19 02/03/2021    AST 18 02/03/2021     02/26/2021    K 3.9 02/26/2021     02/26/2021    CREATININE 0.7 02/26/2021    BUN 18 02/26/2021    CO2 26 02/26/2021    TSH 3.91 02/02/2021    INR 1.0 02/03/2021   Echocardiogram dated 2/2/2021 LVEF 55%. No significant valvular abnormalities. Mild dilated left atrium. ECG typical atrial flutter with controlled ventricular rate, 80 bpm.      IMPRESSION:  1. Typical atrial flutter, symptomatic and drug refractory. [id number] score 0. Currently anticoagulated with apixaban. 2.  Obesity, BMI 31 kg/m². 3.  Sleep apnea on CPAP. 4.  History of thoracic aortic aneurysm repair. 5.  History of atrial fibrillation/atypical atrial flutter ablation. ASSESSMENT AND RECOMMENDATIONS:  The patient is symptomatic from atrial flutter. He has failed medical therapy and has had at least 2 cardioversions over the past couple of months with early recurrences. I discussed the management options with the patient. I think he is a good candidate for catheter ablation as a curative option. The risk of the catheter ablation for atrial flutter including bleeding, vascular injury, pericardial fusion require emergency pericardiocentesis, AV rupert injury requiring permanent pacemaker and acute myocardial infarction and possible recurrences in future requiring antiarrhythmic therapy or repeat ablation were discussed with the patient. He wishes to proceed. His questions were answered. We will keep him n.p.o. for possible atrial flutter flutter ablation today.     Electronically signed by Anna Hurley MD, Ngozi Kat on 2/26/2021 at 8:02 AM

## 2021-02-26 NOTE — PROGRESS NOTES
This RN received phone call from Dr. Amelia Lyon who stated to [facility] and give a dose this morning, planning ablation this afternoon.

## 2021-02-26 NOTE — H&P
Hospitalist - History & Physical      Patient: Sindi Katz    Unit/Bed:3B-30/030-A  YOB: 1965  MRN: [id number]   Acct: [de-identified]   PCP: Albert Cadet MD      Assessment and Plan:        1. Paroxysmal atrial flutter: on amiodarone drip from [place], plan to see EP today and possible ablation per [place] notes, anticoagulated on Eliquis  2. Essential hypertension: continue home medication  3. Chronic diastolic CHF NYHA Class 3: monitor fluid balance; report any new shortness of breath, hypertension or hypotension      CC:  Atrial flutter  HPI: Patient transferred from [address] [place] [address] for possible ablation for paroxysmal atrial flutter. The patient states he has an ablation scheduled for two weeks at [facility]. Patient has a long history of atrial flutter and had an ablation a few years ago and has had 1 cardioversions. The patient's most recent cardioversion was 2/25/2021 at [address] [place] [address]. Patient had presented to his PCP for back pain following a fall and was found to be tachycardic and having 3:1 atrial flutter. He was taken to the inpatient unit and unsuccessfully cardioverted. Contact was made with Dr. Vida Irwin and the patient was transferred for ablation on 2/26/2021. Patient has no symptoms or complaints at this time. ROS: Review of Systems   Constitutional: Negative. HENT: Negative. Eyes: Negative. Respiratory: Negative. Negative for shortness of breath. Cardiovascular: Negative for chest pain and palpitations. Gastrointestinal: Negative. Endocrine: Negative. Genitourinary: Negative. Musculoskeletal: Negative. Skin: Negative. Allergic/Immunologic: Negative. Neurological: Negative. Hematological: Negative. Psychiatric/Behavioral: Negative.       PMH:    Past Medical History:   Diagnosis Date    Aortic valve disorders     aortic regurg    Atrial fibrillation ([address]     Atrial flutter ([address]     Encounter for cardioversion procedure 05/05/2015     [address] Encounter for cardioversion procedure     Airam Garcia/ Dr. Bailey Ruiz, states 7 times, most recent 1/2020    Frequent urination     Pt states he's developed frequent urination with 3 times in last yr noting blood in urine as well.  H/O echocardiogram 12/04/2017    EF 64-73% Global LV systolic function appears preserved. Mildly increaseed LV wall thinckness with normal LV carvity size, No definite specific wall motion abnormalities identified, LA is moderately dilated (34-39) with LA volume index of 36ml/m2, Mild mitral and tricuspid regurgitation, moderate aortic regurgitation, Evidence of mild diastolic dysfunction seen    H/O echocardiogram 02/02/2021    EF 55% patient has sigmoid interventricular septim S/P aortic vlave repair with mild to mod aortic regurg Diastoligy cannot be properly assessed due to pts rhtyhm Aortic root is mildly dilated when corrected for body surface area    Hemorrhage of rectum and anus 2013    polypectomy    History of 24 hour EKG monitoring 04/03/2014    Unremarkable except for mild increased number of PVC's with 3 ventricular couplets, asymptomatic     History of echocardiogram 04/10/2014    EF 60%, mild to mod LVH, moderate AI    History of heart surgery 2008    enlarged aorta    History of PFTs 06/25/2015    Consistent with mild obstructive ventilatory impairment. Lung volumes are normal,except mild increase in residual volume,which could be suggestive of airway trapping. Diffusion capacity is normal.    Hx of transesophageal echocardiography (SKYLAR) for monitoring 05/05/2015    Dr Chuy Brothers    Hypertension     pcp dr Latha Yuen Impaired fasting glucose     Knee pain, bilateral     Lipoma     Obesity (BMI 30.0-34. 9)     Obstructive sleep apnea (adult) (pediatric)     does not wear cpap like suppose to    Paroxysmal supraventricular tachycardia ([address]     Tobacco use disorder     Urinary urgency     Ventral hernia      SHX: Social History     Socioeconomic History    Marital status:      Spouse name: Not on file    Number of children: Not on file    Years of education: Not on file    Highest education level: Not on file   Occupational History    Not on file   Social Needs    Financial resource strain: Not on file    Food insecurity     Worry: Not on file     Inability: Not on file    Transportation needs     Medical: Not on file     Non-medical: Not on file   Tobacco Use    Smoking status: Former Smoker     Packs/day: 1.00     Years: 34.00     Pack years: 34.00     Types: Cigarettes     Quit date: 12/15/2020     Years since quittin.2    Smokeless tobacco: Never Used   Substance and Sexual Activity    Alcohol use:  Yes     Alcohol/week: 6.0 standard drinks     Types: 6 Cans of beer per week     Comment: QOD now, use to drink daily    Drug use: No    Sexual activity: Not on file   Lifestyle    Physical activity     Days per week: Not on file     Minutes per session: Not on file    Stress: Not on file   Relationships    Social connections     Talks on phone: Not on file     Gets together: Not on file     Attends Jehovah's witness service: Not on file     Active member of club or organization: Not on file     Attends meetings of clubs or organizations: Not on file     Relationship status: Not on file    Intimate partner violence     Fear of current or ex partner: Not on file     Emotionally abused: Not on file     Physically abused: Not on file     Forced sexual activity: Not on file   Other Topics Concern    Not on file   Social History Narrative    Not on file     FHX:   Family History   Problem Relation Age of Onset    Colon Cancer Mother     Heart Surgery Father         enlarged aorta    Kidney Cancer Father      Allergies: No Known Allergies  Medications:     sodium chloride        influenza virus vaccine  0.5 mL Intramuscular Prior to discharge    [Held by provider] apixaban  5 mg Oral BID    metoprolol succinate  100 mg Oral Daily    sodium chloride flush  10 mL Intravenous 2 times per day         sodium chloride flush, 10 mL, PRN      promethazine, 12.5 mg, Q6H PRN    Or      ondansetron, 4 mg, Q6H PRN      polyethylene glycol, 17 g, Daily PRN      acetaminophen, 650 mg, Q6H PRN    Or      acetaminophen, 650 mg, Q6H PRN      potassium chloride, 40 mEq, PRN    Or      potassium alternative oral replacement, 40 mEq, PRN    Or      potassium chloride, 10 mEq, PRN      magnesium sulfate, 2,000 mg, PRN        Labs:   Recent Results (from the past 24 hour(s))   EKG 12 Lead    Collection Time: 02/25/21  3:36 PM   Result Value Ref Range    Ventricular Rate 90 BPM    Atrial Rate 163 BPM    P-R Interval 150 ms    QRS Duration 96 ms    Q-T Interval 378 ms    QTc Calculation (Bazett) 462 ms    P Axis 31 degrees    R Axis -3 degrees    T Axis 69 degrees   EKG 12 Lead    Collection Time: 02/25/21  3:41 PM   Result Value Ref Range    Ventricular Rate 136 BPM    Atrial Rate 272 BPM    QRS Duration 148 ms    Q-T Interval 362 ms    QTc Calculation (Bazett) 544 ms    P Axis -103 degrees    R Axis -2 degrees    T Axis -58 degrees   Troponin    Collection Time: 02/25/21  6:35 PM   Result Value Ref Range    Troponin, High Sensitivity 12 0 - 22 ng/L    Troponin T NOT REPORTED <0.03 ng/mL    Troponin Interp NOT REPORTED    Brain natriuretic peptide    Collection Time: 02/25/21  6:35 PM   Result Value Ref Range    Pro-BNP 2,762 (H) <300 pg/mL    BNP Interpretation Pro-BNP Reference Range:          Vital Signs: T: 97.5F P: 65 RR: 20 B/P: 143/67: FiO2: Ra: O2 Sat:97%: I/O: No intake or output data in the 24 hours ending 02/26/21 0408      General:   Well appearing, no acute distress  HEENT:  normocephalic and atraumatic. No scleral icterus. PEARLA, mucous membranes moist  Neck: supple. Trachea midline. No JVD. Full ROM, no meningismus. Lungs: clear to auscultation. No retractions, no accessory muscle use.   Cardiac: RRR, no murmur, 2+ pulses  Abdomen: soft. Nontender. Bowel sounds active  Extremities:  No clubbing, cyanosis x 4, no edema    Vasculature: capillary refill < 3 seconds. Skin:  warm and dry. no visible rashes  Psych:  Alert and oriented x3. Affect appropriate  Lymph:  No supraclavicular adenopathy. Neurologic:  CN II-XII grossly intact. No focal deficit. Data: (All radiographs, tracings, PFTs, and imaging are personally viewed and interpreted unless otherwise noted).  Outside data reviewed at length   EKG: pending for this encounter        Electronically signed by  Gonzalo Mendez PA-C                                      Patients clinical record, labs and radiological imaging reviewed. I agree with clinical findings , provisional diagnosis and management.

## 2021-02-26 NOTE — PRE SEDATION
bilateral, Lipoma, Obesity (BMI 30.0-34.9), Obstructive sleep apnea (adult) (pediatric), Paroxysmal supraventricular tachycardia ([facility] Tobacco use disorder, Urinary urgency, and Ventral hernia. Past Surgical History:   has a past surgical history that includes Achilles tendon surgery (Right, 2010); Vasectomy; Foot surgery; ablation of dysrhythmic focus (2015); Umbilical hernia repair (17/53/7389); repair umbilical OMYY,2+S/I,NPSOD (N/A, 10/24/2018); hernia repair (11/2018); Colonoscopy (06/13/2013); Cardiac surgery (2008); hernia repair (03/06/2020); and hernia repair (N/A, 3/6/2020). Medications:   Scheduled Meds:    influenza virus vaccine  0.5 mL Intramuscular Prior to discharge    apixaban  5 mg Oral BID    metoprolol succinate  100 mg Oral Daily    sodium chloride flush  10 mL Intravenous 2 times per day    diphenhydrAMINE  50 mg Intravenous Once    hydrocortisone sodium succinate PF  200 mg Intravenous Once     Continuous Infusions:    sodium chloride 75 mL/hr at 02/26/21 0445    sodium chloride 75 mL/hr at 02/26/21 1001     PRN Meds: sodium chloride flush, promethazine **OR** ondansetron, polyethylene glycol, acetaminophen **OR** acetaminophen, potassium chloride **OR** potassium alternative oral replacement **OR** potassium chloride, magnesium sulfate, lidocaine  Home Meds:   Prior to Admission medications    Medication Sig Start Date End Date Taking?  Authorizing Provider   apixaban (ELIQUIS) 5 MG TABS tablet Take 1 tablet by mouth 2 times daily 2/3/21  Yes AMADOR Mesa CNP   metoprolol succinate (TOPROL XL) 100 MG extended release tablet TAKE 1 TABLET BY MOUTH ONCE DAILY 4/20/20  Yes Jose Pineda MD   ibuprofen (ADVIL;MOTRIN) 400 MG tablet Take 2 tablets by mouth every 8 hours as needed for Pain 3/6/20  Yes Leila Grewal DO   Acetaminophen (TYLENOL) 325 MG CAPS Take 1-2 tablets by mouth every 4 hours as needed (pain) 3/6/20   Leila Grewal DO   docusate sodium (COLACE) 100 MG capsule Take 1 capsule by mouth 2 times daily as needed for Constipation 3/6/20   Cameron Francois DO     Coumadin Use Last 7 Days:  no  Antiplatelet drug therapy use last 7 days: no  Other anticoagulant use last 7 days: yes - Apixaban  Additional Medication Information:  Per medical records. Pre-Sedation Documentation and Exam:   I have personally completed a history, physical exam & review of systems for this patient (see notes).     Mallampati Airway Assessment:  Mallampati Class I - (soft palate, fauces, uvula & anterior/posterior tonsillar pillars are visible)    Prior History of Anesthesia Complications:   none    ASA Classification:  Class 3 - A patient with severe systemic disease that limits activity but is not incapacitating    Sedation/ Anesthesia Plan:   intravenous sedation    Medications Planned:   midazolam (Versed) intravenously and fentanyl intravenously    Patient is an appropriate candidate for plan of sedation: yes    Electronically signed by José Manuel Plunkett MD on 2/26/2021 at 4:19 PM

## 2021-02-26 NOTE — PROGRESS NOTES
Pharmacy Medication History Note      List of current medications patient is taking is complete. Source of information: Patient     Changes made to medication list:  Medications removed (include reason, ex. therapy complete or physician discontinued):  · None    Medications added/doses adjusted:  · None    Other notes (ex. Recent course of antibiotics, Coumadin dosing):  · Denies use of other OTC or herbal medications.       Electronically signed by Valerio Can on 2/26/2021 at 1:31 PM

## 2021-02-26 NOTE — PROGRESS NOTES
Writer called for an updated ETA for pt transfer to [address] [place] [address]. New time of 0030. Writer informed pt and supervisor.

## 2021-02-26 NOTE — PROCEDURES
[facility]  Dept: [phone]       CARDIAC ELECTROPHYSIOLOGY: PROCEDURE NOTE  PATIENT DEMOGRAPHICS:  Date:   2/26/2021  Patient name:              Jenifer Otero  YOB: 1965  Sex: male   MRN:   [id number]      PRIMARY CARE PROVIDER:    Jose Pineda MD     CARDIOLOGIST:  Eleni Sam MD, [address] [facility]    PROCEDURE PLANNED:  Typical atrial flutter radiofrequency catheter ablation. INDICATION FOR PROCEDURE:  Drug refractory symptomatic typical atrial flutter. BRIEF MEDICAL HISTORY:  56/male with persistent typical atrial flutter, failed amiodarone and cardioversions was admitted for rate control. He presents electively for typical atrial flutter ablation. His symptoms typically include atrial fibrillation and exertional shortness of breath. He has prior history of atrial fibrillation/atypical atrial flutter ablation. Normal left ventricular size and function. Medical comorbidities include obesity, sleep apnea and thoracic aortic aneurysm repair. PROCEDURE PERFORMED:  1. Right and left atrial pacing and recording. 2. 3D electro anatomical mapping (CARTO). 3. Cavo-tricuspid isthmus radiofrequency catheter ablation. DESCRIPTION OF THE PROCEDURE:  The patient was brought to the electrophysiology laboratory is a fasting and non-sedated state. He was prepped and draped in the usual sterile fashion. Conscious sedation was administered. Lidocaine, 2% was injected into the right femoral region for local ansthesia. Vascular access was obtained under ultrasound guidance and hemostatic short sheaths placed over the guidewires (8.5 [place] and 7-Danish in the right femoral vein). Right atrial and coronary sinus fast anatomical map was created using 3.5 mm irrigated tip mapping/ablation (STSF D/F curve) catheter.  A 7-Danish deflectable bidirectional mapping catheter was advanced into right atrium and positioned into coronary sinus. At bassline the patient was in atrial flutter, cycle length of 320 ms. The ECG features suggestive of typical (counter-clockwise) atrial flutter. The activation on coronary sinus catheter was proximal to distal. The flutter terminated during catheter manipulation across tricuspid annulus. The flutter could not be induced by burst pacing. I decided to proceed with imperical cavotricuspid isthmus ablation. The ablation catheter was positioned across the cavo-tricuspid isthmus (CTI) at 6 o'clock position using electro anatomical map. Radio-frequency energy was delivered along the CTI till bi-directional block across the CTI was achieved. Wide double potentials were noted along the ablation line with inter-potential interval of 122 ms. Post ablation transannular conduction time was 162 ms. Bi-directional block was confirmed by differential pacing. The CTI remained blocked after 20 minutes of waiting time. At the end of procedure the catheters and sheaths were removed. Hemostasis was achieved by manual compression and vascular access sites were covered by a light sterile dressing. Post ablation, sinus cycle length was 764 ms,  ms, QRSd 93 ms and QT interval 389 ms. RADIOFREQUENCY SUMMARY:  Total number of RF lesions 17 and total RF time 3.5 minutes, maximum energy used 40 mann, mean catheter tip temperature 32 degree C and mean impedance 133 ohms. MEDICATIONS:  1. Midazolam 2 mg IV. 2. Fentanyl 100 mcg IV. FLUOROSCOPIC TIME:  Zero. BLOOD LOSS:  Minimal.     COMPLICATIONS:  None. IN/OUT:  200/0 cc    SUMMARY:  1. Typical atrial flutter (counterclockwise) per ECG. 2. Cavo-tricuspid isthmus radiofrequency catheter ablation with bidirectional block. RECOMMENDATIONS:    1. Observation for 6 hours. 2. Resume anticoagulation (for 4 weeks). Discontinue amiodarone. 3. Post-procedure care per protocol.       Electronically signed by Suzan Araujo Michelle ROCHE on 2/26/2021 at 5:57 PM

## 2021-02-26 NOTE — PLAN OF CARE
Problem: Pain:  Goal: Pain level will decrease  Description: Pain level will decrease  Outcome: Ongoing  Note: Patient was a fall on the ice last week and has pain to their back/rib cage from the recent fall. Patient able to rate pain using the pain rating scale 0-10 and verbalizes understanding. Educating patient that tylenol is available PRN to assist with pain control. Non pharmacological measures in place to assist with pain control. Problem: Falls - Risk of:  Goal: Will remain free from falls  Description: Will remain free from falls  Outcome: Ongoing  Note: Assessment & interventions provided throughout shift. Bed locked & in low position, call light in reach, side-rails up x2, bed/chair alarm utilized, non-slip socks on when ambulating, reminded patient to use call light to call for assistance. Patient remained free from falls throughout shift. Problem: Discharge Planning:  Goal: Participates in care planning  Description: Participates in care planning  Outcome: Ongoing  Note: Patient readily discusses care with the care team.  Patient from home with brother and plans to return there upon discharge. Educating patient regarding post ablation precautions. Problem: Cardiac Output - Decreased:  Goal: Hemodynamic stability will improve  Description: Hemodynamic stability will improve  Outcome: Ongoing  Note: Ongoing assessment & interventions provided throughout shift. Patient on continuous telemetry monitoring, heart tones, vitals & pulses checked at least 3 times per shift & PRN. Vitals within acceptable limits. Peripheral pulses palpable. Patient in atrial fib/flutter and planning ablation this afternoon with Dr. Thor Vaughn. Problem: Tissue Perfusion - Cardiopulmonary, Altered:  Goal: Absence of angina  Description: Absence of angina  Outcome: Ongoing  Note: No chest pain, pressure or shortness of breath noted. Reminded patient to report any of these to the nurse.     Care plan reviewed with patient. Patient verbalizes understanding of the care plan and contributed to goal setting.

## 2021-02-27 VITALS
HEIGHT: 68 IN | HEART RATE: 71 BPM | RESPIRATION RATE: 18 BRPM | TEMPERATURE: 97.7 F | DIASTOLIC BLOOD PRESSURE: 75 MMHG | BODY MASS INDEX: 35.83 KG/M2 | OXYGEN SATURATION: 96 % | WEIGHT: 236.4 LBS | SYSTOLIC BLOOD PRESSURE: 141 MMHG

## 2021-02-27 LAB
ANION GAP SERPL CALCULATED.3IONS-SCNC: 7 MEQ/L (ref 8–16)
BASOPHILS # BLD: 0.6 %
BASOPHILS ABSOLUTE: 0 THOU/MM3 (ref 0–0.1)
BUN BLDV-MCNC: 15 MG/DL (ref 7–22)
CALCIUM SERPL-MCNC: 8.6 MG/DL (ref 8.5–10.5)
CHLORIDE BLD-SCNC: 105 MEQ/L (ref 98–111)
CO2: 27 MEQ/L (ref 23–33)
CREAT SERPL-MCNC: 0.7 MG/DL (ref 0.4–1.2)
EKG ATRIAL RATE: 82 BPM
EKG P AXIS: 32 DEGREES
EKG P-R INTERVAL: 150 MS
EKG Q-T INTERVAL: 416 MS
EKG QRS DURATION: 98 MS
EKG QTC CALCULATION (BAZETT): 486 MS
EKG R AXIS: -5 DEGREES
EKG T AXIS: 96 DEGREES
EKG VENTRICULAR RATE: 82 BPM
EOSINOPHIL # BLD: 4 %
EOSINOPHILS ABSOLUTE: 0.3 THOU/MM3 (ref 0–0.4)
ERYTHROCYTE [DISTWIDTH] IN BLOOD BY AUTOMATED COUNT: 12 % (ref 11.5–14.5)
ERYTHROCYTE [DISTWIDTH] IN BLOOD BY AUTOMATED COUNT: 41.8 FL (ref 35–45)
GFR SERPL CREATININE-BSD FRML MDRD: > 90 ML/MIN/1.73M2
GLUCOSE BLD-MCNC: 99 MG/DL (ref 70–108)
HCT VFR BLD CALC: 42.5 % (ref 42–52)
HEMOGLOBIN: 13.7 GM/DL (ref 14–18)
IMMATURE GRANS (ABS): 0.02 THOU/MM3 (ref 0–0.07)
IMMATURE GRANULOCYTES: 0.3 %
LYMPHOCYTES # BLD: 19.9 %
LYMPHOCYTES ABSOLUTE: 1.3 THOU/MM3 (ref 1–4.8)
MCH RBC QN AUTO: 30.4 PG (ref 26–33)
MCHC RBC AUTO-ENTMCNC: 32.2 GM/DL (ref 32.2–35.5)
MCV RBC AUTO: 94.4 FL (ref 80–94)
MONOCYTES # BLD: 9.7 %
MONOCYTES ABSOLUTE: 0.6 THOU/MM3 (ref 0.4–1.3)
NUCLEATED RED BLOOD CELLS: 0 /100 WBC
PLATELET # BLD: 169 THOU/MM3 (ref 130–400)
PMV BLD AUTO: 9.8 FL (ref 9.4–12.4)
POTASSIUM REFLEX MAGNESIUM: 3.9 MEQ/L (ref 3.5–5.2)
RBC # BLD: 4.5 MILL/MM3 (ref 4.7–6.1)
SEG NEUTROPHILS: 65.5 %
SEGMENTED NEUTROPHILS ABSOLUTE COUNT: 4.3 THOU/MM3 (ref 1.8–7.7)
SODIUM BLD-SCNC: 139 MEQ/L (ref 135–145)
WBC # BLD: 6.5 THOU/MM3 (ref 4.8–10.8)

## 2021-02-27 PROCEDURE — 6370000000 HC RX 637 (ALT 250 FOR IP): Performed by: PHYSICIAN ASSISTANT

## 2021-02-27 PROCEDURE — APPNB15 APP NON BILLABLE TIME 0-15 MINS: Performed by: NURSE PRACTITIONER

## 2021-02-27 PROCEDURE — 85025 COMPLETE CBC W/AUTO DIFF WBC: CPT

## 2021-02-27 PROCEDURE — 93010 ELECTROCARDIOGRAM REPORT: CPT | Performed by: NUCLEAR MEDICINE

## 2021-02-27 PROCEDURE — 80048 BASIC METABOLIC PNL TOTAL CA: CPT

## 2021-02-27 PROCEDURE — 36415 COLL VENOUS BLD VENIPUNCTURE: CPT

## 2021-02-27 PROCEDURE — 6370000000 HC RX 637 (ALT 250 FOR IP): Performed by: INTERNAL MEDICINE

## 2021-02-27 PROCEDURE — 99238 HOSP IP/OBS DSCHRG MGMT 30/<: CPT | Performed by: INTERNAL MEDICINE

## 2021-02-27 PROCEDURE — 99999 PR OFFICE/OUTPT VISIT,PROCEDURE ONLY: CPT | Performed by: INTERNAL MEDICINE

## 2021-02-27 RX ADMIN — Medication 5 MG: at 09:08

## 2021-02-27 RX ADMIN — APIXABAN 5 MG: 5 TABLET, FILM COATED ORAL at 09:08

## 2021-02-27 RX ADMIN — METOPROLOL SUCCINATE 100 MG: 100 TABLET, FILM COATED, EXTENDED RELEASE ORAL at 09:08

## 2021-02-27 ASSESSMENT — PAIN DESCRIPTION - ORIENTATION: ORIENTATION: LEFT

## 2021-02-27 ASSESSMENT — PAIN DESCRIPTION - LOCATION: LOCATION: SHOULDER

## 2021-02-27 ASSESSMENT — PAIN DESCRIPTION - DIRECTION: RADIATING_TOWARDS: NO

## 2021-02-27 ASSESSMENT — PAIN DESCRIPTION - FREQUENCY: FREQUENCY: CONTINUOUS

## 2021-02-27 NOTE — DISCHARGE SUMMARY
Hospital Medicine Discharge Summary      Patient Identification:   Kamaljit Horta   : 1965  MRN: [id number]   Account: [de-identified]      Patient's PCP: Chuck Horan MD    Admit Date: 2021     Discharge Date:   21    Admitting Physician: James Fernandez MD     Discharge Physician: James Fernandez MD     Discharge Diagnoses: Paroxysmal Atrial Flutter with RVR s/p Successful Ablation     Active Hospital Problems    Diagnosis Date Noted    Acute on chronic diastolic CHF (congestive heart failure), NYHA class 3 (HCC) [I50.33]      Priority: High    A-fib ([address] [I48.91] 2021    Hypertension [I10]     Moderate aortic regurgitation [I35.1]        The patient was seen and examined on day of discharge and this discharge summary is in conjunction with any daily progress note from day of discharge. Hospital Course:   Kamaljit Horta is a 64 y.o. male admitted to [address] [place] [address] on 2021 as a transfer from [place] for atrial flutter ablation. The patient states he has an ablation scheduled for two weeks at [facility]. Patient has a long history of atrial flutter and had an ablation a few years ago and has had multiple failed cardioversions. The patient's most recent cardioversion was 2021 at [facility]. Patient had presented to his PCP for back pain following a fall and was found to be tachycardic and having 3:1 atrial flutter. He was taken to the inpatient unit and unsuccessfully cardioverted. Contact was made with Dr. Dale Monk (EP) and the patient was transferred for ablation to [facility]. Underwent successful ablation on 2021. Pt back in [facility]. Per EP, pt to continue [address] [place] [address] Road for 4 weeks and to continue Metoprolol. Pt will need close PCP and Cardiology follow-up.        Exam:     Vitals:  Vitals:    21 2300 21 0000 21 0448 21 0815   BP: (!) 112/53 (!) 113/57 (!) 116/59 (!) 141/75   Pulse: 84 81  71   Resp:   18 18   Temp:   97.7 °F (36.5 °C) TempSrc:   Oral Oral   SpO2: 96% 94% 96% 96%   Weight:       Height:         Weight: Weight: 236 lb 6.4 oz (107.2 kg)     24 hour intake/output:    Intake/Output Summary (Last 24 hours) at 2/27/2021 0954  Last data filed at 2/27/2021 0449  Gross per 24 hour   Intake 539.81 ml   Output 1050 ml   Net -510.19 ml         Labs: For convenience and continuity at follow-up the following most recent labs are provided:      CBC:    Lab Results   Component Value Date    WBC 6.5 02/27/2021    HGB 13.7 02/27/2021    HCT 42.5 02/27/2021     02/27/2021       Renal:    Lab Results   Component Value Date     02/27/2021    K 3.9 02/27/2021     02/27/2021    CO2 27 02/27/2021    BUN 15 02/27/2021    CREATININE 0.7 02/27/2021    CALCIUM 8.6 02/27/2021    PHOS 3.8 02/03/2021       Consults:     IP CONSULT TO CARDIOLOGY  IP CONSULT TO ELECTROPHYSIOLOGY    Disposition:    [x] Home       [] TCU       [] Rehab       [] Psych       [] SNF       [] [place]       [] Other-    Condition at Discharge: Stable    Code Status:  Full Code     Patient Instructions: Activity: activity as tolerated  Diet: DIET CARDIAC; Follow-up visits:   Carol Naranjo MD  [address]  [phone]                the office will call on Monday to schedule a follow up appt. needed with 7 days. If you do not hear from them by 12:00, call the office.     Reji Klein MD  [address]  [phone]               Discharge Medications:      Honorio Tejeda   Home Medication Instructions [id number]    Printed on:02/27/21 1000   Medication Information                      Acetaminophen (TYLENOL) 325 MG CAPS  Take 1-2 tablets by mouth every 4 hours as needed (pain)             apixaban (ELIQUIS) 5 MG TABS tablet  Take 1 tablet by mouth 2 times daily             docusate sodium (COLACE) 100 MG capsule  Take 1 capsule by mouth 2 times daily as needed for

## 2021-02-27 NOTE — DISCHARGE INSTR - ACTIVITY
No pushing, pulling, shoving, shoveling or lifting anything over 5 lbs. For 1 week  No driving for 2-3 days.

## 2021-02-27 NOTE — PROGRESS NOTES
The pt. Has been given discharge instructions, using teach back method. Medications, care of right groin ablation site and home going care explained. Follow up appointments were reviewed together. The pt. Has verbalized understanding and declined further questions. His brother is here to transport home. Taken by wheel chair to [facility]. Bayron Flores .

## 2021-03-31 ENCOUNTER — OFFICE VISIT (OUTPATIENT)
Dept: CARDIOLOGY | Age: 56
End: 2021-03-31
Payer: OTHER GOVERNMENT

## 2021-03-31 VITALS
SYSTOLIC BLOOD PRESSURE: 111 MMHG | OXYGEN SATURATION: 98 % | DIASTOLIC BLOOD PRESSURE: 64 MMHG | WEIGHT: 237.8 LBS | BODY MASS INDEX: 36.04 KG/M2 | HEART RATE: 80 BPM | RESPIRATION RATE: 17 BRPM | HEIGHT: 68 IN

## 2021-03-31 DIAGNOSIS — G47.33 OSA (OBSTRUCTIVE SLEEP APNEA): ICD-10-CM

## 2021-03-31 DIAGNOSIS — Z86.79 HISTORY OF ATRIAL FLUTTER: Primary | ICD-10-CM

## 2021-03-31 DIAGNOSIS — I35.1 MODERATE AORTIC REGURGITATION: ICD-10-CM

## 2021-03-31 PROCEDURE — 93000 ELECTROCARDIOGRAM COMPLETE: CPT | Performed by: INTERNAL MEDICINE

## 2021-03-31 PROCEDURE — 99213 OFFICE O/P EST LOW 20 MIN: CPT | Performed by: INTERNAL MEDICINE

## 2021-03-31 RX ORDER — ASPIRIN 325 MG
81 TABLET, DELAYED RELEASE (ENTERIC COATED) ORAL DAILY
Qty: 90 TABLET | Refills: 3 | Status: SHIPPED | OUTPATIENT
Start: 2021-03-31 | End: 2022-03-26 | Stop reason: SDUPTHER

## 2021-03-31 NOTE — PROGRESS NOTES
root is mildly dilated when corrected for body surface area    Hemorrhage of rectum and anus 2013    polypectomy    History of 24 hour EKG monitoring 04/03/2014    Unremarkable except for mild increased number of PVC's with 3 ventricular couplets, asymptomatic     History of echocardiogram 04/10/2014    EF 60%, mild to mod LVH, moderate AI    History of heart surgery 2008    enlarged aorta    History of PFTs 06/25/2015    Consistent with mild obstructive ventilatory impairment. Lung volumes are normal,except mild increase in residual volume,which could be suggestive of airway trapping. Diffusion capacity is normal.    Hx of transesophageal echocardiography (SKYLAR) for monitoring 05/05/2015    Dr Liam Norris    Hypertension     pcp dr Cristian Jessica Impaired fasting glucose     Knee pain, bilateral     Lipoma     Obesity (BMI 30.0-34. 9)     Obstructive sleep apnea (adult) (pediatric)     does not wear cpap like suppose to    Paroxysmal supraventricular tachycardia ([address]     Tobacco use disorder     Urinary urgency     Ventral hernia        Past Surgical History:   Procedure Laterality Date    ABLATION OF DYSRHYTHMIC FOCUS  2015    ACHILLES TENDON SURGERY Right 2010   Joan Horn CARDIAC SURGERY  2008    thoracic AAA repair and modified Coleman procedure ( aortic valve sparing ) at [address]  06/13/2013    x 2 , bleeding polyps removed    FOOT SURGERY      reconstructive for club foot (right)    HERNIA REPAIR  11/2018    ventral    HERNIA REPAIR  03/06/2020    ventral    HERNIA REPAIR N/A 3/6/2020    XI LAPAROSCOPIC ROBOTIC VENTRAL HERNIA WITH MESH performed by Chica Reece DO at [address] [facility],3+P/V,DJDOQ N/A 08/71/2469    HERNIA UMBILICAL REPAIR performed by Jonas Shah MD at [address]  10/24/2018    VASECTOMY         Family History   Problem Relation Age of Onset    Colon Cancer Mother     Heart Surgery Father respiratory distress. He has no wheezes, rhonchi or rales. Abdominal: Soft, non-tender. Bowel sounds and aorta are normal. He exhibits no organomegaly, mass or bruit. Extremities: No edema. No cyanosis and no clubbing. Pulses are 2+ radial and carotid pulses. 2+ dorsalis pedis and posterior tibial pulses bilaterally. Neurological: He is alert and oriented to person, place, and time. No evidence of gross cranial nerve deficit. Coordination appeared normal.   Skin: Skin is warm and dry. There is no rash or diaphoresis. Psychiatric: He has a normal mood and affect. His speech is normal and behavior is normal.          Lab Results   Component Value Date    WBC 6.5 02/27/2021    HGB 13.7 (L) 02/27/2021    HCT 42.5 02/27/2021     02/27/2021    CHOL 163 12/22/2016    TRIG 41 12/22/2016    HDL 96 12/22/2016    ALT 19 02/03/2021    AST 18 02/03/2021     02/27/2021    K 3.9 02/27/2021     02/27/2021    CREATININE 0.7 02/27/2021    BUN 15 02/27/2021    CO2 27 02/27/2021    TSH 3.91 02/02/2021    PSA 1.58 12/22/2016    INR 1.02 02/26/2021        Diagnosis Orders   1. History of atrial flutter  EKG 12 lead   2. Moderate aortic regurgitation     3. MARIA TERESA (obstructive sleep apnea)       Plan:     History of Atrial Flutter/Fibrillation: Rate Control Asymptomatic   Repeat Cardioversion on 2/26/2021 - Not successful - Was sent to [facility] for Ablation is Dr. Caroline Diaz. Currently in normal sinus rhythm shown on todays EKG  On 3/31/2021   Antiplatelet Agent: START Aspirin 325 mg daily. I also reminded them to watch for signs of bloody or black tarry stools and stop the medication immediately if this develops as this could be life threatening. · Beta Blocker: Continue Metoprolol succinate (Toprol XL) 100 mg daily.    [id number] Score for Atrial Fibrillation Stroke Risk   Risk   Factors  Component Value   C CHF Yes 1   H HTN Yes 1   A2 Age >= 75 No,  (60 y.o.) 0   D DM No 0   S2 Prior Stroke/TIA No 0   V Vascular Disease No 0   A Age 74-69 No,  (60 y.o.) 0   Sc Sex male 0    [id number]  Score  2   Score last updated 8/80/71 27:78 PM EDT  Click here for a link to the UpToDate guideline \"Atrial Fibrillation: Anticoagulation therapy to prevent embolization    Disclaimer: Risk Score calculation is dependent on accuracy of patient problem list and past encounter diagnosis. Stroke Risk: CHADS2-VASc Score: 2/9 (2.2% stroke risk)  Anticoagulation: STOP Apixaban (Eliquis). Patient maintaining sinus rhythm since ablation. Restart full dose aspirin. Additional Testing List: None    · Moderate Aortic Regurgitation:  · Currently asymptomatic  · No signs of volume overload  · We will continue to monitor. · Obstructive Sleep Apnea:  · In process of getting CPAP      Finally, I recommended that he continue his other medications and follow up with you as previously scheduled. FOLLOW UP:  I told Mr. Hinton to call my office if he had any problems, but otherwise told him to Return in about 1 year (around 3/31/2022). However, I would be happy to see him sooner should the need arise. Sincerely,  Christine Kebede MD  [facility] Cardiology [facility] Arvin Keith, [address] [place] [address] Avenue  Phone: [phone]; Fax: [phone]    I believe that the risk of significant morbidity and mortality related to the patient's current medical conditions are: Intermediate. >15 minutes were spent during prep work, discussion and exam of the patient, and follow up documentation and all of their questions were answered. The documentation recorded by the scribe, accurately and completely reflects the services I personally performed and the decisions made by me. Christine Kebede MD, F.A.C.C.  March 31, 2021

## 2021-03-31 NOTE — PATIENT INSTRUCTIONS
SURVEY:    You may be receiving a survey from [employer] regarding your visit today. Please complete the survey to enable us to provide the highest quality of care to you and your family. If you cannot score us a very good on any question, please call the office to discuss how we could have made your experience a very good one. Thank you.

## 2021-04-13 ENCOUNTER — OFFICE VISIT (OUTPATIENT)
Dept: PRIMARY CARE CLINIC | Age: 56
End: 2021-04-13
Payer: OTHER GOVERNMENT

## 2021-04-13 VITALS
SYSTOLIC BLOOD PRESSURE: 118 MMHG | RESPIRATION RATE: 16 BRPM | WEIGHT: 240 LBS | HEART RATE: 72 BPM | DIASTOLIC BLOOD PRESSURE: 74 MMHG | BODY MASS INDEX: 36.49 KG/M2

## 2021-04-13 DIAGNOSIS — M17.12 PRIMARY OSTEOARTHRITIS OF LEFT KNEE: Primary | ICD-10-CM

## 2021-04-13 PROCEDURE — 99213 OFFICE O/P EST LOW 20 MIN: CPT | Performed by: FAMILY MEDICINE

## 2021-04-13 NOTE — PROGRESS NOTES
normal.    Hx of transesophageal echocardiography (SKYLAR) for monitoring 2015    Dr Zaira Diaz    Hypertension     pcp dr Vasquez Daily Impaired fasting glucose     Knee pain, bilateral     Lipoma     Obesity (BMI 30.0-34. 9)     Obstructive sleep apnea (adult) (pediatric)     does not wear cpap like suppose to    Paroxysmal supraventricular tachycardia ([address]     Tobacco use disorder     Urinary urgency     Ventral hernia        Past Surgical History:    Past Surgical History:   Procedure Laterality Date    ABLATION OF DYSRHYTHMIC FOCUS  2015    ACHILLES TENDON SURGERY Right 2010   [facility] CARDIAC SURGERY  2008    thoracic AAA repair and modified Coleman procedure ( aortic valve sparing ) at [address]  06/13/2013    x 2 , bleeding polyps removed    FOOT SURGERY      reconstructive for club foot (right)    HERNIA REPAIR  2018    ventral    HERNIA REPAIR  2020    ventral    HERNIA REPAIR N/A 3/6/2020    XI LAPAROSCOPIC ROBOTIC VENTRAL HERNIA WITH MESH performed by Mirela Suresh IV, DO at [address] [facility],5+J/T,ELKTF N/A     HERNIA UMBILICAL REPAIR performed by Isha Deshpande MD at [address]  10/24/2018    VASECTOMY         Social History:   Social History     Tobacco Use    Smoking status: Former Smoker     Packs/day: 1.00     Years: 34.00     Pack years: 34.00     Types: Cigarettes     Quit date: 12/15/2020     Years since quittin.3    Smokeless tobacco: Never Used   Substance Use Topics    Alcohol use:  Yes     Alcohol/week: 6.0 standard drinks     Types: 6 Cans of beer per week     Comment: QOD now, use to drink daily       Family History:   Family History   Problem Relation Age of Onset    Colon Cancer Mother     Heart Surgery Father         enlarged aorta    Kidney Cancer Father        Review of Systems:  Constitutional: negative for fevers or chills  Eyes: negative for visual disturbance ENT: negative for sore throat or nasal congestion  Respiratory: negative for cough, shortness of breath and sputum  Cardiovascular: negative for chest pain or palpitations  Gastrointestinal: negative for abd pain, nausea, vomiting, diarrhea or constipation  Genitourinary: negative for dysuria, urgency or frequency  Musculoskeletal:has  Lt knee pain  Integument/breast: negative for skin rash or lesions  Neurological: negative for unilateral weakness, numbness or tingling. Psych: negative for anxiety, depression, suicidial ideation and suicidal attempt       Objective:  Physical Exam:  GEN:   A & O x3, no apparent distress  EXT: Extremities: + 2 pedal pulses, no edema or calf tenderness, and warm to touch. Normal nails without lesions  EXT:  LEFT knee: effusion, crepitus, medial joint line tenderness and lateral joint line tenderness, ACL stable, PCL stable, MCL stable and LCL stable. NEURO: Motor and sensory grossly intact  SKIN:  No skin lesions or rashes          Assessment:  1. Primary osteoarthritis of left knee          Plan:  · Ice to swollen joint 20 min on / 20 min off PRN swelling  Orders Placed This Encounter   Procedures    External Referral To Orthopedic Surgery     Referral Priority:   Routine     Referral Type:   Eval and Treat     Referral Reason:   Specialty Services Required     Referred to Provider:   Marisol Charles MD     Requested Specialty:   Orthopedic Surgery     Number of Visits Requested:   1     No follow-ups on file. No orders of the defined types were placed in this encounter.     Medication directions and side effects discussed      Electronically signed by Bethanie Lyman MD on 4/13/2021 at 11:38 AM

## 2021-04-26 ENCOUNTER — HOSPITAL ENCOUNTER (OUTPATIENT)
Dept: PREADMISSION TESTING | Age: 56
Discharge: HOME | End: 2021-04-30
Payer: OTHER GOVERNMENT

## 2021-04-26 VITALS
TEMPERATURE: 97.8 F | OXYGEN SATURATION: 97 % | SYSTOLIC BLOOD PRESSURE: 139 MMHG | HEART RATE: 82 BPM | WEIGHT: 239.5 LBS | BODY MASS INDEX: 38.49 KG/M2 | RESPIRATION RATE: 18 BRPM | HEIGHT: 66 IN | DIASTOLIC BLOOD PRESSURE: 72 MMHG

## 2021-04-26 LAB
ABSOLUTE EOS #: 0.17 K/UL (ref 0–0.44)
ABSOLUTE IMMATURE GRANULOCYTE: 0.03 K/UL (ref 0–0.3)
ABSOLUTE LYMPH #: 1.17 K/UL (ref 1.1–3.7)
ABSOLUTE MONO #: 0.54 K/UL (ref 0.1–1.2)
ANION GAP SERPL CALCULATED.3IONS-SCNC: 10 MMOL/L (ref 9–17)
BASOPHILS # BLD: 1 % (ref 0–2)
BASOPHILS ABSOLUTE: 0.04 K/UL (ref 0–0.2)
BUN BLDV-MCNC: 18 MG/DL (ref 6–20)
BUN/CREAT BLD: 19 (ref 9–20)
CALCIUM SERPL-MCNC: 9.8 MG/DL (ref 8.6–10.4)
CHLORIDE BLD-SCNC: 103 MMOL/L (ref 98–107)
CO2: 28 MMOL/L (ref 20–31)
CREAT SERPL-MCNC: 0.96 MG/DL (ref 0.7–1.2)
DIFFERENTIAL TYPE: ABNORMAL
EKG ATRIAL RATE: 77 BPM
EKG P AXIS: 41 DEGREES
EKG P-R INTERVAL: 156 MS
EKG Q-T INTERVAL: 410 MS
EKG QRS DURATION: 98 MS
EKG QTC CALCULATION (BAZETT): 463 MS
EKG R AXIS: 2 DEGREES
EKG T AXIS: 64 DEGREES
EKG VENTRICULAR RATE: 77 BPM
EOSINOPHILS RELATIVE PERCENT: 3 % (ref 1–4)
GFR AFRICAN AMERICAN: >60 ML/MIN
GFR NON-AFRICAN AMERICAN: >60 ML/MIN
GFR SERPL CREATININE-BSD FRML MDRD: NORMAL ML/MIN/{1.73_M2}
GFR SERPL CREATININE-BSD FRML MDRD: NORMAL ML/MIN/{1.73_M2}
GLUCOSE BLD-MCNC: 98 MG/DL (ref 70–99)
HCT VFR BLD CALC: 41.6 % (ref 40.7–50.3)
HEMOGLOBIN: 13.8 G/DL (ref 13–17)
IMMATURE GRANULOCYTES: 1 %
LYMPHOCYTES # BLD: 19 % (ref 24–43)
MCH RBC QN AUTO: 30.1 PG (ref 25.2–33.5)
MCHC RBC AUTO-ENTMCNC: 33.2 G/DL (ref 28.4–34.8)
MCV RBC AUTO: 90.8 FL (ref 82.6–102.9)
MONOCYTES # BLD: 9 % (ref 3–12)
NRBC AUTOMATED: 0 PER 100 WBC
PDW BLD-RTO: 12 % (ref 11.8–14.4)
PLATELET # BLD: 183 K/UL (ref 138–453)
PLATELET ESTIMATE: ABNORMAL
PMV BLD AUTO: 9.8 FL (ref 8.1–13.5)
POTASSIUM SERPL-SCNC: 4.1 MMOL/L (ref 3.7–5.3)
RBC # BLD: 4.58 M/UL (ref 4.21–5.77)
RBC # BLD: ABNORMAL 10*6/UL
SEG NEUTROPHILS: 67 % (ref 36–65)
SEGMENTED NEUTROPHILS ABSOLUTE COUNT: 4.18 K/UL (ref 1.5–8.1)
SODIUM BLD-SCNC: 141 MMOL/L (ref 135–144)
WBC # BLD: 6.1 K/UL (ref 3.5–11.3)
WBC # BLD: ABNORMAL 10*3/UL

## 2021-04-26 PROCEDURE — 93010 ELECTROCARDIOGRAM REPORT: CPT | Performed by: INTERNAL MEDICINE

## 2021-04-26 PROCEDURE — 80048 BASIC METABOLIC PNL TOTAL CA: CPT

## 2021-04-26 PROCEDURE — 36415 COLL VENOUS BLD VENIPUNCTURE: CPT

## 2021-04-26 PROCEDURE — 93005 ELECTROCARDIOGRAM TRACING: CPT | Performed by: ORTHOPAEDIC SURGERY

## 2021-04-26 PROCEDURE — 85025 COMPLETE CBC W/AUTO DIFF WBC: CPT

## 2021-04-26 PROCEDURE — 87641 MR-STAPH DNA AMP PROBE: CPT

## 2021-04-26 RX ORDER — ACETAMINOPHEN 325 MG/1
650 TABLET ORAL ONCE
Status: CANCELLED | OUTPATIENT
Start: 2021-04-26 | End: 2021-04-26

## 2021-04-26 RX ORDER — CELECOXIB 200 MG/1
400 CAPSULE ORAL ONCE
Status: CANCELLED | OUTPATIENT
Start: 2021-04-26 | End: 2021-04-26

## 2021-04-26 ASSESSMENT — PAIN DESCRIPTION - DESCRIPTORS: DESCRIPTORS: ACHING;STABBING

## 2021-04-26 ASSESSMENT — PAIN DESCRIPTION - PAIN TYPE: TYPE: CHRONIC PAIN

## 2021-04-26 ASSESSMENT — PAIN DESCRIPTION - ORIENTATION: ORIENTATION: LEFT

## 2021-04-26 ASSESSMENT — PAIN SCALES - GENERAL: PAINLEVEL_OUTOF10: 3

## 2021-04-26 NOTE — PROGRESS NOTES
Patient instructed on the pre-operative, intra-operative, and post-operative process. Patient's surgery arrival time to the hospital and surgery start time confirmed for the day of surgery. Patient instructed on NPO status. Medication instructions reviewed with patient. Pre operative instruction sheet reviewed and given to patient in PAT. CHG skin prep instructions reviewed with the patient.

## 2021-04-26 NOTE — PROGRESS NOTES
[facility]   Preadmission Testing    Name: Aretha Read [facility]  : 1965  Patient Phone: [phone] (home) [phone] (work)    Procedure  Total Knee Arthroplasty Left   Date of Procedure: 5/10/2021   Surgeon: No att. providers found    Ht:  5' 6\" (167.6 cm)  Wt: 239 lb 8 oz (108.6 kg)  Wt method: Actual    Allergies: No Known Allergies    Peanut allergy: Yes    Latex Allergy Screening Tool  Have you ever had a reaction to or been told by a physician that you have an allergy to latex or natural rubber?: No    Vitals:    21 1040   BP: 139/72   Pulse: 82   Resp: 18   Temp: 97.8 °F (36.6 °C)   SpO2: 97%       No LMP for male patient. Do you take blood thinners? [x] Yes    [] No         Instructed to stop blood thinners prior to procedure? [x] Yes    [] No      [] N/A   Do you have sleep apnea? [x] Yes    [] No     Instructed to bring CPAP machine? [x] Yes    [] No    [] N/A   Do you have acid reflux ? [] Yes    [x] No     Do you have  hiatal hernia? [] Yes    [x] No    Do you ever experience motion sickness? [] Yes    [x] No     Have you had a respiratory infection or sore throat in last 4 weeks before surgery? [] Yes    [x] No     Do you have poorly controlled asthma or COPD? [] Yes    [x] No     Do you have a history of angina in the last month or symptomatic arrhythmia? [] Yes    [x] No     Do you have significant central nervous system disease? [] Yes    [x] No     Have you had an EKG, labs, or chest xray in last 12 months? If yes provide copies to anesthesia   [x] Yes    [] No       [x] Lab    [x] EKG    [] CXR     Have you had a stress test?     [x] Yes    [] No    When/where: 2015    Was it normal?    [x] Yes    [] No   Do you or your family have a history of Malignant Hyperthermia?    [] Yes    [x] No               PAT Call/Visit Questions  Person Interviewed: patient  Relationship to Patient: self  Surgery Location Verified: Yes  Patient Language:

## 2021-04-27 LAB
MRSA, DNA, NASAL: NORMAL
SPECIMEN DESCRIPTION: NORMAL

## 2021-04-30 ENCOUNTER — OFFICE VISIT (OUTPATIENT)
Dept: PRIMARY CARE CLINIC | Age: 56
End: 2021-04-30
Payer: OTHER GOVERNMENT

## 2021-04-30 VITALS
BODY MASS INDEX: 39.41 KG/M2 | DIASTOLIC BLOOD PRESSURE: 72 MMHG | RESPIRATION RATE: 16 BRPM | HEART RATE: 72 BPM | WEIGHT: 244.2 LBS | SYSTOLIC BLOOD PRESSURE: 122 MMHG

## 2021-04-30 DIAGNOSIS — I48.91 ATRIAL FIBRILLATION, UNSPECIFIED TYPE (HCC): ICD-10-CM

## 2021-04-30 DIAGNOSIS — I10 ESSENTIAL HYPERTENSION: Chronic | ICD-10-CM

## 2021-04-30 DIAGNOSIS — I50.32 CHRONIC DIASTOLIC HEART FAILURE (HCC): ICD-10-CM

## 2021-04-30 DIAGNOSIS — M17.12 PRIMARY OSTEOARTHRITIS OF LEFT KNEE: Primary | ICD-10-CM

## 2021-04-30 PROCEDURE — 99214 OFFICE O/P EST MOD 30 MIN: CPT | Performed by: FAMILY MEDICINE

## 2021-04-30 RX ORDER — FUROSEMIDE 20 MG/1
20 TABLET ORAL EVERY OTHER DAY
Qty: 30 TABLET | Refills: 0 | Status: SHIPPED | OUTPATIENT
Start: 2021-04-30 | End: 2021-05-25 | Stop reason: SDUPTHER

## 2021-04-30 NOTE — PROGRESS NOTES
Patient is here for a surgery clearance for a total knee replacement on left knee. He said that the surgery is taking place on May 10th with Dr. Hunter Cunnnigham. Atrial fibrillation- HAD ABLATION AND HAS NO SYMPTOMS. Has some edema of legs but no shortness of breath or pnd      Allergies:  Patient has no known allergies. Past Medical History:    Past Medical History:   Diagnosis Date    Aortic valve disorders     aortic regurg    Atrial fibrillation (HCC)     Atrial flutter ([address]     Encounter for cardioversion procedure 05/05/2015    Dr Anusha Berg Encounter for cardioversion procedure     [id number] [facility]/ Dr. Bhupinder Pope, states 7 times, most recent 1/2020    Frequent urination     Pt states he's developed frequent urination with 3 times in last yr noting blood in urine as well.  H/O echocardiogram 12/04/2017    EF 51-86% Global LV systolic function appears preserved. Mildly increaseed LV wall thinckness with normal LV carvity size, No definite specific wall motion abnormalities identified, LA is moderately dilated (34-39) with LA volume index of 36ml/m2, Mild mitral and tricuspid regurgitation, moderate aortic regurgitation, Evidence of mild diastolic dysfunction seen    H/O echocardiogram 02/02/2021    EF 55% patient has sigmoid interventricular septim S/P aortic vlave repair with mild to mod aortic regurg Diastoligy cannot be properly assessed due to pts rhtyhm Aortic root is mildly dilated when corrected for body surface area    Hemorrhage of rectum and anus 2013    polypectomy    History of 24 hour EKG monitoring 04/03/2014    Unremarkable except for mild increased number of PVC's with 3 ventricular couplets, asymptomatic     History of echocardiogram 04/10/2014    EF 60%, mild to mod LVH, moderate AI    History of heart surgery 2008    enlarged aorta    History of PFTs 06/25/2015    Consistent with mild obstructive ventilatory impairment.  Lung volumes are normal,except mild increase in residual volume,which could be suggestive of airway trapping. Diffusion capacity is normal.    Hx of transesophageal echocardiography (SKYLAR) for monitoring 2015    Dr Kala Sykes    Hypertension     pcp dr Veronica Hayden Impaired fasting glucose     Knee pain, bilateral     Lipoma     Obesity (BMI 30.0-34. 9)     Obstructive sleep apnea (adult) (pediatric)     does not wear cpap like suppose to    Paroxysmal supraventricular tachycardia (HCC)     Sleep apnea     uses CPAP    Tobacco use disorder     Urinary urgency     Ventral hernia        Past Surgical History:    Past Surgical History:   Procedure Laterality Date    ABLATION OF DYSRHYTHMIC FOCUS  2015    ACHILLES TENDON SURGERY Right 2010   Buelah Castor CARDIAC SURGERY  2008    thoracic AAA repair and modified Coleman procedure ( aortic valve sparing ) at [address]  06/13/2013    x 2 , bleeding polyps removed    FOOT SURGERY      reconstructive for club foot (right)    HERNIA REPAIR  2018    ventral    HERNIA REPAIR  2020    ventral    HERNIA REPAIR N/A 3/6/2020    XI LAPAROSCOPIC ROBOTIC VENTRAL HERNIA WITH MESH [facility] [address] [facility] N/A     HERNIA UMBILICAL REPAIR performed by Pietro Rodríguez MD at . [facility]  10/24/2018    VASECTOMY         Social History:   Social History     Tobacco Use    Smoking status: Former Smoker     Packs/day: 1.00     Years: 34.00     Pack years: 34.00     Types: Cigarettes     Quit date: 12/15/2020     Years since quittin.3    Smokeless tobacco: Never Used   Substance Use Topics    Alcohol use:  Yes     Alcohol/week: 6.0 standard drinks     Types: 6 Cans of beer per week     Comment: QOD now, use to drink daily       Family History:   Family History   Problem Relation Age of Onset    Colon Cancer Mother     Heart Surgery Father         enlarged aorta    Kidney Cancer Father          Review of Systems:  Constitutional: negative for fever or chills  Eyes: negative for visual disturbance   ENT: negative for sore throat or nasal congestion  Respiratory: negative for cough, shortness of breath and sputum  Cardiovascular: negative for chest pain or palpitations, has leg edema  Gastrointestinal: negative for abd pain, nausea, vomiting, diarrhea or constipation  Genitourinary: negative for dysuria, urgency or frequency  Musculoskeletal:has lt knee pain  Integument/breast: negative for skin rash or lesions  Neurological: negative for unilateral weakness, numbness or tingling. Psych: negative for anxiety, depression, suicidial ideation and suicidal attempt. Objective:  Physical Exam:  GEN:   A & O x3, no apparent distress  EYES: No gross abnormalities. ENT:ENT exam normal, no neck nodes or sinus tenderness  NECK: normal, supple, no lymphadenopathy,  no carotid bruits  PULM: clear to auscultation bilaterally- no wheezes, rales or rhonchi, normal air movement, no respiratory distress  COR: regular rate & rhythm, no gallops and 2/6 murmer  ABD:  soft, non-tender, non-distended, normal bowel sounds, no masses or organomegaly  : deferred  EXT: Extremities: + 2 pedal pulses, 1 + edema ,no calf tenderness, and warm to touch. Normal nails without lesions. Has varicose veins  NEURO: Motor and sensory grossly intact  SKIN:  No skin lesions or rashes          Assessment:  1. Primary osteoarthritis of left knee- lbs,ekg reviewed and clear for surgery,hold asa 1 wk before surgery   2. Chronic diastolic heart failure (HCC) - add lasix 20 mf  Qod,check bmp 1 wk   3. Essential hypertension -stable   4.  Atrial fibrillation, unspecified type ([address] -stable ,post ablation     Patient Active Problem List   Diagnosis Code    Family history of colon cancer Z80.0    H/O ascending aorta repair Z98.890    Hx of amiodarone therapy Z92.29    Aneurysm of thoracic aorta ([address] I71.2    Varicose veins of right lower extremity I83.91    Typical atrial flutter ([facility]) I48.3    Atrial flutter with rapid ventricular response ([facility]) I48.92    Moderate aortic regurgitation I35.1    Tobacco abuse Z72.0    Tobacco abuse counseling Z71.6    Atrial fibrillation with rapid ventricular response ([facility]) I48.91    Impaired fasting glucose R73.01    Hypertension I10    Atrial fibrillation with RVR ([facility]) I48.91    Acute on chronic diastolic CHF (congestive heart failure), NYHA class 3 ([facility]) I50.33    A-fib ([facility]) I48.91       Plan:  Orders Placed This Encounter   Medications    furosemide (LASIX) 20 MG tablet     Sig: Take 1 tablet by mouth every other day     Dispense:  30 tablet     Refill:  0     Orders Placed This Encounter   Procedures    Basic Metabolic Panel     Standing Status:   Future     Standing Expiration Date:   4/30/2022     No follow-ups on file.      Electronically signed by Crow Miller MD on 4/30/2021 at 11:01 AM

## 2021-05-03 ENCOUNTER — HOSPITAL ENCOUNTER (OUTPATIENT)
Age: 56
Discharge: HOME | End: 2021-05-03
Payer: OTHER GOVERNMENT

## 2021-05-03 ENCOUNTER — HOSPITAL ENCOUNTER (OUTPATIENT)
Dept: PHYSICAL THERAPY | Age: 56
Setting detail: THERAPIES SERIES
Discharge: HOME | End: 2021-05-03
Payer: OTHER GOVERNMENT

## 2021-05-03 ENCOUNTER — HOSPITAL ENCOUNTER (OUTPATIENT)
Dept: PREADMISSION TESTING | Age: 56
Setting detail: SPECIMEN
Discharge: HOME | End: 2021-05-07
Payer: OTHER GOVERNMENT

## 2021-05-03 DIAGNOSIS — Z01.818 PREOPERATIVE TESTING: ICD-10-CM

## 2021-05-03 DIAGNOSIS — Z01.818 PREOPERATIVE TESTING: Primary | ICD-10-CM

## 2021-05-03 LAB
ABO/RH: NORMAL
ANTIBODY SCREEN: NEGATIVE
ARM BAND NUMBER: NORMAL
EXPIRATION DATE: NORMAL

## 2021-05-03 PROCEDURE — 86900 BLOOD TYPING SEROLOGIC ABO: CPT

## 2021-05-03 PROCEDURE — U0005 INFEC AGEN DETEC AMPLI PROBE: HCPCS

## 2021-05-03 PROCEDURE — 86901 BLOOD TYPING SEROLOGIC RH(D): CPT

## 2021-05-03 PROCEDURE — 36415 COLL VENOUS BLD VENIPUNCTURE: CPT

## 2021-05-03 PROCEDURE — U0003 INFECTIOUS AGENT DETECTION BY NUCLEIC ACID (DNA OR RNA); SEVERE ACUTE RESPIRATORY SYNDROME CORONAVIRUS 2 (SARS-COV-2) (CORONAVIRUS DISEASE [COVID-19]), AMPLIFIED PROBE TECHNIQUE, MAKING USE OF HIGH THROUGHPUT TECHNOLOGIES AS DESCRIBED BY CMS-2020-01-R: HCPCS

## 2021-05-03 PROCEDURE — 97110 THERAPEUTIC EXERCISES: CPT

## 2021-05-03 PROCEDURE — 97116 GAIT TRAINING THERAPY: CPT

## 2021-05-03 PROCEDURE — 86850 RBC ANTIBODY SCREEN: CPT

## 2021-05-03 PROCEDURE — C9803 HOPD COVID-19 SPEC COLLECT: HCPCS

## 2021-05-04 LAB
SARS-COV-2: NORMAL
SARS-COV-2: NOT DETECTED
SOURCE: NORMAL

## 2021-05-06 RX ORDER — METOPROLOL SUCCINATE 100 MG/1
TABLET, EXTENDED RELEASE ORAL
Qty: 90 TABLET | Refills: 0 | Status: SHIPPED | OUTPATIENT
Start: 2021-05-06 | End: 2021-05-27

## 2021-05-07 ENCOUNTER — ANESTHESIA EVENT (OUTPATIENT)
Dept: OPERATING ROOM | Age: 56
End: 2021-05-07
Payer: OTHER GOVERNMENT

## 2021-05-10 ENCOUNTER — HOSPITAL ENCOUNTER (OUTPATIENT)
Age: 56
Setting detail: OBSERVATION
Discharge: HOME | End: 2021-05-11
Attending: ORTHOPAEDIC SURGERY | Admitting: ORTHOPAEDIC SURGERY
Payer: OTHER GOVERNMENT

## 2021-05-10 ENCOUNTER — ANESTHESIA (OUTPATIENT)
Dept: OPERATING ROOM | Age: 56
End: 2021-05-10
Payer: OTHER GOVERNMENT

## 2021-05-10 VITALS — DIASTOLIC BLOOD PRESSURE: 63 MMHG | SYSTOLIC BLOOD PRESSURE: 106 MMHG | OXYGEN SATURATION: 99 %

## 2021-05-10 DIAGNOSIS — Z96.652 S/P TOTAL KNEE REPLACEMENT USING CEMENT, LEFT: Primary | ICD-10-CM

## 2021-05-10 PROCEDURE — 7100000001 HC PACU RECOVERY - ADDTL 15 MIN: Performed by: ORTHOPAEDIC SURGERY

## 2021-05-10 PROCEDURE — C1776 JOINT DEVICE (IMPLANTABLE): HCPCS | Performed by: ORTHOPAEDIC SURGERY

## 2021-05-10 PROCEDURE — 3700000000 HC ANESTHESIA ATTENDED CARE: Performed by: ORTHOPAEDIC SURGERY

## 2021-05-10 PROCEDURE — G0378 HOSPITAL OBSERVATION PER HR: HCPCS

## 2021-05-10 PROCEDURE — 97161 PT EVAL LOW COMPLEX 20 MIN: CPT

## 2021-05-10 PROCEDURE — 2580000003 HC RX 258: Performed by: ORTHOPAEDIC SURGERY

## 2021-05-10 PROCEDURE — 2500000003 HC RX 250 WO HCPCS: Performed by: ORTHOPAEDIC SURGERY

## 2021-05-10 PROCEDURE — 97535 SELF CARE MNGMENT TRAINING: CPT

## 2021-05-10 PROCEDURE — 2580000003 HC RX 258: Performed by: NURSE ANESTHETIST, CERTIFIED REGISTERED

## 2021-05-10 PROCEDURE — 3600000005 HC SURGERY LEVEL 5 BASE: Performed by: ORTHOPAEDIC SURGERY

## 2021-05-10 PROCEDURE — 6360000002 HC RX W HCPCS: Performed by: NURSE ANESTHETIST, CERTIFIED REGISTERED

## 2021-05-10 PROCEDURE — 97165 OT EVAL LOW COMPLEX 30 MIN: CPT

## 2021-05-10 PROCEDURE — 2500000003 HC RX 250 WO HCPCS: Performed by: NURSE ANESTHETIST, CERTIFIED REGISTERED

## 2021-05-10 PROCEDURE — 6360000002 HC RX W HCPCS: Performed by: ORTHOPAEDIC SURGERY

## 2021-05-10 PROCEDURE — 3700000001 HC ADD 15 MINUTES (ANESTHESIA): Performed by: ORTHOPAEDIC SURGERY

## 2021-05-10 PROCEDURE — 7100000000 HC PACU RECOVERY - FIRST 15 MIN: Performed by: ORTHOPAEDIC SURGERY

## 2021-05-10 PROCEDURE — 2709999900 HC NON-CHARGEABLE SUPPLY: Performed by: ORTHOPAEDIC SURGERY

## 2021-05-10 PROCEDURE — 64447 NJX AA&/STRD FEMORAL NRV IMG: CPT | Performed by: NURSE ANESTHETIST, CERTIFIED REGISTERED

## 2021-05-10 PROCEDURE — 97116 GAIT TRAINING THERAPY: CPT

## 2021-05-10 PROCEDURE — C1713 ANCHOR/SCREW BN/BN,TIS/BN: HCPCS | Performed by: ORTHOPAEDIC SURGERY

## 2021-05-10 PROCEDURE — 3600000015 HC SURGERY LEVEL 5 ADDTL 15MIN: Performed by: ORTHOPAEDIC SURGERY

## 2021-05-10 PROCEDURE — 6370000000 HC RX 637 (ALT 250 FOR IP): Performed by: ORTHOPAEDIC SURGERY

## 2021-05-10 DEVICE — CEMENT BNE 40GM HI VISC RADPQ FOR REV SURG: Type: IMPLANTABLE DEVICE | Site: KNEE | Status: FUNCTIONAL

## 2021-05-10 DEVICE — PSN MC VE ASF L 12MM 8-11 GH: Type: IMPLANTABLE DEVICE | Site: KNEE | Status: FUNCTIONAL

## 2021-05-10 DEVICE — COMPONENT PAT DIA35MM THK9MM STD KNEE VIVACIT-E CEM PERSONA: Type: IMPLANTABLE DEVICE | Site: KNEE | Status: FUNCTIONAL

## 2021-05-10 DEVICE — PSN FEM CR CMT CCR STD SZ10 L: Type: IMPLANTABLE DEVICE | Site: KNEE | Status: FUNCTIONAL

## 2021-05-10 DEVICE — DUP USE 339820 IMPL KNEE PSN TIB STM 5 DEG SZ G L: Type: IMPLANTABLE DEVICE | Site: KNEE | Status: FUNCTIONAL

## 2021-05-10 RX ORDER — SODIUM CHLORIDE 0.9 % (FLUSH) 0.9 %
10 SYRINGE (ML) INJECTION EVERY 12 HOURS SCHEDULED
Status: DISCONTINUED | OUTPATIENT
Start: 2021-05-10 | End: 2021-05-11 | Stop reason: HOSPADM

## 2021-05-10 RX ORDER — HYDROCODONE BITARTRATE AND ACETAMINOPHEN 5; 325 MG/1; MG/1
1 TABLET ORAL EVERY 4 HOURS PRN
Status: DISCONTINUED | OUTPATIENT
Start: 2021-05-10 | End: 2021-05-11 | Stop reason: HOSPADM

## 2021-05-10 RX ORDER — METOCLOPRAMIDE HYDROCHLORIDE 5 MG/ML
10 INJECTION INTRAMUSCULAR; INTRAVENOUS
Status: DISCONTINUED | OUTPATIENT
Start: 2021-05-10 | End: 2021-05-10 | Stop reason: HOSPADM

## 2021-05-10 RX ORDER — HYDROMORPHONE HCL 110MG/55ML
0.5 PATIENT CONTROLLED ANALGESIA SYRINGE INTRAVENOUS
Status: DISCONTINUED | OUTPATIENT
Start: 2021-05-10 | End: 2021-05-11 | Stop reason: HOSPADM

## 2021-05-10 RX ORDER — SODIUM CHLORIDE 9 MG/ML
25 INJECTION, SOLUTION INTRAVENOUS PRN
Status: DISCONTINUED | OUTPATIENT
Start: 2021-05-10 | End: 2021-05-11 | Stop reason: HOSPADM

## 2021-05-10 RX ORDER — FUROSEMIDE 20 MG/1
20 TABLET ORAL EVERY OTHER DAY
Status: DISCONTINUED | OUTPATIENT
Start: 2021-05-10 | End: 2021-05-11 | Stop reason: HOSPADM

## 2021-05-10 RX ORDER — SODIUM CHLORIDE 0.9 % (FLUSH) 0.9 %
5-40 SYRINGE (ML) INJECTION PRN
Status: CANCELLED | OUTPATIENT
Start: 2021-05-10

## 2021-05-10 RX ORDER — LIDOCAINE HYDROCHLORIDE 20 MG/ML
INJECTION, SOLUTION INFILTRATION; PERINEURAL PRN
Status: DISCONTINUED | OUTPATIENT
Start: 2021-05-10 | End: 2021-05-10 | Stop reason: SDUPTHER

## 2021-05-10 RX ORDER — EPHEDRINE SULFATE/0.9% NACL/PF 50 MG/5 ML
SYRINGE (ML) INTRAVENOUS PRN
Status: DISCONTINUED | OUTPATIENT
Start: 2021-05-10 | End: 2021-05-10 | Stop reason: SDUPTHER

## 2021-05-10 RX ORDER — CELECOXIB 200 MG/1
400 CAPSULE ORAL ONCE
Status: COMPLETED | OUTPATIENT
Start: 2021-05-10 | End: 2021-05-10

## 2021-05-10 RX ORDER — SODIUM CHLORIDE, SODIUM LACTATE, POTASSIUM CHLORIDE, CALCIUM CHLORIDE 600; 310; 30; 20 MG/100ML; MG/100ML; MG/100ML; MG/100ML
INJECTION, SOLUTION INTRAVENOUS CONTINUOUS
Status: DISCONTINUED | OUTPATIENT
Start: 2021-05-10 | End: 2021-05-10

## 2021-05-10 RX ORDER — HYDROCODONE BITARTRATE AND ACETAMINOPHEN 5; 325 MG/1; MG/1
2 TABLET ORAL EVERY 6 HOURS PRN
Status: DISCONTINUED | OUTPATIENT
Start: 2021-05-10 | End: 2021-05-11 | Stop reason: HOSPADM

## 2021-05-10 RX ORDER — MIDAZOLAM HYDROCHLORIDE 1 MG/ML
INJECTION INTRAMUSCULAR; INTRAVENOUS PRN
Status: DISCONTINUED | OUTPATIENT
Start: 2021-05-10 | End: 2021-05-10 | Stop reason: SDUPTHER

## 2021-05-10 RX ORDER — CEFAZOLIN SODIUM 2 G/50ML
2000 SOLUTION INTRAVENOUS ONCE
Status: COMPLETED | OUTPATIENT
Start: 2021-05-10 | End: 2021-05-10

## 2021-05-10 RX ORDER — DIMENHYDRINATE 50 MG/1
50 TABLET ORAL ONCE
Status: COMPLETED | OUTPATIENT
Start: 2021-05-10 | End: 2021-05-10

## 2021-05-10 RX ORDER — FENTANYL CITRATE 50 UG/ML
25 INJECTION, SOLUTION INTRAMUSCULAR; INTRAVENOUS EVERY 5 MIN PRN
Status: DISCONTINUED | OUTPATIENT
Start: 2021-05-10 | End: 2021-05-10 | Stop reason: HOSPADM

## 2021-05-10 RX ORDER — METOPROLOL SUCCINATE 50 MG/1
50 TABLET, EXTENDED RELEASE ORAL DAILY
Status: DISCONTINUED | OUTPATIENT
Start: 2021-05-11 | End: 2021-05-11 | Stop reason: HOSPADM

## 2021-05-10 RX ORDER — ROPIVACAINE HYDROCHLORIDE 5 MG/ML
INJECTION, SOLUTION EPIDURAL; INFILTRATION; PERINEURAL PRN
Status: DISCONTINUED | OUTPATIENT
Start: 2021-05-10 | End: 2021-05-10 | Stop reason: SDUPTHER

## 2021-05-10 RX ORDER — TRANEXAMIC ACID 100 MG/ML
INJECTION, SOLUTION INTRAVENOUS PRN
Status: DISCONTINUED | OUTPATIENT
Start: 2021-05-10 | End: 2021-05-10 | Stop reason: ALTCHOICE

## 2021-05-10 RX ORDER — PROPOFOL 10 MG/ML
INJECTION, EMULSION INTRAVENOUS PRN
Status: DISCONTINUED | OUTPATIENT
Start: 2021-05-10 | End: 2021-05-10 | Stop reason: SDUPTHER

## 2021-05-10 RX ORDER — OXYCODONE HYDROCHLORIDE 5 MG/1
5 TABLET ORAL
Status: DISCONTINUED | OUTPATIENT
Start: 2021-05-10 | End: 2021-05-10 | Stop reason: HOSPADM

## 2021-05-10 RX ORDER — SODIUM CHLORIDE, SODIUM LACTATE, POTASSIUM CHLORIDE, CALCIUM CHLORIDE 600; 310; 30; 20 MG/100ML; MG/100ML; MG/100ML; MG/100ML
INJECTION, SOLUTION INTRAVENOUS CONTINUOUS
Status: DISCONTINUED | OUTPATIENT
Start: 2021-05-10 | End: 2021-05-11 | Stop reason: HOSPADM

## 2021-05-10 RX ORDER — SODIUM CHLORIDE 0.9 % (FLUSH) 0.9 %
5-40 SYRINGE (ML) INJECTION EVERY 12 HOURS SCHEDULED
Status: CANCELLED | OUTPATIENT
Start: 2021-05-10

## 2021-05-10 RX ORDER — TRANEXAMIC ACID 650 1/1
1300 TABLET ORAL ONCE
Status: DISCONTINUED | OUTPATIENT
Start: 2021-05-10 | End: 2021-05-10 | Stop reason: SDUPTHER

## 2021-05-10 RX ORDER — CEFAZOLIN SODIUM 1 G/50ML
INJECTION, SOLUTION INTRAVENOUS PRN
Status: DISCONTINUED | OUTPATIENT
Start: 2021-05-10 | End: 2021-05-10 | Stop reason: SDUPTHER

## 2021-05-10 RX ORDER — SODIUM CHLORIDE 0.9 % (FLUSH) 0.9 %
10 SYRINGE (ML) INJECTION PRN
Status: DISCONTINUED | OUTPATIENT
Start: 2021-05-10 | End: 2021-05-11 | Stop reason: HOSPADM

## 2021-05-10 RX ORDER — SENNA AND DOCUSATE SODIUM 50; 8.6 MG/1; MG/1
1 TABLET, FILM COATED ORAL 2 TIMES DAILY
Status: DISCONTINUED | OUTPATIENT
Start: 2021-05-10 | End: 2021-05-11 | Stop reason: HOSPADM

## 2021-05-10 RX ORDER — FENTANYL CITRATE 50 UG/ML
INJECTION, SOLUTION INTRAMUSCULAR; INTRAVENOUS PRN
Status: DISCONTINUED | OUTPATIENT
Start: 2021-05-10 | End: 2021-05-10 | Stop reason: SDUPTHER

## 2021-05-10 RX ORDER — TRANEXAMIC ACID 650 1/1
1300 TABLET ORAL ONCE
Status: COMPLETED | OUTPATIENT
Start: 2021-05-10 | End: 2021-05-10

## 2021-05-10 RX ORDER — TRANEXAMIC ACID 650 1/1
1300 TABLET ORAL ONCE
Status: DISCONTINUED | OUTPATIENT
Start: 2021-05-11 | End: 2021-05-10 | Stop reason: SDUPTHER

## 2021-05-10 RX ORDER — GENTAMICIN SULFATE 40 MG/ML
INJECTION, SOLUTION INTRAMUSCULAR; INTRAVENOUS
Status: DISCONTINUED
Start: 2021-05-10 | End: 2021-05-10

## 2021-05-10 RX ORDER — SODIUM CHLORIDE 9 MG/ML
25 INJECTION, SOLUTION INTRAVENOUS PRN
Status: CANCELLED | OUTPATIENT
Start: 2021-05-10

## 2021-05-10 RX ORDER — SODIUM CHLORIDE 9 MG/ML
INJECTION INTRAVENOUS PRN
Status: DISCONTINUED | OUTPATIENT
Start: 2021-05-10 | End: 2021-05-10 | Stop reason: SDUPTHER

## 2021-05-10 RX ORDER — ACETAMINOPHEN 325 MG/1
650 TABLET ORAL EVERY 4 HOURS PRN
Status: DISCONTINUED | OUTPATIENT
Start: 2021-05-10 | End: 2021-05-11 | Stop reason: HOSPADM

## 2021-05-10 RX ORDER — ONDANSETRON 2 MG/ML
4 INJECTION INTRAMUSCULAR; INTRAVENOUS
Status: DISCONTINUED | OUTPATIENT
Start: 2021-05-10 | End: 2021-05-10 | Stop reason: HOSPADM

## 2021-05-10 RX ORDER — BUPIVACAINE/KETOROLAC/KETAMINE 150-60/50
SYRINGE (ML) INJECTION
Status: DISCONTINUED
Start: 2021-05-10 | End: 2021-05-10

## 2021-05-10 RX ORDER — DEXAMETHASONE SODIUM PHOSPHATE 10 MG/ML
INJECTION, SOLUTION INTRAMUSCULAR; INTRAVENOUS PRN
Status: DISCONTINUED | OUTPATIENT
Start: 2021-05-10 | End: 2021-05-10 | Stop reason: SDUPTHER

## 2021-05-10 RX ORDER — CEFAZOLIN SODIUM 2 G/50ML
2000 SOLUTION INTRAVENOUS EVERY 8 HOURS
Status: COMPLETED | OUTPATIENT
Start: 2021-05-10 | End: 2021-05-11

## 2021-05-10 RX ORDER — TRANEXAMIC ACID 650 1/1
1300 TABLET ORAL ONCE
Status: COMPLETED | OUTPATIENT
Start: 2021-05-11 | End: 2021-05-11

## 2021-05-10 RX ORDER — ACETAMINOPHEN 325 MG/1
650 TABLET ORAL ONCE
Status: COMPLETED | OUTPATIENT
Start: 2021-05-10 | End: 2021-05-10

## 2021-05-10 RX ORDER — FENTANYL CITRATE 50 UG/ML
50 INJECTION, SOLUTION INTRAMUSCULAR; INTRAVENOUS EVERY 5 MIN PRN
Status: DISCONTINUED | OUTPATIENT
Start: 2021-05-10 | End: 2021-05-10 | Stop reason: HOSPADM

## 2021-05-10 RX ORDER — ACETAMINOPHEN 325 MG/1
650 TABLET ORAL EVERY 6 HOURS
Status: DISCONTINUED | OUTPATIENT
Start: 2021-05-10 | End: 2021-05-11 | Stop reason: HOSPADM

## 2021-05-10 RX ADMIN — CEFAZOLIN SODIUM 2000 MG: 2 SOLUTION INTRAVENOUS at 22:37

## 2021-05-10 RX ADMIN — LIDOCAINE HYDROCHLORIDE 100 MG: 20 INJECTION, SOLUTION INFILTRATION; PERINEURAL at 12:11

## 2021-05-10 RX ADMIN — DEXAMETHASONE SODIUM PHOSPHATE 5 MG: 10 INJECTION, SOLUTION INTRAMUSCULAR; INTRAVENOUS at 11:34

## 2021-05-10 RX ADMIN — SODIUM CHLORIDE 15 ML: 9 INJECTION, SOLUTION INTRAMUSCULAR; INTRAVENOUS; SUBCUTANEOUS at 11:37

## 2021-05-10 RX ADMIN — CEFAZOLIN SODIUM 1 G: 1 INJECTION, SOLUTION INTRAVENOUS at 14:28

## 2021-05-10 RX ADMIN — TRANEXAMIC ACID 1300 MG: 650 TABLET ORAL at 19:37

## 2021-05-10 RX ADMIN — CEFAZOLIN SODIUM 2000 MG: 2 SOLUTION INTRAVENOUS at 12:03

## 2021-05-10 RX ADMIN — DIMENHYDRINATE 50 MG: 50 TABLET ORAL at 11:00

## 2021-05-10 RX ADMIN — Medication 10 MG: at 12:31

## 2021-05-10 RX ADMIN — ROPIVACAINE HYDROCHLORIDE 15 ML: 5 INJECTION, SOLUTION EPIDURAL; INFILTRATION; PERINEURAL at 11:34

## 2021-05-10 RX ADMIN — DEXAMETHASONE SODIUM PHOSPHATE 5 MG: 10 INJECTION, SOLUTION INTRAMUSCULAR; INTRAVENOUS at 11:37

## 2021-05-10 RX ADMIN — ACETAMINOPHEN 650 MG: 325 TABLET ORAL at 11:00

## 2021-05-10 RX ADMIN — ROPIVACAINE HYDROCHLORIDE 15 ML: 5 INJECTION, SOLUTION EPIDURAL; INFILTRATION; PERINEURAL at 11:37

## 2021-05-10 RX ADMIN — SODIUM CHLORIDE, POTASSIUM CHLORIDE, SODIUM LACTATE AND CALCIUM CHLORIDE: 600; 310; 30; 20 INJECTION, SOLUTION INTRAVENOUS at 13:31

## 2021-05-10 RX ADMIN — Medication 10 MG: at 12:23

## 2021-05-10 RX ADMIN — FENTANYL CITRATE 50 MCG: 50 INJECTION, SOLUTION INTRAMUSCULAR; INTRAVENOUS at 14:48

## 2021-05-10 RX ADMIN — FENTANYL CITRATE 50 MCG: 50 INJECTION, SOLUTION INTRAMUSCULAR; INTRAVENOUS at 13:00

## 2021-05-10 RX ADMIN — MIDAZOLAM 2 MG: 1 INJECTION INTRAMUSCULAR; INTRAVENOUS at 11:30

## 2021-05-10 RX ADMIN — PHENYLEPHRINE HYDROCHLORIDE 100 MCG: 10 INJECTION INTRAVENOUS at 12:15

## 2021-05-10 RX ADMIN — DOCUSATE SODIUM 50 MG AND SENNOSIDES 8.6 MG 1 TABLET: 8.6; 5 TABLET, FILM COATED ORAL at 21:28

## 2021-05-10 RX ADMIN — PROPOFOL 200 MG: 10 INJECTION, EMULSION INTRAVENOUS at 12:11

## 2021-05-10 RX ADMIN — RIVAROXABAN 10 MG: 10 TABLET, FILM COATED ORAL at 22:37

## 2021-05-10 RX ADMIN — FENTANYL CITRATE 100 MCG: 50 INJECTION, SOLUTION INTRAMUSCULAR; INTRAVENOUS at 12:11

## 2021-05-10 RX ADMIN — SODIUM CHLORIDE, POTASSIUM CHLORIDE, SODIUM LACTATE AND CALCIUM CHLORIDE: 600; 310; 30; 20 INJECTION, SOLUTION INTRAVENOUS at 11:20

## 2021-05-10 RX ADMIN — FENTANYL CITRATE 50 MCG: 50 INJECTION, SOLUTION INTRAMUSCULAR; INTRAVENOUS at 12:54

## 2021-05-10 RX ADMIN — FENTANYL CITRATE 50 MCG: 50 INJECTION, SOLUTION INTRAMUSCULAR; INTRAVENOUS at 13:30

## 2021-05-10 RX ADMIN — ACETAMINOPHEN 650 MG: 325 TABLET ORAL at 22:37

## 2021-05-10 RX ADMIN — CELECOXIB 400 MG: 200 CAPSULE ORAL at 11:00

## 2021-05-10 ASSESSMENT — PAIN - FUNCTIONAL ASSESSMENT: PAIN_FUNCTIONAL_ASSESSMENT: 0-10

## 2021-05-10 ASSESSMENT — PAIN SCALES - GENERAL
PAINLEVEL_OUTOF10: 0
PAINLEVEL_OUTOF10: 1

## 2021-05-10 NOTE — ANESTHESIA PRE PROCEDURE
regurgitation I35.1    Tobacco abuse Z72.0    Tobacco abuse counseling Z71.6    Atrial fibrillation with rapid ventricular response (HCC) I48.91    Impaired fasting glucose R73.01    Hypertension I10    Atrial fibrillation with RVR (HCC) I48.91    Acute on chronic diastolic CHF (congestive heart failure), NYHA class 3 (HCC) I50.33    A-fib (HCC) I48.91       Past Medical History:        Diagnosis Date    Aortic valve disorders     aortic regurg    Atrial fibrillation (HCC)     Atrial flutter ([address]     Encounter for cardioversion procedure 05/05/2015    Dr Munoz Poll Encounter for cardioversion procedure     [facility]/ Dr. Marcellina Lundborg, states 7 times, most recent 1/2020    Frequent urination     Pt states he's developed frequent urination with 3 times in last yr noting blood in urine as well.  H/O echocardiogram 12/04/2017    EF 84-24% Global LV systolic function appears preserved. Mildly increaseed LV wall thinckness with normal LV carvity size, No definite specific wall motion abnormalities identified, LA is moderately dilated (34-39) with LA volume index of 36ml/m2, Mild mitral and tricuspid regurgitation, moderate aortic regurgitation, Evidence of mild diastolic dysfunction seen    H/O echocardiogram 02/02/2021    EF 55% patient has sigmoid interventricular septim S/P aortic vlave repair with mild to mod aortic regurg Diastoligy cannot be properly assessed due to pts rhtyhm Aortic root is mildly dilated when corrected for body surface area    Hemorrhage of rectum and anus 2013    polypectomy    History of 24 hour EKG monitoring 04/03/2014    Unremarkable except for mild increased number of PVC's with 3 ventricular couplets, asymptomatic     History of echocardiogram 04/10/2014    EF 60%, mild to mod LVH, moderate AI    History of heart surgery 2008    enlarged aorta    History of PFTs 06/25/2015    Consistent with mild obstructive ventilatory impairment.  Lung volumes are normal,except mild increase in residual volume,which could be suggestive of airway trapping. Diffusion capacity is normal.    Hx of transesophageal echocardiography (SKYLAR) for monitoring 2015    Dr Brenda Rico    Hypertension     pcp dr Shaun Diaz Impaired fasting glucose     Knee pain, bilateral     Lipoma     Obesity (BMI 30.0-34. 9)     Obstructive sleep apnea (adult) (pediatric)     does not wear cpap like suppose to    Paroxysmal supraventricular tachycardia (HCC)     Sleep apnea     uses CPAP    Tobacco use disorder     Urinary urgency     Ventral hernia        Past Surgical History:        Procedure Laterality Date    ABLATION OF DYSRHYTHMIC FOCUS  2015    ACHILLES TENDON SURGERY Right 2010   [facility] CARDIAC SURGERY  2008    thoracic AAA repair and modified Coleman procedure ( aortic valve sparing ) at [address]  06/13/2013    x 2 , bleeding polyps removed    FOOT SURGERY      reconstructive for club foot (right)    HERNIA REPAIR  2018    ventral    HERNIA REPAIR  2020    ventral    HERNIA REPAIR N/A 3/6/2020    XI LAPAROSCOPIC ROBOTIC VENTRAL HERNIA WITH MESH performed by Lissette Suresh IV, DO at [address] VTIX,3+W/O,OSUXD N/A     HERNIA UMBILICAL REPAIR performed by Jaison Hilario MD at [address]  10/24/2018    VASECTOMY         Social History:    Social History     Tobacco Use    Smoking status: Former Smoker     Packs/day: 1.00     Years: 34.00     Pack years: 34.00     Types: Cigarettes     Quit date: 12/15/2020     Years since quittin.4    Smokeless tobacco: Never Used   Substance Use Topics    Alcohol use:  Yes     Alcohol/week: 6.0 standard drinks     Types: 6 Cans of beer per week     Comment: QOD now, use to drink daily                                Counseling given: Not Answered      Vital Signs (Current):   Vitals:    05/10/21 1052   BP: (!) 145/77   Pulse: 65   Resp: 16   Temp: 36.3 °C (97.4 °F)   TempSrc: Temporal   SpO2: 99%   Weight: 242 lb 3.2 oz (109.9 kg)   Height: 5' 6\" (1.676 m)                                              BP Readings from Last 3 Encounters:   05/10/21 (!) 145/77   04/26/21 139/72   04/30/21 122/72       NPO Status: Time of last liquid consumption: 2330                        Time of last solid consumption: 1900                        Date of last liquid consumption: 05/09/21                        Date of last solid food consumption: 05/09/21    BMI:   Wt Readings from Last 3 Encounters:   05/10/21 242 lb 3.2 oz (109.9 kg)   04/26/21 239 lb 8 oz (108.6 kg)   04/30/21 244 lb 3.2 oz (110.8 kg)     Body mass index is 39.09 kg/m². CBC:   Lab Results   Component Value Date    WBC 6.1 04/26/2021    RBC 4.58 04/26/2021    HGB 13.8 04/26/2021    HCT 41.6 04/26/2021    MCV 90.8 04/26/2021    RDW 12.0 04/26/2021     04/26/2021       CMP:   Lab Results   Component Value Date     04/26/2021    K 4.1 04/26/2021    K 3.9 02/27/2021     04/26/2021    CO2 28 04/26/2021    BUN 18 04/26/2021    CREATININE 0.96 04/26/2021    GFRAA >60 04/26/2021    LABGLOM >60 04/26/2021    LABGLOM >90 02/27/2021    GLUCOSE 98 04/26/2021    GLUCOSE 115 10/01/2011    PROT 6.5 02/03/2021    CALCIUM 9.8 04/26/2021    BILITOT 0.55 02/03/2021    ALKPHOS 98 02/03/2021    AST 18 02/03/2021    ALT 19 02/03/2021       POC Tests: No results for input(s): POCGLU, POCNA, POCK, POCCL, POCBUN, POCHEMO, POCHCT in the last 72 hours.     Coags:   Lab Results   Component Value Date    PROTIME 12.8 02/03/2021    INR 1.02 02/26/2021    APTT 29.6 02/26/2021       HCG (If Applicable): No results found for: PREGTESTUR, PREGSERUM, HCG, HCGQUANT     ABGs: No results found for: PHART, PO2ART, YFL5PRP, AUA4OQA, BEART, X1XONIYO     Type & Screen (If Applicable):  Lab Results   Component Value Date    LABRH POS 02/26/2021       Drug/Infectious Status (If Applicable):  No results found for: HIV, HEPCAB    COVID-19 Screening (If Applicable):   Lab Results   Component Value Date    COVID19 Not Detected 05/03/2021           Anesthesia Evaluation   no history of anesthetic complications:   Airway: Mallampati: III  TM distance: >3 FB   Neck ROM: full  Mouth opening: > = 3 FB Dental: normal exam         Pulmonary:normal exam  breath sounds clear to auscultation  (+) sleep apnea: on CPAP,                             Cardiovascular:  Exercise tolerance: good (>4 METS),   (+) hypertension:, valvular problems/murmurs (aortic valve repair):, dysrhythmias (no issues since ablation in Feb 2021): atrial fibrillation, CHF:,       ECG reviewed      Echocardiogram reviewed                  Neuro/Psych:   Negative Neuro/Psych ROS              GI/Hepatic/Renal:   (+) morbid obesity          Endo/Other: Negative Endo/Other ROS                    Abdominal:   (+) obese,         Vascular: negative vascular ROS. Anesthesia Plan      general     ASA 3     (Refused spinal.  Discussed PNB with pt, consented)  Induction: intravenous. Anesthetic plan and risks discussed with patient.                       AMADOR Bell - CRNA   5/10/2021

## 2021-05-10 NOTE — CONSULTS
negative for skin rash, and negative for skin lesions  Neurological: negative for unilateral weakness, numbness or tingling. Exam:  GEN:    Awake, alert and oriented x 3. no acute distress  NECK:  Supple. normal  CVS:   Regular rhythm, systolic murmur H9-N6 normal  PULM:  CTA, no wheezes, rales or rhonchi  ABD:    Bowels sounds normal.  Abdomen is soft. No distention. No tenderness. EXT:   no  edema. Pedal pulses are intact. No calf tenderness  NEURO: Motor and sensory are intact  SKIN:  Incision is clean, dry and intact. No surrounding erythema.  -----------------------------------------------------------------  Diagnostic Data:    Lab Results   Component Value Date    WBC 6.1 04/26/2021    HGB 13.8 04/26/2021    MCV 90.8 04/26/2021     04/26/2021      Lab Results   Component Value Date    GLUCOSE 98 04/26/2021    BUN 18 04/26/2021    CREATININE 0.96 04/26/2021     04/26/2021    K 4.1 04/26/2021    CALCIUM 9.8 04/26/2021     04/26/2021    CO2 28 04/26/2021         ASSESSMENT:      · Post-op medical management of:        Principal Problem:    S/P total knee replacement using cement, left  Active Problems:    Hypertension    A-fib (HCC)  Resolved Problems:    * No resolved hospital problems.  *    · s/p left total Knee arthroplasty    PLAN:  · Continue current physical and occupational therapy  · Continue DVT prophylaxis-Xarelto  · Discharge planning-home with outpatient therapy  ·     AMADOR Pringle, NP-C  5/10/2021, 6:22 PM

## 2021-05-10 NOTE — PROGRESS NOTES
Physical Therapy    Facility/Department: [facility] AT THE [facility] MED SURG  Initial Assessment    NAME: Aisha Beavers  : 1965  MRN: [id number]    Date of Service: 5/10/2021    Discharge Recommendations:  Continue to assess pending progress, Outpatient PT   PT Equipment Recommendations  Equipment Needed: No    Assessment   Assessment: The patient is a 64 y.o. male s/p L TKA. He demonstrates decreased L knee ROM, decreased L LE strength, decreased endurance, and impaired balance. He would benefit from skilled PT to address his deficits to improve functional mobility. Treatment Diagnosis: Generalized weakness, decreased activity endurance  Prognosis: Good  Decision Making: Low Complexity  REQUIRES PT FOLLOW UP: Yes  Activity Tolerance  Activity Tolerance: Patient Tolerated treatment well       Patient Diagnosis(es): There were no encounter diagnoses. has a past medical history of Aortic valve disorders, Atrial fibrillation ([address] Atrial flutter ([address] Encounter for cardioversion procedure, Encounter for cardioversion procedure, Frequent urination, H/O echocardiogram, H/O echocardiogram, Hemorrhage of rectum and anus, History of 24 hour EKG monitoring, History of echocardiogram, History of heart surgery, History of PFTs, Hx of transesophageal echocardiography (SKYLAR) for monitoring, Hypertension, Impaired fasting glucose, Knee pain, bilateral, Lipoma, Obesity (BMI 30.0-34.9), Obstructive sleep apnea (adult) (pediatric), Paroxysmal supraventricular tachycardia ([address] Sleep apnea, Tobacco use disorder, Urinary urgency, and Ventral hernia. has a past surgical history that includes Achilles tendon surgery (Right, 2010); Vasectomy; Foot surgery; ablation of dysrhythmic focus (); Umbilical hernia repair (); repair umbilical NGAU,0+A/Z,JLCTH (N/A, 10/24/2018); hernia repair (2018); Colonoscopy (2013);  Cardiac surgery (); hernia repair (2020); hernia repair (N/A, 3/6/2020); and Total knee Grossly 3+/5  Strength LLE  Strength LLE: WFL     Sensation  Overall Sensation Status: WFL  Bed mobility  Supine to Sit: Stand by assistance  Transfers  Sit to Stand: Contact guard assistance  Stand to sit: Contact guard assistance  Ambulation  Ambulation?: Yes  WB Status: WBAT  Ambulation 1  Surface: level tile  Device: Rolling Walker  Assistance: Contact guard assistance  Quality of Gait: Pt ambulates with step-to pattern, decreased step length and height  Distance: 5'  Stairs/Curb  Stairs?: No     Balance  Posture: Fair  Sitting - Static: Good  Sitting - Dynamic: Good  Standing - Static: Fair  Standing - Dynamic: Fair  Exercises  Comments: Pt educated on quad sets, ankle pumps, heel slides, and glute sets     Plan   Plan  Times per week: 7  Times per day: Twice a day  Plan Comment: 1x/day on weekends  Safety Devices  Type of devices: All fall risk precautions in place    AM-PAC Score  AM-PAC Inpatient Mobility without Stair Climbing Raw Score : 15 (05/10/21 1914)  AM-PAC Inpatient without Stair Climbing T-Scale Score : 43.03 (05/10/21 1914)  Mobility Inpatient CMS 0-100% Score: 47.43 (05/10/21 1914)  Mobility Inpatient without Stair CMS G-Code Modifier : CK (05/10/21 1914)    Goals  Short term goals  Time Frame for Short term goals: 10 days  Short term goal 1: Patient will ambulate 36' with FWW, supervision, without LOB  Short term goal 2: Patient will perform bed mobility and transfers with MI  Short term goal 3: Patient will tolerate 20-30 minutes of therex/act to improve endurance for ADLs  Short term goal 4: The patient will be able to negotiate 1 step for a safe return to home environment.   Patient Goals   Patient goals : Improve mobility to return home       Therapy Time   Individual Concurrent Group Co-treatment   Time In 9889         Time Out 1730         Minutes 25         Timed Code Treatment Minutes: 25 Minutes       Lily Lancaster, PT, DPT

## 2021-05-10 NOTE — OP NOTE
[address] [place], [place] [address]                                OPERATIVE REPORT    PATIENT NAME: Darlyn Gamez                     :        1965  MED REC NO:   [id number]                              ROOM:  ACCOUNT NO:   [de-identified]                           ADMIT DATE: 05/10/2021  PROVIDER:     Taryn Hall    DATE OF PROCEDURE:  05/10/2021    PREOPERATIVE DIAGNOSIS:  Degenerative arthritis, left knee. POSTOPERATIVE DIAGNOSIS:  Degenerative arthritis, left knee. PROCEDURE PERFORMED:  Left total knee replacement using a Maciel Persona  knee size 10 cemented femoral component, size G stemmed tibial plateau,  a 28-EE MC poly, and a 35-mm vitamin E patellar dome. SURGEON:  Dr. Yvonne Griffiths. ANESTHESIA:  General with regional block. DESCRIPTION OF PROCEDURE:  The patient was brought into the operating  room, placed in the supine position on the operating table. General  anesthetic was administered. The patient's left lower extremity was  prepped with ChloraPrep, draped in a sterile fashion. The patient  received Ancef. A 10-cm medial parapatellar quad-sparing incision was  made. This was taken down through the skin and subcu tissue. Arthrotomy was made along the medial border of the patellar tendon  through the medial retinaculum. Medial quad snip was made at the  superior pole of the patella. There was some generalized oozing through  the surgical case. A central drill hole was placed down the femoral  canal and distal femoral cut was set at 5 degrees of valgus. Femoral  tower marked for 3 degrees of external rotation and a size 10  four-in-one cutting block was then used. Surface of the tibia was then  osteotomized with the tibial jig. Remnants of menisci were removed. The posterior capsule was injected with 20 mL of the total joint  compound.   Undersurface of the patella was then osteotomized, elected to  go with a 35-mm patellar dome. Raynham hole was then placed. Knee was  then cleansed with the Simpulse with gentamicin in irrigation solution. It was elected to go with size G tibial plateau. Knee was well balanced  both in flexion and extension. Trial components were removed. The  tourniquet was elevated to 350 mmHg and the knee was then cleansed with  the Simpulse with gentamicin in irrigation solution. The tibial  component was then cemented in place. This was followed by cementing  the femoral component in place and finally patellar dome was cemented in  place. All excess cement was removed. A 12-mm MC poly was used. The  knee was stable both in flexion and extension. The patella was then  cemented in place using a 35-mm patellar dome. The tourniquet was then  released. The wound was irrigated out with the Simpulse with gentamicin  in the irrigation solution. TXA was used. The deep fascial layer was  then closed with #1 Vicryl figure-of-eight sutures over two Hemovac  drains. The deep fascial structures and the skin margins were injected  with the total joint compound. Subcu was closed with 2-0 Vicryl and  skin was reapproximated with 3-0 undyed Monocryl subcuticular stitch  followed by Steri-Strips in the skin, fluffs, ABDs, Kerlix roll, and Ace  wrap from toe to groin. The patient was transferred to Recovery in  stable condition. Estimated blood loss 850 mL. ART HENDERSON    D: 05/10/2021 15:25:34       T: 05/10/2021 15:30:12     [email or web address]  Job#: [id number]     Doc#: [id number]    CC:

## 2021-05-10 NOTE — ANESTHESIA POSTPROCEDURE EVALUATION
Department of Anesthesiology  Postprocedure Note    Patient: Valerie James  MRN: [id number]  YOB: 1965  Date of evaluation: 5/10/2021  Time:  4:00 PM     Procedure Summary     Date: 05/10/21 Room / Location: [address] [place] [address]    Anesthesia Start: 1207 Anesthesia Stop: 1518    Procedure: KNEE TOTAL ARTHROPLASTY (Left Knee) Diagnosis: (ARTHRITIS OF LEFT KNEE)    Surgeons: Lo Chiu MD Responsible Provider: AMADOR Poon CRNA    Anesthesia Type: general ASA Status: 3          Anesthesia Type: general    Kirsty Phase I: Kirsty Score: 8    Kirsty Phase II:      Last vitals: Reviewed and per EMR flowsheets.        Anesthesia Post Evaluation    Patient location during evaluation: PACU  Patient participation: complete - patient participated  Level of consciousness: awake and alert  Pain score: 1  Airway patency: patent  Nausea & Vomiting: no nausea and no vomiting  Complications: no  Cardiovascular status: blood pressure returned to baseline  Respiratory status: acceptable  Hydration status: euvolemic  Comments: Pt transferred to [facility] for planned admission

## 2021-05-10 NOTE — PROGRESS NOTES
Occupational Therapy   Occupational Therapy Initial Assessment  Date: 5/10/2021   Patient Name: Valerie James  MRN: [id number]     : 1965    Date of Service: 5/10/2021    Discharge Recommendations:  Continue to assess pending progress, Home with assist PRN       Assessment   Performance deficits / Impairments: Decreased functional mobility ; Decreased ADL status; Decreased strength;Decreased endurance  Assessment: Patient is a 63 y/o male seen s/p L TKA. He presents with deconditioning following surgery, requiring increased assistance with ADLs. Patient to benefit from OT services in order to address these deficits. Treatment Diagnosis: M62.81-Generalized Weakness  Decision Making: Low Complexity  OT Education: OT Role;Plan of Care  Barriers to Learning: none  REQUIRES OT FOLLOW UP: Yes  Activity Tolerance  Activity Tolerance: Patient Tolerated treatment well  Safety Devices  Safety Devices in place: Yes  Type of devices: Left in chair;Nurse notified;Call light within reach           Patient Diagnosis(es): There were no encounter diagnoses. has a past medical history of Aortic valve disorders, Atrial fibrillation ([address] Atrial flutter ([address] Encounter for cardioversion procedure, Encounter for cardioversion procedure, Frequent urination, H/O echocardiogram, H/O echocardiogram, Hemorrhage of rectum and anus, History of 24 hour EKG monitoring, History of echocardiogram, History of heart surgery, History of PFTs, Hx of transesophageal echocardiography (SKYLAR) for monitoring, Hypertension, Impaired fasting glucose, Knee pain, bilateral, Lipoma, Obesity (BMI 30.0-34.9), Obstructive sleep apnea (adult) (pediatric), Paroxysmal supraventricular tachycardia ([address] Sleep apnea, Tobacco use disorder, Urinary urgency, and Ventral hernia. has a past surgical history that includes Achilles tendon surgery (Right, 2010); Vasectomy; Foot surgery; ablation of dysrhythmic focus ();  Umbilical hernia repair (10/24/2018); repair umbilical YTDF,1+C/X,HTLSD (N/A, 10/24/2018); hernia repair (11/2018); Colonoscopy (06/13/2013); Cardiac surgery (2008); hernia repair (03/06/2020); hernia repair (N/A, 3/6/2020); and Total knee arthroplasty (Left, 05/10/2021). Treatment Diagnosis: M62.81-Generalized Weakness      Restrictions  Restrictions/Precautions  Restrictions/Precautions: Weight Bearing, General Precautions, Fall Risk  Lower Extremity Weight Bearing Restrictions  Left Lower Extremity Weight Bearing: Weight Bearing As Tolerated    Subjective   General  Chart Reviewed: Yes  Patient assessed for rehabilitation services?: Yes  Family / Caregiver Present: No  Referring Practitioner: Dr. Alexandrea Johnson  Diagnosis: Left TKA  Subjective  Subjective: Patient lying in bed upon arrival, agreeable to OT evaluation.   Patient Currently in Pain: Denies  Pain Assessment  Pain Assessment: 0-10  Pain Level: 0  Response to Pain Intervention: Patient Satisfied  Vital Signs  Temp: 97.8 °F (36.6 °C)  Temp Source: Temporal  Pulse: 79  Heart Rate Source: Monitor  Resp: 18  BP: 125/74  BP Location: Right upper arm  MAP (mmHg): 86  Patient Position: Semi fowlers  Level of Consciousness: Alert (0)  MEWS Score: 1  Patient Currently in Pain: Denies  Oxygen Therapy  SpO2: 96 %  Pulse Oximeter Device Mode: Continuous  Pulse Oximeter Device Location: Finger  O2 Device: None (Room air)  O2 Flow Rate (L/min): 1 L/min  Social/Functional History  Social/Functional History  Lives With: Significant other  Type of Home: House  Home Layout: One level  Home Access: Stairs to enter with rails  Entrance Stairs - Number of Steps: 1 step from garage  Entrance Stairs - Rails: Both  Bathroom Shower/Tub: Tub/Shower unit  Bathroom Equipment: Grab bars in shower  Home Equipment: Rolling walker, Cane  ADL Assistance: [address] [place] [address] Avenue: Independent  Homemaking Responsibilities: Yes  Ambulation Assistance: Independent  Transfer Assistance: Independent  Active : 7310)    Goals  Short term goals  Time Frame for Short term goals: 20 visits  Short term goal 1: Patient to complete 15 minutes ther-ex/ther-act to improve strength/endurance for ADLs. Short term goal 2: Patient to complete sinkside ADLs (UB dressing/grooming) with setup. Short term goal 3: Patient to complete LB dressing with use of AE prn with min A. Short term goal 4: Patient to demo G knowledge/understanding of education and D/C folder.        Therapy Time   Individual Concurrent Group Co-treatment   Time In 1669         Time Out 1730         Minutes [facility] [facility], OTR/L

## 2021-05-10 NOTE — PROGRESS NOTES
Discharge Criteria    Inpatients must meet Criteria 1 through 7. All other patients are either YES or N/A. If a NO is chosen then Anesthesia or Surgeon must be notified. 1.  Minimum 30 minutes after last dose of sedative medication, minimum 120 minutes after last dose of reversal agent. Yes      2. Systolic BP stable within 20 mmHg for 30 minutes & systolic BP between 90 & 314 or within 10 mmHg of baseline. Yes      3. Pulse between 60 and 100 or within 10 bpm of baseline. Yes      4. Spontaneous respiratory rate >/= 10 per minute. Yes      5. SaO2 >/= 95 or  >/= baseline. Yes      6. Able to cough and swallow or return to baseline function. Yes      7. Alert and oriented or return to baseline mental status. Yes      8. Demonstrates controlled, coordinated movements, ambulates with steady gait, or return to baseline activity function. N/A      9. Minimal or no pain or nausea, or at a level tolerable and acceptable to patient. Yes      10. Takes and retains oral fluids as allowed. N/A      11. Procedural / perioperative site stable. Minimal or no bleeding. Yes          12. If GI endoscopy procedure, minimal or no abdominal distention or passing flatus. N/A      13. Written discharge instructions and emergency telephone number provided. N/A      14. Accompanied by a responsible adult.     N/A

## 2021-05-10 NOTE — ANESTHESIA PROCEDURE NOTES
Peripheral Block    Patient location during procedure: pre-op  Start time: 5/10/2021 11:31 AM  End time: 5/10/2021 11:37 AM  Staffing  Performed: resident/CRNA   Resident/CRNA: AMADOR Crabtree CRNA  Preanesthetic Checklist  Completed: patient identified, IV checked, site marked, risks and benefits discussed, surgical consent, monitors and equipment checked, pre-op evaluation, timeout performed, anesthesia consent given, oxygen available and patient being monitored  Peripheral Block  Patient position: supine  Prep: ChloraPrep  Patient monitoring: continuous pulse ox and IV access  Block type: Saphenous and iPacks  Laterality: left  Injection technique: single-shot  Guidance: ultrasound guided  Local infiltration: ropivacaine and decadron  Infiltration strength: 0.5 %  Dose: 30 mL  Provider prep: mask and sterile gloves  Local infiltration: ropivacaine and decadron  Needle  Needle type: pajunk 80mm TAP.    Assessment  Injection assessment: negative aspiration for heme, no paresthesia on injection and local visualized surrounding nerve on ultrasound  Paresthesia pain: none  Slow fractionated injection: yes  Hemodynamics: stable  Reason for block: post-op pain management and at surgeon's request

## 2021-05-11 VITALS
WEIGHT: 246.91 LBS | BODY MASS INDEX: 39.68 KG/M2 | SYSTOLIC BLOOD PRESSURE: 113 MMHG | TEMPERATURE: 98.6 F | DIASTOLIC BLOOD PRESSURE: 76 MMHG | RESPIRATION RATE: 16 BRPM | OXYGEN SATURATION: 98 % | HEIGHT: 66 IN | HEART RATE: 89 BPM

## 2021-05-11 LAB
HCT VFR BLD CALC: 31.2 % (ref 40.7–50.3)
HEMOGLOBIN: 10.3 G/DL (ref 13–17)

## 2021-05-11 PROCEDURE — G0378 HOSPITAL OBSERVATION PER HR: HCPCS

## 2021-05-11 PROCEDURE — 97110 THERAPEUTIC EXERCISES: CPT

## 2021-05-11 PROCEDURE — 94761 N-INVAS EAR/PLS OXIMETRY MLT: CPT

## 2021-05-11 PROCEDURE — 97116 GAIT TRAINING THERAPY: CPT

## 2021-05-11 PROCEDURE — 2580000003 HC RX 258: Performed by: ORTHOPAEDIC SURGERY

## 2021-05-11 PROCEDURE — 97535 SELF CARE MNGMENT TRAINING: CPT

## 2021-05-11 PROCEDURE — 36415 COLL VENOUS BLD VENIPUNCTURE: CPT

## 2021-05-11 PROCEDURE — 6360000002 HC RX W HCPCS: Performed by: ORTHOPAEDIC SURGERY

## 2021-05-11 PROCEDURE — 96365 THER/PROPH/DIAG IV INF INIT: CPT

## 2021-05-11 PROCEDURE — 85014 HEMATOCRIT: CPT

## 2021-05-11 PROCEDURE — 85018 HEMOGLOBIN: CPT

## 2021-05-11 PROCEDURE — 6370000000 HC RX 637 (ALT 250 FOR IP): Performed by: ORTHOPAEDIC SURGERY

## 2021-05-11 PROCEDURE — 2500000003 HC RX 250 WO HCPCS: Performed by: ORTHOPAEDIC SURGERY

## 2021-05-11 RX ORDER — HYDROCODONE BITARTRATE AND ACETAMINOPHEN 5; 325 MG/1; MG/1
1-2 TABLET ORAL EVERY 4 HOURS PRN
Qty: 30 TABLET | Refills: 0 | Status: SHIPPED | OUTPATIENT
Start: 2021-05-11 | End: 2021-05-18

## 2021-05-11 RX ADMIN — TRANEXAMIC ACID 1000 MG: 1 INJECTION, SOLUTION INTRAVENOUS at 12:42

## 2021-05-11 RX ADMIN — ACETAMINOPHEN 650 MG: 325 TABLET ORAL at 09:29

## 2021-05-11 RX ADMIN — ACETAMINOPHEN 650 MG: 325 TABLET ORAL at 05:09

## 2021-05-11 RX ADMIN — CEFAZOLIN SODIUM 2000 MG: 2 SOLUTION INTRAVENOUS at 05:09

## 2021-05-11 RX ADMIN — METOPROLOL SUCCINATE 50 MG: 50 TABLET, EXTENDED RELEASE ORAL at 09:29

## 2021-05-11 RX ADMIN — DOCUSATE SODIUM 50 MG AND SENNOSIDES 8.6 MG 1 TABLET: 8.6; 5 TABLET, FILM COATED ORAL at 09:29

## 2021-05-11 RX ADMIN — TRANEXAMIC ACID 1300 MG: 650 TABLET ORAL at 01:35

## 2021-05-11 RX ADMIN — SODIUM CHLORIDE, PRESERVATIVE FREE 10 ML: 5 INJECTION INTRAVENOUS at 09:29

## 2021-05-11 RX ADMIN — HYDROCODONE BITARTRATE AND ACETAMINOPHEN 1 TABLET: 5; 325 TABLET ORAL at 15:05

## 2021-05-11 ASSESSMENT — PAIN SCALES - GENERAL
PAINLEVEL_OUTOF10: 0
PAINLEVEL_OUTOF10: 1
PAINLEVEL_OUTOF10: 0
PAINLEVEL_OUTOF10: 3

## 2021-05-11 NOTE — PROGRESS NOTES
Reviewed: Yes  Patient assessed for rehabilitation services?: Yes  Family / Caregiver Present: No  Referring Practitioner: Dr. Jessenia Alvarez  Diagnosis: Left TKA  Subjective  Subjective: Patient lying in bed upon arrival, agreeable to OT evaluation. Orientation     Objective    ADL  Equipment Provided: Sock aid(sock aid demionstration provided)  Feeding: Independent  Grooming: Supervision  UE Bathing: Stand by assistance  LE Bathing: Stand by assistance  UE Dressing: Stand by assistance  LE Dressing: Minimal assistance  Additional Comments: LBD while seated no use of reacher required        Balance  Sitting Balance: Supervision  Standing Balance: Contact guard assistance  Functional Mobility  Functional - Mobility Device: Rolling Walker  Bed mobility  Supine to Sit: Stand by assistance  Scooting: Stand by assistance  Transfers  Stand Step Transfers: Contact guard assistance  Stand Pivot Transfers: Contact guard assistance  Sit to stand: Contact guard assistance                                            Type of ROM/Therapeutic Exercise  Type of ROM/Therapeutic Exercise: AROM  Comment: Provided skilled  instruction in BUE strengthening using 2# reisitance in 4 planes to improve strength for ease during self care tasks,                    Plan   Plan  Times per week: 7  Times per day: Daily  Current Treatment Recommendations: Strengthening, Functional Mobility Training, Endurance Training, Self-Care / ADL, Safety Education & Training  G-Code     OutComes Score                                                  AM-PAC Score             Goals  Short term goals  Time Frame for Short term goals: 20 visits  Short term goal 1: Patient to complete 15 minutes ther-ex/ther-act to improve strength/endurance for ADLs. Short term goal 2: Patient to complete sinkside ADLs (UB dressing/grooming) with setup. Short term goal 3: Patient to complete LB dressing with use of AE prn with min A.   Short term goal 4: Patient to demo G knowledge/understanding of education and D/C folder.        Therapy Time   Individual Concurrent Group Co-treatment   Time In 0902         Time Out 0933         Minutes [address], CUEVAS/L

## 2021-05-11 NOTE — DISCHARGE SUMMARY
Physician Discharge Summary     Patient ID:  Lashay Vega  [id number]  1965    Admit date: 5/10/2021    Discharge date and time:      Admitting Physician: Everton Ward MD     Primary Care Physician: Everton Ward MD    Primary Discharge Diagnoses: DJD left knee     Additional Diagnoses:       Diagnosis Date    Aortic valve disorders     aortic regurg    Atrial fibrillation [facility])     Atrial flutter ([address]     Encounter for cardioversion procedure 05/05/2015    Dr Peter Vasquez Encounter for cardioversion procedure     [id number] [facility]/ Dr. Mara Frances, states 7 times, most recent 1/2020    Frequent urination     Pt states he's developed frequent urination with 3 times in last yr noting blood in urine as well.  H/O echocardiogram 12/04/2017    EF 87-34% Global LV systolic function appears preserved. Mildly increaseed LV wall thinckness with normal LV carvity size, No definite specific wall motion abnormalities identified, LA is moderately dilated (34-39) with LA volume index of 36ml/m2, Mild mitral and tricuspid regurgitation, moderate aortic regurgitation, Evidence of mild diastolic dysfunction seen    H/O echocardiogram 02/02/2021    EF 55% patient has sigmoid interventricular septim S/P aortic vlave repair with mild to mod aortic regurg Diastoligy cannot be properly assessed due to pts rhtyhm Aortic root is mildly dilated when corrected for body surface area    Hemorrhage of rectum and anus 2013    polypectomy    History of 24 hour EKG monitoring 04/03/2014    Unremarkable except for mild increased number of PVC's with 3 ventricular couplets, asymptomatic     History of echocardiogram 04/10/2014    EF 60%, mild to mod LVH, moderate AI    History of heart surgery 2008    enlarged aorta    History of PFTs 06/25/2015    Consistent with mild obstructive ventilatory impairment. Lung volumes are normal,except mild increase in residual volume,which could be suggestive of airway trapping. Diffusion capacity is normal.    Hx of transesophageal echocardiography (SKYLAR) for monitoring 05/05/2015    Dr Romero Vilchis    Hypertension     pcp dr Marcia Contreras Impaired fasting glucose     Knee pain, bilateral     Lipoma     Obesity (BMI 30.0-34. 9)     Obstructive sleep apnea (adult) (pediatric)     does not wear cpap like suppose to    Paroxysmal supraventricular tachycardia (HCC)     Sleep apnea     uses CPAP    Tobacco use disorder     Urinary urgency     Ventral hernia        Hospital Course: The patient was admitted for the above. He was treated with surgery and improved over the course of his hospitalization. Consults: none    Procedures: left knee total replacement    Complications: none    Discharge Condition: good    Post op anemia:  acute blood loss    Lab Results   Component Value Date    HGB 10.3 05/11/2021    HGB 13.8 04/26/2021    HGB 13.7 02/27/2021    HGB 14.5 02/26/2021    HGB 13.7 02/26/2021       Estimate Blood Loss:       BMI: Body mass index is 39.85 kg/m². obesity     Disposition: Home    Patient Instructions:    Teodoro Heimlich   Home Medication Instructions [id number]    Printed on:05/11/21 1225   Medication Information                      Acetaminophen (TYLENOL) 325 MG CAPS  Take 1-2 tablets by mouth every 4 hours as needed (pain)             aspirin  MG EC tablet  Take 1 tablet by mouth daily             furosemide (LASIX) 20 MG tablet  Take 1 tablet by mouth every other day             HYDROcodone-acetaminophen (NORCO) 5-325 MG per tablet  Take 1-2 tablets by mouth every 4 hours as needed for Pain for up to 7 days.              metoprolol succinate (TOPROL XL) 100 MG extended release tablet  Take 1 tablet by mouth once daily             Nutritional Supplements (VITAMIN D BOOSTER PO)  Take by mouth             rivaroxaban (XARELTO) 10 MG TABS tablet  Take 1 tablet by mouth daily               Activity: Physical Therapy  Wound Care: keep wound clean and dry, shower after 4 days if no drainage  Other:      Follow-up with Cynthia Mcgovern in 2 weeks.     Signed:  Cynthia Mgcovern M.D.  5/11/2021  12:25 PM

## 2021-05-11 NOTE — PROGRESS NOTES
9.8 04/26/2021     04/26/2021    CO2 28 04/26/2021       PROBLEM LIST:  Principal Problem:    S/P total knee replacement using cement, left  Active Problems:    Hypertension    A-fib (HCC)  Resolved Problems:    * No resolved hospital problems. *      ASSESSMENT / PLAN:  S/P total knee replacement using cement, left  · Continue current therapy  · Pain control  · Pt and ot  · dvt prophylaxis  · htn-well controlled  · afib- in sinus  · Nutrition status:  Well developed, well nourished with no malnutrition  · DVT prophylaxis: Xarelto   · High risk medications: xarelto   · Disposition:  Discharge plan is home    [address], M.D.  5/11/2021  8:02 AM

## 2021-05-11 NOTE — PROGRESS NOTES
Progress Note    Subjective:     Post-Operative Day: 1 Status Post left Total Knee Arthroplasty  Systemic or Specific Complaints:No Complaints    Objective:     Patient Vitals for the past 24 hrs:   BP Temp Temp src Pulse Resp SpO2 Height Weight   05/11/21 1057 106/62 98.4 °F (36.9 °C) Temporal 78 16 98 % -- --   05/11/21 0752 -- -- -- -- -- -- 5' 6\" (1.676 m) --   05/11/21 0637 112/62 98.7 °F (37.1 °C) Temporal 79 16 96 % -- --   05/11/21 0513 -- -- -- -- -- -- -- 246 lb 14.6 oz (112 kg)   05/11/21 0507 114/66 -- -- 81 16 96 % -- --   05/11/21 0135 102/66 98.6 °F (37 °C) Temporal 74 16 96 % -- --   05/10/21 2130 113/61 98.3 °F (36.8 °C) Temporal 84 16 97 % -- --   05/10/21 1930 100/64 98.2 °F (36.8 °C) Temporal 90 16 98 % -- --   05/10/21 1800 98/63 98.7 °F (37.1 °C) Temporal 89 16 94 % -- --   05/10/21 1700 125/74 97.8 °F (36.6 °C) Temporal 79 18 96 % -- --   05/10/21 1630 114/64 97.8 °F (36.6 °C) Temporal 73 18 96 % -- --   05/10/21 1600 116/67 97.7 °F (36.5 °C) Temporal 77 18 92 % -- --   05/10/21 1545 (!) 113/59 98 °F (36.7 °C) Temporal 75 18 96 % -- --   05/10/21 1530 114/61 -- -- 76 18 95 % -- --   05/10/21 1524 123/67 -- -- 70 18 94 % -- --   05/10/21 1519 127/68 -- -- 72 18 96 % -- --   05/10/21 1514 138/78 99 °F (37.2 °C) Temporal 78 18 96 % -- --       General: alert, appears stated age and cooperative   Wound: Wound clean and dry no evidence of infection. Motion: Painless Range of Motion   DVT Exam: No evidence of DVT seen on physical exam.       Knee swollen but thigh soft to palpation. Moving foot and ankle. Good distal pulses. Data Review  CBC:   Lab Results   Component Value Date    WBC 6.1 04/26/2021    RBC 4.58 04/26/2021    HGB 10.3 05/11/2021    HCT 31.2 05/11/2021     04/26/2021       Assessment:     Status Post left Total Knee Arthroplasty. Doing well postoperatively.      Plan:      1: Discharge today, Return to Clinic: 2 weeks :  2:  Continue Deep venous thrombosis prophylaxis  3:  Continue physical therapy  4:  Continue Pain Control

## 2021-05-11 NOTE — PLAN OF CARE
Problem: Falls - Risk of:  Goal: Will remain free from falls  Description: Will remain free from falls  5/11/2021 1122 by Reggie Spine  Outcome: Ongoing  Note: Call light in reach at all times, frequent checks, bed in lowest position, wheels of bed and chair locked, non skid footwear on, appropriate transfer techniques, personal items within reach, walkways free of obstructions, fall risk armband and sign displayed, Goldstein fall risk score per protocol. No falls this shift, will continue to monitor. Problem: Mobility - Impaired:  Goal: Mobility will improve  Description: Mobility will improve  5/11/2021 1122 by Reggie Spine  Outcome: Ongoing  Note: Patient assisted during ambulation and activities. Areas clear and free of obstacles. Alarms used as needed for safety. Patient reminded to call for help. Problem: Pain - Acute:  Goal: Pain level will decrease  Description: Pain level will decrease  5/11/2021 1122 by Reggie Spine  Outcome: Ongoing  Note: Assess pain every 4 hours and prn using a 0-10 scale. Teach patient adverse complication of uncontrolled pain. Plan patients day so that aggravating activities coincide with peak of analgesic. Patients should be medicated before procedures and activities that incite pain to prevent spikes in pain. Provide patient with distractions of choice such as TV, music or reading. Discuss with patient what a tolerable pain rating would be and work towards that and not just eliminating all pain.

## 2021-05-11 NOTE — PROGRESS NOTES
Comprehensive Nutrition Assessment    Type and Reason for Visit:  Initial(observation )    Nutrition Recommendations/Plan:   1. Continue current diet. 2. Encouraged protein foods to aid healing needs. 3. Encouraged 3 meals daily, having a meal within 1 hour of rising. Nutrition Assessment:  increased nutrient needs r/t acute injury/trauma aeb s/p L TKR. Weight gain trend noted. H/H are low, would be expected post op. Pt states of 35# weight gain since he stopped smoking in December. Has had knee pain for 12 years. Pt typically eating 2 meals per day at home, would like to lose weight. Pt encouraged to eat breakfast within 1 hour of rising and having 3 meals per day. Pt encouraged to use raw vegetables for snacks. Pt plans to be more active with knee replacement. Malnutrition Assessment:  Malnutrition Status:  No malnutrition    Context:  Acute Illness     Findings of the 6 clinical characteristics of malnutrition:  Energy Intake:  No significant decrease in energy intake  Weight Loss:  No significant weight loss     Body Fat Loss:  No significant body fat loss     Muscle Mass Loss:  No significant muscle mass loss    Fluid Accumulation:  No significant fluid accumulation     Strength:  Not Performed      Nutrition Related Findings:  appears well nourished, trace edema LLE      Wounds:  Surgical Incision       Current Nutrition Therapies:    DIET GENERAL;     Anthropometric Measures:  · Height: 5' 6\" (167.6 cm)  · Current Body Weight: 246 lb 14.6 oz (112 kg)   · Admission Body Weight: 242 lb 3.2 oz (109.9 kg)    · Usual Body Weight: 233 lb (105.7 kg)(2/3/21)     · Ideal Body Weight: 142 lbs; % Ideal Body Weight 173.9 %   · BMI: 39.9  · BMI Categories: Obese Class 2 (BMI 35.0 -39.9)       Nutrition Diagnosis:   · Increased nutrient needs related to acute injury/trauma as evidenced by wounds(s/p L TKR)      Nutrition Interventions:   Food and/or Nutrient Delivery:  Continue Current Diet  Nutrition Education/Counseling:  No recommendation at this time   Coordination of Nutrition Care:  Continue to monitor while inpatient    Goals:  PO > 75% of meals with good protein sources to aid healing needs       Hemoglobin/Hematocrit:    Lab Results   Component Value Date    HGB 10.3 05/11/2021    HCT 31.2 05/11/2021     Nutrition Monitoring and Evaluation:   Behavioral-Environmental Outcomes:  None Identified   Food/Nutrient Intake Outcomes:  Food and Nutrient Intake  Physical Signs/Symptoms Outcomes:  Biochemical Data, Skin, Weight     Discharge Planning:    Continue current diet     Electronically signed by Ashish Ac RD, LD on 5/11/21 at 7:56 AM EDT    Contact: [phone]

## 2021-05-11 NOTE — PROGRESS NOTES
Physical Therapy  Facility/Department: [facility] AT THE [facility] MED SURG  Daily Treatment Note  NAME: Lashay   : 1965  MRN: [id number]    Date of Service: 2021    Discharge Recommendations:  Continue to assess pending progress, Outpatient PT        Assessment   Treatment Diagnosis: Generalized weakness, decreased activity endurance  Prognosis: Good  PT Education: Goals; General Safety;Gait Training;PT Role;Home Exercise Program;Transfer Training  Patient Education: Pt educated on above with good understanding. Therapy discharge folder dispersed and reviewed with patient. REQUIRES PT FOLLOW UP: Yes  Activity Tolerance  Activity Tolerance: Patient Tolerated treatment well     Patient Diagnosis(es): There were no encounter diagnoses. has a past medical history of Aortic valve disorders, Atrial fibrillation ([address] Atrial flutter ([address] Encounter for cardioversion procedure, Encounter for cardioversion procedure, Frequent urination, H/O echocardiogram, H/O echocardiogram, Hemorrhage of rectum and anus, History of 24 hour EKG monitoring, History of echocardiogram, History of heart surgery, History of PFTs, Hx of transesophageal echocardiography (SKYLAR) for monitoring, Hypertension, Impaired fasting glucose, Knee pain, bilateral, Lipoma, Obesity (BMI 30.0-34.9), Obstructive sleep apnea (adult) (pediatric), Paroxysmal supraventricular tachycardia ([address] Sleep apnea, Tobacco use disorder, Urinary urgency, and Ventral hernia. has a past surgical history that includes Achilles tendon surgery (Right, ); Vasectomy; Foot surgery; ablation of dysrhythmic focus (); Umbilical hernia repair (); repair umbilical MTKJ,8+N/M,JON (N/A, 10/24/2018); hernia repair (2018); Colonoscopy (2013); Cardiac surgery (); hernia repair (2020); hernia repair (N/A, 3/6/2020); and Total knee arthroplasty (Left, 05/10/2021).     Restrictions  Restrictions/Precautions  Restrictions/Precautions: Weight Bearing, General Precautions, Fall Risk  Lower Extremity Weight Bearing Restrictions  Left Lower Extremity Weight Bearing: Weight Bearing As Tolerated  Subjective   General  Chart Reviewed: Yes  Response To Previous Treatment: Patient with no complaints from previous session. Family / Caregiver Present: No  Referring Practitioner: Emily Rosales MD  Subjective  Subjective: Patient denies any pain at this time. Orientation  Orientation  Overall Orientation Status: Within Functional Limits  Cognition      Objective   Bed mobility  Supine to Sit: Stand by assistance  Scooting: Stand by assistance  Transfers  Sit to Stand: Stand by assistance  Stand to sit: Stand by assistance  Ambulation  Ambulation?: Yes  WB Status: WBAT  Ambulation 1  Surface: level tile  Device: Rolling Walker  Assistance: Stand by assistance  Quality of Gait: Steady reciprocal gait pattern. Distance: 300'x1        Exercises  Hip Flexion: x10  Hip Abduction: x10  Knee Long Arc Quad: x10  Ankle Pumps: x10  Comments: Pt completed above listed ex in seated position. Review of HEP. G-Code     OutComes Score    AM-PAC Score    Goals  Short term goals  Time Frame for Short term goals: 10 days  Short term goal 1: Patient will ambulate 36' with FWW, supervision, without LOB  Short term goal 2: Patient will perform bed mobility and transfers with MI  Short term goal 3: Patient will tolerate 20-30 minutes of therex/act to improve endurance for ADLs  Short term goal 4: The patient will be able to negotiate 1 step for a safe return to home environment. Patient Goals   Patient goals : Improve mobility to return home    Plan    Plan  Times per week: 7  Times per day: Twice a day  Plan Comment: 1x/day on weekends  Safety Devices  Type of devices:  All fall risk precautions in place, Call light within reach, Gait belt, Left in chair, Nurse notified     Therapy Time   Individual Concurrent Group Co-treatment   Time In 0740         Time Out 0044 Minutes [address], [place]

## 2021-05-11 NOTE — PROGRESS NOTES
5/10/2021). Restrictions  Restrictions/Precautions  Restrictions/Precautions: Weight Bearing, General Precautions, Fall Risk  Lower Extremity Weight Bearing Restrictions  Left Lower Extremity Weight Bearing: Weight Bearing As Tolerated  Subjective   General  Chart Reviewed: Yes  Response To Previous Treatment: Patient with no complaints from previous session. Family / Caregiver Present: Yes  Referring Practitioner: Drucie Barthel, MD  Subjective  Subjective: Patient reports \"soreness\" at this time \"no pain\". Orientation  Orientation  Overall Orientation Status: Within Functional Limits  Cognition      Objective   Bed mobility  Supine to Sit: Stand by assistance  Scooting: Stand by assistance  Comment: Pt up in recliner upon arrival.  Transfers  Sit to Stand: Supervision  Stand to sit: Supervision  Ambulation  Ambulation?: Yes  WB Status: WBAT  Ambulation 1  Surface: level tile  Device: Rolling Walker  Assistance: Supervision  Quality of Gait: Steady reciprocal gait pattern. Distance: 200x1        Exercises  Comments: Pt declined ex at this time, reports that he has been working on them off and on today. Reviewed HEP with patient. G-Code     OutComes Score    AM-PAC Score    Goals  Short term goals  Time Frame for Short term goals: 10 days  Short term goal 1: Patient will ambulate 36' with FWW, supervision, without LOB  Short term goal 2: Patient will perform bed mobility and transfers with MI  Short term goal 3: Patient will tolerate 20-30 minutes of therex/act to improve endurance for ADLs  Short term goal 4: The patient will be able to negotiate 1 step for a safe return to home environment. Patient Goals   Patient goals : Improve mobility to return home    Plan    Plan  Times per week: 7  Times per day: Twice a day  Plan Comment: 1x/day on weekends  Safety Devices  Type of devices:  All fall risk precautions in place, Call light within reach, Gait belt, Left in chair, Nurse notified     Therapy Time   Individual Concurrent Group Co-treatment   Time In 1312         Time Out 1329         Minutes [address], [place]

## 2021-05-12 ENCOUNTER — HOSPITAL ENCOUNTER (OUTPATIENT)
Dept: PHYSICAL THERAPY | Age: 56
Setting detail: THERAPIES SERIES
Discharge: HOME | End: 2021-05-12
Payer: OTHER GOVERNMENT

## 2021-05-12 PROCEDURE — 97161 PT EVAL LOW COMPLEX 20 MIN: CPT

## 2021-05-12 PROCEDURE — 97110 THERAPEUTIC EXERCISES: CPT

## 2021-05-12 ASSESSMENT — PAIN SCALES - GENERAL: PAINLEVEL_OUTOF10: 3

## 2021-05-12 ASSESSMENT — PAIN DESCRIPTION - ORIENTATION: ORIENTATION: LEFT

## 2021-05-12 ASSESSMENT — PAIN DESCRIPTION - LOCATION: LOCATION: KNEE

## 2021-05-12 NOTE — PROGRESS NOTES
Phone: [address]          Fax: [phone]                      Outpatient Physical Therapy                                                                      Evaluation  Date: 2021  Patient: Gila Lyons  : 1965  CSN #: [id number]  Referring Practitioner: Dr. Leeanna Mcmanus    Referral Date : 21     Medical Diagnosis: S/p L TKA (I93.122)    Treatment Diagnosis: L knee pain, difficulty walking  Onset Date: 05/10/21  PT Insurance Information: [employer]  Total # of Visits Approved: 18   Total # of Visits to Date: 1  No Show: 0  Canceled Appointment: 0     Subjective  Subjective: Pt had L TKA on Monday, was D/C home yesterday. Pt states he was sore yesterday after having physical therapy 2x and OT 1x in the hospital. Pt states he slept pretty well last night, but wasn't sleeping well at the hospital. Pt states he has been taking pain meds as prescribed at took it just before therapy. Additional Pertinent Hx: Heart problems (A-fib)  Pain Screening  Patient Currently in Pain: Yes  Pain Assessment  Pain Assessment: 0-10  Pain Level: 3  Pain Location: Knee  Pain Orientation: Left  Pain Descriptors: Aching, Constant          Objective     Observation/Palpation  Observation: No observed drainage over majority of bandaging covering incision. Unable to visualize bandage over drain port d/t clothing restrictions. Edema:  Mod edema L knee    LLE General AROM: 2-88*          Strength LLE  Comment: Hip flex grossly 4-/5, quad grossly 3+/5, HS 4-/5       Exercises:  Exercise 1: HEP: heel prop, quad sets, heel slides, LAQ, ankle pumps  Exercise 2: Bike x6mins lv 1    Modalities:  Cryotherapy (Minutes\Location): 15mins L knee     Functional Outcome Measures     Any of your usual work, housework, or school activities: Quite a Bit of Difficulty  Your usual hobbies, recreational, or sporting activities: Extreme Difficulty or Unable to Perform Activity  Getting into or out of the bath: Extreme Difficulty or Unable to Perform Activity  Walking between rooms: A Little Bit of Difficulty  Putting on your shoes or socks: Moderate Difficulty  Squatting: Quite a Bit of Difficulty  Lifting an object, like a bag of groceries from the floor: Moderate Difficulty  Performing light activities around your home: A Little Bit of Difficulty  Performing heavy activities around your home: Quite a Bit of Difficulty  Getting into or out of a car: Moderate Difficulty  Walking 2 blocks: Quite a Bit of Difficulty  Walking a mile: Extreme Difficulty or Unable to Perform Activity  Going up or down 10 stairs (about 1 flight of stairs): Quite a Bit of Difficulty  Standing for 1 hour: Quite a Bit of Difficulty  Sitting for 1 hour: A Little Bit of Difficulty  Running on even ground : Extreme Difficulty or Unable to Perform Activity  Running on uneven ground : Extreme Difficulty or Unable to Perform Activity  Making sharp turns while running fast : Extreme Difficulty or Unable to Perform Activity  Hopping: Extreme Difficulty or Unable to Perform Activity  Rolling over in bed: Moderate Difficulty  LEFS Total Score: 23         Assessment  Assessment: Pt is a 63 y/o male who presents to PT s/p L TKA. Pt currently ambulating with RW and mod antalgia. Pt demonstrates decreased L knee ROM, which was measured 2-88* at time of eval. Pt demonstrates decreased LLE strength and increased edema in L knee compared bilaterally. Pt will benefit from skilled PT services to address the above impairments and to work toward the established functional goals. Prognosis: Good        Decision Making: Low Complexity    Patient Education  *Patient Education: PT POC and HEP. Pt verbalized/demonstrated good understanding:     [X] Yes         [] No, pt required further clarification. Goals  Short term goals  Time Frame for Short term goals: 3 weeks  Short term goal 1: Pt will initiate HEP.   Short term goal 2: Pt will improve L knee flex ROM to >/= 95* to progress functional mobility. Short term goal 3: Pt will ambulate household distances with SPC to progress functional ambulation. Long term goals  Time Frame for Long term goals : 6 weeks  Long term goal 1: Pt will be independent and compliant with HEP. Long term goal 2: Pt will improve L knee ROM to 0-115* to improve gait quality. Long term goal 3: Pt will improve L hip and knee strength grossly to 4+/5 for ease of stair navigation and squatting tasks. Long term goal 4: Pt will ambulate household and community distances without AD and with min/no antalgia to restore toward PLOF.       Patient goals : \"Full motion of knee\"        Minutes Tracking:  Time In: 0957  Time Out: 1050  Minutes: 53  Timed Code Treatment Minutes: [facility], [address] [place] [address], DPT    5/12/2021

## 2021-05-12 NOTE — PLAN OF CARE
per week: 3  Plan weeks: 6  [x] HP/CP      [x] Electrical Stim   [x] Therapeutic Exercise      [x] Gait Training  [] Aquatics   [] Ultrasound         [x] Patient Education/HEP   [x] Manual Therapy  [] Traction    [x] Neuro-reg        [x] Soft Tissue Mobs            [] Home TENS  [] Iontophoresis    [] Orthotic casting/fitting      [] Dry Needling             Electronically signed by: Debi Sanchez PT, DPT    Date: 5/12/2021      ______________________________________ Date: 5/12/2021   Physician Signature

## 2021-05-14 ENCOUNTER — HOSPITAL ENCOUNTER (OUTPATIENT)
Dept: PHYSICAL THERAPY | Age: 56
Setting detail: THERAPIES SERIES
Discharge: HOME | End: 2021-05-14
Payer: OTHER GOVERNMENT

## 2021-05-14 PROCEDURE — 97140 MANUAL THERAPY 1/> REGIONS: CPT

## 2021-05-14 PROCEDURE — 97110 THERAPEUTIC EXERCISES: CPT

## 2021-05-14 NOTE — PROGRESS NOTES
Phone: Jimy           Fax: [phone]                           Outpatient Physical Therapy                                                                            Daily Note    Patient: Lisandra Palmer : 1965  CSN #: [id number]   Referring Practitioner:  Dr. Brody Curtis    Referral Date : 21     Date: 2021    Diagnosis: S/p L TKA (A43.344)  Treatment Diagnosis: L knee pain, difficulty walking    Onset Date: 05/10/21  PT Insurance Information: [facility] ASA Aetna  Total # of Visits Approved: 18 Per Physician Order  Total # of Visits to Date: 2  No Show: 0  Canceled Appointment: 0      Pre-Treatment Pain: 0 /10  Subjective: Pt arrives to session with reports of aching but no pain in L knee. Pt ambulates with even B step length/height and appropriate heel strike with use of SPC. Pt states he mainly uses AD with community amb and goes without at home. Pt reports he's \"very pleased\" with progress so far. Exercises:  Exercise 1: HEP: heel prop, quad sets, heel slides, LAQ, ankle pumps  Exercise 2: Bike 10 mins lv 1  Exercise 3: FSU x10  Exercise 4: TKE BTB x10 3 sec hold  Exercise 5: Step stretches 4 x15\" flex/ext  Exercise 6: Sit to stand x10  Exercise 7: Heel slide with strap/quad set 10\" x10  Exercise 8: heel prop x2 min    Manual:  PROM: L knee flex supine    Modalities:  Cryotherapy (Minutes\Location): 15mins L knee       Assessment  Assessment: Tolerated closed chain strengthening well. Pt educated on continuing to wear compressive stockings d/t pt not having on today and increased edema in LLE. L knee flex AAROM= 109*. Ext lacking 1-2*. He was encouraged to cont HEP. WIll progress.     Activity Tolerance  Activity Tolerance: Patient Tolerated treatment well    Patient Education  Compression socks, HEP, exercise rationale, ice and elevation at home  Pt verbalized/demonstrated good understanding:     [x] Yes         [] No, pt required further clarification. Post Treatment Pain: 0 /10      Plan  Times per week: 3  Plan weeks: 6      Goals  (Total # of Visits to Date: 2)      Short term goals  Time Frame for Short term goals: 3 weeks  Short term goal 1: Pt will initiate HEP. -met  Short term goal 2: Pt will improve L knee flex ROM to >/= 95* to progress functional mobility. -achieved 109* AAROM  Short term goal 3: Pt will ambulate household distances with SPC to progress functional ambulation. -met    Long term goals  Time Frame for Long term goals : 6 weeks  Long term goal 1: Pt will be independent and compliant with HEP. Long term goal 2: Pt will improve L knee ROM to 0-115* to improve gait quality. Long term goal 3: Pt will improve L hip and knee strength grossly to 4+/5 for ease of stair navigation and squatting tasks. Long term goal 4: Pt will ambulate household and community distances without AD and with min/no antalgia to restore toward PLOF.     Minutes Tracking:  Time In: 4882  Time Out: [address]  Minutes: Nahid Kothari PTA     Date: 5/14/2021

## 2021-05-18 ENCOUNTER — HOSPITAL ENCOUNTER (OUTPATIENT)
Dept: PHYSICAL THERAPY | Age: 56
Setting detail: THERAPIES SERIES
Discharge: HOME | End: 2021-05-18
Payer: OTHER GOVERNMENT

## 2021-05-18 PROCEDURE — G0283 ELEC STIM OTHER THAN WOUND: HCPCS

## 2021-05-18 PROCEDURE — 97110 THERAPEUTIC EXERCISES: CPT

## 2021-05-18 NOTE — PROGRESS NOTES
Phone: Jimy           Fax: [phone]                           Outpatient Physical Therapy                                                                            Daily Note    Patient: Nimo Melvin : 1965  CSN #: [id number]   Referring Practitioner:  Dr. Racquel Smith    Referral Date : 21     Date: 2021    Diagnosis: S/p L TKA (T85.651)  Treatment Diagnosis: L knee pain, difficulty walking    Onset Date: 05/10/21  PT Insurance Information: [facility] ASA Aetna  Total # of Visits Approved: 18 Per Physician Order  Total # of Visits to Date: 3  No Show: 0  Canceled Appointment: 0      Pre-Treatment Pain:  1/10  Subjective: Patient reports a little soreness, but reports pain is minimal.  He reports 1/10 pain coming into therapy. He ambulates into the clinic with a SC. Exercises:  Exercise 2: Bike 6 mins lv 1  Exercise 4: TKE BTB 2x10 3 sec hold  Exercise 7: Heel slide with strap/quad set 10\" x10  Exercise 9: SLR 2x6  Exercise 10: YTB seated hamstring curl 2x10/LAQ 2x10    Modalities:  Cryotherapy (Minutes\Location): 15mins L knee  E-stim (parameters): IFC with CP x15 minutes to decrease pain and edema     Assessment  Assessment: Patient progressed with LE strengthening exercises to work toward his goals. Patient reported some soreness with hamstring curls. Used E-Stim and ice post tx to decrease soreness. Patient's supine knee ROM measured: 0-103*. Activity Tolerance  Activity Tolerance: Patient Tolerated treatment well    Patient Education  Patient Education: Continue HEP  Pt verbalized/demonstrated good understanding:     [x] Yes         [] No, pt required further clarification. Post Treatment Pain:  2/10      Plan  Times per week: 3  Plan weeks: 6      Goals  (Total # of Visits to Date: 3)      Short term goals  Time Frame for Short term goals: 3 weeks  Short term goal 1: Pt will initiate HEP.  -met  Short term goal 2: Pt will improve L knee flex ROM to >/= 95* to progress functional mobility. -achieved 109* AAROM  Short term goal 3: Pt will ambulate household distances with SPC to progress functional ambulation. -met    Long term goals  Time Frame for Long term goals : 6 weeks  Long term goal 1: Pt will be independent and compliant with HEP. Long term goal 2: Pt will improve L knee ROM to 0-115* to improve gait quality. Long term goal 3: Pt will improve L hip and knee strength grossly to 4+/5 for ease of stair navigation and squatting tasks. Long term goal 4: Pt will ambulate household and community distances without AD and with min/no antalgia to restore toward PLOF.     Minutes Tracking:  Time In: 0845  Time Out: 0935  Minutes: 50  Timed Code Treatment Minutes: Nany 21, [place], DPT     Date: 5/18/2021

## 2021-05-18 NOTE — PROGRESS NOTES
INSURANCE VERIFICATION    [employer]    60 visits per calendar year PT/OT/ST combined    Deductible: $1500.00 w/ $1005.24 met as of date    Covered at 95% AFTER Deductible    OOP: $5000.00 w/ $2216.53 met as of date    Ref#[id number] w/ Michael Jacinto @ [phone]    Electronically signed by Mary Gutierrez on 5/18/2021 at 12:11 PM

## 2021-05-19 ENCOUNTER — HOSPITAL ENCOUNTER (OUTPATIENT)
Dept: PHYSICAL THERAPY | Age: 56
Setting detail: THERAPIES SERIES
Discharge: HOME | End: 2021-05-19
Payer: OTHER GOVERNMENT

## 2021-05-19 PROCEDURE — 97110 THERAPEUTIC EXERCISES: CPT

## 2021-05-19 PROCEDURE — 97140 MANUAL THERAPY 1/> REGIONS: CPT

## 2021-05-19 PROCEDURE — G0283 ELEC STIM OTHER THAN WOUND: HCPCS

## 2021-05-21 ENCOUNTER — HOSPITAL ENCOUNTER (OUTPATIENT)
Dept: PHYSICAL THERAPY | Age: 56
Setting detail: THERAPIES SERIES
Discharge: HOME | End: 2021-05-21
Payer: OTHER GOVERNMENT

## 2021-05-21 PROCEDURE — G0283 ELEC STIM OTHER THAN WOUND: HCPCS

## 2021-05-21 PROCEDURE — 97110 THERAPEUTIC EXERCISES: CPT

## 2021-05-21 PROCEDURE — 97530 THERAPEUTIC ACTIVITIES: CPT

## 2021-05-21 NOTE — PROGRESS NOTES
Phone: Jimy           Fax: [phone]                           Outpatient Physical Therapy                                                                            Daily Note    Patient: Kaveh Finch : 1965  CSN #: [id number]   Referring Practitioner:  Dr. Daphne Baird    Referral Date : 21     Date: 2021    Diagnosis: S/p L TKA (W70.854)  Treatment Diagnosis: L knee pain, difficulty walking    Onset Date: 05/10/21  PT Insurance Information: [employer]  Total # of Visits Approved: 18 Per Physician Order  Total # of Visits to Date: 5  No Show: 0  Canceled Appointment: 0      Pre-Treatment Pain:  2/10  Subjective: Patient denies pain, but reports his knee stiffness. He reports 2/10 knee soreness coming into therapy today. Exercises:  Exercise 2: Bike 8 mins lv 2  Exercise 3: FSU x8  Exercise 4: TKE BTB 2x12 3 sec hold  Exercise 6: Sit to stand x10  Exercise 10: BTB seated hamstring curl 2x12/LAQ 2x10 with 2# ankle weight  Exercise 11: Maurisio step over: forward x10 ea  Exercise 12: Retro walkout 15# x5    Modalities:  Cryotherapy (Minutes\Location): 15mins L knee  E-stim (parameters): IFC with CP x15 minutes to decrease pain and edema     Assessment  Assessment: Patient continued to progress with LE strengthening and endurance exercises to work toward his goals. E-Stim and ice applied post tx to decrease pain and edema. Activity Tolerance  Activity Tolerance: Patient Tolerated treatment well    Patient Education  Patient Education: Continue HEP  Pt verbalized/demonstrated good understanding:     [x] Yes         [] No, pt required further clarification. Post Treatment Pain:  2/10      Plan  Times per week: 3  Plan weeks: 6      Goals  (Total # of Visits to Date: 5)      Short term goals  Time Frame for Short term goals: 3 weeks  Short term goal 1: Pt will initiate HEP.  -met  Short term goal 2: Pt will improve L knee flex ROM to >/= 95* to progress functional mobility. -achieved 109* AAROM  Short term goal 3: Pt will ambulate household distances with SPC to progress functional ambulation. -met    Long term goals  Time Frame for Long term goals : 6 weeks  Long term goal 1: Pt will be independent and compliant with HEP. Long term goal 2: Pt will improve L knee ROM to 0-115* to improve gait quality. Long term goal 3: Pt will improve L hip and knee strength grossly to 4+/5 for ease of stair navigation and squatting tasks. Long term goal 4: Pt will ambulate household and community distances without AD and with min/no antalgia to restore toward PLOF.     Minutes Tracking:  Time In: 0915  Time Out: 1005  Minutes: 50  Timed Code Treatment Minutes: Nany 21, [place], DPT     Date: 5/21/2021

## 2021-05-24 ENCOUNTER — HOSPITAL ENCOUNTER (OUTPATIENT)
Dept: PHYSICAL THERAPY | Age: 56
Setting detail: THERAPIES SERIES
Discharge: HOME | End: 2021-05-24
Payer: OTHER GOVERNMENT

## 2021-05-24 PROCEDURE — G0283 ELEC STIM OTHER THAN WOUND: HCPCS

## 2021-05-24 PROCEDURE — 97110 THERAPEUTIC EXERCISES: CPT

## 2021-05-24 NOTE — PROGRESS NOTES
Tolerance: Patient Tolerated treatment well    Patient Education  Patient Education: Exercise progressions and wearing compression stockings and elevating for edema  Pt verbalized/demonstrated good understanding:     [x] Yes         [] No, pt required further clarification. Post Treatment Pain:  0/10      Plan  Times per week: 3  Plan weeks: 6      Goals  (Total # of Visits to Date: 6)      Short term goals  Time Frame for Short term goals: 3 weeks  Short term goal 1: Pt will initiate HEP. -met  Short term goal 2: Pt will improve L knee flex ROM to >/= 95* to progress functional mobility. - met  Short term goal 3: Pt will ambulate household distances with SPC to progress functional ambulation. -met    Long term goals  Time Frame for Long term goals : 6 weeks  Long term goal 1: Pt will be independent and compliant with HEP. Long term goal 2: Pt will improve L knee ROM to 0-115* to improve gait quality. - met  Long term goal 3: Pt will improve L hip and knee strength grossly to 4+/5 for ease of stair navigation and squatting tasks. Long term goal 4: Pt will ambulate household and community distances without AD and with min/no antalgia to restore toward PLOF.     Minutes Tracking:  Time In: 1005  Time Out: 1110  Minutes: 65  Timed Code Treatment Minutes: [address] [facility], [address], [facility]      Date: 5/24/2021

## 2021-05-25 ENCOUNTER — OFFICE VISIT (OUTPATIENT)
Dept: PRIMARY CARE CLINIC | Age: 56
End: 2021-05-25
Payer: OTHER GOVERNMENT

## 2021-05-25 VITALS
WEIGHT: 243.8 LBS | BODY MASS INDEX: 39.35 KG/M2 | SYSTOLIC BLOOD PRESSURE: 122 MMHG | DIASTOLIC BLOOD PRESSURE: 66 MMHG | HEART RATE: 72 BPM | RESPIRATION RATE: 16 BRPM

## 2021-05-25 DIAGNOSIS — Z96.652 S/P TOTAL KNEE ARTHROPLASTY, LEFT: ICD-10-CM

## 2021-05-25 DIAGNOSIS — I10 ESSENTIAL HYPERTENSION: Primary | ICD-10-CM

## 2021-05-25 DIAGNOSIS — I50.32 CHRONIC DIASTOLIC HEART FAILURE (HCC): ICD-10-CM

## 2021-05-25 DIAGNOSIS — I48.91 ATRIAL FIBRILLATION, UNSPECIFIED TYPE (HCC): ICD-10-CM

## 2021-05-25 PROCEDURE — 99214 OFFICE O/P EST MOD 30 MIN: CPT | Performed by: FAMILY MEDICINE

## 2021-05-25 RX ORDER — HYDROCODONE BITARTRATE AND ACETAMINOPHEN 5; 325 MG/1; MG/1
TABLET ORAL
COMMUNITY
Start: 2021-05-20 | End: 2022-04-21

## 2021-05-25 RX ORDER — FUROSEMIDE 20 MG/1
20 TABLET ORAL EVERY OTHER DAY
Qty: 30 TABLET | Refills: 0 | Status: SHIPPED | OUTPATIENT
Start: 2021-05-25 | End: 2021-05-27 | Stop reason: ALTCHOICE

## 2021-05-25 SDOH — ECONOMIC STABILITY: TRANSPORTATION INSECURITY
IN THE PAST 12 MONTHS, HAS LACK OF TRANSPORTATION KEPT YOU FROM MEETINGS, WORK, OR FROM GETTING THINGS NEEDED FOR DAILY LIVING?: NO

## 2021-05-25 SDOH — ECONOMIC STABILITY: FOOD INSECURITY: WITHIN THE PAST 12 MONTHS, YOU WORRIED THAT YOUR FOOD WOULD RUN OUT BEFORE YOU GOT MONEY TO BUY MORE.: NEVER TRUE

## 2021-05-25 SDOH — ECONOMIC STABILITY: TRANSPORTATION INSECURITY
IN THE PAST 12 MONTHS, HAS THE LACK OF TRANSPORTATION KEPT YOU FROM MEDICAL APPOINTMENTS OR FROM GETTING MEDICATIONS?: NO

## 2021-05-25 SDOH — ECONOMIC STABILITY: FOOD INSECURITY: WITHIN THE PAST 12 MONTHS, THE FOOD YOU BOUGHT JUST DIDN'T LAST AND YOU DIDN'T HAVE MONEY TO GET MORE.: NEVER TRUE

## 2021-05-25 NOTE — PROGRESS NOTES
Patient is here for a follow up from having a total left knee replacement on 05/10/2021. He has been going to physical therapy since 5/12/2021. They said that he is doing well and is ahead of schedule. He said that he is moving around well, and is not having to use a cane or walker. Patient is taking Norco PRN. He said that he takes him if he is sitting around, or before bed because that's when the pain begins. Patient states that he has also been super tired the past few days. He said that he thinks he was dehydrated because he was only drinking one water bottle a day when he is used to drink 6 when he was working. He said that he mixed some Pedialyte and thinks he is starting to feel better. Patient states his blood pressure monitor has been reading high. He said no matter what doctors office he is at the BP reading is normal and then he takes it at home and its always high. CURRENT ALLERGIES: Patient has no known allergies. PAST MEDICAL HISTORY:   Past Medical History:   Diagnosis Date    Aortic valve disorders     aortic regurg    Atrial fibrillation (HCC)     Atrial flutter ([address]     Encounter for cardioversion procedure 05/05/2015    Dr Peter Vasquez Encounter for cardioversion procedure     [facility]/ Dr. Mara Frances, states 7 times, most recent 1/2020    Frequent urination     Pt states he's developed frequent urination with 3 times in last yr noting blood in urine as well.  H/O echocardiogram 12/04/2017    EF 61-19% Global LV systolic function appears preserved.  Mildly increaseed LV wall thinckness with normal LV carvity size, No definite specific wall motion abnormalities identified, LA is moderately dilated (34-39) with LA volume index of 36ml/m2, Mild mitral and tricuspid regurgitation, moderate aortic regurgitation, Evidence of mild diastolic dysfunction seen    H/O echocardiogram 02/02/2021    EF 55% patient has sigmoid interventricular septim S/P aortic vlave Gastrointestinal: negative for abd pain, nausea, vomiting, diarrhea , constipation,hemetemesis,angelica,blood in stool  Genitourinary: negative for dysuria, urgency ,frequency,hematuria  Integument/breast: negative for skin rash or lesions  Neurological: negative for unilateral weakness, numbness or tingling. Skeletal Muscular: lt knee joint pain and swelling improving,back pain    Subjective:  Vitals:    05/25/21 1521   BP: 122/66   Pulse: 72   Resp: 16         Exam:  GEN:   A & O x3, no apparent distress  EYES: No gross abnormalities. ENT:ENT exam normal, no neck nodes or sinus tenderness  NECK: normal, supple, no lymphadenopathy,  no carotid bruits  PULM: clear to auscultation bilaterally- no wheezes, rales or rhonchi, normal air movement, no respiratory distress  COR: regular rate & rhythm, no gallops and 2/6 murmer  ABD:  soft, non-tender, non-distended, normal bowel sounds, no masses or organomegaly  : deferred  EXT: Extremities: + 2 pedal pulses, has bilat leg edema ,no calf tenderness, and warm to touch. Normal nails without lesions  Skeletomuscular: has minimal swelling of lt knee  NEURO: Motor and sensory grossly intact  SKIN:  No skin lesions or rashes      Assessment:  1. Essential hypertension    2. Atrial fibrillation, unspecified type ([address]    3. S/P total knee arthroplasty, left    4. Chronic diastolic heart failure (HCC)      Labs reviewed: none  Other tests reviewed:   Socioeconomic determinants : none    Plan:  Orders Placed This Encounter   Procedures    Basic Metabolic Panel     Standing Status:   Future     Standing Expiration Date:   5/25/2022     No follow-ups on file.    Orders Placed This Encounter   Medications    furosemide (LASIX) 20 MG tablet     Sig: Take 1 tablet by mouth every other day     Dispense:  30 tablet     Refill:  0   No NSAIDS  Salt restriction in diet  Support stocking  Medication directions and side effects discussed      Electronically signed by Crow Miller MD on 5/25/2021 at 3:38 PM         Dennise Brown MD, MD

## 2021-05-26 ENCOUNTER — HOSPITAL ENCOUNTER (OUTPATIENT)
Dept: PHYSICAL THERAPY | Age: 56
Setting detail: THERAPIES SERIES
Discharge: HOME | End: 2021-05-26
Payer: OTHER GOVERNMENT

## 2021-05-26 PROCEDURE — 97140 MANUAL THERAPY 1/> REGIONS: CPT

## 2021-05-26 PROCEDURE — G0283 ELEC STIM OTHER THAN WOUND: HCPCS

## 2021-05-26 PROCEDURE — 97110 THERAPEUTIC EXERCISES: CPT

## 2021-05-26 NOTE — PROGRESS NOTES
Phone: Jimy           Fax: [phone]                           Outpatient Physical Therapy                                                                            Daily Note    Patient: Nabil Olea : 1965  CSN #: [id number]   Referring Practitioner:  Dr. Aung Muse    Referral Date : 21     Date: 2021    Diagnosis: S/p L TKA (K73.488)  Treatment Diagnosis: L knee pain, difficulty walking    Onset Date: 05/10/21  PT Insurance Information: [employer]  Total # of Visits Approved: 18 Per Physician Order  Total # of Visits to Date: 7  No Show: 0  Canceled Appointment: 0      Pre-Treatment Pain:  2/10  Subjective: Pt reports some stiffness over his L knee cap. States he had two Dr. Westley Watson yesterday, reports surgeon was very pleased with progress. States he didn't complete HEP yesterday d/t busy schedule. States Dr Shlomo Reid believes he is partially dehydrated and will have blood work done in a few weeks. States he was able to get in/out of the bathtub without hitting his foot on the side. Exercises:  Exercise 1: HEP: heel prop, quad sets, heel slides, LAQ, ankle pumps  Exercise 2: Bike 10 mins lv 3  Exercise 3: FSU/LSU 6in x15 ea/ step down 4\" x10  Exercise 5: Step stretches 3x30\" flex/ext  Exercise 6: Sit to stand x10 5#  Exercise 7: Heel slide with strap/quad set 10\" x10  Exercise 10: BTB seated hamstring curl 2x12/LAQ 2x10 with 3# ankle weight  Exercise 12: Retro walkout 15# x5    Manual:  PROM: L knee flex prone  Soft Tissue Mobalization: Light efflurage to L knee to decrease edema    Modalities:  Cryotherapy (Minutes\Location): 15mins L knee  E-stim (parameters): IFC with CP x15 minutes to decrease pain and edema       Assessment  Assessment: Progressed reps today to progress strength. Pt reports he was able to ascend 3 steps reciprocally yesterday. L knee AAROM flex= 118* today. Light efflurage to L knee to decrease edema/pain.    Will

## 2021-05-27 ENCOUNTER — HOSPITAL ENCOUNTER (OUTPATIENT)
Dept: CT IMAGING | Age: 56
Discharge: HOME | End: 2021-05-29
Payer: OTHER GOVERNMENT

## 2021-05-27 ENCOUNTER — HOSPITAL ENCOUNTER (OUTPATIENT)
Dept: PHYSICAL THERAPY | Age: 56
Setting detail: THERAPIES SERIES
Discharge: HOME | End: 2021-05-27
Payer: OTHER GOVERNMENT

## 2021-05-27 ENCOUNTER — TELEPHONE (OUTPATIENT)
Dept: PRIMARY CARE CLINIC | Age: 56
End: 2021-05-27

## 2021-05-27 ENCOUNTER — NURSE ONLY (OUTPATIENT)
Dept: CARDIOLOGY | Age: 56
End: 2021-05-27
Payer: OTHER GOVERNMENT

## 2021-05-27 ENCOUNTER — HOSPITAL ENCOUNTER (OUTPATIENT)
Age: 56
Discharge: HOME | End: 2021-05-27
Payer: OTHER GOVERNMENT

## 2021-05-27 ENCOUNTER — TELEPHONE (OUTPATIENT)
Dept: CARDIOLOGY | Age: 56
End: 2021-05-27

## 2021-05-27 VITALS
HEART RATE: 70 BPM | SYSTOLIC BLOOD PRESSURE: 118 MMHG | HEIGHT: 66 IN | DIASTOLIC BLOOD PRESSURE: 75 MMHG | RESPIRATION RATE: 18 BRPM | BODY MASS INDEX: 39.35 KG/M2 | OXYGEN SATURATION: 99 %

## 2021-05-27 DIAGNOSIS — R53.83 FATIGUE, UNSPECIFIED TYPE: ICD-10-CM

## 2021-05-27 DIAGNOSIS — R06.02 SOB (SHORTNESS OF BREATH): ICD-10-CM

## 2021-05-27 DIAGNOSIS — Z86.79 HISTORY OF ATRIAL FLUTTER: ICD-10-CM

## 2021-05-27 DIAGNOSIS — R53.83 FATIGUE, UNSPECIFIED TYPE: Primary | ICD-10-CM

## 2021-05-27 DIAGNOSIS — G47.33 OBSTRUCTIVE SLEEP APNEA: Primary | ICD-10-CM

## 2021-05-27 DIAGNOSIS — I35.1 MODERATE AORTIC REGURGITATION: ICD-10-CM

## 2021-05-27 DIAGNOSIS — I50.32 CHRONIC DIASTOLIC HEART FAILURE (HCC): ICD-10-CM

## 2021-05-27 LAB
ABSOLUTE EOS #: 0.86 K/UL (ref 0–0.44)
ABSOLUTE IMMATURE GRANULOCYTE: <0.03 K/UL (ref 0–0.3)
ABSOLUTE LYMPH #: 1.56 K/UL (ref 1.1–3.7)
ABSOLUTE MONO #: 0.56 K/UL (ref 0.1–1.2)
ANION GAP SERPL CALCULATED.3IONS-SCNC: 11 MMOL/L (ref 9–17)
BASOPHILS # BLD: 1 % (ref 0–2)
BASOPHILS ABSOLUTE: 0.06 K/UL (ref 0–0.2)
BNP INTERPRETATION: ABNORMAL
BUN BLDV-MCNC: 17 MG/DL (ref 6–20)
BUN/CREAT BLD: 18 (ref 9–20)
CALCIUM SERPL-MCNC: 9.6 MG/DL (ref 8.6–10.4)
CHLORIDE BLD-SCNC: 101 MMOL/L (ref 98–107)
CO2: 27 MMOL/L (ref 20–31)
CREAT SERPL-MCNC: 0.92 MG/DL (ref 0.7–1.2)
DIFFERENTIAL TYPE: ABNORMAL
EOSINOPHILS RELATIVE PERCENT: 14 % (ref 1–4)
GFR AFRICAN AMERICAN: >60 ML/MIN
GFR NON-AFRICAN AMERICAN: >60 ML/MIN
GFR SERPL CREATININE-BSD FRML MDRD: ABNORMAL ML/MIN/{1.73_M2}
GFR SERPL CREATININE-BSD FRML MDRD: ABNORMAL ML/MIN/{1.73_M2}
GLUCOSE BLD-MCNC: 102 MG/DL (ref 70–99)
HCT VFR BLD CALC: 35 % (ref 40.7–50.3)
HEMOGLOBIN: 11 G/DL (ref 13–17)
IMMATURE GRANULOCYTES: 0 %
LYMPHOCYTES # BLD: 25 % (ref 24–43)
MCH RBC QN AUTO: 30.7 PG (ref 25.2–33.5)
MCHC RBC AUTO-ENTMCNC: 31.4 G/DL (ref 28.4–34.8)
MCV RBC AUTO: 97.8 FL (ref 82.6–102.9)
MONOCYTES # BLD: 9 % (ref 3–12)
NRBC AUTOMATED: 0 PER 100 WBC
PDW BLD-RTO: 13.7 % (ref 11.8–14.4)
PLATELET # BLD: 176 K/UL (ref 138–453)
PLATELET ESTIMATE: ABNORMAL
PMV BLD AUTO: 10.7 FL (ref 8.1–13.5)
POTASSIUM SERPL-SCNC: 4.8 MMOL/L (ref 3.7–5.3)
PRO-BNP: 317 PG/ML
RBC # BLD: 3.58 M/UL (ref 4.21–5.77)
RBC # BLD: ABNORMAL 10*6/UL
SEG NEUTROPHILS: 51 % (ref 36–65)
SEGMENTED NEUTROPHILS ABSOLUTE COUNT: 3.27 K/UL (ref 1.5–8.1)
SODIUM BLD-SCNC: 139 MMOL/L (ref 135–144)
TSH SERPL DL<=0.05 MIU/L-ACNC: 3.29 MIU/L (ref 0.3–5)
WBC # BLD: 6.3 K/UL (ref 3.5–11.3)
WBC # BLD: ABNORMAL 10*3/UL

## 2021-05-27 PROCEDURE — 83880 ASSAY OF NATRIURETIC PEPTIDE: CPT

## 2021-05-27 PROCEDURE — 36415 COLL VENOUS BLD VENIPUNCTURE: CPT

## 2021-05-27 PROCEDURE — 80048 BASIC METABOLIC PNL TOTAL CA: CPT

## 2021-05-27 PROCEDURE — 84443 ASSAY THYROID STIM HORMONE: CPT

## 2021-05-27 PROCEDURE — 85025 COMPLETE CBC W/AUTO DIFF WBC: CPT

## 2021-05-27 PROCEDURE — 71260 CT THORAX DX C+: CPT

## 2021-05-27 PROCEDURE — 97530 THERAPEUTIC ACTIVITIES: CPT

## 2021-05-27 PROCEDURE — 93000 ELECTROCARDIOGRAM COMPLETE: CPT | Performed by: INTERNAL MEDICINE

## 2021-05-27 PROCEDURE — 97110 THERAPEUTIC EXERCISES: CPT

## 2021-05-27 PROCEDURE — G0283 ELEC STIM OTHER THAN WOUND: HCPCS

## 2021-05-27 PROCEDURE — 6360000004 HC RX CONTRAST MEDICATION: Performed by: INTERNAL MEDICINE

## 2021-05-27 RX ORDER — METOPROLOL SUCCINATE 50 MG/1
50 TABLET, EXTENDED RELEASE ORAL DAILY
Qty: 30 TABLET | Refills: 3 | Status: SHIPPED | OUTPATIENT
Start: 2021-05-27 | End: 2021-09-24

## 2021-05-27 RX ADMIN — IOPAMIDOL 75 ML: 755 INJECTION, SOLUTION INTRAVENOUS at 12:16

## 2021-05-27 NOTE — PROGRESS NOTES
clarification. Post Treatment Pain:  2/10      Plan  Times per week: 3  Plan weeks: 6      Goals  (Total # of Visits to Date: 8)      Short term goals  Time Frame for Short term goals: 3 weeks  Short term goal 1: Pt will initiate HEP. -met  Short term goal 2: Pt will improve L knee flex ROM to >/= 95* to progress functional mobility. - met  Short term goal 3: Pt will ambulate household distances with SPC to progress functional ambulation. -met    Long term goals  Time Frame for Long term goals : 6 weeks  Long term goal 1: Pt will be independent and compliant with HEP. Long term goal 2: Pt will improve L knee ROM to 0-115* to improve gait quality. - met  Long term goal 3: Pt will improve L hip and knee strength grossly to 4+/5 for ease of stair navigation and squatting tasks. Long term goal 4: Pt will ambulate household and community distances without AD and with min/no antalgia to restore toward PLOF.     Minutes Tracking:  Time In: 1503  Time Out: 1600  Minutes: 57  Timed Code Treatment Minutes: [address], [place], DPT     Date: 5/27/2021

## 2021-05-27 NOTE — TELEPHONE ENCOUNTER
Patient would like order for CPAP supplies. Verbal order from Dr. Guero Rodriguez to order CPAP supplies.

## 2021-05-27 NOTE — PROGRESS NOTES
Niurka Nicole am scribing for and in the presence of Rebecca Bautista MD, F.A.C.C..    Patient: Lisandra Palmer  : 1965  Date of Visit: May 27, 2021    REASON FOR VISIT / CONSULTATION: Follow-up (Patient stopped in office complaining of extreme fatigue and just not feeling right. Patient denies chest pain, SOB, palpitations, dizziness.)      Dear Dr. León Rosales MD     I had the opportunity to see Lisandra Palmer at the office today for follow up. 1-He has a history of thoracic aorta surgery at the [facility] in . He said he had a repair with no artificial grafts put in. He had a heart catheterization prior to the aorta surgery and was told his coronary arteries were fine. No history of MI or angina. 2-He does have a history of atrial fibrillation/Atypical flutter which predates his thoracic aorta surgery but after his thoracic aorta surgery he had a couple of recurrences, multiple cardioversion and finally underwent atypical flutter ablation on 2015 at [facility] 3-Echocardiogram done 17 EF 55-60% Mildly increased left ventricular wall thickness with a normal left ventricular cavity size. No definite specific wall motion abnormalities were identified. The left atrium is moderately dilated (34-39) with a left atrial volume index of 36 ml/m2. Mild mitral and tricuspid regurgitation. Moderate aortic regurgitation. Evidence of mild diastolic dysfunction is seen     4-Another SKYLAR and cardioversion of atrial flutter on 2020. This episode seems to be precipitated by binge drinking. 5- He presented again to [facility] on 2021 with atrial flutter with rapid ventricular response. We ended up doing a cardioversion on 2/3/2021. No SKYLAR was needed because patient symptoms were less than 48 hours. Patient discharged home that day on anticoagulation.     6- EKG done in office on 2021 showed atrial flutter with heart rate of 135 to 140 bpm without any heart rate variability. 7- Last visit he was sent tot [facility] for a cardioversion and that failed, so he was sent to [address] [place] [address] for an ablation procedure with Dr. Melita Arevalo on 2/26/2021. 8- EKG done today (5/27/21) in office showed NSR. Mr. Maral Le just walked into the clinic today reporting that he is extremely tired and fatigued and requesting to be seen urgently. He had left knee surgery 2 weeks ago and it went well. He still doing physical therapy. He reports he has been feeling extremely tired. He went to therapy for knee replacement and did some shopping yesterday but he has to sleep for 4 to 6 hours after this and still waking up tired. He reported he is using his CPAP machine regularly. He quit smoking and is not drinking that much. He is currently off work post knee surgery. He works for the [employer]. He has been trying to stay hydrated. He reported that his fatigue is interfering with his daily activities. He reported having some chest pain few days ago, he did not make a big deal of it. He has some shortness of breath on exertion. No further chest pain, pressure or tightness. His breathing staying stable. No palpitations. No significant dizziness or lightheadedness. No presyncope or syncopal episodes. He is compliant with medications and follow-up. He said he use using low-dose blood thinner after his knee replacement surgery but this has completed few days ago. He denies any nausea, vomiting or abdominal pain. No fever or cough. No muscle pain. No significant change in his weight or appetite.       Past Medical History:   Diagnosis Date    Aortic valve disorders     aortic regurg    Atrial fibrillation (HCC)     Atrial flutter ([facility] [address]     Encounter for cardioversion procedure 05/05/2015    Dr Evon Juarez for cardioversion procedure     [facility]/ Dr. Katty Bautista, states 7 times, most recent 1/2020    Frequent urination     Pt states he's developed frequent urination with 3 times in last yr noting blood in urine as well.  H/O echocardiogram 12/04/2017    EF 39-95% Global LV systolic function appears preserved. Mildly increaseed LV wall thinckness with normal LV carvity size, No definite specific wall motion abnormalities identified, LA is moderately dilated (34-39) with LA volume index of 36ml/m2, Mild mitral and tricuspid regurgitation, moderate aortic regurgitation, Evidence of mild diastolic dysfunction seen    H/O echocardiogram 02/02/2021    EF 55% patient has sigmoid interventricular septim S/P aortic vlave repair with mild to mod aortic regurg Diastoligy cannot be properly assessed due to pts rhtyhm Aortic root is mildly dilated when corrected for body surface area    Hemorrhage of rectum and anus 2013    polypectomy    History of 24 hour EKG monitoring 04/03/2014    Unremarkable except for mild increased number of PVC's with 3 ventricular couplets, asymptomatic     History of echocardiogram 04/10/2014    EF 60%, mild to mod LVH, moderate AI    History of heart surgery 2008    enlarged aorta    History of PFTs 06/25/2015    Consistent with mild obstructive ventilatory impairment. Lung volumes are normal,except mild increase in residual volume,which could be suggestive of airway trapping. Diffusion capacity is normal.    Hx of transesophageal echocardiography (SKYLAR) for monitoring 05/05/2015    Dr Kori Corey    Hypertension     pcp dr William Nguyen Impaired fasting glucose     Knee pain, bilateral     Lipoma     Obesity (BMI 30.0-34. 9)     Obstructive sleep apnea (adult) (pediatric)     does not wear cpap like suppose to    Paroxysmal supraventricular tachycardia (HCC)     Sleep apnea     uses CPAP    Tobacco use disorder     Urinary urgency     Ventral hernia        Past Surgical History:   Procedure Laterality Date    ABLATION OF DYSRHYTHMIC FOCUS  2015    ACHILLES TENDON SURGERY Right 2010    CARDIAC SURGERY 2008    thoracic AAA repair and modified Coleman procedure ( aortic valve sparing ) at [address]  06/13/2013    x 2 , bleeding polyps removed    FOOT SURGERY      reconstructive for club foot (right)    HERNIA REPAIR  2018    ventral    HERNIA REPAIR  2020    ventral    HERNIA REPAIR N/A 3/6/2020    XI LAPAROSCOPIC ROBOTIC VENTRAL HERNIA WITH MESH performed by Maite Rosa DO at [address] UNVW,4+N/B,WWFII N/A     HERNIA UMBILICAL REPAIR performed by Francesco Pulido MD at [address] Left 05/10/2021    Dr. Jillian Martinez Left 5/10/2021    KNEE TOTAL ARTHROPLASTY performed by Zeinab Patterson MD at [address]  10/24/2018    VASECTOMY         Family History   Problem Relation Age of Onset    Colon Cancer Mother     Heart Surgery Father         enlarged aorta    Kidney Cancer Father        Social History     Tobacco Use    Smoking status: Former Smoker     Packs/day: 1.00     Years: 34.00     Pack years: 34.00     Types: Cigarettes     Quit date: 12/15/2020     Years since quittin.4    Smokeless tobacco: Never Used   Vaping Use    Vaping Use: Never used   Substance Use Topics    Alcohol use:  Yes     Alcohol/week: 6.0 standard drinks     Types: 6 Cans of beer per week     Comment: QOD now, use to drink daily    Drug use: No       Current Outpatient Medications   Medication Sig Dispense Refill    HYDROcodone-acetaminophen (NORCO) 5-325 MG per tablet       furosemide (LASIX) 20 MG tablet Take 1 tablet by mouth every other day 30 tablet 0    metoprolol succinate (TOPROL XL) 100 MG extended release tablet Take 1 tablet by mouth once daily 90 tablet 0    Nutritional Supplements (VITAMIN D BOOSTER PO) Take by mouth      aspirin  MG EC tablet Take 1 tablet by mouth daily 90 tablet 3    Acetaminophen (TYLENOL) 325 MG CAPS Take 1-2 tablets by mouth every 4 hours as needed (pain) (Patient not taking: Reported on 4/13/2021) 30 capsule 0     No current facility-administered medications for this visit. No Known Allergies    ROS: 14 systems reviewed and negative except for pertinent positives as noted above. PHYSICAL EXAM:   /75 (Site: Right Upper Arm, Position: Sitting, Cuff Size: Large Adult)   Pulse 70   Resp 18   Ht 5' 6\" (1.676 m)   SpO2 99%   BMI 39.35 kg/m²  Body mass index is 39.35 kg/m². Constitutional: He is oriented to person, place, and time. He appears well-developed and well-nourished. In no acute distress. Obese   HEENT: Normocephalic and atraumatic. No JVD present. Carotid bruit is not present. No mass and no thyromegaly present. No lymphadenopathy present. Cardiovascular: Normal rate, regular rhythm, normal heart sounds. Exam reveals no gallop and no friction rubs. 2/6 short systolic murmur and 2/6 diastolic murmur heard best at 2nd Right upper sternal border. Pulmonary/Chest: Effort normal and breath sounds normal. No respiratory distress. He has no wheezes, rhonchi or rales. Abdominal: Soft, non-tender. Bowel sounds and aorta are normal. He exhibits no organomegaly, mass or bruit. Extremities: No edema. No cyanosis and no clubbing. Pulses are 2+ radial and carotid pulses. 2+ dorsalis pedis and posterior tibial pulses bilaterally. Neurological: He is alert and oriented to person, place, and time. No evidence of gross cranial nerve deficit. Coordination appeared normal.   Skin: Skin is warm and dry. There is no rash or diaphoresis. Psychiatric: He has a normal mood and affect.  His speech is normal and behavior is normal.          Lab Results   Component Value Date    WBC 6.1 04/26/2021    HGB 10.3 (L) 05/11/2021    HCT 31.2 (L) 05/11/2021     04/26/2021    CHOL 163 12/22/2016    TRIG 41 12/22/2016    HDL 96 12/22/2016    ALT 19 02/03/2021    AST 18 02/03/2021     04/26/2021    K 4.1 04/26/2021     04/26/2021 CREATININE 0.96 04/26/2021    BUN 18 04/26/2021    CO2 28 04/26/2021    TSH 3.91 02/02/2021    PSA 1.58 12/22/2016    INR 1.02 02/26/2021        Diagnosis Orders   1. Fatigue, unspecified type  CBC With Auto Differential    Basic Metabolic Panel    TSH With Reflex Ft4    CTA PULMONARY W CONTRAST    Brain Natriuretic Peptide   2. SOB (shortness of breath)  CBC With Auto Differential    Basic Metabolic Panel    TSH With Reflex Ft4    CTA PULMONARY W CONTRAST    Brain Natriuretic Peptide   3. History of atrial flutter  EKG 12 lead    CBC With Auto Differential    Basic Metabolic Panel    TSH With Reflex Ft4    CTA PULMONARY W CONTRAST    Brain Natriuretic Peptide   4. Moderate aortic regurgitation       Plan:     · Fatigue and Shortness of Breath s/p knee replacement  · I had a long discussion with the patient regarding his current symptoms. · I told him my biggest concern after an knee surgery in a patient with shortness of breath and fatigue is to make sure he has no evidence of pulmonary embolism. It is a safety issue, we cannot miss a blood clot in the lung because this can be life-threatening so I went ahead and ordered CTA of the pulmonary arteries to rule out pulmonary embolism. · His clinical examination is unremarkable. He still have aortic regurgitation but does not seem to be any worse than before on clinical examination. I do not think a repeat echo is needed at this point. · We also discussed that his fatigue and shortness of breath can be induced by medications. I will decrease his Toprol-XL to 50 mg daily giving the fact that he is maintaining sinus rhythm and his heart rate is controlled. No further atrial flutter post ablation. · Also clinically has no signs of volume overload, I will discontinue Lasix but I will get basic metabolic panel, CBC, thyroid function and BNP for further evaluation. History of Atrial Flutter/Fibrillation: Currently maintaining sinus rhythm.    Repeat Cardioversion on 2/26/2021 - Not successful - Was sent to [facility] for Ablation is Dr. Renny Dawson. Currently in normal sinus rhythm shown on todays EKG  On 3/31/2021   Antiplatelet Agent: Continue Aspirin 325 mg daily. · Beta Blocker: DECREASE to Metoprolol succinate (Toprol XL) 50 mg daily. [id number] Score for Atrial Fibrillation Stroke Risk   Risk   Factors  Component Value   C CHF Yes 1   H HTN Yes 1   A2 Age >= 75 No,  (60 y.o.) 0   D DM No 0   S2 Prior Stroke/TIA No 0   V Vascular Disease No 0   A Age 74-69 No,  (60 y.o.) 0   Sc Sex male 0    [id number]  Score  2   Score last updated 2/16/15 10:26 PM EDT  Click here for a link to the UpToDate guideline \"Atrial Fibrillation: Anticoagulation therapy to prevent embolization    Disclaimer: Risk Score calculation is dependent on accuracy of patient problem list and past encounter diagnosis. Stroke Risk: CHADS2-VASc Score: 2/9 (2.2% stroke risk)  Anticoagulation: Continue full dose aspirin. .  Patient maintaining sinus rhythm since ablation. · Moderate Aortic Regurgitation:  · No signs of volume overload  · We will continue to monitor. · Decrease Toprol-XL to 50 mg daily. · I plan to see him back after 1 week for further evaluation. Finally, I recommended that he continue his other medications and follow up with you as previously scheduled. FOLLOW UP:  I told Mr. Hinton to call my office if he had any problems, but otherwise told him to Return in about 1 week (around 6/3/2021). However, I would be happy to see him sooner should the need arise. Sincerely,  Arvin Briscoe MD, Dr, [address] [facility]  Phone: [phone]; Fax: [phone]    I believe that the risk of significant morbidity and mortality related to the patient's current medical conditions are: intermediate-high.  >30 minutes were spent during prep work, discussion and exam of the patient, and follow up documentation and all of their questions were answered. The documentation recorded by the scribe, accurately and completely reflects the services I personally performed and the decisions made by me. Shanta Anderson MD, F.A.C.C.  May 27, 2021

## 2021-05-27 NOTE — TELEPHONE ENCOUNTER
----- Message from Juanjose Jean MD sent at 5/27/2021  1:25 PM EDT -----  CT of the chest, blood work including thyroid function, kidney function and blood count are all good. Continue therapy as discussed in the office today. Please call with questions and/or concerns.   Thank you

## 2021-06-01 ENCOUNTER — HOSPITAL ENCOUNTER (OUTPATIENT)
Dept: PHYSICAL THERAPY | Age: 56
Setting detail: THERAPIES SERIES
Discharge: HOME | End: 2021-06-01
Payer: OTHER GOVERNMENT

## 2021-06-01 PROCEDURE — G0283 ELEC STIM OTHER THAN WOUND: HCPCS

## 2021-06-01 PROCEDURE — 97110 THERAPEUTIC EXERCISES: CPT

## 2021-06-01 NOTE — PROGRESS NOTES
Phone: Jimy           Fax: [phone]                           Outpatient Physical Therapy                                                                            Daily Note    Patient: Zac Nogueira : 1965  CSN #: [id number]   Referring Practitioner:  Dr. Jorge Cade    Referral Date : 21     Date: 2021    Diagnosis: S/p L TKA (I66.016)  Treatment Diagnosis: L knee pain, difficulty walking    Onset Date: 05/10/21  PT Insurance Information: [employer]  Total # of Visits Approved: 18 Per Physician Order  Total # of Visits to Date: 9  No Show: 0  Canceled Appointment: 0      Pre-Treatment Pain:  1/10  Subjective: Pt states he is still having a hard time sleeping d/t stiffness/discomfort after laying for ~20mins. Pt states he tosses and turns a lot but unable to get comfortable to allow for quality sleep. Pt states everything else seems to be getting better. Exercises:  Exercise 2: Bike 10 mins lv 3.5  Exercise 3: FSU/LSU 6in x15 ea/ step down 6\" x10  Exercise 5: Step stretches 3x30\" flex/ext  Exercise 6: Sit to stand 2x10 8# ball  Exercise 12: Retro walkout 18# x5  Exercise 13: Slantboard stretch 3x30\"  Exercise 14: StarTrac HS curl 3 plates 2R66/BAOCASHN knee extension 2 plate 4Q48    Modalities:  Cryotherapy (Minutes\Location): 15mins L knee  E-stim (parameters): IFC with CP x15 minutes to decrease pain and edema       Assessment  Assessment: Strengthening progressions made this date, which pt tolerates well. Pt demonstrates min/no gait compensations with ambulation. Reviewed various sleeping positions to improve sleep quality. Will progress as tolerated. Activity Tolerance  Activity Tolerance: Patient Tolerated treatment well    Patient Education  Patient Education: Sleeping modifications  Pt verbalized/demonstrated good understanding:     [x] Yes         [] No, pt required further clarification.        Post Treatment Pain:

## 2021-06-02 ENCOUNTER — HOSPITAL ENCOUNTER (OUTPATIENT)
Dept: PHYSICAL THERAPY | Age: 56
Setting detail: THERAPIES SERIES
Discharge: HOME | End: 2021-06-02
Payer: OTHER GOVERNMENT

## 2021-06-02 PROCEDURE — G0283 ELEC STIM OTHER THAN WOUND: HCPCS

## 2021-06-02 PROCEDURE — 97110 THERAPEUTIC EXERCISES: CPT

## 2021-06-02 PROCEDURE — 97140 MANUAL THERAPY 1/> REGIONS: CPT

## 2021-06-02 NOTE — PROGRESS NOTES
clarification. Post Treatment Pain:  1/10      Plan  Times per week: 3  Plan weeks: 6      Goals  (Total # of Visits to Date: 6)      Short term goals  Time Frame for Short term goals: 3 weeks  Short term goal 1: Pt will initiate HEP. -met  Short term goal 2: Pt will improve L knee flex ROM to >/= 95* to progress functional mobility. - met  Short term goal 3: Pt will ambulate household distances with SPC to progress functional ambulation. -met    Long term goals  Time Frame for Long term goals : 6 weeks  Long term goal 1: Pt will be independent and compliant with HEP. Long term goal 2: Pt will improve L knee ROM to 0-115* to improve gait quality. - met  Long term goal 3: Pt will improve L hip and knee strength grossly to 4+/5 for ease of stair navigation and squatting tasks. Long term goal 4: Pt will ambulate household and community distances without AD and with min/no antalgia to restore toward PLOF.     Minutes Tracking:  Time In: 0915  Time Out: 1019  Minutes: 64  Timed Code Treatment Minutes: [facility] PTA     Date: 6/2/2021

## 2021-06-03 ENCOUNTER — OFFICE VISIT (OUTPATIENT)
Dept: CARDIOLOGY | Age: 56
End: 2021-06-03
Payer: OTHER GOVERNMENT

## 2021-06-03 VITALS
OXYGEN SATURATION: 96 % | HEART RATE: 75 BPM | SYSTOLIC BLOOD PRESSURE: 134 MMHG | DIASTOLIC BLOOD PRESSURE: 77 MMHG | WEIGHT: 245.2 LBS | HEIGHT: 66 IN | RESPIRATION RATE: 18 BRPM | BODY MASS INDEX: 39.41 KG/M2

## 2021-06-03 DIAGNOSIS — I35.1 MODERATE AORTIC REGURGITATION: ICD-10-CM

## 2021-06-03 DIAGNOSIS — R53.83 FATIGUE, UNSPECIFIED TYPE: Primary | ICD-10-CM

## 2021-06-03 DIAGNOSIS — Z86.79 HISTORY OF ATRIAL FLUTTER: ICD-10-CM

## 2021-06-03 PROCEDURE — 99213 OFFICE O/P EST LOW 20 MIN: CPT | Performed by: INTERNAL MEDICINE

## 2021-06-03 NOTE — PROGRESS NOTES
Maria A Elkins am scribing for and in the presence of Nica Zuniga MD, F.A.C.C..    Patient: Osvaldo Gomez  : 1965  Date of Visit: Darby 3, 2021    REASON FOR VISIT / CONSULTATION: Follow-up (Hx: fatigue, SOB, aflutter, mod aortic regurg. pt is here for 1 week f/u. he is dong well. Denies: CP, SOB, dizziness, lightheaded, palps)      Dear Dr. Kristopher Riley MD     I had the opportunity to see Osvaldo Gomez at the office today for follow up. 1-He has a history of thoracic aorta surgery at the [facility] in . He said he had a repair with no artificial grafts put in. He had a heart catheterization prior to the aorta surgery and was told his coronary arteries were fine. No history of MI or angina. 2-He does have a history of atrial fibrillation/Atypical flutter which predates his thoracic aorta surgery but after his thoracic aorta surgery he had a couple of recurrences, multiple cardioversion and finally underwent atypical flutter ablation on 2015 at [facility]. 3-Echocardiogram done 17 EF 55-60% Mildly increased left ventricular wall thickness with a normal left ventricular cavity size. No definite specific wall motion abnormalities were identified. The left atrium is moderately dilated (34-39) with a left atrial volume index of 36 ml/m2. Mild mitral and tricuspid regurgitation. Moderate aortic regurgitation. Evidence of mild diastolic dysfunction is seen     4-Another SKYLAR and cardioversion of atrial flutter on 2020. This episode seems to be precipitated by binge drinking. 5- He presented again to [facility] on 2021 with atrial flutter with rapid ventricular response. We ended up doing a cardioversion on 2/3/2021. No SKYLAR was needed because patient symptoms were less than 48 hours. Patient discharged home that day on anticoagulation.     6- EKG done in office on 2021 showed atrial flutter with heart rate of 135 to 140 bpm without any heart rate variability. 7- Last visit he was sent tot [facility] for a cardioversion and that failed, so he was sent to [address] [place] [address] for an ablation procedure with Dr. Delmer Simmons on 2/26/2021. 8- EKG done (5/27/21) in office showed NSR. Mr. Seema Taveras is here today for a week follow up. He was previously here for extreme fatigue and dizziness. Since making medication changes he is doing much better. He has had no further episodes of dizziness and the fatigue is much better. He says he still doesn't sleep well at night but this is due to his knee pain. He does used his CPAP machine nightly. He continues to go to physical therapy three times weekly. He is regaining full range of motion in his knee, he is now building his strength. He no longer feels any daytime fatigue after physical therapy. He denies any shortness of breath on exertion. He denies chest pain, pressure or tightness. His breathing staying stable. No palpitations. No significant dizziness or lightheadedness. No presyncope or syncopal episodes. He is compliant with medications and follow-up. He denies any nausea, vomiting or abdominal pain. No fever or cough. No muscle pain. No significant change in his weight or appetite. Past Medical History:   Diagnosis Date    Aortic valve disorders     aortic regurg    Atrial fibrillation (HCC)     Atrial flutter ([address]     Encounter for cardioversion procedure 05/05/2015    Dr Shahzad Acharya Encounter for cardioversion procedure     [facility]/ Dr. Sarah Friedman, states 7 times, most recent 1/2020    Frequent urination     Pt states he's developed frequent urination with 3 times in last yr noting blood in urine as well.  H/O echocardiogram 12/04/2017    EF 08-47% Global LV systolic function appears preserved.  Mildly increaseed LV wall thinckness with normal LV carvity size, No definite specific wall motion abnormalities identified, LA is moderately dilated (34-39) with LA volume index of 36ml/m2, Mild mitral and tricuspid regurgitation, moderate aortic regurgitation, Evidence of mild diastolic dysfunction seen    H/O echocardiogram 02/02/2021    EF 55% patient has sigmoid interventricular septim S/P aortic vlave repair with mild to mod aortic regurg Diastoligy cannot be properly assessed due to pts rhtyhm Aortic root is mildly dilated when corrected for body surface area    Hemorrhage of rectum and anus 2013    polypectomy    History of 24 hour EKG monitoring 04/03/2014    Unremarkable except for mild increased number of PVC's with 3 ventricular couplets, asymptomatic     History of echocardiogram 04/10/2014    EF 60%, mild to mod LVH, moderate AI    History of heart surgery 2008    enlarged aorta    History of PFTs 06/25/2015    Consistent with mild obstructive ventilatory impairment. Lung volumes are normal,except mild increase in residual volume,which could be suggestive of airway trapping. Diffusion capacity is normal.    Hx of transesophageal echocardiography (SKYLAR) for monitoring 05/05/2015    Dr Candelario Hirsch    Hypertension     pcp dr León Walsh Impaired fasting glucose     Knee pain, bilateral     Lipoma     Obesity (BMI 30.0-34. 9)     Obstructive sleep apnea (adult) (pediatric)     does not wear cpap like suppose to    Paroxysmal supraventricular tachycardia ([address]     Sleep apnea     uses CPAP    Tobacco use disorder     Urinary urgency     Ventral hernia        Past Surgical History:   Procedure Laterality Date    ABLATION OF DYSRHYTHMIC FOCUS  2015    ACHILLES TENDON SURGERY Right 2010   [employer] CARDIAC SURGERY  2008    thoracic AAA repair and modified Coleman procedure ( aortic valve sparing ) at [address]  06/13/2013    x 2 , bleeding polyps removed    FOOT SURGERY      reconstructive for club foot (right)    HERNIA REPAIR  11/2018    ventral    HERNIA REPAIR  03/06/2020    ventral    HERNIA REPAIR N/A 3/6/2020    XI LAPAROSCOPIC ROBOTIC VENTRAL HERNIA WITH MESH performed by Carolyn Suresh IV, DO at [address] GMQW,5+C/Z,THKON N/A     HERNIA UMBILICAL REPAIR performed by Elliott Peterson MD at [address] Left 05/10/2021    Dr. Lauren Trevino Left 5/10/2021    KNEE TOTAL ARTHROPLASTY performed by Bro Russell MD at . [facility]  10/24/2018    VASECTOMY         Family History   Problem Relation Age of Onset    Colon Cancer Mother     Heart Surgery Father         enlarged aorta    Kidney Cancer Father        Social History     Tobacco Use    Smoking status: Former Smoker     Packs/day: 1.00     Years: 34.00     Pack years: 34.00     Types: Cigarettes     Quit date: 12/15/2020     Years since quittin.4    Smokeless tobacco: Never Used   Vaping Use    Vaping Use: Never used   Substance Use Topics    Alcohol use: Yes     Alcohol/week: 6.0 standard drinks     Types: 6 Cans of beer per week     Comment: QOD now, use to drink daily    Drug use: No       Current Outpatient Medications   Medication Sig Dispense Refill    metoprolol succinate (TOPROL XL) 50 MG extended release tablet Take 1 tablet by mouth daily 30 tablet 3    aspirin  MG EC tablet Take 1 tablet by mouth daily 90 tablet 3    HYDROcodone-acetaminophen (NORCO) 5-325 MG per tablet  (Patient not taking: Reported on 6/3/2021)      Nutritional Supplements (VITAMIN D BOOSTER PO) Take by mouth (Patient not taking: Reported on 6/3/2021)      Acetaminophen (TYLENOL) 325 MG CAPS Take 1-2 tablets by mouth every 4 hours as needed (pain) (Patient not taking: Reported on 2021) 30 capsule 0     No current facility-administered medications for this visit. No Known Allergies    ROS: 14 systems reviewed and negative except for pertinent positives as noted above.        PHYSICAL EXAM:   /77 (Site: Left Upper Arm, Position: Sitting, Cuff Size: Large improved since decreasing metoprolol and stopping his diuretic. · No further episodes of dizziness. History of Atrial Flutter/Fibrillation: Currently maintaining sinus rhythm. Repeat Cardioversion on 2/26/2021 - Not successful - Was [facility] for Ablation is Dr. Dany Blank. Antiplatelet Agent: Continue Aspirin 325 mg daily. · Beta Blocker: Continue Metoprolol succinate (Toprol XL) 50 mg daily. [id number] Score for Atrial Fibrillation Stroke Risk   Risk   Factors  Component Value   C CHF Yes 1   H HTN Yes 1   A2 Age >= 75 No,  (60 y.o.) 0   D DM No 0   S2 Prior Stroke/TIA No 0   V Vascular Disease No 0   A Age 74-69 No,  (60 y.o.) 0   Sc Sex male 0    [id number]  Score  2   Score last updated 1/02/76 73:42 PM EDT  Click here for a link to the UpToDate guideline \"Atrial Fibrillation: Anticoagulation therapy to prevent embolization  Disclaimer: Risk Score calculation is dependent on accuracy of patient problem list and past encounter diagnosis. Stroke Risk: CHADS2-VASc Score: 2/9 (2.2% stroke risk)  · Anticoagulation: Continue full dose aspirin. Patient maintaining sinus rhythm since ablation. · Moderate Aortic Regurgitation:  · No signs of volume overload  · We will continue to monitor. · Beta Blocker: Continue Metoprolol succinate (Toprol XL) 50 mg daily. Finally, I recommended that he continue his other medications and follow up with you as previously scheduled. FOLLOW UP:  I told Mr. Hinton to call my office if he had any problems, but otherwise told him to Return in about 6 months (around 12/3/2021). However, I would be happy to see him sooner should the need arise. Sincerely,  Maira Jolley MD  [facility] Card[facility], [address] [place] [address] Avenue  Phone: [phone]; Fax: [phone]    I believe that the risk of significant morbidity and mortality related to the patient's current medical conditions are: low-intermediate.  >15 minutes were spent during prep work, discussion and exam of the patient, and follow up documentation and all of their questions were answered. The documentation recorded by the scribe, accurately and completely reflects the services I personally performed and the decisions made by me. Randy Crowley MD, F.A.C.C.  Darby 3, 2021

## 2021-06-04 ENCOUNTER — HOSPITAL ENCOUNTER (OUTPATIENT)
Dept: PHYSICAL THERAPY | Age: 56
Setting detail: THERAPIES SERIES
Discharge: HOME | End: 2021-06-04
Payer: OTHER GOVERNMENT

## 2021-06-04 PROCEDURE — 97110 THERAPEUTIC EXERCISES: CPT

## 2021-06-04 PROCEDURE — G0283 ELEC STIM OTHER THAN WOUND: HCPCS

## 2021-06-04 NOTE — PROGRESS NOTES
Phone: Jimy           Fax: [phone]                           Outpatient Physical Therapy                                                                            Daily Note    Patient: Rick Solis : 1965  CSN #: [id number]   Referring Practitioner:  Dr. Le Souza    Referral Date : 21     Date: 2021    Diagnosis: S/p L TKA (Z96.053)  Treatment Diagnosis: L knee pain, difficulty walking    Onset Date: 05/10/21  PT Insurance Information: [employer]HA [employer]  Total # of Visits Approved: 18 Per Physician Order  Total # of Visits to Date: 12  No Show: 0  Canceled Appointment: 0      Pre-Treatment Pain:  0/10  Subjective: Pt denies pain upon arrival, c/o stiffness only. Pt denies increased pain/soreness following last visit. Pt states still not sleeping well. Exercises:  Exercise 2: Bike 12 mins lv 3.5  Exercise 3: FSU/LSU 6in x15 ea/ step down 6\" x10  Exercise 5: Step stretches 3x30\" flex/ext  Exercise 6: Sit to stand 2x15 8# ball  Exercise 7: Heel slide with strap/quad set 10\" x10  Exercise 8: heel prop x2 min  Exercise 12: Retro walkout 18# x5  Exercise 14: StarTrac HS curl 5 plates 9D91/[id number] knee extension 3 plate 1F64  Exercise 15: leg press 8pl 2x10 double leg      Modalities:  Cryotherapy (Minutes\Location): 15mins L knee  E-stim (parameters): IFC with CP x15 minutes to decrease pain and edema       Assessment  Assessment: Cont with strengthening progressions, which pt tolerates well. Pt ambulates community distances without AD and with minimal antalgia. L knee AAROM 0-118* measured at end of tx. Pt follows up with MD next week. Will progress as tolerated. Activity Tolerance  Activity Tolerance: Patient Tolerated treatment well    Patient Education  *Patient Education: Reviewed proper form with exercises  Pt verbalized/demonstrated good understanding:     [x] Yes         [] No, pt required further clarification.        Post Treatment Pain:  0/10      Plan  Times per week: 3  Plan weeks: 6      Goals  (Total # of Visits to Date: 15)      Short term goals  Time Frame for Short term goals: 3 weeks  Short term goal 1: Pt will initiate HEP. -met  Short term goal 2: Pt will improve L knee flex ROM to >/= 95* to progress functional mobility. - met  Short term goal 3: Pt will ambulate household distances with SPC to progress functional ambulation. -met    Long term goals  Time Frame for Long term goals : 6 weeks  Long term goal 1: Pt will be independent and compliant with HEP. Long term goal 2: Pt will improve L knee ROM to 0-115* to improve gait quality. - met  Long term goal 3: Pt will improve L hip and knee strength grossly to 4+/5 for ease of stair navigation and squatting tasks. Long term goal 4: Pt will ambulate household and community distances without AD and with min/no antalgia to restore toward PLOF. - met    Minutes Tracking:  Time In: 8728  Time Out: 1020  Minutes: 63  Timed Code Treatment Minutes: [id number] [place], [place], DPT      Date: 6/4/2021

## 2021-06-07 ENCOUNTER — HOSPITAL ENCOUNTER (OUTPATIENT)
Dept: PHYSICAL THERAPY | Age: 56
Setting detail: THERAPIES SERIES
Discharge: HOME | End: 2021-06-07
Payer: OTHER GOVERNMENT

## 2021-06-07 PROCEDURE — 97110 THERAPEUTIC EXERCISES: CPT

## 2021-06-07 PROCEDURE — G0283 ELEC STIM OTHER THAN WOUND: HCPCS

## 2021-06-07 PROCEDURE — 97140 MANUAL THERAPY 1/> REGIONS: CPT

## 2021-06-07 NOTE — PROGRESS NOTES
Phone: Jimy           Fax: [phone]                           Outpatient Physical Therapy                                                                            Daily Note    Patient: Giancarlo García : 1965  CSN #: [id number]   Referring Practitioner:  Dr. Robb Bauman    Referral Date : 21     Date: 2021    Diagnosis: S/p L TKA (V23.085)  Treatment Diagnosis: L knee pain, difficulty walking    Onset Date: 05/10/21  PT Insurance Information: [employer]  Total # of Visits Approved: 18 Per Physician Order  Total # of Visits to Date: 13  No Show: 0  Canceled Appointment: 0      Pre-Treatment Pain:  0/10  Subjective: Pt. states no pain today, just tightness. Pt. states tightness is usually in the morning. Pt. states still not sleeping well. Exercises:  Exercise 2: Bike 12 mins lv 3.5  Exercise 3: FSU/LSU 8in x15 ea/ step down 6\" x10  Exercise 4: TKE BTB 2x12 3 sec hold  Exercise 5: Step stretches 3x30\" flex/ext  Exercise 6: Sit to stand 2x15 8# ball  Exercise 7: Heel slide with strap/quad set 10\" x10  Exercise 11: Maurisio step over: forward and lateral x10 ea  Exercise 13: Slantboard stretch 3x30\"  Exercise 14: StarTrac HS curl 5 plates 5O00/GRZDHFTO knee extension 3 plate 7Z59  Exercise 15: leg press 8pl 2x10 double leg    Manual:  PROM: L knee flex prone    Modalities:  Cryotherapy (Minutes\Location): 15mins L knee  E-stim (parameters): IFC with CP x15 minutes to decrease pain and edema       Assessment  Assessment: Pain remains mild, swelling is mild/moderate. Progressed to 8\" step downs/ ROM is slowly improving. Prone stretching to assist with increasing flexion    Activity Tolerance  Activity Tolerance: Patient Tolerated treatment well    Patient Education  Patient Education: HEP  Pt verbalized/demonstrated good understanding:     [x] Yes         [] No, pt required further clarification.        Post Treatment Pain:  0/10      Plan  Times per

## 2021-06-08 ENCOUNTER — HOSPITAL ENCOUNTER (OUTPATIENT)
Age: 56
Discharge: HOME | End: 2021-06-08
Payer: OTHER GOVERNMENT

## 2021-06-08 DIAGNOSIS — I10 ESSENTIAL HYPERTENSION: ICD-10-CM

## 2021-06-08 DIAGNOSIS — I10 ESSENTIAL HYPERTENSION: Primary | ICD-10-CM

## 2021-06-08 LAB
ANION GAP SERPL CALCULATED.3IONS-SCNC: 9 MMOL/L (ref 9–17)
BUN BLDV-MCNC: 14 MG/DL (ref 6–20)
BUN/CREAT BLD: 18 (ref 9–20)
CALCIUM SERPL-MCNC: 9.3 MG/DL (ref 8.6–10.4)
CHLORIDE BLD-SCNC: 98 MMOL/L (ref 98–107)
CO2: 27 MMOL/L (ref 20–31)
CREAT SERPL-MCNC: 0.76 MG/DL (ref 0.7–1.2)
GFR AFRICAN AMERICAN: >60 ML/MIN
GFR NON-AFRICAN AMERICAN: >60 ML/MIN
GFR SERPL CREATININE-BSD FRML MDRD: ABNORMAL ML/MIN/{1.73_M2}
GFR SERPL CREATININE-BSD FRML MDRD: ABNORMAL ML/MIN/{1.73_M2}
GLUCOSE BLD-MCNC: 97 MG/DL (ref 70–99)
POTASSIUM SERPL-SCNC: 4.5 MMOL/L (ref 3.7–5.3)
SODIUM BLD-SCNC: 134 MMOL/L (ref 135–144)

## 2021-06-08 PROCEDURE — 36415 COLL VENOUS BLD VENIPUNCTURE: CPT

## 2021-06-08 PROCEDURE — 80048 BASIC METABOLIC PNL TOTAL CA: CPT

## 2021-06-29 ENCOUNTER — OFFICE VISIT (OUTPATIENT)
Dept: PRIMARY CARE CLINIC | Age: 56
End: 2021-06-29
Payer: OTHER GOVERNMENT

## 2021-06-29 VITALS
BODY MASS INDEX: 39.3 KG/M2 | HEART RATE: 72 BPM | SYSTOLIC BLOOD PRESSURE: 136 MMHG | WEIGHT: 243.4 LBS | RESPIRATION RATE: 16 BRPM | DIASTOLIC BLOOD PRESSURE: 78 MMHG

## 2021-06-29 DIAGNOSIS — I10 ESSENTIAL HYPERTENSION: Primary | ICD-10-CM

## 2021-06-29 DIAGNOSIS — I50.32 CHRONIC DIASTOLIC HEART FAILURE (HCC): ICD-10-CM

## 2021-06-29 DIAGNOSIS — G47.33 OBSTRUCTIVE SLEEP APNEA: ICD-10-CM

## 2021-06-29 PROCEDURE — 99213 OFFICE O/P EST LOW 20 MIN: CPT | Performed by: FAMILY MEDICINE

## 2021-06-29 RX ORDER — FUROSEMIDE 20 MG/1
20 TABLET ORAL SEE ADMIN INSTRUCTIONS
COMMUNITY
End: 2021-09-25

## 2021-06-29 NOTE — PROGRESS NOTES
Patient is here for a post-op follow up from his knee surgery. He would like to return to work. He said that he would like to work 5 hours a day, and depending on how he feels stay longer if he can. He normally works 8-10 hours a day. He said that he is doing well, and does not currently have any complaints. Allergies:  Patient has no known allergies. Past Medical History:    Past Medical History:   Diagnosis Date    Aortic valve disorders     aortic regurg    Atrial fibrillation (HCC)     Atrial flutter ([address]     Encounter for cardioversion procedure 05/05/2015    Dr Prakash Angeles Encounter for cardioversion procedure     [facility]/ Dr. Chan Last, states 7 times, most recent 1/2020    Frequent urination     Pt states he's developed frequent urination with 3 times in last yr noting blood in urine as well.  H/O echocardiogram 12/04/2017    EF 59-52% Global LV systolic function appears preserved. Mildly increaseed LV wall thinckness with normal LV carvity size, No definite specific wall motion abnormalities identified, LA is moderately dilated (34-39) with LA volume index of 36ml/m2, Mild mitral and tricuspid regurgitation, moderate aortic regurgitation, Evidence of mild diastolic dysfunction seen    H/O echocardiogram 02/02/2021    EF 55% patient has sigmoid interventricular septim S/P aortic vlave repair with mild to mod aortic regurg Diastoligy cannot be properly assessed due to pts rhtyhm Aortic root is mildly dilated when corrected for body surface area    Hemorrhage of rectum and anus 2013    polypectomy    History of 24 hour EKG monitoring 04/03/2014    Unremarkable except for mild increased number of PVC's with 3 ventricular couplets, asymptomatic     History of echocardiogram 04/10/2014    EF 60%, mild to mod LVH, moderate AI    History of heart surgery 2008    enlarged aorta    History of PFTs 06/25/2015    Consistent with mild obstructive ventilatory impairment.  Lung volumes are normal,except mild increase in residual volume,which could be suggestive of airway trapping. Diffusion capacity is normal.    Hx of transesophageal echocardiography (SKYLAR) for monitoring 2015    Dr Brenda Rico    Hypertension     pcp dr Shaun Daiz Impaired fasting glucose     Knee pain, bilateral     Lipoma     Obesity (BMI 30.0-34. 9)     Obstructive sleep apnea (adult) (pediatric)     does not wear cpap like suppose to    Paroxysmal supraventricular tachycardia (HCC)     Sleep apnea     uses CPAP    Tobacco use disorder     Urinary urgency     Ventral hernia        Past Surgical History:    Past Surgical History:   Procedure Laterality Date    ABLATION OF DYSRHYTHMIC FOCUS  2015    ACHILLES TENDON SURGERY Right 2010   [facility]    thoracic AAA repair and modified Coleman procedure ( aortic valve sparing ) at [address]  06/13/2013    x 2 , bleeding polyps removed    FOOT SURGERY      reconstructive for club foot (right)    HERNIA REPAIR  2018    ventral    HERNIA REPAIR  2020    ventral    HERNIA REPAIR N/A 3/6/2020    XI LAPAROSCOPIC ROBOTIC VENTRAL HERNIA WITH MESH performed by Amy Hurst IV, DO at [address] [facility],3+F/C,[facility] N/A     HERNIA UMBILICAL REPAIR performed by Jaison Hilario MD at [address] [facility] Left 05/10/2021    Dr. Oliveira RUST Left 5/10/2021    KNEE TOTAL ARTHROPLASTY performed by Bekah Mcnair MD at [address] [place] [address]  10/24/2018    VASECTOMY         Social History:   Social History     Tobacco Use    Smoking status: Former Smoker     Packs/day: 1.00     Years: 34.00     Pack years: 34.00     Types: Cigarettes     Quit date: 12/15/2020     Years since quittin.5    Smokeless tobacco: Never Used   Substance Use Topics    Alcohol use:  Yes     Alcohol/week: 6.0 standard drinks     Types: 6 Cans of beer per week     Comment: QOD now, use to drink daily       Family History:   Family History   Problem Relation Age of Onset    Colon Cancer Mother     Heart Surgery Father         enlarged aorta    Kidney Cancer Father          Review of Systems:  Constitutional: negative for fevers or chills  Eyes: negative for visual disturbance   ENT: negative for sore throat or nasal congestion  Respiratory: no cough or shortness of breath  Cardiovascular: negative for chest pain or palpitations  Gastrointestinal: negative for abd pain, nausea, vomiting, diarrhea or constipation  Genitourinary: negative for dysuria, urgency or frequency  Integument/breast: negative for skin rash or lesions  Neurological: negative for unilateral weakness, numbness or tingling. Skeletal Muscular: no joint swelling     Medication List          Accurate as of June 29, 2021  2:32 PM. If you have any questions, ask your nurse or doctor. CONTINUE taking these medications    Acetaminophen 325 MG Caps  Commonly known as: Tylenol  Take 1-2 tablets by mouth every 4 hours as needed (pain)     aspirin 325 MG EC tablet  Take 1 tablet by mouth daily     furosemide 20 MG tablet  Commonly known as: LASIX     HYDROcodone-acetaminophen 5-325 MG per tablet  Commonly known as: NORCO     metoprolol succinate 50 MG extended release tablet  Commonly known as: TOPROL XL  Take 1 tablet by mouth daily     VITAMIN D BOOSTER PO            Objective:  Physical Exam:  VitalsBP 136/78 (Site: Left Upper Arm, Position: Sitting)   Pulse 72   Resp 16   Wt 243 lb 6.4 oz (110.4 kg)   BMI 39.30 kg/m²   GEN:   A & O x3, no apparent distress  EYES: No gross abnormalities.   ENT:ENT exam normal, no neck nodes or sinus tenderness  NECK: normal, supple, no lymphadenopathy,  no carotid bruits  PULM: clear to auscultation bilaterally- no wheezes, rales or rhonchi, normal air movement, no respiratory distress  COR: regular rate & rhythm, no gallops and 2/6 murmer  ABD:  soft, non-tender  : deferred  EXT: normal strength, tone, and muscle mass, has minimal edema of both legs  NEURO: Motor and sensory grossly intact  SKIN:  No skin lesions or rashes      Labs:  Lab Results   Component Value Date    BUN 14 06/08/2021    CREATININE 0.76 06/08/2021     (L) 06/08/2021    K 4.5 06/08/2021    CALCIUM 9.3 06/08/2021    CL 98 06/08/2021    CO2 27 06/08/2021    LABGLOM >60 06/08/2021    CHOL 163 12/22/2016    LDLCHOLESTEROL 59 12/22/2016    HDL 96 12/22/2016    TRIG 41 12/22/2016    ALT 19 02/03/2021    AST 18 02/03/2021       Assessment:  1. Essential hypertension    2. Obstructive sleep apnea    3. Chronic diastolic heart failure (HCC)      Patient Active Problem List   Diagnosis Code    Family history of colon cancer Z80.0    H/O ascending aorta repair Z98.890    Hx of amiodarone therapy Z92.29    Aneurysm of thoracic aorta ([facility]) I71.2    Varicose veins of right lower extremity I83.91    Typical atrial flutter ([facility]) I48.3    Atrial flutter with rapid ventricular response ([facility]) I48.92    Moderate aortic regurgitation I35.1    Tobacco abuse Z72.0    Tobacco abuse counseling Z71.6    Atrial fibrillation with rapid ventricular response ([facility]) I48.91    Impaired fasting glucose R73.01    Hypertension I10    Atrial fibrillation with RVR ([facility]) I48.91    Acute on chronic diastolic CHF (congestive heart failure), NYHA class 3 ([facility]) I50.33    A-fib ([facility]) I48.91    S/P total knee replacement using cement, left A19.975     Plan:   Diagnosis Orders   1. Essential hypertension     2. Obstructive sleep apnea     3. Chronic diastolic heart failure (HCC)         · Continue current medications  · resume lasix 20 mg every othwr day   · return to work  · Encouraged low sodium, low cholesterol, weight loss diet.     · Goal for blood pressure controll is 120/80  · Recommended regular exercise as tolerated, 3-5 times per day  · Labs:  BMP in 1 months  · Follow up in 1 months  ·     No orders of the defined types were placed in this encounter. Current Outpatient Medications   Medication Sig Dispense Refill    furosemide (LASIX) 20 MG tablet Take 20 mg by mouth See Admin Instructions 20 mg every other day      metoprolol succinate (TOPROL XL) 50 MG extended release tablet Take 1 tablet by mouth daily 30 tablet 3    aspirin  MG EC tablet Take 1 tablet by mouth daily 90 tablet 3    HYDROcodone-acetaminophen (NORCO) 5-325 MG per tablet  (Patient not taking: Reported on 6/3/2021)      Nutritional Supplements (VITAMIN D BOOSTER PO) Take by mouth (Patient not taking: Reported on 6/3/2021)      Acetaminophen (TYLENOL) 325 MG CAPS Take 1-2 tablets by mouth every 4 hours as needed (pain) (Patient not taking: Reported on 4/13/2021) 30 capsule 0     No current facility-administered medications for this visit. No follow-ups on file.       Electronically signed by Keanu Wilcox MD on 6/29/2021 at 2:32 PM

## 2021-06-29 NOTE — LETTER
[facility]  [address] [place] [address]  Phone: [phone]  Fax: [phone]    Kristopher Riley MD        June 29, 2021     Patient: Andrew Castillo [facility]   YOB: 1965   Date of Visit: 6/29/2021       To Whom It May Concern: It is my medical opinion that Sangeetha Overall may return to work on 6/30/2021 with restrictions of not working more than 6 hours for 1 wk. restrictions apply until 7/6/201. If you have any questions or concerns, please don't hesitate to call.     Sincerely,        Kristopher Riley MD

## 2021-09-25 RX ORDER — FUROSEMIDE 20 MG/1
TABLET ORAL
Qty: 30 TABLET | Refills: 0 | Status: SHIPPED | OUTPATIENT
Start: 2021-09-25 | End: 2022-01-27

## 2021-11-18 NOTE — DISCHARGE SUMMARY
Phone: Jimy          Fax: [phone]                            Outpatient Physical Therapy                                                                    Discharge Summary    Patient: Devika Cody  : 1965  [facility] #: [id number]   Referring physician: No admitting provider for patient encounter. Referring Practitioner: Dr. Vanessa Campbell      Diagnosis: S/p L TKA (W74.493)      Date Treatment Initiated: 21  Date of Last Treatment: 21      PT Visit Information  Onset Date: 05/10/21  PT Insurance Information: [facility]  Total # of Visits Approved: 18  Total # of Visits to Date: 15  Plan of Care/Certification Expiration Date: 21  No Show: 1  Canceled Appointment: 2  Progress Note Counter: RTD: 2021      Frequency/Duration  3 times per week  6 weeks      Treatment Received  [x] HP/CP      [x] Electrical Stim   [x] Therapeutic Exercise      [x] Gait Training  [] Aquatics   [] Ultrasound         [x] Patient Education/HEP   [x] Manual Therapy  [] Traction    [x] Neuro-reg        [x] Soft Tissue Mobs            [] Home TENS  [] Iontophoresis    [] Orthotic casting/fitting      [] Dry Needling    Assessment  Assessment: Patient attended 13 PT visits for L TKA and met goals for initiating HEP and for improved L knee ROM and gait pattern and made good progress toward goals for decreased pain and improved strength. Patient then did not return for [facility] PT. Will dc patient at this time d/t optimal function reached. Goals  Short term goals  Time Frame for Short term goals: 3 weeks  Short term goal 1: Pt will initiate HEP. -met  Short term goal 2: Pt will improve L knee flex ROM to >/= 95* to progress functional mobility. - met  Short term goal 3: Pt will ambulate household distances with SPC to progress functional ambulation.  -met    Long term goals  Time Frame for Long term goals : 6 weeks  Long term goal 1: Pt will be independent and compliant with HEP.  Long term goal 2: Pt will improve L knee ROM to 0-115* to improve gait quality. - met  Long term goal 3: Pt will improve L hip and knee strength grossly to 4+/5 for ease of stair navigation and squatting tasks. Long term goal 4: Pt will ambulate household and community distances without AD and with min/no antalgia to restore toward PLOF. - met      Reason for Discharge  [] Goals Achieved                        []  Poor Follow Through/Attendance                  [x]  Optimal Function Achieved     [x]  Patient Discharged Self    []  Hospitalization                         []  Physician discharge      Thank you for this referral      Grayson Luna, PT, DPT               Date: 11/18/2021

## 2022-01-27 RX ORDER — FUROSEMIDE 20 MG/1
TABLET ORAL
Qty: 30 TABLET | Refills: 0 | Status: SHIPPED | OUTPATIENT
Start: 2022-01-27 | End: 2022-04-21

## 2022-03-28 RX ORDER — ASPIRIN 325 MG
325 TABLET, DELAYED RELEASE (ENTERIC COATED) ORAL DAILY
Qty: 90 TABLET | Refills: 3 | OUTPATIENT
Start: 2022-03-28

## 2022-03-28 RX ORDER — ASPIRIN 325 MG
325 TABLET, DELAYED RELEASE (ENTERIC COATED) ORAL DAILY
Qty: 90 TABLET | Refills: 3 | Status: ON HOLD | OUTPATIENT
Start: 2022-03-28 | End: 2022-04-25 | Stop reason: HOSPADM

## 2022-04-21 ENCOUNTER — OFFICE VISIT (OUTPATIENT)
Dept: PRIMARY CARE CLINIC | Age: 57
End: 2022-04-21
Payer: OTHER GOVERNMENT

## 2022-04-21 ENCOUNTER — HOSPITAL ENCOUNTER (OUTPATIENT)
Age: 57
Discharge: HOME | End: 2022-04-23
Payer: OTHER GOVERNMENT

## 2022-04-21 ENCOUNTER — HOSPITAL ENCOUNTER (OUTPATIENT)
Age: 57
Discharge: HOME | End: 2022-04-21
Payer: OTHER GOVERNMENT

## 2022-04-21 ENCOUNTER — HOSPITAL ENCOUNTER (OUTPATIENT)
Dept: GENERAL RADIOLOGY | Age: 57
Discharge: HOME | End: 2022-04-23
Payer: OTHER GOVERNMENT

## 2022-04-21 DIAGNOSIS — M79.652 PAIN OF LEFT THIGH: ICD-10-CM

## 2022-04-21 DIAGNOSIS — M25.552 ACUTE PAIN OF LEFT HIP: ICD-10-CM

## 2022-04-21 DIAGNOSIS — M25.552 ACUTE PAIN OF LEFT HIP: Primary | ICD-10-CM

## 2022-04-21 LAB — URIC ACID: 6.7 MG/DL (ref 3.4–7)

## 2022-04-21 PROCEDURE — 73552 X-RAY EXAM OF FEMUR 2/>: CPT

## 2022-04-21 PROCEDURE — 73502 X-RAY EXAM HIP UNI 2-3 VIEWS: CPT

## 2022-04-21 PROCEDURE — 36415 COLL VENOUS BLD VENIPUNCTURE: CPT

## 2022-04-21 PROCEDURE — 99214 OFFICE O/P EST MOD 30 MIN: CPT | Performed by: FAMILY MEDICINE

## 2022-04-21 PROCEDURE — 84550 ASSAY OF BLOOD/URIC ACID: CPT

## 2022-04-21 RX ORDER — METHYLPREDNISOLONE 4 MG/1
TABLET ORAL
Qty: 1 KIT | Refills: 0 | Status: ON HOLD | OUTPATIENT
Start: 2022-04-21 | End: 2022-04-25 | Stop reason: HOSPADM

## 2022-04-21 RX ORDER — FUROSEMIDE 20 MG/1
20 TABLET ORAL EVERY OTHER DAY
Qty: 45 TABLET | Refills: 0 | Status: SHIPPED | OUTPATIENT
Start: 2022-04-21 | End: 2022-07-14

## 2022-04-21 RX ORDER — FUROSEMIDE 20 MG/1
20 TABLET ORAL EVERY OTHER DAY
Status: ON HOLD | COMMUNITY
End: 2022-04-25 | Stop reason: HOSPADM

## 2022-04-21 ASSESSMENT — PATIENT HEALTH QUESTIONNAIRE - PHQ9
2. FEELING DOWN, DEPRESSED OR HOPELESS: 0
SUM OF ALL RESPONSES TO PHQ QUESTIONS 1-9: 0
SUM OF ALL RESPONSES TO PHQ QUESTIONS 1-9: 0
1. LITTLE INTEREST OR PLEASURE IN DOING THINGS: 0
SUM OF ALL RESPONSES TO PHQ QUESTIONS 1-9: 0
SUM OF ALL RESPONSES TO PHQ9 QUESTIONS 1 & 2: 0
SUM OF ALL RESPONSES TO PHQ QUESTIONS 1-9: 0

## 2022-04-21 NOTE — PATIENT INSTRUCTIONS
SURVEY:    You may be receiving a survey from [employer] regarding your visit today. You may get this in the mail, through your MyChart, or in your email. Please complete the survey to enable us to provide the highest quality of care to you and your family. If you cannot score us a very good (5 Stars) on any question, please call the office to discuss how we could of made your experience exceptional.    Thank you! Dr. Regino Howard Dr. Kathy Sandhoff, NAWAF Alvarado, RN   [facility], [address] [place] [address]    Phone: [phone]  Fax: [phone]    Office Hours:   Dion Mtaa, F: 8-5 Wednesday: 9-11Patient Education        Purine-Restricted Diet: Care Instructions  Your Care Instructions     Purines are substances that are found in some foods. Your body turns purines into uric acid. High levels of uric acid can cause gout, which is a form ofarthritis that causes pain and inflammation in joints. You may be able to help control the amount of uric acid in your body bylimiting high-purine foods in your diet. Follow-up care is a key part of your treatment and safety. Be sure to make and go to all appointments, and call your doctor if you are having problems. It's also a good idea to know your test results and keep alist of the medicines you take. How can you care for yourself at home?  Plan your meals and snacks around foods that are low in purines and are safe for you to eat. These foods include:  ? Green vegetables and tomatoes. ? Fruits. ? Whole-grain breads, rice, and cereals. ? Eggs, peanut butter, and nuts. ? Low-fat milk, cheese, and other milk products. ? Popcorn. ? Gelatin desserts, chocolate, cocoa, and cakes and sweets, in small amounts.  You can eat certain foods that are medium-high in purines, but eat them only once in a while. These foods include:  ? Legumes, such as dried beans and dried peas.  You can have 1 cup cooked legumes each day.  ? Asparagus, cauliflower, spinach, mushrooms, and green peas. ? Fish and seafood (other than very high-purine seafood). ? Oatmeal, wheat bran, and wheat germ.  Limit very high-purine foods, including:  ? Organ meats, such as liver, kidneys, sweetbreads, and brains. ? Meats, including pereyra, beef, pork, and lamb. ? Game meats and any other meats in large amounts. ? Anchovies, sardines, herring, mackerel, and scallops. ? Gravy. ? Beer. Where can you learn more? Go to https://[email or web address]. org and sign in to your [employer] account. Enter F448 in the [employer] box to learn more about \"Purine-Restricted Diet: Care Instructions. \"     If you do not have an account, please click on the \"Sign Up Now\" link. Current as of: September 8, 2021               Content Version: 13.2  © 2006-2022 Healthwise, [facility]. Care instructions adapted under license by [facility] ([facility]). If you have questions about a medical condition or this instruction, always ask your healthcare professional. [place] [address] any warranty or liability for your use of this information.

## 2022-04-21 NOTE — PROGRESS NOTES
Patient is here with complaints of left hip pain and weakness. He said that it started in the butt cheek, and goes down into the hip. He said that the began on Thursday, and has gotten worse during that time. He did go see the chiropractor yesterday, and he thinks it is nerve related. He has taken Ibuprofen for the pain, and he states it does not really seem to be helping. He tried ibuprofen but did not help. 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 Evaluation to date: none. Treatment to date: OTC analgesics, which have been not very effective. CURRENT ALLERGIES: Patient has no known allergies. PAST MEDICAL HISTORY:   Past Medical History:   Diagnosis Date    Aortic valve disorders     aortic regurg    Atrial fibrillation (HCC)     Atrial flutter ([facility] [address]     Encounter for cardioversion procedure 05/05/2015    Dr Holly Pineda Encounter for cardioversion procedure     [facility]/ Dr. Byron Rueda, states 7 times, most recent 1/2020    Frequent urination     Pt states he's developed frequent urination with 3 times in last yr noting blood in urine as well.  H/O echocardiogram 12/04/2017    EF 05-59% Global LV systolic function appears preserved.  Mildly increaseed LV wall thinckness with normal LV carvity size, No definite specific wall motion abnormalities identified, LA is moderately dilated (34-39) with LA volume index of 36ml/m2, Mild mitral and tricuspid regurgitation, moderate aortic regurgitation, Evidence of mild diastolic dysfunction seen    H/O echocardiogram 02/02/2021    EF 55% patient has sigmoid interventricular septim S/P aortic vlave repair with mild to mod aortic regurg Diastoligy cannot be properly assessed due to pts rhtyhm Aortic root is mildly dilated when corrected for body surface area    Hemorrhage of rectum and anus 2013    polypectomy    History of 24 hour EKG monitoring 04/03/2014    Unremarkable except for mild increased number of PVC's with 3 ventricular couplets, asymptomatic     History of echocardiogram 04/10/2014    EF 60%, mild to mod LVH, moderate AI    History of heart surgery 2008    enlarged aorta    History of PFTs 06/25/2015    Consistent with mild obstructive ventilatory impairment. Lung volumes are normal,except mild increase in residual volume,which could be suggestive of airway trapping. Diffusion capacity is normal.    Hx of transesophageal echocardiography ([facility]) for monitoring 05/05/2015    Dr Dustin Rinaldi    Hypertension     pcp dr Bren Antoine Impaired fasting glucose     Knee pain, bilateral     Lipoma     Obesity (BMI 30.0-34. 9)     Obstructive sleep apnea (adult) (pediatric)     does not wear cpap like suppose to    Paroxysmal supraventricular tachycardia (HCC)     Sleep apnea     uses CPAP    Tobacco use disorder     Urinary urgency     Ventral hernia        SURGICAL HISTORY:   Past Surgical History:   Procedure Laterality Date    ABLATION OF DYSRHYTHMIC FOCUS  2015    ACHILLES TENDON SURGERY Right 2010   [facility] CARDIAC SURGERY  2008    thoracic AAA repair and modified Coleman procedure ( aortic valve sparing ) at [address]  06/13/2013    x 2 , bleeding polyps removed    FOOT SURGERY      reconstructive for club foot (right)    HERNIA REPAIR  11/2018    ventral    HERNIA REPAIR  03/06/2020    ventral    HERNIA REPAIR N/A 3/6/2020    XI LAPAROSCOPIC ROBOTIC VENTRAL [facility]ERN[facility] WITH MESH [facility] [address] [facility] N/A 79/44/6737    HERNIA UMBILICAL REPAIR performed by Ethan Capellan MD at [address] [facility] Left 05/10/2021    Dr. Radha Terrazas Left 5/10/2021    KNEE TOTAL ARTHROPLASTY performed by John Pond MD at . [facility]  10/24/2018    VASECTOMY         FAMILY HISTORY:   Family History   Problem Relation Age of Onset    Colon Cancer Mother     Heart Surgery Father         enlarged aorta    Kidney Cancer Father        SOCIAL HISTORY:   Social History     Tobacco Use    Smoking status: Former Smoker     Packs/day: 1.00     Years: 34.00     Pack years: 34.00     Types: Cigarettes     Quit date: 12/15/2020     Years since quittin.3    Smokeless tobacco: Never Used   Vaping Use    Vaping Use: Never used   Substance Use Topics    Alcohol use: Yes     Alcohol/week: 6.0 standard drinks     Types: 6 Cans of beer per week     Comment: QOD now, use to drink daily    Drug use: No         Review of Systems:  Constitutional: negative for fevers or chills  Eyes: negative for visual disturbance   ENT: ne gative for sore throat or nasal congestion  Respiratory: negative for cough, shortness of breath and sputum  Cardiovascular: negative for chest pain or palpitations  Gastrointestinal: negative for abd pain, nausea, vomiting, diarrhea or constipation  Genitourinary: negative for dysuria, urgency or frequency  Musculoskeletal:positive for acute pain of lt hip and lt thigh pain,negative for arthralgias, muscle weakness and stiff joints   Integument/breast: negative for skin rash or lesions  Neurological: negative for unilateral weakness, numbness or tingling. Psych: negative for anxiety, depression, suicidial ideation and suicidal attempt. Objective: There were no vitals taken for this visit. GEN:  He is alert and oriented, no distress  ENT:    ENT exam normal, no neck nodes or sinus tenderness  NECK:   neck supple and non tender without mass, no thyromegaly or thyroid nodules, no cervical lymphadenopathy  EYES:   No gross abnormalities.   CVS:     CVS exam BP noted to be well controlled today in office, S1, S2 normal, no gallop, 2/6 murmur, chest clear, no JVD, no HSM, no edema  PULM:   chest clear, no wheezing, rales, normal symmetric air entry  ABD:   exam deferred  MUSC:  No muscle tenderness  EXT:

## 2022-04-23 ENCOUNTER — APPOINTMENT (OUTPATIENT)
Dept: GENERAL RADIOLOGY | Age: 57
DRG: 481 | End: 2022-04-23
Payer: OTHER GOVERNMENT

## 2022-04-23 ENCOUNTER — APPOINTMENT (OUTPATIENT)
Dept: CT IMAGING | Age: 57
DRG: 481 | End: 2022-04-23
Payer: OTHER GOVERNMENT

## 2022-04-23 ENCOUNTER — HOSPITAL ENCOUNTER (INPATIENT)
Age: 57
LOS: 2 days | Discharge: HOME | DRG: 481 | End: 2022-04-25
Attending: EMERGENCY MEDICINE
Payer: OTHER GOVERNMENT

## 2022-04-23 DIAGNOSIS — S72.002A CLOSED FRACTURE OF LEFT HIP, INITIAL ENCOUNTER (HCC): Primary | ICD-10-CM

## 2022-04-23 DIAGNOSIS — S72.002A HIP FRACTURE REQUIRING OPERATIVE REPAIR, LEFT, CLOSED, INITIAL ENCOUNTER (HCC): ICD-10-CM

## 2022-04-23 LAB
ABSOLUTE EOS #: 0.06 K/UL (ref 0–0.44)
ABSOLUTE IMMATURE GRANULOCYTE: 0.09 K/UL (ref 0–0.3)
ABSOLUTE LYMPH #: 1.06 K/UL (ref 1.1–3.7)
ABSOLUTE MONO #: 0.77 K/UL (ref 0.1–1.2)
ALBUMIN SERPL-MCNC: 4.4 G/DL (ref 3.5–5.2)
ALBUMIN/GLOBULIN RATIO: 1.8 (ref 1–2.5)
ALP BLD-CCNC: 87 U/L (ref 40–129)
ALT SERPL-CCNC: 21 U/L (ref 5–41)
ANION GAP SERPL CALCULATED.3IONS-SCNC: 10 MMOL/L (ref 9–17)
AST SERPL-CCNC: 16 U/L
BASOPHILS # BLD: 0 % (ref 0–2)
BASOPHILS ABSOLUTE: 0.04 K/UL (ref 0–0.2)
BILIRUB SERPL-MCNC: 0.37 MG/DL (ref 0.3–1.2)
BUN BLDV-MCNC: 22 MG/DL (ref 6–20)
BUN/CREAT BLD: 36 (ref 9–20)
CALCIUM SERPL-MCNC: 9.5 MG/DL (ref 8.6–10.4)
CHLORIDE BLD-SCNC: 98 MMOL/L (ref 98–107)
CO2: 26 MMOL/L (ref 20–31)
CREAT SERPL-MCNC: 0.61 MG/DL (ref 0.7–1.2)
EKG ATRIAL RATE: 66 BPM
EKG P AXIS: 50 DEGREES
EKG P-R INTERVAL: 108 MS
EKG Q-T INTERVAL: 424 MS
EKG QRS DURATION: 108 MS
EKG QTC CALCULATION (BAZETT): 444 MS
EKG R AXIS: -3 DEGREES
EKG T AXIS: 44 DEGREES
EKG VENTRICULAR RATE: 66 BPM
EOSINOPHILS RELATIVE PERCENT: 1 % (ref 1–4)
GFR AFRICAN AMERICAN: >60 ML/MIN
GFR NON-AFRICAN AMERICAN: >60 ML/MIN
GFR SERPL CREATININE-BSD FRML MDRD: ABNORMAL ML/MIN/{1.73_M2}
GFR SERPL CREATININE-BSD FRML MDRD: ABNORMAL ML/MIN/{1.73_M2}
GLUCOSE BLD-MCNC: 103 MG/DL (ref 70–99)
HCT VFR BLD CALC: 42.7 % (ref 40.7–50.3)
HEMOGLOBIN: 14 G/DL (ref 13–17)
IMMATURE GRANULOCYTES: 1 %
INR BLD: 1
LYMPHOCYTES # BLD: 8 % (ref 24–43)
MCH RBC QN AUTO: 30.6 PG (ref 25.2–33.5)
MCHC RBC AUTO-ENTMCNC: 32.8 G/DL (ref 28.4–34.8)
MCV RBC AUTO: 93.2 FL (ref 82.6–102.9)
MONOCYTES # BLD: 6 % (ref 3–12)
NRBC AUTOMATED: 0 PER 100 WBC
PARTIAL THROMBOPLASTIN TIME: 25.1 SEC (ref 26.8–34.8)
PDW BLD-RTO: 11.9 % (ref 11.8–14.4)
PLATELET # BLD: 200 K/UL (ref 138–453)
PMV BLD AUTO: 10.3 FL (ref 8.1–13.5)
POTASSIUM SERPL-SCNC: 4.1 MMOL/L (ref 3.7–5.3)
PROTHROMBIN TIME: 13.5 SEC (ref 11.5–14.2)
RBC # BLD: 4.58 M/UL (ref 4.21–5.77)
SEG NEUTROPHILS: 84 % (ref 36–65)
SEGMENTED NEUTROPHILS ABSOLUTE COUNT: 10.89 K/UL (ref 1.5–8.1)
SODIUM BLD-SCNC: 134 MMOL/L (ref 135–144)
TOTAL PROTEIN: 6.9 G/DL (ref 6.4–8.3)
WBC # BLD: 12.9 K/UL (ref 3.5–11.3)

## 2022-04-23 PROCEDURE — 96374 THER/PROPH/DIAG INJ IV PUSH: CPT

## 2022-04-23 PROCEDURE — 99285 EMERGENCY DEPT VISIT HI MDM: CPT

## 2022-04-23 PROCEDURE — 94761 N-INVAS EAR/PLS OXIMETRY MLT: CPT

## 2022-04-23 PROCEDURE — 2580000003 HC RX 258: Performed by: INTERNAL MEDICINE

## 2022-04-23 PROCEDURE — 6370000000 HC RX 637 (ALT 250 FOR IP): Performed by: EMERGENCY MEDICINE

## 2022-04-23 PROCEDURE — 72100 X-RAY EXAM L-S SPINE 2/3 VWS: CPT

## 2022-04-23 PROCEDURE — 85610 PROTHROMBIN TIME: CPT

## 2022-04-23 PROCEDURE — 6360000002 HC RX W HCPCS: Performed by: INTERNAL MEDICINE

## 2022-04-23 PROCEDURE — 73552 X-RAY EXAM OF FEMUR 2/>: CPT

## 2022-04-23 PROCEDURE — 93010 ELECTROCARDIOGRAM REPORT: CPT | Performed by: INTERNAL MEDICINE

## 2022-04-23 PROCEDURE — 93005 ELECTROCARDIOGRAM TRACING: CPT | Performed by: EMERGENCY MEDICINE

## 2022-04-23 PROCEDURE — 6360000002 HC RX W HCPCS: Performed by: EMERGENCY MEDICINE

## 2022-04-23 PROCEDURE — 94660 CPAP INITIATION&MGMT: CPT

## 2022-04-23 PROCEDURE — 73502 X-RAY EXAM HIP UNI 2-3 VIEWS: CPT

## 2022-04-23 PROCEDURE — 73700 CT LOWER EXTREMITY W/O DYE: CPT

## 2022-04-23 PROCEDURE — 6370000000 HC RX 637 (ALT 250 FOR IP): Performed by: INTERNAL MEDICINE

## 2022-04-23 PROCEDURE — 80053 COMPREHEN METABOLIC PANEL: CPT

## 2022-04-23 PROCEDURE — 85025 COMPLETE CBC W/AUTO DIFF WBC: CPT

## 2022-04-23 PROCEDURE — 71045 X-RAY EXAM CHEST 1 VIEW: CPT

## 2022-04-23 PROCEDURE — 36415 COLL VENOUS BLD VENIPUNCTURE: CPT

## 2022-04-23 PROCEDURE — 96375 TX/PRO/DX INJ NEW DRUG ADDON: CPT

## 2022-04-23 PROCEDURE — 1200000000 HC SEMI PRIVATE

## 2022-04-23 PROCEDURE — 85730 THROMBOPLASTIN TIME PARTIAL: CPT

## 2022-04-23 PROCEDURE — 72170 X-RAY EXAM OF PELVIS: CPT

## 2022-04-23 RX ORDER — METOPROLOL SUCCINATE 50 MG/1
50 TABLET, EXTENDED RELEASE ORAL DAILY
Status: DISCONTINUED | OUTPATIENT
Start: 2022-04-23 | End: 2022-04-25 | Stop reason: HOSPADM

## 2022-04-23 RX ORDER — MORPHINE SULFATE 2 MG/ML
2 INJECTION, SOLUTION INTRAMUSCULAR; INTRAVENOUS EVERY 4 HOURS PRN
Status: DISCONTINUED | OUTPATIENT
Start: 2022-04-23 | End: 2022-04-24

## 2022-04-23 RX ORDER — MORPHINE SULFATE 2 MG/ML
2 INJECTION, SOLUTION INTRAMUSCULAR; INTRAVENOUS ONCE
Status: COMPLETED | OUTPATIENT
Start: 2022-04-23 | End: 2022-04-23

## 2022-04-23 RX ORDER — ONDANSETRON 2 MG/ML
4 INJECTION INTRAMUSCULAR; INTRAVENOUS ONCE
Status: COMPLETED | OUTPATIENT
Start: 2022-04-23 | End: 2022-04-23

## 2022-04-23 RX ORDER — SODIUM CHLORIDE 0.9 % (FLUSH) 0.9 %
10 SYRINGE (ML) INJECTION PRN
Status: DISCONTINUED | OUTPATIENT
Start: 2022-04-23 | End: 2022-04-25 | Stop reason: HOSPADM

## 2022-04-23 RX ORDER — ONDANSETRON 4 MG/1
4 TABLET, ORALLY DISINTEGRATING ORAL EVERY 8 HOURS PRN
Status: DISCONTINUED | OUTPATIENT
Start: 2022-04-23 | End: 2022-04-25 | Stop reason: HOSPADM

## 2022-04-23 RX ORDER — SODIUM CHLORIDE 9 MG/ML
INJECTION, SOLUTION INTRAVENOUS CONTINUOUS
Status: DISCONTINUED | OUTPATIENT
Start: 2022-04-23 | End: 2022-04-25

## 2022-04-23 RX ORDER — SODIUM CHLORIDE 0.9 % (FLUSH) 0.9 %
5-40 SYRINGE (ML) INJECTION EVERY 12 HOURS SCHEDULED
Status: DISCONTINUED | OUTPATIENT
Start: 2022-04-23 | End: 2022-04-25 | Stop reason: HOSPADM

## 2022-04-23 RX ORDER — ACETAMINOPHEN 650 MG/1
650 SUPPOSITORY RECTAL EVERY 6 HOURS PRN
Status: DISCONTINUED | OUTPATIENT
Start: 2022-04-23 | End: 2022-04-25 | Stop reason: HOSPADM

## 2022-04-23 RX ORDER — IBUPROFEN 400 MG/1
400 TABLET ORAL EVERY 6 HOURS PRN
Status: ON HOLD | COMMUNITY
End: 2022-04-25 | Stop reason: HOSPADM

## 2022-04-23 RX ORDER — METOPROLOL SUCCINATE 25 MG/1
25 TABLET, EXTENDED RELEASE ORAL DAILY
Status: DISCONTINUED | OUTPATIENT
Start: 2022-04-23 | End: 2022-04-23

## 2022-04-23 RX ORDER — ONDANSETRON 2 MG/ML
4 INJECTION INTRAMUSCULAR; INTRAVENOUS EVERY 6 HOURS PRN
Status: DISCONTINUED | OUTPATIENT
Start: 2022-04-23 | End: 2022-04-25 | Stop reason: HOSPADM

## 2022-04-23 RX ORDER — SODIUM CHLORIDE 9 MG/ML
INJECTION, SOLUTION INTRAVENOUS PRN
Status: DISCONTINUED | OUTPATIENT
Start: 2022-04-23 | End: 2022-04-25 | Stop reason: HOSPADM

## 2022-04-23 RX ORDER — POLYETHYLENE GLYCOL 3350 17 G/17G
17 POWDER, FOR SOLUTION ORAL DAILY PRN
Status: DISCONTINUED | OUTPATIENT
Start: 2022-04-23 | End: 2022-04-25 | Stop reason: HOSPADM

## 2022-04-23 RX ORDER — HYDROCODONE BITARTRATE AND ACETAMINOPHEN 5; 325 MG/1; MG/1
1 TABLET ORAL ONCE
Status: COMPLETED | OUTPATIENT
Start: 2022-04-23 | End: 2022-04-23

## 2022-04-23 RX ORDER — ACETAMINOPHEN 325 MG/1
650 TABLET ORAL EVERY 6 HOURS PRN
Status: DISCONTINUED | OUTPATIENT
Start: 2022-04-23 | End: 2022-04-25 | Stop reason: HOSPADM

## 2022-04-23 RX ORDER — HYDROCODONE BITARTRATE AND ACETAMINOPHEN 5; 325 MG/1; MG/1
1 TABLET ORAL EVERY 4 HOURS PRN
Status: DISCONTINUED | OUTPATIENT
Start: 2022-04-23 | End: 2022-04-25 | Stop reason: HOSPADM

## 2022-04-23 RX ADMIN — MORPHINE SULFATE 2 MG: 2 INJECTION, SOLUTION INTRAMUSCULAR; INTRAVENOUS at 23:11

## 2022-04-23 RX ADMIN — SODIUM CHLORIDE: 9 INJECTION, SOLUTION INTRAVENOUS at 16:56

## 2022-04-23 RX ADMIN — HYDROCODONE BITARTRATE AND ACETAMINOPHEN 1 TABLET: 5; 325 TABLET ORAL at 12:22

## 2022-04-23 RX ADMIN — ONDANSETRON 4 MG: 2 INJECTION INTRAMUSCULAR; INTRAVENOUS at 13:59

## 2022-04-23 RX ADMIN — HYDROCODONE BITARTRATE AND ACETAMINOPHEN 1 TABLET: 5; 325 TABLET ORAL at 20:48

## 2022-04-23 RX ADMIN — MORPHINE SULFATE 2 MG: 2 INJECTION, SOLUTION INTRAMUSCULAR; INTRAVENOUS at 14:00

## 2022-04-23 RX ADMIN — MORPHINE SULFATE 2 MG: 2 INJECTION, SOLUTION INTRAMUSCULAR; INTRAVENOUS at 18:45

## 2022-04-23 RX ADMIN — HYDROCODONE BITARTRATE AND ACETAMINOPHEN 1 TABLET: 5; 325 TABLET ORAL at 16:55

## 2022-04-23 ASSESSMENT — PAIN DESCRIPTION - ORIENTATION
ORIENTATION: LEFT

## 2022-04-23 ASSESSMENT — PAIN SCALES - GENERAL
PAINLEVEL_OUTOF10: 10
PAINLEVEL_OUTOF10: 8
PAINLEVEL_OUTOF10: 5
PAINLEVEL_OUTOF10: 8
PAINLEVEL_OUTOF10: 10
PAINLEVEL_OUTOF10: 10
PAINLEVEL_OUTOF10: 6
PAINLEVEL_OUTOF10: 10

## 2022-04-23 ASSESSMENT — PAIN DESCRIPTION - DESCRIPTORS
DESCRIPTORS: SHOOTING
DESCRIPTORS: SPASM;SHOOTING
DESCRIPTORS: SHOOTING

## 2022-04-23 ASSESSMENT — PAIN DESCRIPTION - LOCATION
LOCATION: HIP

## 2022-04-23 ASSESSMENT — PAIN DESCRIPTION - PAIN TYPE
TYPE: ACUTE PAIN

## 2022-04-23 ASSESSMENT — PAIN DESCRIPTION - FREQUENCY: FREQUENCY: INTERMITTENT

## 2022-04-23 ASSESSMENT — PAIN - FUNCTIONAL ASSESSMENT: PAIN_FUNCTIONAL_ASSESSMENT: 0-10

## 2022-04-23 NOTE — ED PROVIDER NOTES
[facility]      Pt Name: Pippa Echavarria  MRN: [id number]  [place] 1965  Date of evaluation: 4/23/2022  Provider: William Young MD    [address] [place] [address]       Chief Complaint   Patient presents with    Hip Pain     Left hip pain         HISTORY OF PRESENT ILLNESS   (Location/Symptom, Timing/Onset, Context/Setting, Quality, Duration, Modifying Factors, Severity)  Note limiting factors. Pippa Echavarria is a 62 y.o. male who presents to the emergency department      Very pleasant 80-year-old gentleman presented emergency department by EMS for evaluation of severe left hip pain and left gluteal pain. Patient works as a . States a week ago Thursday he began having pain in his left hip. He did not injure himself does not recall any previous or recent injuries or strains pain is worse with standing and walking. He did work through the weekend. He has Sunday Monday and Tuesday off. Tried to return to work Wednesday but was experiencing significant discomfort. Seen by his primary care provider on Thursday. Patient had an x-ray and blood work performed patient was diagnosed with gout and started on Medrol Dosepak. Patient states the pain has not changed and is becoming worse. This morning he was unable to bear weight. Denies any back pain. No dysuria urinary frequency. No abdominal pain. No history of inguinal hernias. Denies any lower extremity weakness numbness or tingling. States the pain is localized to his left lateral hip. Is not any fevers chills or sweats. Was taking over-the-counter ibuprofen in addition to the Medrol with no relief. Nursing Notes were reviewed. REVIEW OF SYSTEMS    (2-9 systems for level 4, 10 or more for level 5)     Review of Systems   All other systems reviewed and are negative. Except as noted above the remainder of the review of systems was reviewed and negative.        PAST MEDICAL HISTORY     Past Medical History:   Diagnosis Date    Aortic valve disorders     aortic regurg    Atrial fibrillation (HCC)     Atrial flutter ([address]     Encounter for cardioversion procedure 05/05/2015    Dr Miles Acosta Encounter for cardioversion procedure     [facility]/ Dr. Milad Hutchinson, states 7 times, most recent 1/2020    Frequent urination     Pt states he's developed frequent urination with 3 times in last yr noting blood in urine as well.  H/O echocardiogram 12/04/2017    EF 77-39% Global LV systolic function appears preserved. Mildly increaseed LV wall thinckness with normal LV carvity size, No definite specific wall motion abnormalities identified, LA is moderately dilated (34-39) with LA volume index of 36ml/m2, Mild mitral and tricuspid regurgitation, moderate aortic regurgitation, Evidence of mild diastolic dysfunction seen    H/O echocardiogram 02/02/2021    EF 55% patient has sigmoid interventricular septim S/P aortic vlave repair with mild to mod aortic regurg Diastoligy cannot be properly assessed due to pts rhtyhm Aortic root is mildly dilated when corrected for body surface area    Hemorrhage of rectum and anus 2013    polypectomy    History of 24 hour EKG monitoring 04/03/2014    Unremarkable except for mild increased number of PVC's with 3 ventricular couplets, asymptomatic     History of echocardiogram 04/10/2014    EF 60%, mild to mod LVH, moderate AI    History of heart surgery 2008    enlarged aorta    History of PFTs 06/25/2015    Consistent with mild obstructive ventilatory impairment. Lung volumes are normal,except mild increase in residual volume,which could be suggestive of airway trapping. Diffusion capacity is normal.    Hx of transesophageal echocardiography (SKYLAR) for monitoring 05/05/2015    Dr Camron Waggoner    Hypertension     pcp dr Bianca Gutiérrez Impaired fasting glucose     Knee pain, bilateral     Lipoma     Obesity (BMI 30.0-34. 9)     Obstructive sleep apnea (adult) (pediatric)     does not wear cpap like suppose to    Paroxysmal supraventricular tachycardia ([address]     Sleep apnea     uses CPAP    Tobacco use disorder     Urinary urgency     Ventral hernia          SURGICAL HISTORY       Past Surgical History:   Procedure Laterality Date    ABLATION OF DYSRHYTHMIC FOCUS  2015    ACHILLES TENDON SURGERY Right 2010   [facility] CARDIAC SURGERY  2008    thoracic AAA repair and modified Coleman procedure ( aortic valve sparing ) at [address]  06/13/2013    x 2 , bleeding polyps removed    FOOT SURGERY      reconstructive for club foot (right)    HERNIA REPAIR  11/2018    ventral    HERNIA REPAIR  03/06/2020    ventral    HERNIA REPAIR N/A 3/6/2020    XI LAPAROSCOPIC ROBOTIC VENTRAL HERNIA WITH MESH performed by Jae Chang IV, DO at [address] [facility],3+B/S,MRX N/A 73/88/1040    HERNIA UMBILICAL REPAIR performed by Kei Chambers MD at [address] [facility] Left 05/10/2021    Dr. You Wright Left 5/10/2021    KNEE TOTAL ARTHROPLASTY performed by Tonny Guillen MD at . [facility]  10/24/2018    [address]       Current Discharge Medication List      CONTINUE these medications which have NOT CHANGED    Details   ibuprofen (ADVIL;MOTRIN) 400 MG tablet Take 400 mg by mouth every 6 hours as needed for Pain      !! furosemide (LASIX) 20 MG tablet Take 1 tablet by mouth every other day  Qty: 45 tablet, Refills: 0      !! furosemide (LASIX) 20 MG tablet Take 20 mg by mouth every other day      methylPREDNISolone (MEDROL DOSEPACK) 4 MG tablet Take by mouth. Qty: 1 kit, Refills: 0      aspirin 325 MG EC tablet Take 1 tablet by mouth daily  Qty: 90 tablet, Refills: 3      metoprolol succinate (TOPROL XL) 50 MG extended release tablet Take 1 tablet by mouth once daily  Qty: 90 tablet, Refills: 3       !! - Potential duplicate medications found. Please discuss with provider. ALLERGIES     Patient has no known allergies. FAMILY HISTORY       Family History   Problem Relation Age of Onset    Colon Cancer Mother     Heart Surgery Father         enlarged aorta    Kidney Cancer Father           SOCIAL HISTORY       Social History     Socioeconomic History    Marital status:      Spouse name: None    Number of children: None    Years of education: None    Highest education level: None   Occupational History    None   Tobacco Use    Smoking status: Former Smoker     Packs/day: 1.00     Years: 34.00     Pack years: 34.00     Types: Cigarettes     Quit date: 12/15/2020     Years since quittin.3    Smokeless tobacco: Never Used   Vaping Use    Vaping Use: Never used   Substance and Sexual Activity    Alcohol use: Yes     Alcohol/week: 6.0 standard drinks     Types: 6 Cans of beer per week     Comment: twice a week    Drug use: No    Sexual activity: None   Other Topics Concern    None   Social History Narrative    None     Social Determinants of Health     Financial Resource Strain: Low Risk     Difficulty of Paying Living Expenses: Not hard at all   Food Insecurity: No Food Insecurity    Worried About Running Out of Food in the Last Year: Never true    Chris of Food in the Last Year: Never true   Transportation Needs: No Transportation Needs    Lack of Transportation (Medical): No    Lack of Transportation (Non-Medical):  No   Physical Activity:     Days of Exercise per Week: Not on file    Minutes of Exercise per Session: Not on file   Stress:     Feeling of Stress : Not on file   Social Connections:     Frequency of Communication with Friends and Family: Not on file    Frequency of Social Gatherings with Friends and Family: Not on file    Attends Advent Services: Not on file    Active Member of Clubs or Organizations: Not on file    Attends Club or Organization Meetings: Not on file    Marital Status: Not on file   Intimate Partner Violence:     Fear of Current or Ex-Partner: Not on file    Emotionally Abused: Not on file    Physically Abused: Not on file    Sexually Abused: Not on file   Housing Stability:     Unable to Pay for Housing in the Last Year: Not on file    Number of [place] in the Last Year: Not on file    Unstable Housing in the Last Year: Not on file       SCREENINGS        Izaiah Coma Scale  Eye Opening: Spontaneous  Best Verbal Response: Oriented  Best Motor Response: Obeys commands  [place] Coma Scale Score: 15               PHYSICAL EXAM    (up to 7 for level 4, 8 or more for level 5)     ED Triage Vitals [04/23/22 1152]   BP Temp Temp Source Pulse Resp SpO2 Height Weight   -- 97.1 °F (36.2 °C) Tympanic 68 16 97 % 5' 9\" (1.753 m) 240 lb (108.9 kg)       Physical Exam  Vitals and nursing note reviewed. Constitutional:       Appearance: Normal appearance. Comments: Uncomfortable but in no acute distress   HENT:      Head: Normocephalic and atraumatic. Cardiovascular:      Rate and Rhythm: Normal rate and regular rhythm. Pulmonary:      Effort: Pulmonary effort is normal. No respiratory distress. Breath sounds: Normal breath sounds. Abdominal:      General: There is no distension. Palpations: Abdomen is soft. Comments: Patient had no localized tenderness on exam.  With the palpation of his left lower quadrant he did experience some discomfort in his left hip. No rebound tenderness. No guarding. No hernias   Musculoskeletal:      Cervical back: Normal range of motion. Comments: Tenderness to palpation over left hip. Tenderness left gluteal region. No deformity. No ecchymosis or abrasions. No abnormal rotation. Distal neurovascular intact. Skin:     General: Skin is warm and dry. Neurological:      General: No focal deficit present. Mental Status: He is alert and oriented to person, place, and time. DIAGNOSTIC RESULTS     EKG:  All EKG's are interpreted by the Emergency Department Physician who either signs or Co-signs this chart in the absence of a cardiologist.    Sinus rhythm rate of 66 bpm.  Normal conduction. Normal intervals. No acute ST segment or T wave findings. No acute findings of ischemia or infarct    RADIOLOGY:   Non-plain film images such as CT, Ultrasound and MRI are read by the radiologist. Plain radiographic images are visualized and preliminarily interpreted by the emergency physician with the below findings:        Interpretation per the Radiologist below, if available at the time of this note:    XR FEMUR LEFT (MIN 2 VIEWS)   Final Result   Acute mildly impacted fracture of the left femoral neck         XR PELVIS (1-2 VIEWS)   Final Result   Acute mildly impacted fracture of the left femoral neck         CT HIP LEFT WO CONTRAST   Preliminary Result   Acute left basicervical femoral neck fracture in varus. Anterior apex   angulation. Osteopenia. XR CHEST PORTABLE   Final Result   No acute process. Stable cardiomegaly         XR HIP LEFT (2-3 VIEWS)   Final Result   Mildly angulated fracture left femoral neck         XR LUMBAR SPINE (2-3 VIEWS)   Final Result   1. Mild multilevel degenerative disc disease and facet joint arthropathy   2.  No acute lumbar spine abnormality               ED BEDSIDE ULTRASOUND:   Performed by ED Physician - none    LABS:  Labs Reviewed   CBC WITH AUTO DIFFERENTIAL - Abnormal; Notable for the following components:       Result Value    WBC 12.9 (*)     Seg Neutrophils 84 (*)     Lymphocytes 8 (*)     Immature Granulocytes 1 (*)     Segs Absolute 10.89 (*)     Absolute Lymph # 1.06 (*)     All other components within normal limits   COMPREHENSIVE METABOLIC PANEL W/ REFLEX TO MG FOR LOW K - Abnormal; Notable for the following components:    Glucose 103 (*)     BUN 22 (*)     CREATININE 0.61 (*)     Bun/Cre Ratio 36 (*)     Sodium 134 (*)     All other components within normal limits   APTT - Abnormal; Notable for the following components:    PTT 25.1 (*)     All other components within normal limits   PROTIME-INR   URINALYSIS WITH REFLEX TO CULTURE   COMPREHENSIVE METABOLIC PANEL W/ REFLEX TO MG FOR LOW K   CBC WITH AUTO DIFFERENTIAL       All other labs were within normal range or not returned as of this dictation. EMERGENCY DEPARTMENT COURSE and DIFFERENTIAL DIAGNOSIS/MDM:   Vitals:    Vitals:    04/23/22 1500 04/23/22 1515 04/23/22 1530 04/23/22 1620   BP: (!) 154/74 (!) 140/53 (!) 144/58 (!) 167/72   Pulse:    74   Resp:    18   Temp:    97.8 °F (36.6 °C)   TempSrc:    Temporal   SpO2: 94% 94% 94% 94%   Weight:       Height:               MDM  Number of Diagnoses or Management Options  Closed fracture of left hip, initial encounter [facility])  Diagnosis management comments: 59-year-old gentleman left hip pain. Left hip x-ray is significant for a mildly angulated femoral neck fracture. Results were discussed with patient. Patient does have a history of atrial fibrillation/flutter. States been quite sometime since he has had an episode. He is not on any anticoagulation. Does take a 325 mg aspirin daily. He is seeing Dr. Argenis Fox in the past for his cardiac issues patient will be admitted to hospitalist service awaiting orthopedics return phone call. Discussed with Dr. Park Rodriguez and Dr. Antonieta Arango. Patient excepted for admission. MIPS       REASSESSMENT          CRITICAL CARE TIME   Total Critical Care time was  minutes, excluding separately reportable procedures. There was a high probability of clinically significant/life threatening deterioration in the patient's condition which required my urgent intervention. CONSULTS:  IP CONSULT TO RESPIRATORY CARE  IP CONSULT TO CASE MANAGEMENT  IP CONSULT TO ORTHOPEDIC SURGERY    PROCEDURES:  Unless otherwise noted below, none     Procedures        FINAL IMPRESSION      1.  Closed fracture of left hip, initial encounter ([facility] [address] DISPOSITION/PLAN   DISPOSITION Decision To Admit 04/23/2022 12:43:28 PM      PATIENT REFERRED TO:  No follow-up provider specified. DISCHARGE MEDICATIONS:  Current Discharge Medication List        Controlled Substances Monitoring:     No flowsheet data found.     (Please note that portions of this note were completed with a voice recognition program.  Efforts were made to edit the dictations but occasionally words are mis-transcribed.)    Snow Peters MD (electronically signed)  Attending Emergency Physician             Snow Peters MD  04/23/22 3878

## 2022-04-24 ENCOUNTER — ANESTHESIA (OUTPATIENT)
Dept: OPERATING ROOM | Age: 57
DRG: 481 | End: 2022-04-24
Payer: OTHER GOVERNMENT

## 2022-04-24 ENCOUNTER — ANESTHESIA EVENT (OUTPATIENT)
Dept: OPERATING ROOM | Age: 57
DRG: 481 | End: 2022-04-24
Payer: OTHER GOVERNMENT

## 2022-04-24 ENCOUNTER — APPOINTMENT (OUTPATIENT)
Dept: GENERAL RADIOLOGY | Age: 57
DRG: 481 | End: 2022-04-24
Payer: OTHER GOVERNMENT

## 2022-04-24 VITALS
SYSTOLIC BLOOD PRESSURE: 123 MMHG | OXYGEN SATURATION: 98 % | DIASTOLIC BLOOD PRESSURE: 51 MMHG | RESPIRATION RATE: 25 BRPM

## 2022-04-24 LAB
ABSOLUTE EOS #: 0.13 K/UL (ref 0–0.44)
ABSOLUTE IMMATURE GRANULOCYTE: 0.04 K/UL (ref 0–0.3)
ABSOLUTE LYMPH #: 1.09 K/UL (ref 1.1–3.7)
ABSOLUTE MONO #: 0.82 K/UL (ref 0.1–1.2)
ALBUMIN SERPL-MCNC: 4.1 G/DL (ref 3.5–5.2)
ALBUMIN/GLOBULIN RATIO: 1.5 (ref 1–2.5)
ALP BLD-CCNC: 92 U/L (ref 40–129)
ALT SERPL-CCNC: 17 U/L (ref 5–41)
ANION GAP SERPL CALCULATED.3IONS-SCNC: 11 MMOL/L (ref 9–17)
AST SERPL-CCNC: 13 U/L
BASOPHILS # BLD: 1 % (ref 0–2)
BASOPHILS ABSOLUTE: 0.04 K/UL (ref 0–0.2)
BILIRUB SERPL-MCNC: 0.67 MG/DL (ref 0.3–1.2)
BUN BLDV-MCNC: 18 MG/DL (ref 6–20)
BUN/CREAT BLD: 26 (ref 9–20)
CALCIUM SERPL-MCNC: 9.1 MG/DL (ref 8.6–10.4)
CHLORIDE BLD-SCNC: 97 MMOL/L (ref 98–107)
CO2: 28 MMOL/L (ref 20–31)
CREAT SERPL-MCNC: 0.7 MG/DL (ref 0.7–1.2)
EOSINOPHILS RELATIVE PERCENT: 2 % (ref 1–4)
GFR AFRICAN AMERICAN: >60 ML/MIN
GFR NON-AFRICAN AMERICAN: >60 ML/MIN
GFR SERPL CREATININE-BSD FRML MDRD: ABNORMAL ML/MIN/{1.73_M2}
GFR SERPL CREATININE-BSD FRML MDRD: ABNORMAL ML/MIN/{1.73_M2}
GLUCOSE BLD-MCNC: 112 MG/DL (ref 70–99)
HCT VFR BLD CALC: 42.5 % (ref 40.7–50.3)
HEMOGLOBIN: 14.5 G/DL (ref 13–17)
IMMATURE GRANULOCYTES: 1 %
LYMPHOCYTES # BLD: 13 % (ref 24–43)
MCH RBC QN AUTO: 31.3 PG (ref 25.2–33.5)
MCHC RBC AUTO-ENTMCNC: 34.1 G/DL (ref 28.4–34.8)
MCV RBC AUTO: 91.8 FL (ref 82.6–102.9)
MONOCYTES # BLD: 10 % (ref 3–12)
NRBC AUTOMATED: 0 PER 100 WBC
PDW BLD-RTO: 12 % (ref 11.8–14.4)
PLATELET # BLD: 170 K/UL (ref 138–453)
PMV BLD AUTO: 11.3 FL (ref 8.1–13.5)
POTASSIUM SERPL-SCNC: 4.1 MMOL/L (ref 3.7–5.3)
RBC # BLD: 4.63 M/UL (ref 4.21–5.77)
SEG NEUTROPHILS: 73 % (ref 36–65)
SEGMENTED NEUTROPHILS ABSOLUTE COUNT: 6.5 K/UL (ref 1.5–8.1)
SODIUM BLD-SCNC: 136 MMOL/L (ref 135–144)
TOTAL PROTEIN: 6.9 G/DL (ref 6.4–8.3)
WBC # BLD: 8.6 K/UL (ref 3.5–11.3)

## 2022-04-24 PROCEDURE — 6360000002 HC RX W HCPCS: Performed by: NURSE ANESTHETIST, CERTIFIED REGISTERED

## 2022-04-24 PROCEDURE — 3600000004 HC SURGERY LEVEL 4 BASE: Performed by: ORTHOPAEDIC SURGERY

## 2022-04-24 PROCEDURE — 3209999900 FLUORO FOR SURGICAL PROCEDURES

## 2022-04-24 PROCEDURE — 88311 DECALCIFY TISSUE: CPT

## 2022-04-24 PROCEDURE — 3700000001 HC ADD 15 MINUTES (ANESTHESIA): Performed by: ORTHOPAEDIC SURGERY

## 2022-04-24 PROCEDURE — 2580000003 HC RX 258: Performed by: NURSE ANESTHETIST, CERTIFIED REGISTERED

## 2022-04-24 PROCEDURE — 94660 CPAP INITIATION&MGMT: CPT

## 2022-04-24 PROCEDURE — 6370000000 HC RX 637 (ALT 250 FOR IP): Performed by: INTERNAL MEDICINE

## 2022-04-24 PROCEDURE — 1200000000 HC SEMI PRIVATE

## 2022-04-24 PROCEDURE — 88304 TISSUE EXAM BY PATHOLOGIST: CPT

## 2022-04-24 PROCEDURE — C1713 ANCHOR/SCREW BN/BN,TIS/BN: HCPCS | Performed by: ORTHOPAEDIC SURGERY

## 2022-04-24 PROCEDURE — 7100000001 HC PACU RECOVERY - ADDTL 15 MIN: Performed by: ORTHOPAEDIC SURGERY

## 2022-04-24 PROCEDURE — A4216 STERILE WATER/SALINE, 10 ML: HCPCS | Performed by: NURSE ANESTHETIST, CERTIFIED REGISTERED

## 2022-04-24 PROCEDURE — 0QS706Z REPOSITION LEFT UPPER FEMUR WITH INTRAMEDULLARY INTERNAL FIXATION DEVICE, OPEN APPROACH: ICD-10-PCS | Performed by: ORTHOPAEDIC SURGERY

## 2022-04-24 PROCEDURE — 2700000000 HC OXYGEN THERAPY PER DAY

## 2022-04-24 PROCEDURE — 76942 ECHO GUIDE FOR BIOPSY: CPT | Performed by: NURSE ANESTHETIST, CERTIFIED REGISTERED

## 2022-04-24 PROCEDURE — 7100000000 HC PACU RECOVERY - FIRST 15 MIN: Performed by: ORTHOPAEDIC SURGERY

## 2022-04-24 PROCEDURE — 6360000002 HC RX W HCPCS: Performed by: INTERNAL MEDICINE

## 2022-04-24 PROCEDURE — 3600000014 HC SURGERY LEVEL 4 ADDTL 15MIN: Performed by: ORTHOPAEDIC SURGERY

## 2022-04-24 PROCEDURE — 6360000002 HC RX W HCPCS: Performed by: ORTHOPAEDIC SURGERY

## 2022-04-24 PROCEDURE — 94761 N-INVAS EAR/PLS OXIMETRY MLT: CPT

## 2022-04-24 PROCEDURE — 2709999900 HC NON-CHARGEABLE SUPPLY: Performed by: ORTHOPAEDIC SURGERY

## 2022-04-24 PROCEDURE — 3700000000 HC ANESTHESIA ATTENDED CARE: Performed by: ORTHOPAEDIC SURGERY

## 2022-04-24 PROCEDURE — 85025 COMPLETE CBC W/AUTO DIFF WBC: CPT

## 2022-04-24 PROCEDURE — 80053 COMPREHEN METABOLIC PANEL: CPT

## 2022-04-24 PROCEDURE — 2720000010 HC SURG SUPPLY STERILE: Performed by: ORTHOPAEDIC SURGERY

## 2022-04-24 PROCEDURE — 2580000003 HC RX 258: Performed by: INTERNAL MEDICINE

## 2022-04-24 PROCEDURE — 36415 COLL VENOUS BLD VENIPUNCTURE: CPT

## 2022-04-24 DEVICE — TRIGEN INTERTAN 13MM X 18CM 125DEGREE
Type: IMPLANTABLE DEVICE | Site: HIP | Status: FUNCTIONAL
Brand: TRIGEN

## 2022-04-24 DEVICE — INTERTAN LAG/COMPRESSION SCREW KIT                                    105MM / 100MM
Type: IMPLANTABLE DEVICE | Site: HIP | Status: FUNCTIONAL
Brand: TRIGEN

## 2022-04-24 DEVICE — TRIGEN LOW PROFILE SCREW 5.0MM X 40MM
Type: IMPLANTABLE DEVICE | Site: HIP | Status: FUNCTIONAL
Brand: TRIGEN

## 2022-04-24 RX ORDER — ROPIVACAINE HYDROCHLORIDE 5 MG/ML
INJECTION, SOLUTION EPIDURAL; INFILTRATION; PERINEURAL PRN
Status: DISCONTINUED | OUTPATIENT
Start: 2022-04-24 | End: 2022-04-24 | Stop reason: SDUPTHER

## 2022-04-24 RX ORDER — FENTANYL CITRATE 50 UG/ML
INJECTION, SOLUTION INTRAMUSCULAR; INTRAVENOUS PRN
Status: DISCONTINUED | OUTPATIENT
Start: 2022-04-24 | End: 2022-04-24 | Stop reason: SDUPTHER

## 2022-04-24 RX ORDER — ENOXAPARIN SODIUM 100 MG/ML
40 INJECTION SUBCUTANEOUS DAILY
Status: DISCONTINUED | OUTPATIENT
Start: 2022-04-25 | End: 2022-04-25 | Stop reason: HOSPADM

## 2022-04-24 RX ORDER — SODIUM CHLORIDE, SODIUM LACTATE, POTASSIUM CHLORIDE, CALCIUM CHLORIDE 600; 310; 30; 20 MG/100ML; MG/100ML; MG/100ML; MG/100ML
INJECTION, SOLUTION INTRAVENOUS CONTINUOUS PRN
Status: DISCONTINUED | OUTPATIENT
Start: 2022-04-24 | End: 2022-04-24 | Stop reason: SDUPTHER

## 2022-04-24 RX ORDER — KETOROLAC TROMETHAMINE 30 MG/ML
INJECTION, SOLUTION INTRAMUSCULAR; INTRAVENOUS PRN
Status: DISCONTINUED | OUTPATIENT
Start: 2022-04-24 | End: 2022-04-24 | Stop reason: SDUPTHER

## 2022-04-24 RX ORDER — DEXAMETHASONE SODIUM PHOSPHATE 10 MG/ML
INJECTION, SOLUTION INTRAMUSCULAR; INTRAVENOUS PRN
Status: DISCONTINUED | OUTPATIENT
Start: 2022-04-24 | End: 2022-04-24 | Stop reason: SDUPTHER

## 2022-04-24 RX ORDER — HYDROCODONE BITARTRATE AND ACETAMINOPHEN 5; 325 MG/1; MG/1
1 TABLET ORAL EVERY 4 HOURS PRN
Qty: 42 TABLET | Refills: 0 | Status: SHIPPED | OUTPATIENT
Start: 2022-04-25 | End: 2022-05-02

## 2022-04-24 RX ORDER — PROPOFOL 10 MG/ML
INJECTION, EMULSION INTRAVENOUS PRN
Status: DISCONTINUED | OUTPATIENT
Start: 2022-04-24 | End: 2022-04-24 | Stop reason: SDUPTHER

## 2022-04-24 RX ORDER — SODIUM CHLORIDE 9 MG/ML
INJECTION INTRAVENOUS PRN
Status: DISCONTINUED | OUTPATIENT
Start: 2022-04-24 | End: 2022-04-24 | Stop reason: SDUPTHER

## 2022-04-24 RX ORDER — MORPHINE SULFATE 2 MG/ML
2 INJECTION, SOLUTION INTRAMUSCULAR; INTRAVENOUS
Status: DISCONTINUED | OUTPATIENT
Start: 2022-04-24 | End: 2022-04-25 | Stop reason: HOSPADM

## 2022-04-24 RX ORDER — MORPHINE SULFATE 4 MG/ML
4 INJECTION, SOLUTION INTRAMUSCULAR; INTRAVENOUS
Status: DISCONTINUED | OUTPATIENT
Start: 2022-04-24 | End: 2022-04-25 | Stop reason: HOSPADM

## 2022-04-24 RX ORDER — MIDAZOLAM HYDROCHLORIDE 1 MG/ML
INJECTION INTRAMUSCULAR; INTRAVENOUS PRN
Status: DISCONTINUED | OUTPATIENT
Start: 2022-04-24 | End: 2022-04-24 | Stop reason: SDUPTHER

## 2022-04-24 RX ADMIN — PROPOFOL 35 MG: 10 INJECTION, EMULSION INTRAVENOUS at 09:38

## 2022-04-24 RX ADMIN — SODIUM CHLORIDE, POTASSIUM CHLORIDE, SODIUM LACTATE AND CALCIUM CHLORIDE: 600; 310; 30; 20 INJECTION, SOLUTION INTRAVENOUS at 09:32

## 2022-04-24 RX ADMIN — FENTANYL CITRATE 50 MCG: 50 INJECTION INTRAMUSCULAR; INTRAVENOUS at 09:03

## 2022-04-24 RX ADMIN — CEFAZOLIN 2000 MG: 10 INJECTION, POWDER, FOR SOLUTION INTRAVENOUS at 16:51

## 2022-04-24 RX ADMIN — MORPHINE SULFATE 2 MG: 2 INJECTION, SOLUTION INTRAMUSCULAR; INTRAVENOUS at 04:53

## 2022-04-24 RX ADMIN — PROPOFOL 105 MCG/KG/MIN: 10 INJECTION, EMULSION INTRAVENOUS at 09:39

## 2022-04-24 RX ADMIN — DEXAMETHASONE SODIUM PHOSPHATE 10 MG: 10 INJECTION, SOLUTION INTRAMUSCULAR; INTRAVENOUS at 09:10

## 2022-04-24 RX ADMIN — CEFAZOLIN SODIUM 2000 MG: 10 INJECTION, POWDER, FOR SOLUTION INTRAVENOUS at 09:26

## 2022-04-24 RX ADMIN — MIDAZOLAM 0.5 MG: 1 INJECTION INTRAMUSCULAR; INTRAVENOUS at 09:35

## 2022-04-24 RX ADMIN — FENTANYL CITRATE 50 MCG: 50 INJECTION INTRAMUSCULAR; INTRAVENOUS at 09:00

## 2022-04-24 RX ADMIN — ROPIVACAINE HYDROCHLORIDE 40 ML: 5 INJECTION, SOLUTION EPIDURAL; INFILTRATION; PERINEURAL at 09:10

## 2022-04-24 RX ADMIN — KETOROLAC TROMETHAMINE 30 MG: 30 INJECTION, SOLUTION INTRAMUSCULAR at 10:52

## 2022-04-24 RX ADMIN — MIDAZOLAM 0.5 MG: 1 INJECTION INTRAMUSCULAR; INTRAVENOUS at 09:38

## 2022-04-24 RX ADMIN — SODIUM CHLORIDE 10 ML: 9 INJECTION, SOLUTION INTRAMUSCULAR; INTRAVENOUS; SUBCUTANEOUS at 09:10

## 2022-04-24 RX ADMIN — SODIUM CHLORIDE: 9 INJECTION, SOLUTION INTRAVENOUS at 05:00

## 2022-04-24 RX ADMIN — HYDROCORTISONE SODIUM SUCCINATE 100 MG: 100 INJECTION, POWDER, FOR SOLUTION INTRAMUSCULAR; INTRAVENOUS at 09:40

## 2022-04-24 RX ADMIN — METOPROLOL SUCCINATE 50 MG: 50 TABLET, EXTENDED RELEASE ORAL at 07:33

## 2022-04-24 RX ADMIN — CEFAZOLIN 2 G: 10 INJECTION, POWDER, FOR SOLUTION INTRAVENOUS at 09:32

## 2022-04-24 RX ADMIN — PROPOFOL 80 MG: 10 INJECTION, EMULSION INTRAVENOUS at 10:14

## 2022-04-24 ASSESSMENT — PAIN DESCRIPTION - FREQUENCY: FREQUENCY: INTERMITTENT

## 2022-04-24 ASSESSMENT — PAIN - FUNCTIONAL ASSESSMENT: PAIN_FUNCTIONAL_ASSESSMENT: ACTIVITIES ARE NOT PREVENTED

## 2022-04-24 ASSESSMENT — PAIN SCALES - GENERAL
PAINLEVEL_OUTOF10: 0
PAINLEVEL_OUTOF10: 5

## 2022-04-24 ASSESSMENT — LIFESTYLE VARIABLES: SMOKING_STATUS: 1

## 2022-04-24 ASSESSMENT — PAIN DESCRIPTION - PAIN TYPE: TYPE: ACUTE PAIN

## 2022-04-24 ASSESSMENT — PAIN DESCRIPTION - ORIENTATION: ORIENTATION: LEFT

## 2022-04-24 ASSESSMENT — PAIN DESCRIPTION - DESCRIPTORS: DESCRIPTORS: SHOOTING

## 2022-04-24 ASSESSMENT — PAIN DESCRIPTION - LOCATION: LOCATION: HIP

## 2022-04-24 NOTE — OP NOTE
Operative Note      Patient: Star Vazquez  YOB: 1965  MRN: [id number]    Date of Procedure: 4/24/2022    Pre-Op Diagnosis: Left basicervical femoral neck fracture    Post-Op Diagnosis: Same       Procedure:   Closed reduction, cephalomedullary nailing left basicervical femoral neck fracture with Quintin [employer] TriGen Intertan short nail    Surgeon(s):  Ravinder Lozano DO    Assistant:   * No surgical staff found *    Anesthesia: Pre-op block, MAC    Estimated Blood Loss (mL): 594CW    Complications: None    Specimens:   ID Type Source Tests Collected by Time Destination   A : A. Left Proximal Femur Bone Hip SURGICAL PATHOLOGY Ravinder Lozano DO 4/24/2022 1021        Implants:  Implant Name Type Inv. Item Serial No.  Lot No. LRB No. Used Action   NAIL IM L18CM [id number] 125DEG SH INTERTROCHANTERIC AG CARLOS - [id number]  NAIL IM L18CM [id number] NK 125DEG SH Nadia [employer] [place] [id number] Left 1 Implanted   KIT SCR LAG L105MM POL23AR COMPR L100MM DIA7MM INTEGR - [id number]  KIT SCR LAG L105MM [id number] COMPR L100MM DIA7MM INTEGR  [employer]-WD [id number] Left 1 Implanted   SCREW BNE L40MM DIA5MM WESLEY TIB TI INT HEX LO PROF FOR IM - [id number]  SCREW BNE L40MM DIA5MM WESLEY TIB TI INT HEX LO PROF FOR IM  [employer] [place] [id number] Left 1 Implanted         Drains:   [REMOVED] Closed/Suction Drain Right; Anterior Knee 10 Setswana (Removed)       Findings: Comminution to distal calcar     Indications:  Patient is a 80-year-old male who presented to the emergency department yesterday due to complaints of severe left hip pain. Patient states that he started having left hip pain over the past week or so. He denies any known injury or cause. He was evaluated on 4/21/2022 where imaging revealed no evidence for any acute findings of the left hip. Yesterday, patient went to get up and his left leg gave way.   He had immediate pain, causing him to fall to the ground onto his right side. Patient was unable to ambulate. He was brought to the emergency department where imaging revealed a displaced basicervical femoral neck fracture. Patient was subsequently admitted. Given the patient's history, it is likely the patient developed a stress fracture to the femoral neck which ultimately completed and displaced yesterday. Patient works as a . Patient denies any pain to the left hip prior to this past week. He states normally he is ambulatory without any issues. We discussed surgical and nonsurgical treatment options. Ultimately, surgical treatment was recommended. We discussed both total hip replacement surgery and intramedullary nailing. Given the patient's young age, activity status, lack of pain prior to the incident, and location of the fracture/comminution to the calcar, we elected to proceed with intramedullary nailing. Patient also desired to avoid total hip replacement surgery at this time. He would prefer to try to let the fracture heal instead of replacing the hip. We discussed risks, benefits, and alternatives to intramedullary nailing. Risks to the surgery include bleeding, infection, damage to strength neurovascular and soft tissue structures, pain, stiffness, weakness, implant failure, development of avascular necrosis of the femoral head, nonunion, malunion, need for further treatment, adverse reaction to anesthesia, DVT/PE, loss of limb, loss of life. The patient had all questions answered to his satisfaction, and he elected to proceed. Written and informed consent was obtained. Patient was medically cleared to proceed with the surgery. Detailed Description of Procedure:   Patient was brought down to the perioperative area. He was greeted by the surgery staff and anesthesia. He was also greeted by myself, and I initialed the operative extremity.   The patient had all questions answered to his satisfaction, and he elected to proceed with the surgery. Patient underwent a preoperative nerve block by the anesthesia staff. Patient was then taken to the operative suite. He underwent MAC anesthesia by the anesthesia staff. Preoperative antibiotics were administered. Patient was transferred to the fracture table. Left foot was well-padded and placed into the fracture table boot. Right lower extremity was well-padded and placed into a leg eric. A closed reduction maneuver of the left leg was then performed. Imaging revealed appropriate reduction of the fracture. The fracture extended through the distal portion of the femoral neck/basicervical region. There was noted comminution of the calcar extending all the way to the very top of the lesser trochanter. Decision was made to proceed with the planned surgery. The left lower extremity was prepped and draped in normal sterile orthopedic fashion. A timeout was performed, and all present in the room were in agreement with the correct patient, procedure, and operative extremity. There were no voiced concerns. Sterile skin marker was used to identify and map out the proximal femoral anatomy. Incision was made just proximal to the tip of the greater trochanter. Dissection was carried down to the iliotibial band. This was incised longitudinally with the skin incision. The tip of the greater trochanter was easily palpable. A guidepin was placed in the appropriate starting point on both the AP and lateral views. This was advanced into the proximal femur. Opening reamer was used to open the proximal femur. A [employer] TriGen InterTAN 125 degree 13 mm x 18 cm short cephalomedullary nail was selected and advanced into the proximal femur. This was malleted to the appropriate depth. A second incision was made laterally to accommodate the lag screw/compression screw drill guide. A guidewire was placed low in the femoral neck in the center center position. Appropriate positioning was obtained on both AP and lateral views. Depth was measured. Sequential drilling for the compression screw was performed followed by placement of the derotational bar. Drilling for the lag screw was then performed over the guidepin. Bone from the proximal femur was saved as specimen for biopsy. A Smith [employer]hew 105 mm lag screw was advanced over the guidewire to the appropriate depth and appropriate tip to apex distance. Traction was let off the leg and compression screw was advanced. Approximately 5 mm of compression was obtained with good bone to bone contact. Imaging revealed appropriate reduction of the fracture as well as appropriate placement of hardware. The setscrew was advanced proximally to prevent any further compression given the degree of comminution of the calcar. A third incision was made more distal in the lateral thigh to accommodate the distal interlocking screw. Hole was drilled and distal interlocking screw was placed in the static position. Final imaging revealed satisfactory fracture reduction as well as placement of hardware. Wounds were copiously irrigated with normal saline. Iliotibial band was closed in the proximal incision with 0 Vicryl suture. 2-0 Vicryl suture was used to close the subcutaneous tissue in all 3 incisions. Skin was then closed with staples. Sterile dressing was applied with silver adhesive dressings. Patient was reversed of anesthesia. He tolerated the procedure well and without complications. He was taken to recovery in stable condition. Given the comminution at the calcar, I recommend patient to be nonweightbearing. Patient will work with physical and Occupational Therapy.     Electronically signed by Papi Mahajan DO on 4/24/2022 at 11:21 AM

## 2022-04-24 NOTE — PROGRESS NOTES
Writer at bedside for shift assessment. Patient sitting up in bed watching TV with girlfriend, respirations are even and unlabored while on room air. Vitals obtained and assessment completed, see flowsheet for details. Medication given to patient at this time, see MAR. Pt denies any further needs. Call light in reach, will continue to monitor.

## 2022-04-24 NOTE — PLAN OF CARE
Problem: Pain  Goal: Verbalizes/displays adequate comfort level or baseline comfort level  Outcome: Progressing  Flowsheets (Taken 4/24/2022 0105)  Verbalizes/displays adequate comfort level or baseline comfort level:   Encourage patient to monitor pain and request assistance   Assess pain using appropriate pain scale     Problem: Safety - Adult  Goal: Free from fall injury  Outcome: Progressing  Flowsheets (Taken 4/24/2022 0105)  Free From Fall Injury:   Instruct family/caregiver on patient safety   Based on caregiver fall risk screen, instruct family/caregiver to ask for assistance with transferring infant if caregiver noted to have fall risk factors     Problem: ABCDS Injury Assessment  Goal: Absence of physical injury  Outcome: Progressing     Problem: Skin/Tissue Integrity  Goal: Absence of new skin breakdown  Description: 1. Monitor for areas of redness and/or skin breakdown  2. Assess vascular access sites hourly  3. Every 4-6 hours minimum:  Change oxygen saturation probe site  4. Every 4-6 hours:  If on nasal continuous positive airway pressure, respiratory therapy assess nares and determine need for appliance change or resting period.   Outcome: Progressing     Problem: Chronic Conditions and Co-morbidities  Goal: Patient's chronic conditions and co-morbidity symptoms are monitored and maintained or improved  Outcome: Progressing

## 2022-04-24 NOTE — PROGRESS NOTES
Pt. Assisted to dangle at bedside. Pt. States left leg is still numb and denies any pain. Call light in reach, will continue to monitor.

## 2022-04-24 NOTE — ANESTHESIA POSTPROCEDURE EVALUATION
Department of Anesthesiology  Postprocedure Note    Patient: Wayne Mcnally  MRN: [id number]  YOB: 1965  Date of evaluation: 4/24/2022  Time:  11:34 AM     Procedure Summary     Date: 04/24/22 Room / Location: [address] [place] [address]    Anesthesia Start: Jim Dawes Anesthesia Stop: 454    Procedure: HIP OPEN REDUCTION INTERNAL FIXATION (Left Hip) Diagnosis: (Left Hip Fracture)    Surgeons: Jonny Padilla DO Responsible Provider: AMADOR Mclean CRNA    Anesthesia Type: general, regional, TIVA ASA Status: 3          Anesthesia Type: general, regional, TIVA    Kirtsy Phase I: Kirsty Score: 10    Kirsty Phase II:      Last vitals: Reviewed and per EMR flowsheets.        Anesthesia Post Evaluation    Patient location during evaluation: bedside  Patient participation: complete - patient participated  Level of consciousness: awake and alert  Pain score: 0  Airway patency: patent  Nausea & Vomiting: no nausea and no vomiting  Complications: no  Cardiovascular status: hemodynamically stable  Respiratory status: acceptable  Hydration status: stable

## 2022-04-24 NOTE — ANESTHESIA PRE PROCEDURE
Department of Anesthesiology  Preprocedure Note       Name:  Nirav Ha   Age:  62 y.o.  :  1965                                          MRN:  [id number]         Date:  2022      Surgeon: Yumi Terry):  Johnathon Waterman DO    Procedure: Procedure(s):  HIP OPEN REDUCTION INTERNAL FIXATION    Medications prior to admission:   Prior to Admission medications    Medication Sig Start Date End Date Taking? Authorizing Provider   ibuprofen (ADVIL;MOTRIN) 400 MG tablet Take 400 mg by mouth every 6 hours as needed for Pain    Historical Provider, MD   furosemide (LASIX) 20 MG tablet Take 1 tablet by mouth every other day 22   Jorge Dorsey MD   furosemide (LASIX) 20 MG tablet Take 20 mg by mouth every other day    Historical Provider, MD   methylPREDNISolone (MEDROL DOSEPACK) 4 MG tablet Take by mouth. 22  Jorge Dorsey MD   aspirin 325 MG EC tablet Take 1 tablet by mouth daily 3/28/22   Kip Sever, MD   metoprolol succinate (TOPROL XL) 50 MG extended release tablet Take 1 tablet by mouth once daily 21   Kip Sever, MD       Current medications:    No current facility-administered medications for this visit. No current outpatient medications on file.      Facility-Administered Medications Ordered in Other Visits   Medication Dose Route Frequency Provider Last Rate Last Admin    [MAR Hold] 0.9 % sodium chloride infusion   IntraVENous Continuous Carmen Richardson MD 75 mL/hr at 22 0500 New Bag at 22 0500    [MAR Hold] sodium chloride flush 0.9 % injection 5-40 mL  5-40 mL IntraVENous 2 times per day Carmen Richardson MD        [facility] Hold] sodium chloride flush 0.9 % injection 10 mL  10 mL IntraVENous PRN Carmen Richardson MD        [facility] Hold] 0.9 % sodium chloride infusion   IntraVENous PRN Carmen Richardson MD        [facility] Hold] ondansetron (ZOFRAN-ODT) disintegrating tablet 4 mg  4 mg Oral Q8H PRN Carmen Richardson MD        Or   Akira [facility] Hold] ondansetron [facility]) injection 4 mg  4 mg IntraVENous Q6H PRN Lisandra Fernández MD        [facility] Hold] polyethylene glycol [facility]) packet 17 g  17 g Oral Daily PRN Lisandra Fernández MD        [facility] Hold] acetaminophen (TYLENOL) tablet 650 mg  650 mg Oral Q6H PRN Lisandra Fernández MD        Or    [facility] Hold] acetaminophen (TYLENOL) suppository 650 mg  650 mg Rectal Q6H PRN Lisandra Fernández MD        [facility] Hold] morphine (PF) injection 2 mg  2 mg IntraVENous Q4H PRN Lisandra Fernández MD   2 mg at 04/24/22 0453    [MAR Hold] HYDROcodone-acetaminophen (Ina Lars) 5-325 MG per tablet 1 tablet  1 tablet Oral Q4H PRN Lisandra Fernández MD   1 tablet at 04/23/22 2048    [MAR Hold] metoprolol succinate (TOPROL XL) extended release tablet 50 mg  50 mg Oral Daily Lisandra Fernández MD   50 mg at 04/24/22 7151       Allergies:  No Known Allergies    Problem List:    Patient Active Problem List   Diagnosis Code    Family history of colon cancer Z80.0    H/O ascending aorta repair Z98.890    Hx of amiodarone therapy Z92.29    Aneurysm of thoracic aorta (HCC) I71.2    Varicose veins of right lower extremity I83.91    Typical atrial flutter (HCC) / HX I48.3    Atrial flutter with rapid ventricular response (HCC) I48.92    Moderate aortic regurgitation I35.1    Tobacco abuse Z72.0    Tobacco abuse counseling Z71.6    Atrial fibrillation with rapid ventricular response (HCC) I48.91    Impaired fasting glucose R73.01    Hypertension I10    Atrial fibrillation with RVR ([facility]) I48.91    Acute on chronic diastolic CHF (congestive heart failure), NYHA class 3 ([facility]) I50.33    A-fib (HCC) I48.91    S/P total knee replacement using cement, left Z96.652    Hip fracture requiring operative repair, left, closed, initial encounter ([facility] S72.002A       Past Medical History:        Diagnosis Date    Aortic valve disorders     aortic regurg    [facility]     Atrial flutter ([address]     Encounter for cardioversion procedure 05/05/2015    Dr Vanessa Jimenez for cardioversion procedure     [facility]/ Dr. Ama Butcher, states 7 times, most recent 1/2020    Frequent urination     Pt states he's developed frequent urination with 3 times in last yr noting blood in urine as well.  H/O echocardiogram 12/04/2017    EF 63-63% Global LV systolic function appears preserved. Mildly increaseed LV wall thinckness with normal LV carvity size, No definite specific wall motion abnormalities identified, LA is moderately dilated (34-39) with LA volume index of 36ml/m2, Mild mitral and tricuspid regurgitation, moderate aortic regurgitation, Evidence of mild diastolic dysfunction seen    H/O echocardiogram 02/02/2021    EF 55% patient has sigmoid interventricular septim S/P aortic vlave repair with mild to mod aortic regurg Diastoligy cannot be properly assessed due to pts rhtyhm Aortic root is mildly dilated when corrected for body surface area    Hemorrhage of rectum and anus 2013    polypectomy    History of 24 hour EKG monitoring 04/03/2014    Unremarkable except for mild increased number of PVC's with 3 ventricular couplets, asymptomatic     History of echocardiogram 04/10/2014    EF 60%, mild to mod LVH, moderate AI    History of heart surgery 2008    enlarged aorta    History of PFTs 06/25/2015    Consistent with mild obstructive ventilatory impairment. Lung volumes are normal,except mild increase in residual volume,which could be suggestive of airway trapping. Diffusion capacity is normal.    Hx of transesophageal echocardiography ([facility]) for monitoring 05/05/2015    Dr Cintia Bang    Hypertension     pcp dr Reba Schuler Impaired fasting glucose     Knee pain, bilateral     Lipoma     Obesity (BMI 30.0-34. 9)     Obstructive sleep apnea (adult) (pediatric)     does not wear cpap like suppose to    Paroxysmal supraventricular tachycardia ([address]     Sleep apnea     uses CPAP    Tobacco use disorder     Urinary urgency     Ventral hernia        Past Surgical History:        Procedure Laterality Date    ABLATION OF DYSRHYTHMIC FOCUS  2015    ACHILLES TENDON SURGERY Right 2010   [facility] CARDIAC SURGERY  2008    thoracic AAA repair and modified Coleman procedure ( aortic valve sparing ) at [address]  06/13/2013    x 2 , bleeding polyps removed    FOOT SURGERY      reconstructive for club foot (right)    HERNIA REPAIR  2018    ventral    HERNIA REPAIR  2020    ventral    HERNIA REPAIR N/A 3/6/2020    XI LAPAROSCOPIC ROBOTIC VENTRAL HERNIA WITH MESH performed by Ellen Macedo IV, DO at [address] [facility],7+D/I,ZDSV N/A     HERNIA UMBILICAL REPAIR performed by Edwige Doran MD at [address] [facility] Left 05/10/2021    Dr. Akira Michaels Left 5/10/2021    KNEE TOTAL ARTHROPLASTY performed by Fabiola Juarez MD at [facility]  10/24/2018    VASECTOMY         Social History:    Social History     Tobacco Use    Smoking status: Former Smoker     Packs/day: 1.00     Years: 34.00     Pack years: 34.00     Types: Cigarettes     Quit date: 12/15/2020     Years since quittin.3    Smokeless tobacco: Never Used   Substance Use Topics    Alcohol use: Yes     Alcohol/week: 6.0 standard drinks     Types: 6 Cans of beer per week     Comment: twice a week                                Counseling given: Not Answered      Vital Signs (Current): There were no vitals filed for this visit.                                            BP Readings from Last 3 Encounters:   22 (!) 145/70   21 136/78   21 134/77       NPO Status:                                                                                 BMI:   Wt Readings from Last 3 Encounters:   22 238 lb 11.2 oz (108.3 kg)   21 243 lb 6.4 oz (110.4 kg)   21 245 lb 3.2 oz (111.2 kg)     There is no height or weight on file to calculate BMI.    CBC:   Lab Results   Component Value Date    WBC 8.6 04/24/2022    RBC 4.63 04/24/2022    HGB 14.5 04/24/2022    HCT 42.5 04/24/2022    MCV 91.8 04/24/2022    RDW 12.0 04/24/2022     04/24/2022       CMP:   Lab Results   Component Value Date     04/24/2022    K 4.1 04/24/2022    K 3.9 02/27/2021    CL 97 04/24/2022    CO2 28 04/24/2022    BUN 18 04/24/2022    CREATININE 0.70 04/24/2022    GFRAA >60 04/24/2022    LABGLOM >60 04/24/2022    LABGLOM >90 02/27/2021    GLUCOSE 112 04/24/2022    GLUCOSE 115 10/01/2011    PROT 6.9 04/24/2022    CALCIUM 9.1 04/24/2022    BILITOT 0.67 04/24/2022    ALKPHOS 92 04/24/2022    AST 13 04/24/2022    ALT 17 04/24/2022       POC Tests: No results for input(s): POCGLU, POCNA, POCK, POCCL, POCBUN, POCHEMO, POCHCT in the last 72 hours. Coags:   Lab Results   Component Value Date    PROTIME 13.5 04/23/2022    INR 1.0 04/23/2022    APTT 25.1 04/23/2022    APTT 29.6 02/26/2021       HCG (If Applicable): No results found for: PREGTESTUR, PREGSERUM, HCG, HCGQUANT     ABGs: No results found for: PHART, PO2ART, YCT8EOI, ODO4PAA, BEART, H3RIRFFW     Type & Screen (If Applicable):  Lab Results   Component Value Date    LABRH POS 02/26/2021       Drug/Infectious Status (If Applicable):  No results found for: HIV, HEPCAB    COVID-19 Screening (If Applicable):   Lab Results   Component Value Date    COVID19 Not Detected 05/03/2021           Anesthesia Evaluation  Patient summary reviewed and Nursing notes reviewed no history of anesthetic complications:   Airway: Mallampati: III  TM distance: >3 FB   Neck ROM: full  Mouth opening: > = 3 FB Dental: normal exam         Pulmonary:normal exam  breath sounds clear to auscultation  (+) sleep apnea: on CPAP,  current smoker          Patient did not smoke on day of surgery.                  Cardiovascular:  Exercise tolerance: good (>4 METS),   (+) hypertension:, valvular problems/murmurs (aortic valve repair):, dysrhythmias (no issues since ablation in Feb 2021): atrial fibrillation, CHF:,       ECG reviewed      Echocardiogram reviewed         Beta Blocker:  Dose within 24 Hrs      ROS comment: 02/02/2021 Start: 11:15 AM     Study Location: [facility]     Indications:Atrial fibrillation.     History / Tech. Comments:  Dx: atrial fibrillation/ atrial flutter  Hx: aortic valve repair, atrial fibrillation/atrial flutter, HTN, ablation,  cardioversion, AAA repair     Patient Status: Outpatient     Height: 68 inches Weight: 225 pounds BSA: 2.15 m^2 BMI: 34.21 kg/m^2     BP: 107/63 mmHg     CONCLUSIONS     Summary  Global left ventricular systolic function appears preserved with an  estimated ejection fraction of 55%. Mildly increased left ventricular wall thickness with a normal left  ventricular cavity size. The patient has a sigmoid interventricular septum. S/P aortic valve repair with mild to moderate aortic regurgitation. Diastology cannot be properly assessed due to the patients rhythm. The aortic root is mildly dilated when corrected for body surface area.     Compared to the previous study of 1/7/2019, no significant change was seen.     Signature  ----------------------------------------------------------------------------   Electronically signed by Promise Farrell(Sonographer) on 02/02/2021 01:23 PM  ----------------------------------------------------------------------------     ----------------------------------------------------------------------------   Electronically signed by Marco Olivarez(Interpreting physician) on 02/02/2021   03:11 PM  ----------------------------------------------------------------------------  FINDINGS  Left Atrium  Left atrium is normal in size. Left Ventricle  Global left ventricular systolic function appears preserved with an  estimated ejection fraction of 55%. Mildly increased left ventricular wall thickness with a normal left  ventricular cavity size.   The patient has a sigmoid interventricular septum. Right Atrium  Right atrium is normal in size. Right Ventricle  Normal right ventricular size and function. Mitral Valve  Normal mitral valve structure and function. Aortic Valve  S/P aortic valve repair with mild to moderate aortic regurgitation. Tricuspid Valve  Normal tricuspid valve structure and function. Pulmonic Valve  Normal pulmonic valve structure with trivial pulmonic regurgitation.     Miscellaneous  Diastology cannot be properly assessed due to the patients rhythm. The aortic root is mildly dilated when corrected for body surface area. Neuro/Psych:   Negative Neuro/Psych ROS              GI/Hepatic/Renal: Neg GI/Hepatic/Renal ROS            Endo/Other: Negative Endo/Other ROS                    Abdominal:   (+) obese,           Vascular: negative vascular ROS. Other Findings:             Anesthesia Plan      general, regional and TIVA     ASA 3     (Refused spinal.  Discussed PNB with pt, consented)  Induction: intravenous. MIPS: Postoperative opioids intended and Prophylactic antiemetics administered. Anesthetic plan and risks discussed with patient. Use of blood products discussed with patient whom consented to blood products. Plan discussed with CRNA.                 AMADOR Javier - CRNA   4/24/2022

## 2022-04-24 NOTE — ANESTHESIA PROCEDURE NOTES
Peripheral Block    Patient location during procedure: pre-op  Start time: 4/24/2022 9:00 AM  End time: 4/24/2022 9:10 AM  Staffing  Performed: resident/CRNA   Resident/CRNA: AMADOR Castillo CRNA  Preanesthetic Checklist  Completed: patient identified, IV checked, site marked, risks and benefits discussed, surgical consent, monitors and equipment checked, pre-op evaluation, timeout performed, anesthesia consent given, oxygen available and patient being monitored  Peripheral Block  Patient position: supine  Prep: ChloraPrep  Patient monitoring: continuous pulse ox and cardiac monitor  Block type: Fascia iliaca  Laterality: left  Injection technique: single-shot  Guidance: ultrasound guided  Local infiltration: decadron and ropivacaine  Infiltration strength: 0.5 %  Dose: 40 mL  Provider prep: sterile gloves and mask  Local infiltration: decadron and ropivacaine  Needle  Needle type:  Other   Needle gauge: 22 G  Needle length: 5 cm  Needle localization: ultrasound guidanceOther needle type: pajunk  Assessment  Injection assessment: negative aspiration for heme, no paresthesia on injection and local visualized surrounding nerve on ultrasound  Paresthesia pain: none  Slow fractionated injection: yes  Hemodynamics: stable  Additional Notes  0.5% Ropivacaine 40ml, PFNS 10ml, PF Decadron 10mg  Reason for block: post-op pain management and at surgeon's request

## 2022-04-24 NOTE — PROGRESS NOTES
Writer at bedside for second assessment. Patient sitting up in bed watching TV, respirations are even and unlabored while on room air. Vitals obtained and assessment completed, see flowsheet for details. Urinal emptied. Medication given, see MAR. Respiratory called to set up CPAP for patient. pt denies further needs at this time. Call light in reach, will continue to monitor.

## 2022-04-24 NOTE — H&P
Patient:  Danyell Casillas  MRN: [id number]    Chief Complaint:    Chief Complaint   Patient presents with    Hip Pain     Left hip pain       History Obtained From:  patient, electronic medical record    PCP: Bharati Be MD    History of Present Illness: The patient is a 62 y.o. male who presents with emergency department by EMS for evaluation of severe left hip pain and left gluteal pain. Patient works as a . States a week ago Thursday he began having pain in his left hip. He did not injure himself does not recall any previous or recent injuries or strains pain is worse with standing and walking. He did work through the weekend. He has Sunday Monday and Tuesday off. Tried to return to work Wednesday but was experiencing significant discomfort. Seen by his primary care provider on Thursday. Patient had an x-ray and blood work performed patient was diagnosed with gout and started on Medrol Dosepak. Patient states the pain has not changed and is becoming worse. This morning he was unable to bear weight. Denies any back pain. No dysuria urinary frequency. No abdominal pain. No history of inguinal hernias. Denies any lower extremity weakness numbness or tingling. States the pain is localized to his left lateral hip. Is not any fevers chills or sweats. Was taking over-the-counter ibuprofen in addition to the Medrol with no relief    Past Medical History:        Diagnosis Date    Aortic valve disorders     aortic regurg    Atrial fibrillation (HCC)     [facility] flutter ([facility] [address]     Encounter for cardioversion procedure 05/05/2015    Dr Holly Pineda Encounter for cardioversion procedure     [place]/ Dr. Byron Rueda, states 7 times, most recent 1/2020    Frequent urination     Pt states he's developed frequent urination with 3 times in last yr noting blood in urine as well.  H/O echocardiogram 12/04/2017    EF 04-43% Global LV systolic function appears preserved. Mildly increaseed LV wall thinckness with normal LV carvity size, No definite specific wall motion abnormalities identified, LA is moderately dilated (34-39) with LA volume index of 36ml/m2, Mild mitral and tricuspid regurgitation, moderate aortic regurgitation, Evidence of mild diastolic dysfunction seen    H/O echocardiogram 02/02/2021    EF 55% patient has sigmoid interventricular septim S/P aortic vlave repair with mild to mod aortic regurg Diastoligy cannot be properly assessed due to pts rhtyhm Aortic root is mildly dilated when corrected for body surface area    Hemorrhage of rectum and anus 2013    polypectomy    History of 24 hour EKG monitoring 04/03/2014    Unremarkable except for mild increased number of PVC's with 3 ventricular couplets, asymptomatic     History of echocardiogram 04/10/2014    EF 60%, mild to mod LVH, moderate AI    History of heart surgery 2008    enlarged aorta    History of PFTs 06/25/2015    Consistent with mild obstructive ventilatory impairment. Lung volumes are normal,except mild increase in residual volume,which could be suggestive of airway trapping. Diffusion capacity is normal.    Hx of transesophageal echocardiography (SKYLAR) for monitoring 05/05/2015    Dr Wilma Zayas    Hypertension     pcp dr Liya Garcia Impaired fasting glucose     Knee pain, bilateral     Lipoma     Obesity (BMI 30.0-34. 9)     Obstructive sleep apnea (adult) (pediatric)     does not wear cpap like suppose to    Paroxysmal supraventricular tachycardia ([address]     Sleep apnea     uses CPAP    Tobacco use disorder     Urinary urgency     Ventral hernia        Past Surgical History:        Procedure Laterality Date    ABLATION OF DYSRHYTHMIC FOCUS  2015    ACHILLES TENDON SURGERY Right 2010   [facility]ARD[facility]  2008    thoracic AAA repair and modified Coleman procedure ( aortic valve sparing ) at [address]  06/13/2013    x 2 , bleeding polyps removed    FOOT SURGERY      reconstructive for club foot (right)    HERNIA REPAIR  11/2018    ventral    HERNIA REPAIR  03/06/2020    ventral    HERNIA REPAIR N/A 3/6/2020    XI LAPAROSCOPIC ROBOTIC VENTRAL HERNIA WITH MESH performed by Michelle Haro DO at [facility],8+F/D,GVTNT N/A 94/29/2744    HERNIA UMBILICAL REPAIR performed by Rene Frank MD at [facility] Left 05/10/2021    Dr. Marissa Helton Left 5/10/2021    KNEE TOTAL ARTHROPLASTY performed by Andry Crowley MD at . [facility]  10/24/2018    VASECTOMY         Family History:       Problem Relation Age of Onset    Colon Cancer Mother     Heart Surgery Father         enlarged aorta    Kidney Cancer Father        Social History:   TOBACCO:   reports that he quit smoking about 16 months ago. His smoking use included cigarettes. He has a 34.00 pack-year smoking history. He has never used smokeless tobacco.  ETOH:   reports current alcohol use of about 6.0 standard drinks of alcohol per week. Allergies:  Patient has no known allergies. Medications Prior to Admission:    Prior to Admission medications    Medication Sig Start Date End Date Taking?  Authorizing Provider   ibuprofen (ADVIL;MOTRIN) 400 MG tablet Take 400 mg by mouth every 6 hours as needed for Pain   Yes Historical Provider, MD   furosemide (LASIX) 20 MG tablet Take 1 tablet by mouth every other day 4/21/22   Christina Ramirez MD   furosemide (LASIX) 20 MG tablet Take 20 mg by mouth every other day    Historical Provider, MD   methylPREDNISolone (MEDROL DOSEPACK) 4 MG tablet Take by mouth. 4/21/22 4/27/22  Christina Ramirez MD   aspirin 325 MG EC tablet Take 1 tablet by mouth daily 3/28/22   Nancy Mendoza MD   metoprolol succinate (TOPROL XL) 50 MG extended release tablet Take 1 tablet by mouth once daily 9/24/21   Nancy Mendoza MD       Review of Systems:  Constitutional:negative  for fevers, and negative for chills. Eyes: negative for visual disturbance   ENT: negative for sore throat, negative nasal congestion, and negative for earache  Respiratory: negative for shortness of breath, negative for cough, and negative for wheezing  Cardiovascular: negative for chest pain, negative for palpitations, and negative for syncope  Gastrointestinal: negative for abdominal pain, negative for nausea,negative for vomiting, negative for diarrhea, negative for constipation, and negative for hematochezia or melena  Genitourinary: negative for dysuria, negative for urinary urgency, negative for urinary frequency, and negative for hematuria  Skin: negative for skin rash, and negative for skin lesions  Neurological: negative for unilateral weakness, numbness or tingling. Physical Exam:    Vitals:   Temp: 98.4 °F (36.9 °C)  BP: (!) 145/70  Resp: 16  Pulse: 78  SpO2: 93 %  24HR INTAKE/OUTPUT:      Intake/Output Summary (Last 24 hours) at 4/24/2022 0716  Last data filed at 4/24/2022 1585  Gross per 24 hour   Intake --   Output 3850 ml   Net -3850 ml       Exam:  GEN:  alert & appropriate appearance  EYES: normal eyes, non icteric  NECK: supple with no lymphadenopathy,  no bruits noted  PULM: clear with no rales or rhonchi. No wheezes  COR: regular with rate and rhythm  ABD:  soft & NT, No distension  EXT:   Tender left hip palpation.   NEURO: negative, follows commands, stable  SKIN:  negative  -----------------------------------------------------------------  Diagnostic Data:   Lab Results   Component Value Date    WBC 8.6 04/24/2022    HGB 14.5 04/24/2022     04/24/2022       Lab Results   Component Value Date    BUN 18 04/24/2022    CREATININE 0.70 04/24/2022     04/24/2022    K 4.1 04/24/2022    CALCIUM 9.1 04/24/2022    CL 97 (L) 04/24/2022    CO2 28 04/24/2022    LABGLOM >60 04/24/2022       Lab Results   Component Value Date    WBCUA 0 TO 2 03/30/2015    RBCUA 0 TO 2 03/30/2015    EPITHUA 0 TO 2 03/30/2015    LEUKOCYTESUR NEGATIVE 03/30/2015    SPECGRAV 1.015 03/30/2015    GLUCOSEU NEGATIVE 03/30/2015    KETUA NEGATIVE 03/30/2015    PROTEINU NEGATIVE 03/30/2015    HGBUR NEGATIVE 03/30/2015    CASTUA NOT REPORTED 03/30/2015    CRYSTUA NOT REPORTED 03/30/2015    BACTERIA NOT REPORTED 03/30/2015    YEAST NOT REPORTED 03/30/2015       Lab Results   Component Value Date    TROPONINT NOT REPORTED 02/25/2021    PROBNP 317 (H) 05/27/2021       XR LUMBAR SPINE (2-3 VIEWS)    Result Date: 4/23/2022  EXAMINATION: 2 XRAY VIEWS OF THE LUMBAR SPINE 4/23/2022 12:20 pm COMPARISON: None. HISTORY: ORDERING SYSTEM PROVIDED HISTORY: pain TECHNOLOGIST PROVIDED HISTORY: pain FINDINGS: Alignment is anatomic. Mild multilevel degenerative disc disease and facet joint arthropathy are noted. No fractures or destructive bony abnormalities are identified. Atherosclerotic aortic calcifications. 1. Mild multilevel degenerative disc disease and facet joint arthropathy 2. No acute lumbar spine abnormality     XR PELVIS (1-2 VIEWS)    Result Date: 4/23/2022  EXAMINATION: XRAY VIEWS OF THE LEFT FEMUR; ONE XRAY VIEW OF THE PELVIS 4/23/2022 10:53 am COMPARISON: 04/21/2022 HISTORY: ORDERING SYSTEM PROVIDED HISTORY: left hip fracture TECHNOLOGIST PROVIDED HISTORY: left hip fracture; ORDERING SYSTEM PROVIDED HISTORY: left hip pain TECHNOLOGIST PROVIDED HISTORY: left hip pain FINDINGS: Mildly impacted fracture of the left femoral neck with mild lateral angulation. There is no evidence of dislocation. Left knee prosthesis with adequate alignment. .  The remaining and joint spaces appear well maintained. The soft tissues are unremarkable. Postsurgical changes in the scrotum     Acute mildly impacted fracture of the left femoral neck     XR HIP LEFT (2-3 VIEWS)    Result Date: 4/23/2022  EXAMINATION: TWO XRAY VIEWS OF THE LEFT HIP 4/23/2022 12:20 pm COMPARISON: None.  HISTORY: ORDERING SYSTEM PROVIDED HISTORY: pain TECHNOLOGIST PROVIDED HISTORY: X-ray injured hip and pelvis - add femur if pain and/or swelling is distal to the hip pain FINDINGS: Mildly angulated fracture the left femoral neck. No dislocation. No other fracture. Mildly angulated fracture left femoral neck     XR FEMUR LEFT (MIN 2 VIEWS)    Result Date: 4/23/2022  EXAMINATION: XRAY VIEWS OF THE LEFT FEMUR; ONE XRAY VIEW OF THE PELVIS 4/23/2022 10:53 am COMPARISON: 04/21/2022 HISTORY: ORDERING SYSTEM PROVIDED HISTORY: left hip fracture TECHNOLOGIST PROVIDED HISTORY: left hip fracture; ORDERING SYSTEM PROVIDED HISTORY: left hip pain TECHNOLOGIST PROVIDED HISTORY: left hip pain FINDINGS: Mildly impacted fracture of the left femoral neck with mild lateral angulation. There is no evidence of dislocation. Left knee prosthesis with adequate alignment. .  The remaining and joint spaces appear well maintained. The soft tissues are unremarkable. Postsurgical changes in the scrotum     Acute mildly impacted fracture of the left femoral neck     XR CHEST PORTABLE    Result Date: 4/23/2022  EXAMINATION: ONE XRAY VIEW OF THE CHEST 4/23/2022 1:08 pm COMPARISON: 02/02/2021 HISTORY: ORDERING SYSTEM PROVIDED HISTORY: Hip fracture, history of valvular heart disease TECHNOLOGIST PROVIDED HISTORY: Hip fracture, history of valvular heart disease FINDINGS: Status post median sternotomy. The lungs are without acute focal process. There is no effusion or pneumothorax. The cardiomediastinal silhouette is stable. The osseous structures are stable. No acute process. Stable cardiomegaly     CT HIP LEFT WO CONTRAST    Result Date: 4/23/2022  EXAMINATION: CT OF THE LEFT HIP WITHOUT CONTRAST 4/23/2022 1:52 pm TECHNIQUE: CT of the left hip was performed without the administration of intravenous contrast.  Multiplanar reformatted images are provided for review.  Dose modulation, iterative reconstruction, and/or weight based adjustment of the mA/kV was utilized to reduce the radiation dose to as low as reasonably achievable. COMPARISON: 4/23/2022 HISTORY ORDERING SYSTEM PROVIDED HISTORY: fracture TECHNOLOGIST PROVIDED HISTORY: Fracture Decision Support Exception - unselect if not a suspected or confirmed emergency medical condition->Emergency Medical Condition (MA) FINDINGS: The bones are demineralized. There is an acute left basicervical femoral neck fracture with anterior apex angulation and resultant varus deformity. There is mild comminution. No additional fracture is appreciated. Atherosclerosis is noted. There is no free fluid in the pelvis. The urinary bladder is distended. There is mild diverticulosis. Acute left basicervical femoral neck fracture in varus. Anterior apex angulation. Osteopenia. Assessment:    Principal Problem:    Hip fracture requiring operative repair, left, closed, initial encounter ([address]  Active Problems:    H/O ascending aorta repair    Typical atrial flutter ([address] / HX  Resolved Problems:    * No resolved hospital problems.  *      Patient Active Problem List    Diagnosis Date Noted    Acute on chronic diastolic CHF (congestive heart failure), NYHA class 3 ([facility])     Hip fracture requiring operative repair, left, closed, initial encounter ([address] 04/23/2022    S/P total knee replacement using cement, left 05/10/2021    A-fib ([address] 02/26/2021    Atrial fibrillation with RVR ([address] 02/02/2021    Impaired fasting glucose     Hypertension     Atrial fibrillation with rapid ventricular response ([address] 01/11/2020    Moderate aortic regurgitation     Tobacco abuse     Tobacco abuse counseling     Typical atrial flutter ([address] / HX 01/07/2019    Atrial flutter with rapid ventricular response ([address] 01/07/2019    Varicose veins of right lower extremity 06/14/2017    Aneurysm of thoracic aorta ([address]     H/O ascending aorta repair 04/03/2014    Hx of amiodarone therapy 04/03/2014    Family history of colon cancer 06/24/2013       Plan:     · This patient requires inpatient admission because of hip fracture  · Factors affecting the medical complexity of this patient include Principal Problem:  ·   Hip fracture requiring operative repair, left, closed, initial encounter ([facility] [address]  · Active Problems:  ·   H/O ascending aorta repair  ·   Typical atrial flutter ([facility] [address] / HX  · Resolved Problems:  ·   * No resolved hospital problems. *  ·   · Estimated length of stay is 3 days  · Surgical tx  · Beta blocker  · Patient is cleared for surgery  · High risk medication monitoring: zero    CORE MEASURES  Core measures including DVT prophylaxis, Code Status, Nutrition, Therapy Options, chart reviewed and advance directives reviewed at this visit.     Aram Mcclellan MD, MD  4/24/2022, 7:16 AM

## 2022-04-24 NOTE — CONSULTS
Department of Orthopedic Surgery  Attending Consult Note        Reason for Consult:  Left hip fracture    CHIEF COMPLAINT:  Severe left hip pain, unable to ambulate    History Obtained From:  patient, electronic medical record    HISTORY OF PRESENT ILLNESS:                The patient is a 62 y.o. male who presented to the ER yesterday due to severe left hip pain. Patient noticed pain over the past week or so. He underwent an xray of the left hip 3 days ago, which was normal. Patient has had to use a cane for ambulation, but yesterday he feels that his hip \"gave. \" He had severe pain and inability to ambulate. ER imaging revealed a left basicervical femoral neck fracture. Orthopedics was consulted. Patient admitted to hospitalist service. Patient denies any prior left hip pain. He is a  and normally ambulates without pain/difficulty. Past Medical History:        Diagnosis Date    Aortic valve disorders     aortic regurg    Atrial fibrillation (HCC)     Atrial flutter ([address]     Encounter for cardioversion procedure 05/05/2015    Dr Kapoor Mode Encounter for cardioversion procedure     [facility]/ Dr. Rony Moffett, states 7 times, most recent 1/2020    Frequent urination     Pt states he's developed frequent urination with 3 times in last yr noting blood in urine as well.  H/O echocardiogram 12/04/2017    EF 14-12% Global LV systolic function appears preserved.  Mildly increaseed LV wall thinckness with normal LV carvity size, No definite specific wall motion abnormalities identified, LA is moderately dilated (34-39) with LA volume index of 36ml/m2, Mild mitral and tricuspid regurgitation, moderate aortic regurgitation, Evidence of mild diastolic dysfunction seen    H/O echocardiogram 02/02/2021    EF 55% patient has sigmoid interventricular septim S/P aortic vlave repair with mild to mod aortic regurg Diastoligy cannot be properly assessed due to pts rhtyhm Aortic root is mildly dilated when corrected for body surface area    Hemorrhage of rectum and anus 2013    polypectomy    History of 24 hour EKG monitoring 04/03/2014    Unremarkable except for mild increased number of PVC's with 3 ventricular couplets, asymptomatic     History of echocardiogram 04/10/2014    EF 60%, mild to mod LVH, moderate AI    History of heart surgery 2008    enlarged aorta    History of PFTs 06/25/2015    Consistent with mild obstructive ventilatory impairment. Lung volumes are normal,except mild increase in residual volume,which could be suggestive of airway trapping. Diffusion capacity is normal.    Hx of transesophageal echocardiography (SKYLAR) for monitoring 05/05/2015    Dr Arabella Zimmerman    Hypertension     pcp dr Taty Echols Impaired fasting glucose     Knee pain, bilateral     Lipoma     Obesity (BMI 30.0-34. 9)     Obstructive sleep apnea (adult) (pediatric)     does not wear cpap like suppose to    Paroxysmal supraventricular tachycardia (HCC)     Sleep apnea     uses CPAP    Tobacco use disorder     Urinary urgency     Ventral hernia      Past Surgical History:        Procedure Laterality Date    ABLATION OF DYSRHYTHMIC FOCUS  2015    ACHILLES TENDON SURGERY Right 2010   [facility] CARDIAC SURGERY  2008    thoracic AAA repair and modified Coleman procedure ( aortic valve sparing ) at [address]  06/13/2013    x 2 , bleeding polyps removed    FOOT SURGERY      reconstructive for club foot (right)    HERNIA REPAIR  11/2018    ventral    HERNIA REPAIR  03/06/2020    ventral    HERNIA REPAIR N/A 3/6/2020    XI LAPAROSCOPIC ROBOTIC VENTRAL HERNIA WITH MESH performed by Wynn Nissen IV, DO at [address] [facility],8+D/D,YSXHI N/A 99/20/9329    HERNIA UMBILICAL REPAIR performed by Binu Garcia MD at [address] [facility] Left 05/10/2021    Dr. Larry Barnett Left 5/10/2021    KNEE TOTAL ARTHROPLASTY performed by Nahid Lino MD at [address]  10/24/2018    VASECTOMY       Current Medications:   Current Facility-Administered Medications: 0.9 % sodium chloride infusion, , IntraVENous, Continuous  sodium chloride flush 0.9 % injection 5-40 mL, 5-40 mL, IntraVENous, 2 times per day  sodium chloride flush 0.9 % injection 10 mL, 10 mL, IntraVENous, PRN  0.9 % sodium chloride infusion, , IntraVENous, PRN  ondansetron (ZOFRAN-ODT) disintegrating tablet 4 mg, 4 mg, Oral, Q8H PRN **OR** ondansetron (ZOFRAN) injection 4 mg, 4 mg, IntraVENous, Q6H PRN  polyethylene glycol (GLYCOLAX) packet 17 g, 17 g, Oral, Daily PRN  acetaminophen (TYLENOL) tablet 650 mg, 650 mg, Oral, Q6H PRN **OR** acetaminophen (TYLENOL) suppository 650 mg, 650 mg, Rectal, Q6H PRN  morphine (PF) injection 2 mg, 2 mg, IntraVENous, Q4H PRN  HYDROcodone-acetaminophen (NORCO) 5-325 MG per tablet 1 tablet, 1 tablet, Oral, Q4H PRN  metoprolol succinate (TOPROL XL) extended release tablet 50 mg, 50 mg, Oral, Daily  Allergies:  Patient has no known allergies. Social History:   Socioeconomic History    Marital status:        Spouse name: None    Number of children: None    Years of education: None    Highest education level: None   Occupational History    None   Tobacco Use    Smoking status: Former Smoker       Packs/day: 1.00       Years: 34.00       Pack years: 34.00       Types: Cigarettes       Quit date: 12/15/2020       Years since quittin.3    Smokeless tobacco: Never Used   Vaping Use    Vaping Use: Never used   Substance and Sexual Activity    Alcohol use: Yes       Alcohol/week: 6.0 standard drinks       Types: 6 Cans of beer per week       Comment: twice a week    Drug use: No     Family History:       Problem Relation Age of Onset    Colon Cancer Mother     Heart Surgery Father         enlarged aorta    Kidney Cancer Father      REVIEW OF SYSTEMS:    Negative except as above.     PHYSICAL EXAM:    VITALS:  BP (!) 145/70   Pulse 78   Temp 98.4 °F (36.9 °C) (Temporal)   Resp 16   Ht 5' 9\" (1.753 m)   Wt 238 lb 11.2 oz (108.3 kg)   SpO2 93%   BMI 35.25 kg/m²     General: A&O, NAD    Left lower extremity: No open injury. Mild swelling around the hip. Sensation intact in the saphenous, sural, tibial, superficial/deep peroneal nerves. Motor intact with ankle dorsiflexion/plantarflexion/wiggling of toes. Distal pulses are intact. DATA:    XR FEMUR LEFT (MIN 2 VIEWS)   Final Result   Acute mildly impacted fracture of the left femoral neck    Displaced and angulated. Comminution at the calcar.       XR PELVIS (1-2 VIEWS)   Final Result   Acute mildly impacted fracture of the left femoral neck     Displaced and angulated. Comminution at the calcar.       CT HIP LEFT WO CONTRAST   Preliminary Result   Acute left basicervical femoral neck fracture in varus. Anterior apex   angulation.       Osteopenia.           XR CHEST PORTABLE   Final Result   No acute process.       Stable cardiomegaly           XR HIP LEFT (2-3 VIEWS)   Final Result   Mildly angulated fracture left femoral neck     Displaced and angulated. Comminution at the calcar.       XR LUMBAR SPINE (2-3 VIEWS)   Final Result   1. Mild multilevel degenerative disc disease and facet joint arthropathy   2. No acute lumbar spine abnormality     Labs are stable. IMPRESSION/RECOMMENDATIONS:    Diagnosis for this patient was discussed with him in detail. Patient has a displaced and angulated basicervical femoral neck fracture. We discussed surgical and nonsurgical treatment options. Surgery would be ISABELA vs. IM Nail. Given the location of the fracture, very low in the femoral neck, as well as comminution at the calcar and patient denies prior left hip pain, I recommended patient undergo IM Nail of the fracture. Patient in agreement and would prefer to have the fracture fixed as opposed to replaced.     We discussed risks, benefits, and alternatives to the procedure. Risks include bleeding, infection, damage to NV structures, pain, stiffness, weakness, femoral head AVN, Nonunion, malunion, implant failure, need for further treatment, loss of limb, and loss of life. Patient had all questions answered to his satisfaction, and he elected to proceed with the surgery. Patient medically cleared to proceed with surgery.

## 2022-04-24 NOTE — PROGRESS NOTES
Pt. Arrived back to room 303 from surgery. Pt. Alert and oriented x4. Denies any pain at this time. Pt. Vitals and assessment completed, see flowsheet. Call light in reach. Water provided. Will continue to monitor.

## 2022-04-24 NOTE — PLAN OF CARE
Problem: Pain  Goal: Verbalizes/displays adequate comfort level or baseline comfort level  4/24/2022 1159 by Jenna Louie RN  Outcome: Progressing   Pt. Pain is adequately controlled. Problem: Safety - Adult  Goal: Free from fall injury  4/24/2022 1159 by Jenna Louie RN  Outcome: Progressing   Pt. Remains free of falls, appropriate fall precautions    Problem: Skin/Tissue Integrity  Goal: Absence of new skin breakdown  Description: 1. Monitor for areas of redness and/or skin breakdown  2. Assess vascular access sites hourly  3. Every 4-6 hours minimum:  Change oxygen saturation probe site  4. Every 4-6 hours:  If on nasal continuous positive airway pressure, respiratory therapy assess nares and determine need for appliance change or resting period. 4/24/2022 1159 by Jenna Louie RN  Outcome: Progressing   Pt. Remains free of any new skin break down. Pt. Surgical dressing clean dry and intact.

## 2022-04-25 VITALS
WEIGHT: 239 LBS | DIASTOLIC BLOOD PRESSURE: 62 MMHG | RESPIRATION RATE: 16 BRPM | HEART RATE: 79 BPM | BODY MASS INDEX: 35.4 KG/M2 | SYSTOLIC BLOOD PRESSURE: 131 MMHG | HEIGHT: 69 IN | OXYGEN SATURATION: 95 % | TEMPERATURE: 97.8 F

## 2022-04-25 LAB
HCT VFR BLD CALC: 37.4 % (ref 40.7–50.3)
HEMOGLOBIN: 12.7 G/DL (ref 13–17)
MCH RBC QN AUTO: 31.1 PG (ref 25.2–33.5)
MCHC RBC AUTO-ENTMCNC: 34 G/DL (ref 28.4–34.8)
MCV RBC AUTO: 91.7 FL (ref 82.6–102.9)
NRBC AUTOMATED: 0 PER 100 WBC
PDW BLD-RTO: 11.7 % (ref 11.8–14.4)
PLATELET # BLD: 168 K/UL (ref 138–453)
PMV BLD AUTO: 10.3 FL (ref 8.1–13.5)
RBC # BLD: 4.08 M/UL (ref 4.21–5.77)
WBC # BLD: 9.2 K/UL (ref 3.5–11.3)

## 2022-04-25 PROCEDURE — 6370000000 HC RX 637 (ALT 250 FOR IP): Performed by: ORTHOPAEDIC SURGERY

## 2022-04-25 PROCEDURE — 85027 COMPLETE CBC AUTOMATED: CPT

## 2022-04-25 PROCEDURE — 97110 THERAPEUTIC EXERCISES: CPT

## 2022-04-25 PROCEDURE — 36415 COLL VENOUS BLD VENIPUNCTURE: CPT

## 2022-04-25 PROCEDURE — 97116 GAIT TRAINING THERAPY: CPT

## 2022-04-25 PROCEDURE — 6360000002 HC RX W HCPCS: Performed by: ORTHOPAEDIC SURGERY

## 2022-04-25 PROCEDURE — 2580000003 HC RX 258: Performed by: ORTHOPAEDIC SURGERY

## 2022-04-25 PROCEDURE — 97165 OT EVAL LOW COMPLEX 30 MIN: CPT

## 2022-04-25 RX ORDER — ENOXAPARIN SODIUM 100 MG/ML
40 INJECTION SUBCUTANEOUS DAILY
Qty: 5.6 ML | Refills: 0 | Status: SHIPPED | OUTPATIENT
Start: 2022-04-26 | End: 2022-05-10

## 2022-04-25 RX ADMIN — SODIUM CHLORIDE: 9 INJECTION, SOLUTION INTRAVENOUS at 05:02

## 2022-04-25 RX ADMIN — CEFAZOLIN 2000 MG: 10 INJECTION, POWDER, FOR SOLUTION INTRAVENOUS at 01:34

## 2022-04-25 RX ADMIN — METOPROLOL SUCCINATE 50 MG: 50 TABLET, EXTENDED RELEASE ORAL at 08:39

## 2022-04-25 RX ADMIN — ENOXAPARIN SODIUM 40 MG: 100 INJECTION SUBCUTANEOUS at 08:39

## 2022-04-25 ASSESSMENT — PAIN DESCRIPTION - LOCATION: LOCATION: HIP

## 2022-04-25 ASSESSMENT — PAIN SCALES - GENERAL: PAINLEVEL_OUTOF10: 2

## 2022-04-25 NOTE — DISCHARGE INSTR - DIET
Good nutrition is important when healing from an illness, injury, or surgery. Follow any nutrition recommendations given to you during your hospital stay. If you were given an oral nutrition supplement while in the hospital, continue to take this supplement at home. You can take it with meals, in-between meals, and/or before bedtime. These supplements can be purchased at most local grocery stores, pharmacies, and chain [employer]. If you have any questions about your diet or nutrition, call the hospital and ask for the dietitian.     Cardiac/Low Sodium Diet as tolerated

## 2022-04-25 NOTE — DISCHARGE SUMMARY
Discharge Summary    [facility]  :  1965  MRN:  [id number]    Admit date:  2022      Discharge date: 2022     Admitting Physician:  No admitting provider for patient encounter. Discharge Diagnoses:    Principal Problem:    Hip fracture requiring operative repair, left, closed, initial encounter ([facility] [address]  Active Problems:    H/O ascending aorta repair    Typical atrial flutter ([facility] [address] / HX  Resolved Problems:    * No resolved hospital problems. *      Hospital Course:   Grady Funes is a 62 y.o. male admitted with closed fracture left hip. He presented to the emergency room via EMS after severe pain developed in the left hip and left gluteal.  Patient is a . He stated approximately 1 week ago he began having pain in the left hip. He denied injury or strains and stated the pain was worse with standing and walking. He stated he worked through the weekend. He stated he was off  and Tuesday and tried to work on Wednesday however was experiencing significant discomfort. Patient was seen by his primary care on Thursday patient had x-ray and lab work completed and initially was diagnosed with gout and started on C/ [address]. Patient stated the pain did not change and continued to get worse and was unable to bear weight. He denied illness denied abdominal pain dysuria. During his evaluation in the emergency room patient was found to have a fracture of the left hip and was admitted. Dr. Isha Vasquez was consulted and patient underwent a closed reduction, cephalomedullary nailing of the left basicervical femoral neck with short nail. Patient tolerated it well. Postoperatively he is done well pain is controlled and is tolerating activity well and is ambulating with crutches. Patient will be discharged home today he will continue with Lovenox injections daily for 2 weeks and will follow up with orthopedic surgeon as an outpatient.     Consultants:  Dr. Isha Vasquez, ortho    Procedures: Closed reduction, cephalomedullary nailing left basicervical femoral neck fracture with short nail    Complications: none    Discharge Condition: fair    Exam:  GEN:  alert and oriented to person, place and time, well-developed and well-nourished, in no acute distress  EYES: No gross abnormalities. , PERRL and EOMI  NECK: normal, supple, no lymphadenopathy,  no carotid bruits  PULM: clear to auscultation bilaterally- no wheezes, rales or rhonchi, normal air movement, no respiratory distress  COR: regular rate & rhythm, no murmurs, no gallops, S1 normal and S2 normal  ABD:  soft, non-tender, non-distended, normal bowel sounds, no masses or organomegaly  EXT:   no cyanosis, clubbing or edema present    NEURO: follows commands, DAIGLE, no deficits  SKIN:  no rashes or significant lesions    Significant Diagnostic Studies:   Lab Results   Component Value Date    WBC 9.2 04/25/2022    HGB 12.7 (L) 04/25/2022     04/25/2022       Lab Results   Component Value Date    BUN 18 04/24/2022    CREATININE 0.70 04/24/2022     04/24/2022    K 4.1 04/24/2022    CALCIUM 9.1 04/24/2022    CL 97 (L) 04/24/2022    CO2 28 04/24/2022    LABGLOM >60 04/24/2022       Lab Results   Component Value Date    WBCUA 0 TO 2 03/30/2015    RBCUA 0 TO 2 03/30/2015    EPITHUA 0 TO 2 03/30/2015    LEUKOCYTESUR NEGATIVE 03/30/2015    SPECGRAV 1.015 03/30/2015    GLUCOSEU NEGATIVE 03/30/2015    KETUA NEGATIVE 03/30/2015    PROTEINU NEGATIVE 03/30/2015    HGBUR NEGATIVE 03/30/2015    CASTUA NOT REPORTED 03/30/2015    CRYSTUA NOT REPORTED 03/30/2015    BACTERIA NOT REPORTED 03/30/2015    YEAST NOT REPORTED 03/30/2015       XR LUMBAR SPINE (2-3 VIEWS)    Result Date: 4/23/2022  EXAMINATION: 2 XRAY VIEWS OF THE LUMBAR SPINE 4/23/2022 12:20 pm COMPARISON: None. HISTORY: ORDERING SYSTEM PROVIDED HISTORY: pain TECHNOLOGIST PROVIDED HISTORY: pain FINDINGS: Alignment is anatomic.   Mild multilevel degenerative disc disease and facet joint arthropathy are noted. No fractures or destructive bony abnormalities are identified. Atherosclerotic aortic calcifications. 1. Mild multilevel degenerative disc disease and facet joint arthropathy 2. No acute lumbar spine abnormality     XR PELVIS (1-2 VIEWS)    Result Date: 4/23/2022  EXAMINATION: XRAY VIEWS OF THE LEFT FEMUR; ONE XRAY VIEW OF THE PELVIS 4/23/2022 10:53 am COMPARISON: 04/21/2022 HISTORY: ORDERING SYSTEM PROVIDED HISTORY: left hip fracture TECHNOLOGIST PROVIDED HISTORY: left hip fracture; ORDERING SYSTEM PROVIDED HISTORY: left hip pain TECHNOLOGIST PROVIDED HISTORY: left hip pain FINDINGS: Mildly impacted fracture of the left femoral neck with mild lateral angulation. There is no evidence of dislocation. Left knee prosthesis with adequate alignment. .  The remaining and joint spaces appear well maintained. The soft tissues are unremarkable. Postsurgical changes in the scrotum     Acute mildly impacted fracture of the left femoral neck     XR HIP LEFT (2-3 VIEWS)    Result Date: 4/23/2022  EXAMINATION: TWO XRAY VIEWS OF THE LEFT HIP 4/23/2022 12:20 pm COMPARISON: None. HISTORY: ORDERING SYSTEM PROVIDED HISTORY: pain TECHNOLOGIST PROVIDED HISTORY: X-ray injured hip and pelvis - add femur if pain and/or swelling is distal to the hip pain FINDINGS: Mildly angulated fracture the left femoral neck. No dislocation. No other fracture. Mildly angulated fracture left femoral neck     XR FEMUR LEFT (MIN 2 VIEWS)    Result Date: 4/23/2022  EXAMINATION: XRAY VIEWS OF THE LEFT FEMUR; ONE XRAY VIEW OF THE PELVIS 4/23/2022 10:53 am COMPARISON: 04/21/2022 HISTORY: ORDERING SYSTEM PROVIDED HISTORY: left hip fracture TECHNOLOGIST PROVIDED HISTORY: left hip fracture; ORDERING SYSTEM PROVIDED HISTORY: left hip pain TECHNOLOGIST PROVIDED HISTORY: left hip pain FINDINGS: Mildly impacted fracture of the left femoral neck with mild lateral angulation. There is no evidence of dislocation.   Left knee prosthesis with adequate alignment. .  The remaining and joint spaces appear well maintained. The soft tissues are unremarkable. Postsurgical changes in the scrotum     Acute mildly impacted fracture of the left femoral neck     XR CHEST PORTABLE    Result Date: 4/23/2022  EXAMINATION: ONE XRAY VIEW OF THE CHEST 4/23/2022 1:08 pm COMPARISON: 02/02/2021 HISTORY: ORDERING SYSTEM PROVIDED HISTORY: Hip fracture, history of valvular heart disease TECHNOLOGIST PROVIDED HISTORY: Hip fracture, history of valvular heart disease FINDINGS: Status post median sternotomy. The lungs are without acute focal process. There is no effusion or pneumothorax. The cardiomediastinal silhouette is stable. The osseous structures are stable. No acute process. Stable cardiomegaly     CT HIP LEFT WO CONTRAST    Result Date: 4/23/2022  EXAMINATION: CT OF THE LEFT HIP WITHOUT CONTRAST 4/23/2022 1:52 pm TECHNIQUE: CT of the left hip was performed without the administration of intravenous contrast.  Multiplanar reformatted images are provided for review. Dose modulation, iterative reconstruction, and/or weight based adjustment of the mA/kV was utilized to reduce the radiation dose to as low as reasonably achievable. COMPARISON: 4/23/2022 HISTORY ORDERING SYSTEM PROVIDED HISTORY: fracture TECHNOLOGIST PROVIDED HISTORY: Fracture Decision Support Exception - unselect if not a suspected or confirmed emergency medical condition->Emergency Medical Condition (MA) FINDINGS: The bones are demineralized. There is an acute left basicervical femoral neck fracture with anterior apex angulation and resultant varus deformity. There is mild comminution. No additional fracture is appreciated. Atherosclerosis is noted. There is no free fluid in the pelvis. The urinary bladder is distended. There is mild diverticulosis. Acute left basicervical femoral neck fracture in varus. Anterior apex angulation. Osteopenia.      FLUORO FOR SURGICAL PROCEDURES    Result Date: 4/24/2022  Radiology exam is complete. No Radiologist dictation. Please follow up with ordering provider. Assessment and Plan:  Patient Active Problem List    Diagnosis Date Noted    Acute on chronic diastolic CHF (congestive heart failure), NYHA class 3 ([facility] [address]     Hip fracture requiring operative repair, left, closed, initial encounter ([address] 04/23/2022    S/P total knee replacement using cement, left 05/10/2021    A-fib ([facility] [address] 02/26/2021    Atrial fibrillation with RVR ([facility] 02/02/2021    Impaired fasting glucose     Hypertension     Atrial fibrillation with rapid ventricular response ([facility] [address] 01/11/2020    Moderate aortic regurgitation     Tobacco abuse     Tobacco abuse counseling     Typical atrial flutter ([facility] [address] / HX 01/07/2019    Atrial flutter with rapid ventricular response ([facility] [address] 01/07/2019    Varicose veins of right lower extremity 06/14/2017    Aneurysm of thoracic aorta ([facility]     H/O ascending aorta repair 04/03/2014    Hx of amiodarone therapy 04/03/2014    Family history of colon cancer 06/24/2013        Discharge Medications:         Medication List      START taking these medications    enoxaparin 40 MG/0.4ML  Commonly known as: LOVENOX  Inject 0.4 mLs into the skin daily for 14 days  Start taking on: April 26, 2022     HYDROcodone-acetaminophen 5-325 MG per tablet  Commonly known as: NORCO  Take 1 tablet by mouth every 4 hours as needed for Pain for up to 7 days. CHANGE how you take these medications    furosemide 20 MG tablet  Commonly known as: LASIX  Take 1 tablet by mouth every other day  What changed: Another medication with the same name was removed. Continue taking this medication, and follow the directions you see here.         CONTINUE taking these medications    metoprolol succinate 50 MG extended release tablet  Commonly known as: TOPROL XL  Take 1 tablet by mouth once daily        STOP taking these medications    aspirin 325 MG EC tablet     ibuprofen 400 MG tablet  Commonly known as: ADVIL;MOTRIN     methylPREDNISolone 4 MG tablet  Commonly known as: MEDROL DOSEPACK           Where to Get Your Medications      These medications were sent to [address]    Phone: [phone]   · enoxaparin 40 MG/0.4ML     You can get these medications from any pharmacy    Bring a paper prescription for each of these medications  · HYDROcodone-acetaminophen 5-325 MG per tablet         Patient Instructions:    Activity: activity as tolerated  Diet: cardiac diet  Wound Care: keep wound clean and dry  Other: None    Disposition:   Discharge to Home    Follow up:  Patient will be followed by Mirna Serna MD in 1-2 weeks; Dr. Demetri Velazquez on Discharge (if applicable)  ACE/ARB in CHF: NA  Statin in MI: NA  ASA in [place]: NA  Statin in CVA: NA  Antiplatelet in CVA: NA    Total time spent on discharge services: 40 minutes    Including the following activities:  Evaluation and Management of patient  Discussion with patient and/or surrogate about current care plan  Coordination with Case Management and/or   Coordination of care with Consultants (if applicable)   Coordination of care with Receiving Facility Physician (if applicable)  Completion of DME forms (if applicable)  Preparation of Discharge Summary  Preparation of Medication Reconciliation  Preparation of Discharge Prescriptions    Signed:  AMADOR Cazares CNP, AMADOR, NP-C  4/25/2022, 3:30 PM

## 2022-04-25 NOTE — PROGRESS NOTES
Progress Note  Ben Tian MD    OBJECTIVE:    Patient seen for f/u of Hip fracture requiring operative repair, left, closed, initial encounter ([address] He has some pain  ,non weight bearing. Wants to go home    ROS:   Constitutional: negative  for fevers, and negative for chills. Respiratory: negative for shortness of breath, negative for cough, and negative for wheezing  Cardiovascular: negative for chest pain, and negative for palpitations  Gastrointestinal: negative for abdominal pain, negative for nausea,negative for vomiting, negative for diarrhea, and negative for constipation     All other systems were reviewed with the patient and are negative unless otherwise stated in HPI    OBJECTIVE:  Vitals:   Temp: 97.8 °F (36.6 °C)  BP: 118/65  Resp: 16  Pulse: 69  SpO2: 95 %    24HR INTAKE/OUTPUT:      Intake/Output Summary (Last 24 hours) at 4/25/2022 0755  Last data filed at 4/25/2022 0716  Gross per 24 hour   Intake 3186.23 ml   Output 2425 ml   Net 761.23 ml     -----------------------------------------------------------------  Exam:  GEN:    Awake, alert and oriented x3. EYES:  EOMI, pupils equal   NECK: Supple. No lymphadenopathy. No carotid bruit  CVS:    regular rate and rhythm, 2/6 systolic murmur  PULM:  CTA, no wheezes, rales or rhonchi, no acute respiratory distress  ABD:    Bowels sounds normal.  Abdomen is soft. No distention. no tenderness to palpation. EXT:   no edema bilaterally . No calf tenderness. NEURO: Moves all extremities. Motor and sensory are grossly intact  SKIN:  No rashes.   No skin lesions.    -----------------------------------------------------------------  Diagnostic Data:    · All available data reviewed  Lab Results   Component Value Date    WBC 9.2 04/25/2022    HGB 12.7 (L) 04/25/2022    MCV 91.7 04/25/2022     04/25/2022      Lab Results   Component Value Date    GLUCOSE 112 (H) 04/24/2022    BUN 18 04/24/2022    CREATININE 0.70 04/24/2022     04/24/2022    K 4.1 04/24/2022    CALCIUM 9.1 04/24/2022    CL 97 (L) 04/24/2022    CO2 28 04/24/2022       PROBLEM LIST:  Principal Problem:    Hip fracture requiring operative repair, left, closed, initial encounter ([address]  Active Problems:    H/O ascending aorta repair    Typical atrial flutter ([address] / HX  Resolved Problems:    * No resolved hospital problems. *      ASSESSMENT / PLAN:  Hip fracture requiring operative repair, left, closed, initial encounter ([address]  · Principal Problem:  ·   Hip fracture requiring operative repair, left, closed, initial encounter ([address]  · Active Problems:  ·   H/O ascending aorta repair  ·   Typical atrial flutter ([address] / HX  · Resolved Problems:  ·   * No resolved hospital problems. *  ·       · Nutrition status:  Well developed, well nourished with no malnutrition  · DVT prophylaxis: SCD's ,lovenox  · High risk medications: none   · Disposition:  Discharge plan is home    Jorge Dorsey MD , MCONSTANCE.  4/25/2022  7:55 AM

## 2022-04-25 NOTE — PROGRESS NOTES
Pt. Discharged in stable condition. Discharge teaching and instructions completed with patient using teachback method. AVS reviewed. Printed prescriptions given to patient. Patient voiced understanding regarding prescriptions, follow up appointments, and care of self at home.  Discharged in a wheelchair to  home with support per family

## 2022-04-25 NOTE — PLAN OF CARE
Problem: Pain  Goal: Verbalizes/displays adequate comfort level or baseline comfort level  Outcome: Progressing   Pt. Denies any pain, will continue to monitor for the need for PRN medication      Problem: Safety - Adult  Goal: Free from fall injury  Outcome: Progressing   Pt. Remains free of falls, will continue to monitor. Problem: Skin/Tissue Integrity  Goal: Absence of new skin breakdown  Description: 1. Monitor for areas of redness and/or skin breakdown  2. Assess vascular access sites hourly  3. Every 4-6 hours minimum:  Change oxygen saturation probe site  4. Every 4-6 hours:  If on nasal continuous positive airway pressure, respiratory therapy assess nares and determine need for appliance change or resting period. Outcome: Progressing   Pt. Shows no new signs of skin break down, skin assessment completed.

## 2022-04-25 NOTE — PROGRESS NOTES
Physician Progress Note      Nick Odom  [facility] #:                  [id number]  :                       1965  ADMIT DATE:       2022 11:50 AM  DISCH DATE:  RESPONDING  PROVIDER #:        Sherlynn Essex MD          QUERY TEXT:    Pt admitted with left femoral neck fracture. Pt noted pain for a week or so,   reported he did not injure himself does not recall any previous or recent   injuries or strains. CT left hip showed osteopenia. If possible, please document in progress notes and discharge summary if you   are evaluating and/or treating any of the following: The medical record reflects the following:  Risk Factors: works as a ,  Pt noted pain for a week or so,   reported he did not injure himself does not recall any previous or recent   injuries or strains  Clinical Indicators: 22 CT left hip:  Acute left basicervical femoral   neck fracture in varus. ? Anterior apex angulation. Osteopenia  Treatment: ORIF, PT, OT    Flaquito Mcconnell RN, CCDS  Clinical   [phone]  Options provided:  -- Pathological left femoral neck  fracture due to osteopenia  -- Osteoporotic left femoral neck fracture  -- Traumatic left femoral neck  fracture  -- Other - I will add my own diagnosis  -- Disagree - Not applicable / Not valid  -- Disagree - Clinically unable to determine / Unknown  -- Refer to Clinical Documentation Reviewer    PROVIDER RESPONSE TEXT:    This patient has a traumatic left femoral fracture.     Query created by: Aurelio Velasquez on 2022 9:15 AM      Electronically signed by:  Sherlynn Essex MD 2022 10:04 AM

## 2022-04-25 NOTE — PLAN OF CARE
Problem: Pain  Goal: Verbalizes/displays adequate comfort level or baseline comfort level  4/24/2022 2144 by Gina Mccall RN  Outcome: Progressing  Note: Pain assessed every 4 hours. Patient is able to communicate with staff the need for pain interventions. Patient currently 0/10.  4/24/2022 1159 by Mathieu Moreira RN  Outcome: Progressing     Problem: Skin/Tissue Integrity  Goal: Absence of new skin breakdown  Description: 1. Monitor for areas of redness and/or skin breakdown  2. Assess vascular access sites hourly  3. Every 4-6 hours minimum:  Change oxygen saturation probe site  4. Every 4-6 hours:  If on nasal continuous positive airway pressure, respiratory therapy assess nares and determine need for appliance change or resting period. 4/24/2022 2144 by Gina Mccall RN  Outcome: Progressing  Note: Skin assessment performed, surgical wound on left hip is present. Dressing is intact and clean.   4/24/2022 1159 by Mathieu Moreira RN  Outcome: Progressing

## 2022-04-25 NOTE — PROGRESS NOTES
Discussed discharge plans with the patient. Patient is a 62year old male here with Hip fracture requiring operative repair, left, closed. He is alert , oriented , and pleasant during our conversation.     Patient is  and lives with his fiances. He has a cane and crutches. Patient's fiance does the cooking and he helps with the cleaning. He is independent with his ADL's prior to his surgery. Patient manages his own medications and drives. He receives no outside services.     His PCP is Dr. Pati Mott MD. He has medical insurance that helps with the medication costs. Patient works at the [employer]. He is also a  served in [place].     The discharge plan is home with no services at this time. He does not have advance directives.  LSW to monitor and assist with any needs or concerns as they arise.     BRENDEN Morillo

## 2022-04-25 NOTE — PROGRESS NOTES
Occupational Therapy  Facility/Department: [facility] AT THE [facility] MED SURG  Occupational Therapy Initial Assessment    Name: Dontae Yuen  : 1965  MRN: [id number]  Date of Service: 2022    Discharge Recommendations:  Home with assist PRN      Patient Diagnosis(es): The encounter diagnosis was Closed fracture of left hip, initial encounter ([facility] [address] Past Medical History:  has a past medical history of Aortic valve disorders, Atrial fibrillation ([address] Atrial flutter ([address] Encounter for cardioversion procedure, Encounter for cardioversion procedure, Frequent urination, H/O echocardiogram, H/O echocardiogram, Hemorrhage of rectum and anus, History of 24 hour EKG monitoring, History of echocardiogram, History of heart surgery, History of PFTs, Hx of transesophageal echocardiography (SKYLAR) for monitoring, Hypertension, Impaired fasting glucose, Knee pain, bilateral, Lipoma, Obesity (BMI 30.0-34.9), Obstructive sleep apnea (adult) (pediatric), Paroxysmal supraventricular tachycardia ([address] Sleep apnea, Tobacco use disorder, Urinary urgency, and Ventral hernia. Past Surgical History:  has a past surgical history that includes Achilles tendon surgery (Right, ); Vasectomy; Foot surgery; ablation of dysrhythmic focus (); Umbilical hernia repair (); repair umbilical ATWP,6+A/X,SZXGA (N/A, 10/24/2018); hernia repair (2018); Colonoscopy (2013); Cardiac surgery (); hernia repair (2020); hernia repair (N/A, 3/6/2020); Total knee arthroplasty (Left, 05/10/2021); Total knee arthroplasty (Left, 5/10/2021); and Hip fracture surgery (Left, 2022). Treatment Diagnosis: Generalized weakness    Assessment   Performance deficits / Impairments: Decreased functional mobility ; Decreased ADL status; Decreased high-level IADLs  Assessment: Pt is a 63 yo male referred to OT services following a hip fracture of LLE, requiring ORIF.  Pt is NWB'ing to LLE and presents with decreased balance, strength, and endurance, affecting his ability to complete self care tasks. Pt would benefit from skilled OT services to address these deficits to ensure safe return home. Treatment Diagnosis: Generalized weakness  Prognosis: Good  Decision Making: Low Complexity  REQUIRES OT FOLLOW-UP: Yes  Activity Tolerance  Activity Tolerance: Patient Tolerated treatment well        Plan   Plan  Times per Week: 7  Times per Day: Daily  Plan Weeks: duration of hospital stay  Current Treatment Recommendations: Strengthening,Functional mobility training,Safety education & training,Patient/Caregiver education & training     Restrictions  Restrictions/Precautions  Restrictions/Precautions: Weight Bearing,Surgical Protocols,General Precautions  Lower Extremity Weight Bearing Restrictions  Left Lower Extremity Weight Bearing: Non Weight Bearing    Subjective   General  Chart Reviewed: Yes  Patient assessed for rehabilitation services?: Yes  Family / Caregiver Present: No  Referring Practitioner: Charo March DO  Diagnosis: Hip fracture requiring operative repair, left, closed, initial encounter  Subjective  Subjective: Pt rec'd sitting in bedisde chair, pleasant and cooperative for OT. Pt denies pain this date. Pre Treatment Pain Screening  Pain at present: 0  Height and Weight  Height: 5' 9\" (175.3 cm)    Social/Functional History  Social/Functional History  Lives With: Significant other  Type of Home: House  Home Layout: One level  Home Access: Stairs to enter without rails  Entrance Stairs - Number of Steps: 1  Bathroom Shower/Tub: Tub/Shower unit  Bathroom Toilet: Standard  Bathroom Equipment: Grab bars in shower,Grab bars around toilet,Tub transfer bench  Home Equipment: Crutches,Cane  ADL Assistance: [facility] [address]: Independent  Homemaking Responsibilities: Yes (Pt reports shared responisbilities with his fiance.  Pt reports that he does a lot of the laundry and she cooks.)  Ambulation Assistance: Independent  Transfer Assistance: Independent  Active : Yes  Occupation: Full time employment  Type of Occupation:      Objective   Hearing: Within functional limits    Safety Devices  Type of Devices: Call light within reach; All fall risk precautions in place; Left in chair  Functional Mobility  Functional - Mobility Device: Rolling Walker  Activity: To/from bathroom  Assist Level: Stand by assistance  Functional Mobility Comments: Pt utilized 2ww for functional mobility and grab bars for toilet transfer  AROM: Within functional limits  Strength: Within functional limits  ADL  Toileting: Stand by assistance  Transfers  Stand Step Transfers: Stand by assistance  Sit to stand: Stand by assistance  Stand to sit: Stand by assistance  Transfer Comments: Pt able to maintain [address] precautions to LLE throughout evaluation this date  Cognition  Overall Cognitive Status: WNL  Education Given To: Patient  Education Provided: Role of Therapy;Plan of Care  Education Method: Verbal  Barriers to Learning: None  Education Outcome: Verbalized understanding    AM-PAC Score  AM-PAC Inpatient Daily Activity Raw Score: 22 (04/25/22 0920)  AM-PAC Inpatient ADL T-Scale Score : 47.1 (04/25/22 0920)  ADL Inpatient CMS 0-100% Score: 25.8 (04/25/22 0920)  ADL Inpatient CMS G-Code Modifier : Regla Whipple (04/25/22 0920)    Goals  Short Term Goals  Time Frame for Short term goals: 20 visits  Short Term Goal 1: Patient to tolerate standing >7' while participating in functional task of choice to improve standing tolerane, balance and safety during ADL, mobility and transfers. Short Term Goal 2: Patient to engage in 15 minutes of ther ex/ther act s significant shortness of breath to improve strength as well as activity tolerance for self care tasks. Short Term Goal 3: Patient to complete ADL routine c Mod I c AE/DME and implementation of EC/WS techniques as needed to ensure safe return home.   Short Term Goal 4: Patient to be educated on d/c folder, AE/DME and EC/WS techniques to ensure safe and indep return home.        Therapy Time   Individual Concurrent Group Co-treatment   Time In 9789         Time Out 0910         Minutes 15         Timed Code Treatment Minutes: 0 Minutes       Akira Flores, OTD, OTR/L

## 2022-04-25 NOTE — PROGRESS NOTES
Physical Therapy  Facility/Department: [facility] AT THE [facility] MED SURG  Daily Treatment Note  NAME: Pippa Echavarria  : 1965  MRN: [id number]    Date of Service: 2022    Discharge Recommendations:  Continue to assess pending progress,Outpatient PT     Patient Diagnosis(es): The primary encounter diagnosis was Closed fracture of left hip, initial encounter ([facility] A diagnosis of Hip fracture requiring operative repair, left, closed, initial encounter [facility]) was also pertinent to this visit. Assessment   Assessment: Pt. tolerated tx well, left at EOB with call light within reach and RN aware. Activity Tolerance: Patient tolerated treatment well     Plan    Plan  Plan: 2 times a day 7 days a week  Plan Comment: Initiate POC     Subjective    Subjective  Subjective: Pt. sitting EOB upon arrival and agreeable to therapy at this time. Pain: Pain 2/10 in L hip region. Orientation  Overall Orientation Status: Within Normal Limits  Cognition  Overall Cognitive Status: WNL     Objective   Balance  Sitting: Intact  Standing: Impaired  Standing - Static: Occasional  Standing - Dynamic: Occasional  Transfer Training  Transfer Training: Yes  Overall Level of Assistance: Contact-guard assistance  Interventions: Verbal cues  Sit to Stand: Contact-guard assistance;Stand-by assistance  Stand to Sit: Contact-guard assistance;Stand-by assistance  Stand Pivot Transfers: Contact-guard assistance;Stand-by assistance  Bed to Chair: Contact-guard assistance;Stand-by assistance  Gait Training  Gait Training: Yes  Left Side Weight Bearing: Non-weight bearing  Gait  Overall Level of Assistance: Contact-guard assistance  Interventions: Verbal cues  Speed/Savanna: Delayed  Distance (ft): 30 Feet  Assistive Device: Walker, rolling  Rail Use: Both (crutches)  Stairs - Level of Assistance: Contact-guard assistance;Stand-by assistance  Number of Stairs Trained: 2     PT Exercises  A/AROM Exercises: Seated exercises B Le x20.     Patient Education  Education Given To: Patient  Education Provided: Role of Therapy;Transfer Training;Plan of Care;Precautions  Education Provided Comments: stair training  Education Method: Demonstration;Verbal  Barriers to Learning: None  Education Outcome: Verbalized understanding;Demonstrated understanding    Goals  Short Term Goals  Time Frame for Short term goals: 10 days  Short term goal 1: Initiate HEP for strength, ROM, and functional mobility for discharge home  Short term goal 2: Pt will be independent with bed mobility without use of rails to allow discharge home and getting up to use restroom. Short term goal 3: Pt will transfer independently with least restrictive device to allow safe return home. Short term goal 4: Pt will be able to ambulate with least restrictive device up to 150 feet for entry and exit into the home with good balance. Short term goal 5: Pt will be able to negotiate 1 step with appropriate device and no railings to enable entry into and out of his home.   Patient Goals   Patient goals : return home and would like to use crutches      Therapy Time   Individual Concurrent Group Co-treatment   Time In 1500         Time Out 1526         Minutes [address]

## 2022-04-25 NOTE — PROGRESS NOTES
Comprehensive Nutrition Assessment    Type and Reason for Visit:  Initial    Nutrition Recommendations/Plan:   1. Continue current diet. 2. Encouraged protein food consumption for healing needs. Malnutrition Assessment:  Malnutrition Status:  No malnutrition (04/25/22 0844)    Context:  Acute Illness     Findings of the 6 clinical characteristics of malnutrition:  Energy Intake:  No significant decrease in energy intake  Weight Loss:  No significant weight loss     Body Fat Loss:  No significant body fat loss     Muscle Mass Loss:  No significant muscle mass loss    Fluid Accumulation:  Mild Extremities (non pitting LLE)   Strength:  Not Performed    Nutrition Assessment:    increased nutrient needs r/t acute injury/trauma aeb healing needs from hip Fx. Pt with Hx CHF. Pt eats 2 times per day, does not typically eat breakfast before leaving for work, eats around 10 am. Pt denied any PO/weight changes. Pt encouraged to consume protein and calcium foods, states he \"breaks easily. \" Pt drinks mostly water with tea flavoring. Pt encouraged to eat 3 meals per day and include protein-containing foods. Nutrition Related Findings:    appears well nourished Wound Type: Surgical Incision       Current Nutrition Intake & Therapies:    Average Meal Intake: %  Average Supplements Intake: None Ordered  ADULT DIET; Regular    Anthropometric Measures:  Height: 5' 9\" (175.3 cm)  Ideal Body Weight (IBW): 160 lbs (73 kg)    Admission Body Weight: 238 lb 11 oz (108.3 kg)  Current Body Weight: 239 lb (108.4 kg), 149.4 % IBW.  Weight Source: Bed Scale  Current BMI (kg/m2): 35.3  Usual Body Weight: 243 lb 6.4 oz (110.4 kg) (6/29/21)  % Weight Change (Calculated): -1.8  Weight Adjustment For: No Adjustment   BMI Categories: Obese Class 2 (BMI 35.0 -39.9)    Estimated Daily Nutrient Needs:  Energy Requirements Based On: Kcal/kg  Weight Used for Energy Requirements: Current  Energy (kcal/day): 6104-9854 (20-23/kg)  Weight Used for Protein Requirements: Ideal  Protein (g/day): 87-122g (1.2-1.4g/kg)  Method Used for Fluid Requirements: ml/Kg  Fluid (ml/day): 2168 ml (20/kg)    Nutrition Diagnosis:   · Increased nutrient needs related to acute injury/trauma as evidenced by other (comment) (healing needs from )      Nutrition Interventions:   Food and/or Nutrient Delivery: Continue Current Diet  Nutrition Education/Counseling: No recommendation at this time  Coordination of Nutrition Care: Continue to monitor while inpatient       Goals:     Goals: PO intake 75% or greater       Recent Labs     04/23/22  1330 04/24/22  0530   * 136   K 4.1 4.1   CL 98 97*   CO2 26 28   BUN 22* 18   CREATININE 0.61* 0.70   GLUCOSE 103* 112*   ALT 21 17   ALKPHOS 87 92   GFR                            Lab Results   Component Value Date    LABALBU 4.1 04/24/2022    LABALBU 4.9 10/01/2011      Nutrition Monitoring and Evaluation:   Behavioral-Environmental Outcomes: None Identified  Food/Nutrient Intake Outcomes: Food and Nutrient Intake  Physical Signs/Symptoms Outcomes: Biochemical Data,Weight,Skin    Discharge Planning:     Too soon to determine     Ramu Fenton, [address] [place] [address],   Contact: [phone]

## 2022-04-25 NOTE — PROGRESS NOTES
Physical Therapy  Facility/Department: [facility] AT THE [facility] MED SURG  Physical Therapy Initial Assessment    Name: Cesar Wright  : 1965  MRN: [id number]  Date of Service: 2022    Discharge Recommendations:  Continue to assess pending progress,Outpatient PT          Patient Diagnosis(es): The primary encounter diagnosis was Closed fracture of left hip, initial encounter ([facility] [address] A diagnosis of Hip fracture requiring operative repair, left, closed, initial encounter [facility]) was also pertinent to this visit. Past Medical History:  has a past medical history of Aortic valve disorders, Atrial fibrillation ([facility] [address] Atrial flutter ([facility] [address] Encounter for cardioversion procedure, Encounter for cardioversion procedure, Frequent urination, H/O echocardiogram, H/O echocardiogram, Hemorrhage of rectum and anus, History of 24 hour EKG monitoring, History of echocardiogram, History of heart surgery, History of PFTs, Hx of transesophageal echocardiography (SKYLAR) for monitoring, Hypertension, Impaired fasting glucose, Knee pain, bilateral, Lipoma, Obesity (BMI 30.0-34.9), Obstructive sleep apnea (adult) (pediatric), Paroxysmal supraventricular tachycardia ([address] Sleep apnea, Tobacco use disorder, Urinary urgency, and Ventral hernia. Past Surgical History:  has a past surgical history that includes Achilles tendon surgery (Right, ); Vasectomy; Foot surgery; ablation of dysrhythmic focus (); Umbilical hernia repair (); repair umbilical VBMA,8+I/H,QAGKX (N/A, 10/24/2018); hernia repair (2018); Colonoscopy (2013); Cardiac surgery (); hernia repair (2020); hernia repair (N/A, 3/6/2020); Total knee arthroplasty (Left, 05/10/2021); Total knee arthroplasty (Left, 5/10/2021); and Hip fracture surgery (Left, 2022). Assessment   Body Structures, Functions, Activity Limitations Requiring Skilled Therapeutic Intervention: Decreased functional mobility ; Decreased endurance;Decreased balance;Decreased strength  Assessment: Pt was referred following L ORIF. Pt is NWB on LLE and was able to maintain with RW. Pt will be progressed to crutches as tolerated and safe. Treatment Diagnosis: difficulty walking  Therapy Prognosis: Good  Decision Making: Low Complexity  Barriers to Learning: none  Activity Tolerance  Activity Tolerance: Patient tolerated evaluation without incident     Plan   Plan  Plan: 2 times a day 7 days a week (with exception of weekends, daily)  Plan Comment: Initiate POC  Safety Devices  Type of Devices: Call light within reach,All fall risk precautions in place,Left in chair     Restrictions  Restrictions/Precautions  Restrictions/Precautions: General Precautions,Fall Risk,Weight Bearing  Lower Extremity Weight Bearing Restrictions  Left Lower Extremity Weight Bearing: Non Weight Bearing     Subjective   General  Chart Reviewed: Yes  Patient assessed for rehabilitation services?: Yes  Subjective  Subjective: Per patient he reiterated he was surprised his LE just broke and he went down in his garage. He did state he had to call ambulance but was not injured otherwise. He just couldn't get up. Social/Functional History  Social/Functional History  Lives With: Significant other  Type of Home: House  Home Layout: One level  Home Access: Stairs to enter without rails  Entrance Stairs - Number of Steps: 1  Bathroom Shower/Tub: Tub/Shower unit  Bathroom Toilet: Standard  Bathroom Equipment: Grab bars in shower,Grab bars around toilet,Tub transfer bench  Home Equipment: Crutches,Cane  ADL Assistance: [facility] [address]: Independent  Homemaking Responsibilities: Yes (Pt reports shared responisbilities with his fiance.  Pt reports that he does a lot of the laundry and she cooks.)  Ambulation Assistance: Independent  Transfer Assistance: Independent  Active : Yes  Occupation: Full time employment  Type of Occupation:   Additional Comments: Per patient, he has used crutches prior and would like to use them again as appropriate with his hip. Vision/Hearing  Hearing: Within functional limits    Cognition         Objective      Observation/Palpation  Posture: Good  Observation: pt states he still isn't feeling well in his LLE, it's kind of numb. He can partially move and the ankle has returned. Gross Assessment  AROM: Generally decreased, functional  Strength: Generally decreased, functional                 Balance  Sitting: Intact  Standing: Impaired  Standing - Static: Occasional  Standing - Dynamic: Occasional  Transfer Training  Transfer Training: Yes  Overall Level of Assistance: Contact-guard assistance  Interventions: Verbal cues  Sit to Stand: Contact-guard assistance  Stand to Sit: Contact-guard assistance  Stand Pivot Transfers: Contact-guard assistance  Bed to Chair: Contact-guard assistance  Gait Training  Gait Training: Yes  Left Side Weight Bearing: Non-weight bearing  Gait  Overall Level of Assistance: Contact-guard assistance (with RW)  Interventions: Verbal cues  Speed/Savanna: Delayed  Distance (ft): 15 Feet  Assistive Device: Walker, rolling     Transfers  Sit to Stand: Contact guard assistance        Balance  Posture: Good  Sitting - Static: Good  Sitting - Dynamic: Good  Standing - Static: Good  Standing - Dynamic: Good;-  Comments: with RW           OutComes Score       AM-PAC Score   See navigator          Goals  Short Term Goals  Time Frame for Short term goals: 10 days  Short term goal 1: Initiate HEP for strength, ROM, and functional mobility for discharge home  Short term goal 2: Pt will be independent with bed mobility without use of rails to allow discharge home and getting up to use restroom. Short term goal 3: Pt will transfer independently with least restrictive device to allow safe return home. Short term goal 4: Pt will be able to ambulate with least restrictive device up to 150 feet for entry and exit into the home with good balance.   Short term goal 5: Pt will be able to negotiate 1 step with appropriate device and no railings to enable entry into and out of his home.   Patient Goals   Patient goals : return home and would like to use crutches       Therapy Time   Individual Concurrent Group Co-treatment   Time In 0810         Time Out 0836         Minutes 26         Timed Code Treatment Minutes: Andrea 83, PT, DPT

## 2022-04-26 LAB — SURGICAL PATHOLOGY REPORT: NORMAL

## 2022-05-05 ENCOUNTER — OFFICE VISIT (OUTPATIENT)
Dept: PRIMARY CARE CLINIC | Age: 57
End: 2022-05-05
Payer: OTHER GOVERNMENT

## 2022-05-05 VITALS
HEART RATE: 82 BPM | RESPIRATION RATE: 16 BRPM | BODY MASS INDEX: 38.81 KG/M2 | WEIGHT: 262.8 LBS | SYSTOLIC BLOOD PRESSURE: 116 MMHG | DIASTOLIC BLOOD PRESSURE: 67 MMHG

## 2022-05-05 DIAGNOSIS — Z87.81 S/P LEFT HIP FRACTURE: Primary | ICD-10-CM

## 2022-05-05 DIAGNOSIS — I50.32 CHRONIC DIASTOLIC HEART FAILURE (HCC): ICD-10-CM

## 2022-05-05 DIAGNOSIS — I10 ESSENTIAL HYPERTENSION: ICD-10-CM

## 2022-05-05 DIAGNOSIS — G47.33 OBSTRUCTIVE SLEEP APNEA: ICD-10-CM

## 2022-05-05 PROCEDURE — 99214 OFFICE O/P EST MOD 30 MIN: CPT | Performed by: FAMILY MEDICINE

## 2022-05-05 NOTE — PATIENT INSTRUCTIONS
SURVEY:    You may be receiving a survey from [employer] regarding your visit today. You may get this in the mail, through your MyChart, or in your email. Please complete the survey to enable us to provide the highest quality of care to you and your family. If you cannot score us a very good (5 Stars) on any question, please call the office to discuss how we could of made your experience exceptional.    Thank you!     Dr. Elias Kumar, NAWAF Andrews, RN   [facility], [address] [place] [address] Jose Keith    Phone: [phone]  Fax: [phone]    Office Hours:   Denisse Garza [place], F: 8-5 Wednesday: 9-11

## 2022-05-05 NOTE — PROGRESS NOTES
Patient is here for a two week follow up for his left hip pain. He said that within the last day of two he has a little more strength than what he had before. He said that he is still unable to pick his foot up, and is still not allowed to put weight on it. He goes back to see the surgeon on the 11th. CURRENT ALLERGIES: Patient has no known allergies. PAST MEDICAL HISTORY:   Past Medical History:   Diagnosis Date    Aortic valve disorders     aortic regurg    Atrial fibrillation (HCC)     Atrial flutter ([address]     Encounter for cardioversion procedure 05/05/2015    Dr Emily Weems Encounter for cardioversion procedure     [facility]/ Dr. Carolyn Luque, states 7 times, most recent 1/2020    Frequent urination     Pt states he's developed frequent urination with 3 times in last yr noting blood in urine as well.  H/O echocardiogram 12/04/2017    EF 22-74% Global LV systolic function appears preserved. Mildly increaseed LV wall thinckness with normal LV carvity size, No definite specific wall motion abnormalities identified, LA is moderately dilated (34-39) with LA volume index of 36ml/m2, Mild mitral and tricuspid regurgitation, moderate aortic regurgitation, Evidence of mild diastolic dysfunction seen    H/O echocardiogram 02/02/2021    EF 55% patient has sigmoid interventricular septim S/P aortic vlave repair with mild to mod aortic regurg Diastoligy cannot be properly assessed due to pts rhtyhm Aortic root is mildly dilated when corrected for body surface area    Hemorrhage of rectum and anus 2013    polypectomy    History of 24 hour EKG monitoring 04/03/2014    Unremarkable except for mild increased number of PVC's with 3 ventricular couplets, asymptomatic     History of echocardiogram 04/10/2014    EF 60%, mild to mod LVH, moderate AI    History of heart surgery 2008    enlarged aorta    History of PFTs 06/25/2015    Consistent with mild obstructive ventilatory impairment.  Lung volumes are normal,except mild increase in residual volume,which could be suggestive of airway trapping. Diffusion capacity is normal.    Hx of transesophageal echocardiography ([facility]) for monitoring 05/05/2015    Dr Zack Mcghee    Hypertension     pcp dr Vitaly Rachel Impaired fasting glucose     Knee pain, bilateral     Lipoma     Obesity (BMI 30.0-34. 9)     Obstructive sleep apnea (adult) (pediatric)     does not wear cpap like suppose to    Paroxysmal supraventricular tachycardia (HCC)     Sleep apnea     uses CPAP    Tobacco use disorder     Urinary urgency     Ventral hernia        SURGICAL HISTORY:   Past Surgical History:   Procedure Laterality Date    ABLATION OF DYSRHYTHMIC FOCUS  2015    ACHILLES TENDON SURGERY Right 2010   [facility] CARDIAC SURGERY  2008    thoracic AAA repair and modified Coleman procedure ( aortic valve sparing ) at [address]  06/13/2013    x 2 , bleeding polyps removed    FOOT SURGERY      reconstructive for club foot (right)    HERNIA REPAIR  11/2018    ventral    HERNIA REPAIR  03/06/2020    ventral    HERNIA REPAIR N/A 3/6/2020    XI LAPAROSCOPIC ROBOTIC VENTRAL HERNIA WITH MESH performed by Paula Suresh IV, DO at [address] [place] [address] Left 04/24/2022    IM Nailing with Dr. Cherrie Maciel Left 4/24/2022    HIP OPEN REDUCTION INTERNAL FIXATION performed by Sam Trammell DO at [facility],6+N/G,XCGOE N/A 91/80/8403    HERNIA UMBILICAL REPAIR performed by Tyrone Pierson MD at [facility] Left 05/10/2021    Dr. Shira Hill Left 5/10/2021    KNEE TOTAL ARTHROPLASTY performed by Homero Burns MD at [facility]  10/24/2018    VASECTOMY         FAMILY HISTORY:   Family History   Problem Relation Age of Onset    Colon Cancer Mother     Heart Surgery Father         enlarged aorta    Kidney Cancer Father SOCIAL HISTORY:   Social History     Tobacco Use    Smoking status: Former Smoker     Packs/day: 1.00     Years: 34.00     Pack years: 34.00     Types: Cigarettes     Quit date: 12/15/2020     Years since quittin.3    Smokeless tobacco: Never Used   Vaping Use    Vaping Use: Never used   Substance Use Topics    Alcohol use: Yes     Alcohol/week: 6.0 standard drinks     Types: 6 Cans of beer per week     Comment: twice a week    Drug use: No     Prior to Admission medications    Medication Sig Start Date End Date Taking? Authorizing Provider   enoxaparin (LOVENOX) 40 MG/0.4ML Inject 0.4 mLs into the skin daily for 14 days 4/26/22 5/10/22 Yes AMADOR Alcantara CNP   furosemide (LASIX) 20 MG tablet Take 1 tablet by mouth every other day 22  Yes Tanner Moreno MD   metoprolol succinate (TOPROL XL) 50 MG extended release tablet Take 1 tablet by mouth once daily 21  Yes Aj Leung MD       Review of Systems:  Constitutional: negative for fevers or chills  Eyes: negative for visual disturbance   ENT: negative for sore throat or nasal congestion  Respiratory: negative for shortness of breath or cough  Cardiovascular: negative for chest pain ,palpitations,pnd,syncope  Gastrointestinal: negative for abd pain, nausea, vomiting, diarrhea , constipation,hemetemesis,angelica,blood in stool  Genitourinary: negative for dysuria, urgency ,frequency,hematuria  Integument/breast: negative for skin rash or lesions  Neurological: negative for unilateral weakness, numbness or tingling. Skeletal Muscular: has ome lt hip pain,no joint swelling,back pain    Subjective:  Vitals:    22 1437   BP: 116/67   Pulse: 82   Resp: 16         Exam:  GEN:   A & O x3, no apparent distress  EYES: No gross abnormalities.   ENT:neck without nodes  NECK: normal, supple, no lymphadenopathy,  no carotid bruits  PULM: clear to auscultation bilaterally- no wheezes, rales or rhonchi, normal air movement, no respiratory distress  COR: regular rate & rhythm, no gallops and 2/6 murmer  ABD:  soft, non-tender  : deferred  EXT: Extremities: + 2 pedal pulses, no edema or calf tenderness, and warm to touch. Normal nails without lesions  Skeletomuscular:  NEURO: Motor and sensory grossly intact  SKIN:  No skin lesions or rashes      Assessment:  1. S/p left hip fracture    2. Essential hypertension    3. Obstructive sleep apnea    4. Chronic diastolic heart failure ([facility] [address]        Plan:    ICD-10-CM    1. S/p left hip fracture- start pt,no weight bearing lt leg until cleared by DR Racquel Mendez  Z87.81 [facility]   2. Essential hypertension -well controlled I10    3. Obstructive sleep apnea  G47.33    4. Chronic diastolic heart failure ([facility] [address] stable I50.32        Orders Placed This Encounter   Procedures    [facility]     Referral Priority:   Routine     Referral Type:   Eval and Treat     Referral Reason:   Specialty Services Required     Requested Specialty:   Physical Therapy     Number of Visits Requested:   1     No follow-ups on file. No orders of the defined types were placed in this encounter.     Medication directions and side effects discussed      Electronically signed by Obed Sanchez MD on 5/5/2022 at 2:54 PM         Obed Sanchez MD, MD

## 2022-05-10 ENCOUNTER — HOSPITAL ENCOUNTER (OUTPATIENT)
Dept: PHYSICAL THERAPY | Age: 57
Setting detail: THERAPIES SERIES
Discharge: HOME | End: 2022-05-10
Payer: OTHER GOVERNMENT

## 2022-05-10 PROCEDURE — 97162 PT EVAL MOD COMPLEX 30 MIN: CPT

## 2022-05-10 NOTE — PROGRESS NOTES
Phone: [address]          Fax: [phone]                      Outpatient Physical Therapy                                                                      Evaluation  Date: 5/10/2022  Patient: Hannah Win  : 1965  CSN #: [id number]    Referring Physician: Dayron Mehta MD     Medical Diagnosis: s/p L hip fx, Z87.81    Treatment Diagnosis: L femur fx, s/p ORIF  Onset Date: 22  PT Insurance Information: Magui Gregg  Total # of Visits Approved: 12   Total # of Visits to Date: 1  No Show: 0  Canceled Appointment: 0     Subjective  Subjective: Patient reports his hip began to hurt on 22. He reports he continued to walk until the pain was so severe he could not ambulate. He reports surgery on 22. He ambualtes into the clinic with TTWB. He reports he is supposed to be NWB for 6 weeks. Patient reports pain ranges from 0/10 at best to 1/10 at worst.  He reports he has been fatigued and sore.   Additional Pertinent Hx: Hernia repair x2, current smoker, OA, repaired aortic valve, club foot repair, R achilles repair    Ambulation/Gait (if applicable):   Ambulation  WB Status: NWB  Ambulation  Surface: level tile  Device: Axillary Crutches  Assistance: Modified Independent  Distance: 25'    Balance Screen:        Objective    AROM       AROM LLE (degrees)  L Hip Flexion 0-125: 90  L Hip Extension 0-10: 0       Strength RLE  R Hip Flexion: 4+/5  R Hip Extension: 4/5  R Hip ABduction: NT  R Knee Flexion: 5/5  R Knee Extension: 5/5  Strength LLE  L Hip Flexion: 2+/5  L Hip Extension: NT  L Hip ABduction: NT  L Knee Flexion: 4+/5  L Knee Extension: 4/5    Exercises:  Exercise 1: HEP: ankle pumps, heel slides, quad sets 10 sec x10 , glute sets 10 sec x10, LAQ 2x10    Functional Outcome Measures  Pt disability report: 100% disabled    Assessment  Body Structures, Functions, Activity Limitations Requiring Skilled Therapeutic Intervention: Decreased functional mobility ,Decreased ADL status,Decreased ROM,Decreased strength,Decreased endurance,Decreased balance,Decreased high-level IADLs,Increased pain  Assessment: The patient is a 62 y.o. male who sustained a L femoral neck fx and subsequent ORIF. He reports he is supposed to be NWB at this time, but ambulates into the clinic with crutches and TTWB. He was educated to follow surgeon's orders for WB. He demonstrates L LE weakness, decreased activity endurance, and impaired balance. He would benefit from skilled PT to address his deficits to improve functional mobility. Pt to be seen initially to advance his HEP until he is given clearance for WB through operative leg from surgeon. Therapy Prognosis: Good        Decision Making: Medium Complexity    Patient Education  Patient Education: PT POC, HEP  Pt verbalized/demonstrated good understanding:     [X] Yes         [] No, pt required further clarification. Goals  Short Term Goals  Time Frame for Short term goals: 3 weeks  Short term goal 1: Patient will be initiated with a HEP  Short term goal 2: Patient will tolerate 20-30 minutes of therex/act to improve endurance for ADLs. Long Term Goals  Time Frame for Long term goals : 6 weeks  Long term goal 1: Patient will be independent and compliant with a HEP  Long term goal 2: Patient will progress WB per surgeon protocol to be able to ambulate without an AD and minimal gait deviations  Long term goal 3: Patient will improve L LE strength to >/=4/5 in all major joints and planes  Long term goal 4: Patient will report 70% improvement in overall function and symptoms. Patient goals : Get back to normal, improve strength and mobility.       Minutes Tracking:  Time In: 1005  Time Out: 1044  Minutes: 39  Timed Code Treatment Minutes: [place], [place], DPT    5/10/2022

## 2022-05-10 NOTE — PLAN OF CARE
[facility]           Phone: [phone]             Outpatient Physical Therapy  Fax: [phone]                                           Date: 5/10/2022  Patient: Deedee Pacheco : 1965 CSN #: [id number]   Referring Physician: Isai Painter MD  Referral Date:          [x] Plan of Care   [] Updated Plan of Care    Dates of Service to Include: 5/10/2022 to 22    Diagnosis:  s/p L hip fx, Z87.81    Rehab (Treatment) Diagnosis:  L femur fx, s/p ORIF             Onset Date:  22    Attendance  Total # of Visits to Date: 1 No Show: 0 Canceled Appointment: 0    Assessment  Body Structures, Functions, Activity Limitations Requiring Skilled Therapeutic Intervention: Decreased functional mobility ,Decreased ADL status,Decreased ROM,Decreased strength,Decreased endurance,Decreased balance,Decreased high-level IADLs,Increased pain  Assessment: The patient is a 62 y.o. male who sustained a L femoral neck fx and subsequent ORIF. He reports he is supposed to be NWB at this time, but ambulates into the clinic with crutches and TTWB. He was educated to follow surgeon's orders for WB. He demonstrates L LE weakness, decreased activity endurance, and impaired balance. He would benefit from skilled PT to address his deficits to improve functional mobility. Pt to be seen initially to advance his HEP until he is given clearance for WB through operative leg from surgeon. Goals  Short Term Goals  Time Frame for Short term goals: 3 weeks  Short term goal 1: Patient will be initiated with a HEP  Short term goal 2: Patient will tolerate 20-30 minutes of therex/act to improve endurance for ADLs.   Long Term Goals  Time Frame for Long term goals : 6 weeks  Long term goal 1: Patient will be independent and compliant with a HEP  Long term goal 2: Patient will progress WB per surgeon protocol to be able to ambulate without an AD and minimal gait deviations  Long term goal 3: Patient will improve L LE strength to >/=4/5 in all major joints and planes  Long term goal 4: Patient will report 70% improvement in overall function and symptoms.      Prognosis  Therapy Prognosis: Good    Treatment Plan   Plan Frequency: 1-2  Plan weeks: 6  [x] HP/CP      [x] Electrical Stim   [x] Therapeutic Exercise      [x] Gait Training  [] Aquatics   [] Ultrasound         [x] Patient Education/HEP   [x] Manual Therapy  [] Traction    [x] Neuro-reg        [x] Soft Tissue Mobs            [] Home TENS  [] Iontophoresis    [] Orthotic casting/fitting      [] Dry Needling             Electronically signed by: Rachel Perez PT, DPT    Date: 5/10/2022      ______________________________________ Date: 5/10/2022   Physician Signature

## 2022-05-24 ENCOUNTER — APPOINTMENT (OUTPATIENT)
Dept: PHYSICAL THERAPY | Age: 57
End: 2022-05-24
Payer: OTHER GOVERNMENT

## 2022-05-27 ENCOUNTER — HOSPITAL ENCOUNTER (OUTPATIENT)
Dept: PHYSICAL THERAPY | Age: 57
Setting detail: THERAPIES SERIES
Discharge: HOME | End: 2022-05-27
Payer: OTHER GOVERNMENT

## 2022-05-27 PROCEDURE — 97110 THERAPEUTIC EXERCISES: CPT

## 2022-05-27 NOTE — PROGRESS NOTES
Phone: [facility]           Fax: [phone]                           Outpatient Physical Therapy                                                                            Daily Note    Patient: Jessica Dent : 1965  CSN #: [id number]   Referring Physician: Yael Cruz MD    Date: 2022     Treatment Diagnosis: L femur fx, s/p ORIF    Onset Date: 22  Total # of Visits Approved: 12 Per Physician Order  Total # of Visits to Date: 2  No Show: 0  Canceled Appointment: 0    Pre-Treatment Pain:  0/10  Subjective: Pt reports he saw Dr and they told him hes now able to place foot on floor with crutches and walk. but thats it for weight on it. Per order Pt still TTWB. Exercises:  Exercise 1: HEP: ankle pumps, heel slides, quad sets 10 sec x10 , glute sets 10 sec x10, LAQ 2x10  Exercise 2: seated LAQ, hip add, abd isos  Exercise 3: supine: heel slides, hip abd, PPT, March 15xea  Exercise 4: sidelying: clamshells, bent knee hip abd 10x ea  Exercise 5: SKTC R for L hip flexor stretching 3x30 and L for hip flexion 3x30    Assessment  Assessment: Focused on NWB open chain strength and stability program with fair tolerance. Reviewed TTWB status with Pt. Will cont. Activity Tolerance  Activity Tolerance: Patient tolerated treatment well    Patient Education  Patient Education: Exercise rationale. Pt verbalized/demonstrated good understanding:     [x] Yes         [] No, pt required further clarification. Post Treatment Pain:  0/10    Plan  Plan Frequency: 1-2  Plan weeks: 6     Goals  (Total # of Visits to Date: 2)      Short Term Goals  Time Frame for Short term goals: 3 weeks  Short term goal 1: Patient will be initiated with a HEP -MET  Short term goal 2: Patient will tolerate 20-30 minutes of therex/act to improve endurance for ADLs.     Long Term Goals  Time Frame for Long term goals : 6 weeks  Long term goal 1: Patient will be independent and compliant with a HEP  Long term goal 2: Patient will progress WB per surgeon protocol to be able to ambulate without an AD and minimal gait deviations  Long term goal 3: Patient will improve L LE strength to >/=4/5 in all major joints and planes  Long term goal 4: Patient will report 70% improvement in overall function and symptoms.     Minutes Tracking:  Time In: 1101  Time Out: 1140  Minutes: 39  Timed Code Treatment Minutes: [place], [place]          Date: 5/27/2022

## 2022-06-01 ENCOUNTER — HOSPITAL ENCOUNTER (OUTPATIENT)
Dept: PHYSICAL THERAPY | Age: 57
Setting detail: THERAPIES SERIES
Discharge: HOME | End: 2022-06-01
Payer: OTHER GOVERNMENT

## 2022-06-01 PROCEDURE — 97110 THERAPEUTIC EXERCISES: CPT

## 2022-06-01 NOTE — PROGRESS NOTES
Phone: [facility]           Fax: [phone]                           Outpatient Physical Therapy                                                                            Daily Note    Patient: Dontae Yuen : 1965  CSN #: [id number]   Referring Physician: Kiesha Pan MD    Date: 2022     Treatment Diagnosis: L femur fx, s/p ORIF    Onset Date: 22  Total # of Visits Approved: 12 Per Physician Order  Total # of Visits to Date: 3  No Show: 0  Canceled Appointment: 0    Pre-Treatment Pain:  0/10  Subjective: Pt states he was 5 weeks on  so he tried it out and used one crutch to \"see where hes at\". Pt states he is a little tender this morning but hes feeling okay. Pt rates current pain a 0/10 just tender. Exercises:  Exercise 2: seated LAQ 3#, hip add, abd isos  Exercise 3: supine: heel slides, hip abd, PPT, March 15xea  Exercise 4: sidelying: clamshells, bent knee hip abd 10x ea  Exercise 5: SKTC R for L hip flexor stretching 3x30 and L for hip flexion 3x30  Exercise 6: Sink Ex LLE open chian only 15x  Exercise 7: TM sequencing L Leg TTWB only to mimic gait cycle. 4 mins. Assessment  Assessment: COnitnued to stress TTWB/ NWB status with Pt to avoid re-injury d/t mode of injury from initial fracture. Advanced to standing open chain exercises with fair tolerance noted. Activity Tolerance  Activity Tolerance: Patient tolerated treatment well    Patient Education  Patient Education: Exercise rationale. Pt verbalized/demonstrated good understanding:     [x] Yes         [] No, pt required further clarification.      Post Treatment Pain:  0/10    Plan  Plan Frequency: 1-2  Plan weeks: 6     Goals  (Total # of Visits to Date: 3)      Short Term Goals  Time Frame for Short term goals: 3 weeks  Short term goal 1: Patient will be initiated with a HEP -MET  Short term goal 2: Patient will tolerate 20-30 minutes of therex/act to improve endurance for ADLs. Long Term Goals  Time Frame for Long term goals : 6 weeks  Long term goal 1: Patient will be independent and compliant with a HEP  Long term goal 2: Patient will progress WB per surgeon protocol to be able to ambulate without an AD and minimal gait deviations  Long term goal 3: Patient will improve L LE strength to >/=4/5 in all major joints and planes  Long term goal 4: Patient will report 70% improvement in overall function and symptoms.     Minutes Tracking:  Time In: 0920  Time Out: 9196  Minutes: 39  Timed Code Treatment Minutes: [id number] [address]         Date: 6/1/2022

## 2022-06-02 ENCOUNTER — HOSPITAL ENCOUNTER (OUTPATIENT)
Dept: PHYSICAL THERAPY | Age: 57
Setting detail: THERAPIES SERIES
Discharge: HOME | End: 2022-06-02
Payer: OTHER GOVERNMENT

## 2022-06-02 PROCEDURE — 97110 THERAPEUTIC EXERCISES: CPT

## 2022-06-02 NOTE — PROGRESS NOTES
Phone: [facility]           Fax: [phone]                           Outpatient Physical Therapy                                                                            Daily Note    Patient: Lizandro Velez : 1965  CSN #: [id number]   Referring Physician: Maria Guadalupe Vázquez MD    Date: 2022     Treatment Diagnosis: L femur fx, s/p ORIF    Onset Date: 22  Total # of Visits Approved: 12 Per Physician Order  Total # of Visits to Date: 4  No Show: 0  Canceled Appointment: 0      Pre-Treatment Pain:  0/10  Subjective: Pt reports he was a little sore yesterday but not bad at all. Pt rates his current pain a 0/10 but feels tender still. Exercises:  Exercise 1: HEP: ankle pumps, heel slides, quad sets 10 sec x10 , glute sets 10 sec x10, LAQ 2x10  Exercise 2: seated LAQ 4#, hip add, abd isos  Exercise 3: supine: heel slides, hip abd, PPT, March 15xea  Exercise 4: sidelying: clamshells, bent knee hip abd 10x ea  Exercise 5: SKTC R for L hip flexor stretching 3x30 and L for hip flexion 3x30  Exercise 6: Sink Ex LLE open chian only 15x  Exercise 7: TM sequencing L Leg TTWB only to mimic gait cycle. 4 mins. Exercise 8: forward and sideways toe taps 4 inch 15x ea    Assessment  Assessment: L hip AAROM with Pt passively stretching 0 to 115*. Continued gentle open chain strengthening exercises with fair tolerance. Activity Tolerance  Activity Tolerance: Patient tolerated treatment well    Patient Education  Patient Education: Exercise rationale. Pt verbalized/demonstrated good understanding:     [x] Yes         [] No, pt required further clarification.      Post Treatment Pain:  0/10    Plan  Plan Frequency: 1-2  Plan weeks: 6     Goals  (Total # of Visits to Date: 4)      Short Term Goals  Time Frame for Short term goals: 3 weeks  Short term goal 1: Patient will be initiated with a HEP -MET  Short term goal 2: Patient will tolerate 20-30 minutes of therex/act to improve endurance for ADLs. Long Term Goals  Time Frame for Long term goals : 6 weeks  Long term goal 1: Patient will be independent and compliant with a HEP  Long term goal 2: Patient will progress WB per surgeon protocol to be able to ambulate without an AD and minimal gait deviations  Long term goal 3: Patient will improve L LE strength to >/=4/5 in all major joints and planes  Long term goal 4: Patient will report 70% improvement in overall function and symptoms.     Minutes Tracking:  Time In: 2721  Time Out: 8790  Minutes: 42  Timed Code Treatment Minutes: [address] [place], [place]          Date: 6/2/2022

## 2022-06-07 ENCOUNTER — HOSPITAL ENCOUNTER (OUTPATIENT)
Dept: PHYSICAL THERAPY | Age: 57
Setting detail: THERAPIES SERIES
Discharge: HOME | End: 2022-06-07
Payer: OTHER GOVERNMENT

## 2022-06-07 PROCEDURE — 97110 THERAPEUTIC EXERCISES: CPT

## 2022-06-07 NOTE — PROGRESS NOTES
Phone: Jimy           Fax: [phone]                           Outpatient Physical Therapy                                                                            Daily Note    Patient: Nirav Ha : 1965  CSN #: [id number]   Referring Physician: Damien Barrett MD    Date: 2022    Diagnosis: s/p L hip fx, Z87.81  Treatment Diagnosis: L femur fx, s/p ORIF    Onset Date: 22  PT Insurance Information: Andr[employer]  Total # of Visits Approved: 12 Per Physician Order  Total # of Visits to Date: 5  No Show: 0  Canceled Appointment: 0      Pre-Treatment Pain:  0/10  Subjective: Patient reports he has been using one crutch at home at times. He reports he returns to surgeon tomorrow. He denies pain or soreness coming into therapy today. Exercises:  Exercise 2: seated LAQ 4#, hip add with ball, abd with BTB - 2x10 ea  Exercise 3: supine: heel slides, hip abd, PPT, March 15xea  Exercise 4: sidelying: clamshells, bent knee hip abd 10x2 ea  Exercise 5: SKTC R for L hip flexor stretching 3x30 and L for hip flexion 3x30  Exercise 6: Sink Ex LLE open chain only 15x2  Exercise 9: SciFIT: level 1 x 8 minutes  Exercise 10: SLR x5, prone hip extension 2x10  Exercise 11: Prone prop x2 min. Manual:  Other: ROM: IR/ER in prone with bent knee    Assessment  Body Structures, Functions, Activity Limitations Requiring Skilled Therapeutic Intervention: Decreased functional mobility ,Decreased ADL status,Decreased ROM,Decreased strength,Decreased endurance,Decreased balance,Decreased high-level IADLs,Increased pain  Assessment: Patient progressed with prone prop to improve hip flexor flexibility. PROM into IR/ER in prone performed manuall to work toward normalizing ROM. He was able to perform straight leg raises today with reports of quad fatigue. He denied any increase in pain following the tx session.     Activity Tolerance  Activity Tolerance: Patient tolerated treatment well    Patient Education  Patient Education: Exercise rationale. Pt verbalized/demonstrated good understanding:     [x] Yes         [] No, pt required further clarification. Post Treatment Pain:  0/10      Plan  Plan Frequency: 1-2  Plan weeks: 6       Goals  (Total # of Visits to Date: 5)      Short Term Goals  Time Frame for Short term goals: 3 weeks  Short term goal 1: Patient will be initiated with a HEP -MET  Short term goal 2: Patient will tolerate 20-30 minutes of therex/act to improve endurance for ADLs. Long Term Goals  Time Frame for Long term goals : 6 weeks  Long term goal 1: Patient will be independent and compliant with a HEP  Long term goal 2: Patient will progress WB per surgeon protocol to be able to ambulate without an AD and minimal gait deviations  Long term goal 3: Patient will improve L LE strength to >/=4/5 in all major joints and planes  Long term goal 4: Patient will report 70% improvement in overall function and symptoms.     Minutes Tracking:  Time In: 0715  Time Out: 0800  Minutes: 45  Timed Code Treatment Minutes: 8196 Rozina Escoto DPT     Date: 6/7/2022

## 2022-06-09 ENCOUNTER — HOSPITAL ENCOUNTER (OUTPATIENT)
Dept: PHYSICAL THERAPY | Age: 57
Setting detail: THERAPIES SERIES
Discharge: HOME | End: 2022-06-09
Payer: OTHER GOVERNMENT

## 2022-06-09 PROCEDURE — 97110 THERAPEUTIC EXERCISES: CPT

## 2022-06-09 NOTE — PROGRESS NOTES
Phone: Jimy           Fax: [phone]                           Outpatient Physical Therapy                                                                            Daily Note    Patient: Dontae Yuen : 1965  CSN #: [id number]   Referring Physician: Kiesha Pan MD    Date: 2022    Diagnosis: s/p L hip fx, Z87.81  Treatment Diagnosis: L femur fx, s/p ORIF    Onset Date: 22  PT Insurance Information: Giancarlo Bill  Total # of Visits Approved: 12 Per Physician Order  Total # of Visits to Date: 6  No Show: 0  Canceled Appointment: 0      Pre-Treatment Pain:  0/10  Subjective: Patient reports he was on his knees pulling weeds yesterday and is feeling sore today. He denies hip pain coming into therapy today. Exercises:  Exercise 2: seated LAQ 4#, seated hip flexion with 4# 2x6, hip add with ball, abd with BTB - 2x12 ea  Exercise 3: supine: PPT, March 15xea  Exercise 5: SKTC R for L hip flexor stretching 2x30 and L for hip flexion 2x30  Exercise 6: Sink Ex LLE open chain only 15x2  Exercise 9: SciFIT: level 1 x 8 minutes  Exercise 10: SLR 2x6, prone hip extension 2x10  Exercise 11: Prone prop x2 min. Manual:  Other: ROM: IR/ER in prone with bent knee    Assessment  Body Structures, Functions, Activity Limitations Requiring Skilled Therapeutic Intervention: Decreased functional mobility ,Decreased ADL status,Decreased ROM,Decreased strength,Decreased endurance,Decreased balance,Decreased high-level IADLs,Increased pain  Assessment: Continued with exercises to improve LE strength. He was educated with the scale on 25% WB. He reported muscular fatigue following his tx session. Activity Tolerance  Activity Tolerance: Patient tolerated treatment well    Patient Education  Patient Education: Exercise rationale. Pt verbalized/demonstrated good understanding:     [x] Yes         [] No, pt required further clarification.     Post Treatment Pain: 0/10      Plan  Plan Frequency: 1-2  Plan weeks: 6       Goals  (Total # of Visits to Date: 6)      Short Term Goals  Time Frame for Short term goals: 3 weeks  Short term goal 1: Patient will be initiated with a HEP -MET  Short term goal 2: Patient will tolerate 20-30 minutes of therex/act to improve endurance for ADLs. Long Term Goals  Time Frame for Long term goals : 6 weeks  Long term goal 1: Patient will be independent and compliant with a HEP  Long term goal 2: Patient will progress WB per surgeon protocol to be able to ambulate without an AD and minimal gait deviations  Long term goal 3: Patient will improve L LE strength to >/=4/5 in all major joints and planes  Long term goal 4: Patient will report 70% improvement in overall function and symptoms.     Minutes Tracking:  Time In: 1100  Time Out: 3051  Minutes: 41  Timed Code Treatment Minutes: [address] [facility], [address], DPT     Date: 6/9/2022

## 2022-06-17 ENCOUNTER — HOSPITAL ENCOUNTER (OUTPATIENT)
Dept: PHYSICAL THERAPY | Age: 57
Setting detail: THERAPIES SERIES
Discharge: HOME | End: 2022-06-17
Payer: OTHER GOVERNMENT

## 2022-06-17 PROCEDURE — 97110 THERAPEUTIC EXERCISES: CPT

## 2022-06-17 NOTE — PROGRESS NOTES
Phone: Jimy           Fax: [phone]                           Outpatient Physical Therapy                                                                            Daily Note    Patient: Jaxson Choi : 1965  CSN #: [id number]   Referring Physician: Ileene Fothergill, MD    Date: 2022    Diagnosis: s/p L hip fx, Z87.81  Treatment Diagnosis: L femur fx, s/p ORIF    Onset Date: 22  PT Insurance Information: [employer]  Total # of Visits Approved: 12 Per Physician Order  Total # of Visits to Date: 7  No Show: 0  Canceled Appointment: 0      Pre-Treatment Pain:  0/10  Subjective: Patient reports he forgot his last appointment. He reports his quad has been sore, but hasn't had any complaints of hip pain. He has been increasing his WB and reports he has been painting a garage and working in the garden. Exercises:  Exercise 2: seated LAQ 4#, seated hip flexion with 4# 2x10 ea, hip add with ball 2x10  Exercise 3: supine: PPT, March 15xea  Exercise 4: sidelying: BTB clamshells 2x10-12  Exercise 6: Sink Ex LLE open chain only 15x2  Exercise 9: SciFIT: level 2.5 x 8 minutes (LE's only)  Exercise 10: SLR 2x6, prone hip extension 2x10  Exercise 11: Prone prop x2 min. Manual:  Other: ROM: IR/ER in prone with bent knee    Assessment  Body Structures, Functions, Activity Limitations Requiring Skilled Therapeutic Intervention: Decreased functional mobility ,Decreased ADL status,Decreased ROM,Decreased strength,Decreased endurance,Decreased balance,Decreased high-level IADLs,Increased pain  Assessment: Patient able to progress with repetitions of exercises to work toward his long term goals. He was educated to continue to follow 50% WB precaution. He reported quad muscle soreness, but denied pain following his tx session.     Activity Tolerance  Activity Tolerance: Patient tolerated treatment well    Patient Education  Patient Education: Exercise rationale. Pt verbalized/demonstrated good understanding:     [x] Yes         [] No, pt required further clarification. Post Treatment Pain:  0/10    Plan  Plan Frequency: 1-2  Plan weeks: 6     Goals  (Total # of Visits to Date: 7)      Short Term Goals  Time Frame for Short term goals: 3 weeks  Short term goal 1: Patient will be initiated with a HEP -MET  Short term goal 2: Patient will tolerate 20-30 minutes of therex/act to improve endurance for ADLs. Long Term Goals  Time Frame for Long term goals : 6 weeks  Long term goal 1: Patient will be independent and compliant with a HEP  Long term goal 2: Patient will progress WB per surgeon protocol to be able to ambulate without an AD and minimal gait deviations  Long term goal 3: Patient will improve L LE strength to >/=4/5 in all major joints and planes  Long term goal 4: Patient will report 70% improvement in overall function and symptoms.     Minutes Tracking:  Time In: 1000  Time Out: 3612  Minutes: 40  Timed Code Treatment Minutes: [address] [place], [place], DPT     Date: 6/17/2022

## 2022-06-20 ENCOUNTER — HOSPITAL ENCOUNTER (OUTPATIENT)
Dept: PHYSICAL THERAPY | Age: 57
Setting detail: THERAPIES SERIES
Discharge: HOME | End: 2022-06-20
Payer: OTHER GOVERNMENT

## 2022-06-20 PROCEDURE — 97110 THERAPEUTIC EXERCISES: CPT

## 2022-06-20 NOTE — PROGRESS NOTES
Phone: Jimy           Fax: [phone]                           Outpatient Physical Therapy                                                                            Daily Note    Patient: Deedee Pacheco : 1965  CSN #: [id number]   Referring Physician: Isai Painter MD    Date: 2022       Treatment Diagnosis: L femur fx, s/p ORIF    Onset Date: 22  PT Insurance Information: Akira Dickens  Total # of Visits Approved: 12 Per Physician Order  Total # of Visits to Date: 8  No Show: 0  Canceled Appointment: 0      Pre-Treatment Pain:  0/10  Subjective: Pt denies pain. States he's maintaing current WB status    Exercises:  Exercise 4: sidelying: BTB clamshells 2x10-12  Exercise 6: Sink Ex LLE open chain only 15x2  Exercise 9: SciFIT: level 2.5 x 10 minutes (LE's only)  Exercise 10: SLR 2x6, prone hip extension 2x10  Exercise 11: Prone prop x2 min. Exercise 12: Star trac extension 5pl 2x15  Exercise 13: side steps/kicks to Left  with PTB 2x15    Manual:  Other: ROM: IR/ER in prone with bent knee         Assessment  Assessment: Pt denies pain, states his hip feels pretty good and is having no issues with current WBing. Pt remains 50% wt bearing until Wed when he progresses to 75%. Mild execise progressions made with no complaints. Will progress as tolerated    Activity Tolerance  Activity Tolerance: Patient tolerated treatment well    Patient Education  Patient Education: HEP  Pt verbalized/demonstrated good understanding:     [x] Yes         [] No, pt required further clarification. Post Treatment Pain:  0/10      Plan  Plan Frequency: 1-2  Plan weeks: 6       Goals  (Total # of Visits to Date: 8)      Short Term Goals  Time Frame for Short term goals: 3 weeks  Short term goal 1: Patient will be initiated with a HEP -MET  Short term goal 2: Patient will tolerate 20-30 minutes of therex/act to improve endurance for ADLs. --progressing    Long Term Goals  Time Frame for Long term goals : 6 weeks  Long term goal 1: Patient will be independent and compliant with a HEP  Long term goal 2: Patient will progress WB per surgeon protocol to be able to ambulate without an AD and minimal gait deviations  Long term goal 3: Patient will improve L LE strength to >/=4/5 in all major joints and planes  Long term goal 4: Patient will report 70% improvement in overall function and symptoms.     Minutes Tracking:  Time In: 1045  Time Out: 1140  Minutes: 55  Timed Code Treatment Minutes: [id number]     Date: 6/20/2022

## 2022-06-21 ENCOUNTER — HOSPITAL ENCOUNTER (OUTPATIENT)
Dept: PHYSICAL THERAPY | Age: 57
Setting detail: THERAPIES SERIES
Discharge: HOME | End: 2022-06-21
Payer: OTHER GOVERNMENT

## 2022-06-21 PROCEDURE — 97110 THERAPEUTIC EXERCISES: CPT

## 2022-06-21 PROCEDURE — 97140 MANUAL THERAPY 1/> REGIONS: CPT

## 2022-06-21 NOTE — PROGRESS NOTES
Phone: Jimy           Fax: [phone]                           Outpatient Physical Therapy                                                                            Daily Note    Patient: Christiano Daniels : 1965  CSN #: [id number]   Referring Physician: Devi Avalos MD    Date: 2022       Treatment Diagnosis: L femur fx, s/p ORIF    Onset Date: 22  Total # of Visits Approved: 12 Per Physician Order  Total # of Visits to Date: 9  No Show: 0  Canceled Appointment: 0      Pre-Treatment Pain: 0 /10  Subjective: Pt declines pain. He states he's been doing his best to maintain current WBing status. Pt reports RTD tomorrow to see if healing is okay to in order to increase WBing to 75%. Exercises:  Exercise 1: HEP: ankle pumps, heel slides, quad sets 10 sec x10 , glute sets 10 sec x10, LAQ 2x10  Exercise 2: seated LAQ 4#, seated hip flexion with 4# 2x15 ea, hip add with ball 2x10  Exercise 6: Sink Ex LLE open chain only 15x2  Exercise 9: SciFIT: level 2.5 x 10 minutes (LE's only)  Exercise 10: SLR 2x6, prone hip extension 2x10    Manual:  Other: ROM: IR/ER in prone with bent knee    Assessment  Assessment: Able to progress reps today with sink ex and seated ex without issue, only c/o increased muscle fatigue. Added 5\" hold once knee is fully extended with LAQ to increase muscle strength/edurance. Pt states he is eager to have [address] status increased to 75%. Continued with manual PROM L hip IR/ER for improved overall ROM. WIll continue as tolerated. Activity Tolerance  Activity Tolerance: Patient tolerated treatment well    Patient Education  Ex rationale, technique; WBing status  Pt verbalized/demonstrated good understanding:     [x] Yes         [] No, pt required further clarification.        Post Treatment Pain: 0 /10      Plan  Plan Frequency: 1-2  Plan weeks: 6       Goals  (Total # of Visits to Date: 5)      Short Term Goals  Time Frame for Short term goals: 3 weeks  Short term goal 1: Patient will be initiated with a HEP -MET  Short term goal 2: Patient will tolerate 20-30 minutes of therex/act to improve endurance for ADLs. -met    Long Term Goals  Time Frame for Long term goals : 6 weeks  Long term goal 1: Patient will be independent and compliant with a HEP  Long term goal 2: Patient will progress WB per surgeon protocol to be able to ambulate without an AD and minimal gait deviations  Long term goal 3: Patient will improve L LE strength to >/=4/5 in all major joints and planes  Long term goal 4: Patient will report 70% improvement in overall function and symptoms.     Minutes Tracking:  Time In: 4304  Time Out: 1326  Minutes: 50  Timed Code Treatment Minutes: Jhonny Campbell     Date: 6/21/2022

## 2022-06-28 ENCOUNTER — HOSPITAL ENCOUNTER (OUTPATIENT)
Dept: PHYSICAL THERAPY | Age: 57
Setting detail: THERAPIES SERIES
Discharge: HOME | End: 2022-06-28
Payer: OTHER GOVERNMENT

## 2022-06-28 PROCEDURE — 97110 THERAPEUTIC EXERCISES: CPT

## 2022-06-28 NOTE — PROGRESS NOTES
Phone: Jimy           Fax: [phone]                           Outpatient Physical Therapy                                                                            Daily Note    Patient: Brody Young : 1965  CSN #: [id number]   Referring Physician: Alyx García MD    Date: 2022    Diagnosis: s/p L hip fx, Z87.81  Treatment Diagnosis: L femur fx, s/p ORIF    Onset Date: 22  PT Insurance Information: Fernanda Soliz  Total # of Visits Approved: 12 Per Physician Order  Total # of Visits to Date: 10  No Show: 0  Canceled Appointment: 0      Pre-Treatment Pain:  0/10  Subjective: Pt saw Dr and states he is now WBAT. Pt denies pain today    Exercises:  Exercise 6: Sink exercise -FWB 15x ea  Exercise 7: side walking along island 8x  Exercise 8: FSU/SSU 4\" 10-15x ea  Exercise 9: SciFIT: level 2.5 x 10 minutes (LE's only)  Exercise 11: Maurisio steps overs 6\" 15x ea  Exercise 12: Star trac extension 5pl 2x15       Assessment  Assessment: Pt is now WBAT following his last Dr appt. Pt denies pain and is currently walking with one crutch. Progressed closed chain exercise to WBAT. Gait in clinic with no device and only a mild gait deviation. Reviewed current HEP with good understanding    Activity Tolerance  Activity Tolerance: Patient tolerated treatment well    Patient Education  Patient Education: HEP  Pt verbalized/demonstrated good understanding:     [x] Yes         [] No, pt required further clarification. Post Treatment Pain:  0/10      Plan  Plan Frequency: 1-2  Plan weeks: 6       Goals  (Total # of Visits to Date: 10)      Short Term Goals  Time Frame for Short term goals: 3 weeks  Short term goal 1: Patient will be initiated with a HEP -MET  Short term goal 2: Patient will tolerate 20-30 minutes of therex/act to improve endurance for ADLs. -met    Long Term Goals  Time Frame for Long term goals : 6 weeks  Long term goal 1: Patient will be independent and compliant with a HEP  Long term goal 2: Patient will progress WB per surgeon protocol to be able to ambulate without an AD and minimal gait deviations  Long term goal 3: Patient will improve L LE strength to >/=4/5 in all major joints and planes  Long term goal 4: Patient will report 70% improvement in overall function and symptoms.     Minutes Tracking:  Time In: 1130  Time Out: 1214  Minutes: 44  Timed Code Treatment Minutes: 42 Minutes       Powell Gaucher AdelspergerPTA     Date: 6/28/2022

## 2022-06-30 ENCOUNTER — OFFICE VISIT (OUTPATIENT)
Dept: PRIMARY CARE CLINIC | Age: 57
End: 2022-06-30
Payer: OTHER GOVERNMENT

## 2022-06-30 ENCOUNTER — HOSPITAL ENCOUNTER (OUTPATIENT)
Dept: PHYSICAL THERAPY | Age: 57
Setting detail: THERAPIES SERIES
Discharge: HOME | End: 2022-06-30
Payer: OTHER GOVERNMENT

## 2022-06-30 VITALS
HEART RATE: 70 BPM | SYSTOLIC BLOOD PRESSURE: 129 MMHG | BODY MASS INDEX: 36.62 KG/M2 | RESPIRATION RATE: 16 BRPM | DIASTOLIC BLOOD PRESSURE: 72 MMHG | WEIGHT: 248 LBS

## 2022-06-30 DIAGNOSIS — I50.32 CHRONIC DIASTOLIC HEART FAILURE (HCC): ICD-10-CM

## 2022-06-30 DIAGNOSIS — Z87.81 S/P LEFT HIP FRACTURE: Primary | ICD-10-CM

## 2022-06-30 DIAGNOSIS — I10 ESSENTIAL HYPERTENSION: ICD-10-CM

## 2022-06-30 DIAGNOSIS — G47.33 OBSTRUCTIVE SLEEP APNEA: ICD-10-CM

## 2022-06-30 PROCEDURE — 97110 THERAPEUTIC EXERCISES: CPT

## 2022-06-30 PROCEDURE — 99214 OFFICE O/P EST MOD 30 MIN: CPT | Performed by: FAMILY MEDICINE

## 2022-06-30 RX ORDER — ASPIRIN 325 MG
325 TABLET ORAL DAILY
COMMUNITY

## 2022-06-30 SDOH — ECONOMIC STABILITY: FOOD INSECURITY: WITHIN THE PAST 12 MONTHS, THE FOOD YOU BOUGHT JUST DIDN'T LAST AND YOU DIDN'T HAVE MONEY TO GET MORE.: NEVER TRUE

## 2022-06-30 SDOH — ECONOMIC STABILITY: FOOD INSECURITY: WITHIN THE PAST 12 MONTHS, YOU WORRIED THAT YOUR FOOD WOULD RUN OUT BEFORE YOU GOT MONEY TO BUY MORE.: NEVER TRUE

## 2022-06-30 ASSESSMENT — SOCIAL DETERMINANTS OF HEALTH (SDOH): HOW HARD IS IT FOR YOU TO PAY FOR THE VERY BASICS LIKE FOOD, HOUSING, MEDICAL CARE, AND HEATING?: NOT HARD AT ALL

## 2022-06-30 NOTE — PROGRESS NOTES
Phone: [phone]                 [facility]           Fax: [phone]                           Outpatient Physical Therapy                                                                            Daily Note    Patient: Agnes Dalal : 1965  CSN #: [id number]   Referring Physician: Zuleima Johnson MD    Date: 2022       Treatment Diagnosis: L femur fx, s/p ORIF    Onset Date: 22  Total # of Visits Approved: 12 Per Physician Order  Total # of Visits to Date: 11  No Show: 0  Canceled Appointment: 0    Pre-Treatment Pain:  0/10  Subjective: Pt reports he was released for light duty with work and only has lifting restrictions currently. Pt denies pain. Exercises:  Exercise 5: SKTC R for L hip flexor stretching 2x30 and L for hip flexion 2x30  Exercise 6: Sink exercise -FWB 15x ea  Exercise 7: side walking along island 8x  Exercise 8: FSU 6 inch/SSU 4\" 10-15x ea  Exercise 9: SciFIT: level 4.5 x 10 minutes (LE's only)  Exercise 11: Maurisio steps overs 6\" 15x ea  Exercise 12: Star trac extension 5pl 2x15 flex SL 5 pl  Exercise 13: side steps/kicks to Left  with PTB 2x15  Exercise 14: Sit<>stand 2x10    Assessment  Assessment: Pt conitnues to display antalgic gait cycle with no AD. SLR remains limited d/t pain. Strength and WB tolerance slowly progressing. Activity Tolerance  Activity Tolerance: Patient tolerated treatment well    Patient Education  Patient Education: Ex progressions. Pt verbalized/demonstrated good understanding:     [x] Yes         [] No, pt required further clarification. Post Treatment Pain:  0/10    Plan  Plan Frequency: 1-2  Plan weeks: 6     Goals  (Total # of Visits to Date: 6)      Short Term Goals  Time Frame for Short term goals: 3 weeks  Short term goal 1: Patient will be initiated with a HEP -MET  Short term goal 2: Patient will tolerate 20-30 minutes of therex/act to improve endurance for ADLs. -met    Long Term Goals  Time Frame for Long term goals : 6 weeks  Long term goal 1: Patient will be independent and compliant with a HEP  Long term goal 2: Patient will progress WB per surgeon protocol to be able to ambulate without an AD and minimal gait deviations  Long term goal 3: Patient will improve L LE strength to >/=4/5 in all major joints and planes  Long term goal 4: Patient will report 70% improvement in overall function and symptoms.     Minutes Tracking:  Time In: 0118  Time Out: 0039  Minutes: 47  Timed Code Treatment Minutes: [place], [place]          Date: 6/30/2022

## 2022-06-30 NOTE — PROGRESS NOTES
Patient would like to go back to work and be light duty. He said that he has the paperwork, he just needs the doctors approval.   chf- has minimal edema,no shortness of breath,cough,pnd  MARIA TERESA- uses his c pap daily and benifitting from it  Smoking- continues to smoke 1/2 pack a day        CURRENT ALLERGIES: Patient has no known allergies. PAST MEDICAL HISTORY:   Past Medical History:   Diagnosis Date    Aortic valve disorders     aortic regurg    Atrial fibrillation (HCC)     Atrial flutter ([address]     Encounter for cardioversion procedure 05/05/2015    Dr Miles Acosta Encounter for cardioversion procedure     [facility]/ Dr. Milad Hutchinson, states 7 times, most recent 1/2020    Frequent urination     Pt states he's developed frequent urination with 3 times in last yr noting blood in urine as well.  H/O echocardiogram 12/04/2017    EF 63-16% Global LV systolic function appears preserved. Mildly increaseed LV wall thinckness with normal LV carvity size, No definite specific wall motion abnormalities identified, LA is moderately dilated (34-39) with LA volume index of 36ml/m2, Mild mitral and tricuspid regurgitation, moderate aortic regurgitation, Evidence of mild diastolic dysfunction seen    H/O echocardiogram 02/02/2021    EF 55% patient has sigmoid interventricular septim S/P aortic vlave repair with mild to mod aortic regurg Diastoligy cannot be properly assessed due to pts rhtyhm Aortic root is mildly dilated when corrected for body surface area    Hemorrhage of rectum and anus 2013    polypectomy    History of 24 hour EKG monitoring 04/03/2014    Unremarkable except for mild increased number of PVC's with 3 ventricular couplets, asymptomatic     History of echocardiogram 04/10/2014    EF 60%, mild to mod LVH, moderate AI    History of heart surgery 2008    enlarged aorta    History of PFTs 06/25/2015    Consistent with mild obstructive ventilatory impairment.  Lung volumes are normal,except mild increase in residual volume,which could be suggestive of airway trapping. Diffusion capacity is normal.    Hx of transesophageal echocardiography (SKYLAR) for monitoring 05/05/2015    Dr Sarah Quezada    Hypertension     pcp dr Enrique Richmond Impaired fasting glucose     Knee pain, bilateral     Lipoma     Obesity (BMI 30.0-34. 9)     Obstructive sleep apnea (adult) (pediatric)     does not wear cpap like suppose to    Paroxysmal supraventricular tachycardia (HCC)     Sleep apnea     uses CPAP    Tobacco use disorder     Urinary urgency     Ventral hernia        SURGICAL HISTORY:   Past Surgical History:   Procedure Laterality Date    ABLATION OF DYSRHYTHMIC FOCUS  2015    ACHILLES TENDON SURGERY Right 2010   [place] CARDIAC SURGERY  2008    thoracic AAA repair and modified Coleman procedure ( aortic valve sparing ) at [address]  06/13/2013    x 2 , bleeding polyps removed    FOOT SURGERY      reconstructive for club foot (right)    HERNIA REPAIR  11/2018    ventral    HERNIA REPAIR  03/06/2020    ventral    HERNIA REPAIR N/A 3/6/2020    XI LAPAROSCOPIC ROBOTIC VENTRAL HERNIA WITH MESH performed by Portia Suresh IV, DO at [address] [place] [address] 04/24/2022    IM Nailing with Dr. Valarie Watts Left 4/24/2022    HIP OPEN REDUCTION INTERNAL FIXATION performed by Omar Wells DO at [facility] HNCA,9+T/D,QITAT N/A 69/68/3909    HERNIA UMBILICAL REPAIR performed by Rajiv Box MD at [address] [place] [address] 05/10/2021    Dr. Jamison Wadsworth Left 5/10/2021    KNEE TOTAL ARTHROPLASTY performed by Monserrat Garcia MD at [address] [place] [address]  10/24/2018    VASECTOMY         FAMILY HISTORY:   Family History   Problem Relation Age of Onset    Colon Cancer Mother     Heart Surgery Father         enlarged aorta    Kidney Cancer Father        SOCIAL HISTORY:   Social History     Tobacco Use    Smoking status: Former Smoker     Packs/day: 1.00     Years: 34.00     Pack years: 34.00     Types: Cigarettes     Quit date: 12/15/2020     Years since quittin.5    Smokeless tobacco: Never Used   Vaping Use    Vaping Use: Never used   Substance Use Topics    Alcohol use: Yes     Alcohol/week: 6.0 standard drinks     Types: 6 Cans of beer per week     Comment: twice a week    Drug use: No     Prior to Admission medications    Medication Sig Start Date End Date Taking? Authorizing Provider   aspirin 325 MG tablet Take 325 mg by mouth daily   Yes Historical Provider, MD   furosemide (LASIX) 20 MG tablet Take 1 tablet by mouth every other day 22  Yes Nicky Line, MD   metoprolol succinate (TOPROL XL) 50 MG extended release tablet Take 1 tablet by mouth once daily 21  Yes Jamia Silveira MD       Review of Systems:  Constitutional: negative for fevers or chills  Eyes: negative for visual disturbance   ENT: negative for sore throat or nasal congestion  Respiratory: negative for shortness of breath or cough,uses c pap  Cardiovascular: negative for chest pain ,palpitations,pnd,syncope  Gastrointestinal: negative for abd pain, nausea, vomiting, diarrhea , constipation,hemetemesis,angelica,blood in stool  Genitourinary: negative for dysuria, urgency ,frequency,hematuria  Integument/breast: negative for skin rash or lesions  Neurological: negative for unilateral weakness, numbness or tingling. Skeletal Muscular: no joint pain,joint swelling,back pain    Subjective:  Vitals:    22 1105   BP: 129/72   Pulse: 70   Resp: 16         Exam:  GEN:   A & O x3, no apparent distress  EYES: No gross abnormalities.   ENT:ENT exam normal, no neck nodes or sinus tenderness  NECK: normal, supple, no lymphadenopathy,  no carotid bruits  PULM: clear to auscultation bilaterally- no wheezes, rales or rhonchi, normal air movement, no respiratory distress  COR: regular rate & rhythm and 2/6 murmer  ABD:  soft, non-tender  : deferred  EXT: Extremities: + 2 pedal pulses, has minimal edema ,no calf tenderness, and warm to touch. Normal nails without lesions, has varicose veins  Skeletomuscular: hip pain better,able to ambulate without difficulty  NEURO: Motor and sensory grossly intact  SKIN:  No skin lesions or rashes      Assessment:  1. S/p left hip fracture    2. Essential hypertension    3. Obstructive sleep apnea    4. Chronic diastolic heart failure ([address]        Plan:    ICD-10-CM    1. S/p left hip fracture - return with light duty to work Z87.81    2. Essential hypertension -well controlled with toprol I10    3. Obstructive sleep apnea - continue c pap G47.33    4.  Chronic diastolic heart failure (HCC) - discussed salt restriction, continue lasix I50.32    discussed smoking cessation  Advised covid booster  Medication directions and side effects discussed      Electronically signed by Obed Sanchez MD on 6/30/2022 at 11:27 AM         Obed Sanchez MD, MD

## 2022-06-30 NOTE — PATIENT INSTRUCTIONS
SURVEY:    You may be receiving a survey from [employer] regarding your visit today. You may get this in the mail, through your MyChart, or in your email. Please complete the survey to enable us to provide the highest quality of care to you and your family. If you cannot score us a very good (5 Stars) on any question, please call the office to discuss how we could of made your experience exceptional.    Thank you!     Dr. Junaid Meza, LPN  Julia Ellison, [address] [place] [address], [address], [address]    Phone: [phone]  Fax: [phone]    Office Hours:   Soy Cabrera, Counts include 234 beds at the [facility], F: 8-5

## 2022-07-06 ENCOUNTER — HOSPITAL ENCOUNTER (OUTPATIENT)
Dept: PHYSICAL THERAPY | Age: 57
Setting detail: THERAPIES SERIES
Discharge: HOME | End: 2022-07-06
Payer: OTHER GOVERNMENT

## 2022-07-06 PROCEDURE — 97110 THERAPEUTIC EXERCISES: CPT

## 2022-07-06 NOTE — PROGRESS NOTES
Phone: Jimy           Fax: [phone]                           Outpatient Physical Therapy                                                                            Daily Note    Patient: Sveta Soto : 1965  CSN #: [id number]   Referring Physician: Veronika Calloway MD    Date: 2022     Treatment Diagnosis: L femur fx, s/p ORIF    Onset Date: 22  Total # of Visits Approved: 12 Per Physician Order  Total # of Visits to Date: 12  No Show: 0  Canceled Appointment: 0    Pre-Treatment Pain:  0/10  Subjective: Pt states he is a little stiff in the mornings and with a few tasks. Pt denies pain currently and reports he is going back to work tomorrow. Exercises:  Exercise 6: TM amb 5 mins  Exercise 7: side walking along island 8x GTB  Exercise 8: FSU 6 inch/SSU 4\" 10-15x ea  Exercise 9: SciFIT: level 4.5 x 10 minutes (LE's only)  Exercise 11: Maurisio steps overs 6\" 15x ea  Exercise 12: Star trac extension 5pl 2x15 flex SL 5 pl  Exercise 13: side steps/kicks to Left  with PTB 2x15  Exercise 14: Sit<>stand 2x10 -black chair    Assessment  Assessment: Added TM amb with tactile cues to decrease circumduction with appropriate carryover. Weakness remains in L hip. Will continue. Activity Tolerance  Activity Tolerance: Patient tolerated treatment well    Patient Education  Patient Education: Gait cycle. Pt verbalized/demonstrated good understanding:     [x] Yes         [] No, pt required further clarification. Post Treatment Pain:  0/10    Plan  Plan Frequency: 1-2  Plan weeks: 6     Goals  (Total # of Visits to Date: 15)      Short Term Goals  Time Frame for Short term goals: 3 weeks  Short term goal 1: Patient will be initiated with a HEP -MET  Short term goal 2: Patient will tolerate 20-30 minutes of therex/act to improve endurance for ADLs. -met    Long Term Goals  Time Frame for Long term goals : 6 weeks  Long term goal 1: Patient will be independent and compliant with a HEP  Long term goal 2: Patient will progress WB per surgeon protocol to be able to ambulate without an AD and minimal gait deviations  Long term goal 3: Patient will improve L LE strength to >/=4/5 in all major joints and planes  Long term goal 4: Patient will report 70% improvement in overall function and symptoms.     Minutes Tracking:  Time In: 1016  Time Out: 1100  Minutes: 44  Timed Code Treatment Minutes: 1200 [place], [place]          Date: 7/6/2022

## 2022-07-08 ENCOUNTER — APPOINTMENT (OUTPATIENT)
Dept: PHYSICAL THERAPY | Age: 57
End: 2022-07-08
Payer: OTHER GOVERNMENT

## 2022-07-14 ENCOUNTER — HOSPITAL ENCOUNTER (OUTPATIENT)
Dept: PHYSICAL THERAPY | Age: 57
Setting detail: THERAPIES SERIES
Discharge: HOME | End: 2022-07-14
Payer: OTHER GOVERNMENT

## 2022-07-14 RX ORDER — FUROSEMIDE 20 MG/1
20 TABLET ORAL EVERY OTHER DAY
Qty: 29 TABLET | Refills: 0 | Status: SHIPPED | OUTPATIENT
Start: 2022-07-14

## 2022-07-14 NOTE — PROGRESS NOTES
[facility]  Inpatient/Observation/Outpatient Rehabilitation    Date: 2022  Patient Name: Cass Granados       [] Inpatient Acute/Observation       [x]  Outpatient  : 1965       [x] Pt no showed for scheduled appointment    [] Pt refused/declined therapy at this time due to:           [] Pt cancelled due to:  [] No Reason Given   [] Sick/ill   [] Other: Called and left voicemail reminding patient of next scheduled appointment. Therapist/Assistant will attempt to see this patient, at our earliest opportunity.        Davis Ward, PT, DPT Date: 2022

## 2022-07-19 ENCOUNTER — HOSPITAL ENCOUNTER (OUTPATIENT)
Dept: PHYSICAL THERAPY | Age: 57
Setting detail: THERAPIES SERIES
Discharge: HOME | End: 2022-07-19
Payer: OTHER GOVERNMENT

## 2022-07-19 NOTE — PLAN OF CARE
[facility]  Inpatient/Observation/Outpatient Rehabilitation    Date: 2022  Patient Name: Jovon Ill   [x]  Outpatient  : 1965       [x] Pt no showed for scheduled appointment      Selena Funes PT, DPT Date: 2022

## 2022-09-20 RX ORDER — METOPROLOL SUCCINATE 50 MG/1
TABLET, EXTENDED RELEASE ORAL
Qty: 90 TABLET | Refills: 0 | OUTPATIENT
Start: 2022-09-20

## 2022-09-27 RX ORDER — METOPROLOL SUCCINATE 50 MG/1
50 TABLET, EXTENDED RELEASE ORAL DAILY
Qty: 90 TABLET | Refills: 0 | Status: SHIPPED | OUTPATIENT
Start: 2022-09-27

## 2022-10-21 NOTE — DISCHARGE SUMMARY
Patient will improve L LE strength to >/=4/5 in all major joints and planes  Long Term Goal 4: Patient will report 70% improvement in overall function and symptoms.       Reason for Discharge  [] Goals Achieved                        []  Poor Follow Through/Attendance                  []  Optimal Function Achieved     [x]  Patient Discharged Self    []  Hospitalization                         []  Physician discharge      Thank you for this referral      Shawn Baumann PT, DPT               Date: 10/21/2022

## 2022-12-01 RX ORDER — FUROSEMIDE 20 MG/1
20 TABLET ORAL EVERY OTHER DAY
Qty: 45 TABLET | Refills: 0 | Status: SHIPPED | OUTPATIENT
Start: 2022-12-01

## 2022-12-12 RX ORDER — METOPROLOL SUCCINATE 50 MG/1
TABLET, EXTENDED RELEASE ORAL
Qty: 90 TABLET | Refills: 0 | Status: SHIPPED | OUTPATIENT
Start: 2022-12-12

## 2022-12-12 RX ORDER — FUROSEMIDE 20 MG/1
20 TABLET ORAL EVERY OTHER DAY
Qty: 30 TABLET | Refills: 0 | Status: SHIPPED | OUTPATIENT
Start: 2022-12-12

## 2022-12-13 ENCOUNTER — OFFICE VISIT (OUTPATIENT)
Dept: PRIMARY CARE CLINIC | Age: 57
End: 2022-12-13
Payer: OTHER GOVERNMENT

## 2022-12-13 VITALS
RESPIRATION RATE: 16 BRPM | SYSTOLIC BLOOD PRESSURE: 115 MMHG | HEART RATE: 72 BPM | BODY MASS INDEX: 36.09 KG/M2 | DIASTOLIC BLOOD PRESSURE: 69 MMHG | WEIGHT: 244.4 LBS

## 2022-12-13 DIAGNOSIS — I10 ESSENTIAL HYPERTENSION: Primary | ICD-10-CM

## 2022-12-13 DIAGNOSIS — Z12.11 SCREENING FOR COLON CANCER: ICD-10-CM

## 2022-12-13 DIAGNOSIS — G47.33 OBSTRUCTIVE SLEEP APNEA: ICD-10-CM

## 2022-12-13 DIAGNOSIS — Z87.891 PERSONAL HISTORY OF TOBACCO USE: ICD-10-CM

## 2022-12-13 DIAGNOSIS — I50.32 CHRONIC DIASTOLIC HEART FAILURE (HCC): ICD-10-CM

## 2022-12-13 PROCEDURE — 3078F DIAST BP <80 MM HG: CPT | Performed by: FAMILY MEDICINE

## 2022-12-13 PROCEDURE — 3074F SYST BP LT 130 MM HG: CPT | Performed by: FAMILY MEDICINE

## 2022-12-13 PROCEDURE — 99214 OFFICE O/P EST MOD 30 MIN: CPT | Performed by: FAMILY MEDICINE

## 2022-12-13 PROCEDURE — G0296 VISIT TO DETERM LDCT ELIG: HCPCS | Performed by: FAMILY MEDICINE

## 2022-12-13 NOTE — PROGRESS NOTES
Hypertension: Patient here for follow-up of elevated blood pressure. He is exercising and is adherent to low salt diet. Blood pressure is not well controlled at home. Patient does not check his blood pressure at home. Cardiac symptoms none. Patient denies chest pain, and palpitations. Cardiovascular risk factors: advanced age (older than 54 for men, 72 for women), hypertension, male gender, and obesity (BMI >= 30 kg/m2). Use of agents associated with hypertension: none. Atrial Fibrillation: Patient denies palpitations and shortness of breath. Congestive Heart Failure  Patient presents for re-evaluation of congestive heart failure. Patient's current complaints are none. He denies chest pain, and palpitations. He states he is compliant all of the time with his medications. He states he is noncompliant with his diet. Allergies:  Patient has no known allergies. Past Medical History:    Past Medical History:   Diagnosis Date    Aortic valve disorders     aortic regurg    Atrial fibrillation [facility])     Atrial flutter ([address]     Encounter for cardioversion procedure 05/05/2015    Dr Izabela Nagel for cardioversion procedure     [facility]/ Dr. Sanchez, states 7 times, most recent 1/2020    Frequent urination     Pt states he's developed frequent urination with 3 times in last yr noting blood in urine as well. H/O echocardiogram 12/04/2017    EF 00-99% Global LV systolic function appears preserved.  Mildly increaseed LV wall thinckness with normal LV carvity size, No definite specific wall motion abnormalities identified, LA is moderately dilated (34-39) with LA volume index of 36ml/m2, Mild mitral and tricuspid regurgitation, moderate aortic regurgitation, Evidence of mild diastolic dysfunction seen    H/O echocardiogram 02/02/2021    EF 55% patient has sigmoid interventricular septim S/P aortic vlave repair with mild to mod aortic regurg Diastoligy cannot be properly assessed due to pts rhtyhm Aortic root is mildly dilated when corrected for body surface area    Hemorrhage of rectum and anus 2013    polypectomy    History of 24 hour EKG monitoring 04/03/2014    Unremarkable except for mild increased number of PVC's with 3 ventricular couplets, asymptomatic     History of echocardiogram 04/10/2014    EF 60%, mild to mod LVH, moderate AI    History of heart surgery 2008    enlarged aorta    History of PFTs 06/25/2015    Consistent with mild obstructive ventilatory impairment. Lung volumes are normal,except mild increase in residual volume,which could be suggestive of airway trapping.  Diffusion capacity is normal.    Hx of transesophageal echocardiography (SKYLAR) for monitoring 05/05/2015    Dr Heddie Ormond    Hypertension     pcp dr Robin Norton    Impaired fasting glucose     Knee pain, bilateral     Lipoma     Obesity (BMI 30.0-34.9)     Obstructive sleep apnea (adult) (pediatric)     does not wear cpap like suppose to    Paroxysmal supraventricular tachycardia ([address]     Sleep apnea     uses CPAP    Tobacco use disorder     Urinary urgency     Ventral hernia        Past Surgical History:    Past Surgical History:   Procedure Laterality Date    ABLATION OF DYSRHYTHMIC FOCUS  2015    ACHILLES TENDON SURGERY Right 2010    CARDIAC SURGERY  2008    thoracic AAA repair and modified Coleman procedure ( aortic valve sparing ) at [facility]  06/13/2013    x 2 , bleeding polyps removed    FOOT SURGERY      reconstructive for club foot (right)    HERNIA REPAIR  11/2018    ventral    HERNIA REPAIR  03/06/2020    ventral    HERNIA REPAIR N/A 3/6/2020    XI LAPAROSCOPIC ROBOTIC VENTRAL HERNIA WITH MESH performed by Alex Suresh IV, DO at [facility] Left 04/24/2022    IM Nailing with Dr. Mariana Rabago Left 4/24/2022    HIP OPEN REDUCTION INTERNAL FIXATION performed by Hesham Baker DO at [address] [facility] UMBILICAL XRMX,2+M/I,YKDTV N/A     HERNIA UMBILICAL REPAIR performed by Cait Kiser MD at [address] [facility] [address] [facility] [address] Left 05/10/2021    Dr. Ayde Quezada Left 5/10/2021    KNEE TOTAL ARTHROPLASTY performed by Bianca Fung MD at [address]  10/24/2018    VASECTOMY         Social History:   Social History     Tobacco Use    Smoking status: Former     Packs/day: 1.00     Years: 34.00     Pack years: 34.00     Types: Cigarettes     Quit date: 12/15/2020     Years since quittin.9    Smokeless tobacco: Never   Substance Use Topics    Alcohol use: Yes     Alcohol/week: 6.0 standard drinks     Types: 6 Cans of beer per week     Comment: twice a week       Family History:   Family History   Problem Relation Age of Onset    Colon Cancer Mother     Heart Surgery Father         enlarged aorta    Kidney Cancer Father          Review of Systems:  Constitutional: negative for fever or chills  Eyes: negative for visual disturbance   ENT: negative for sore throat or nasal congestion  Respiratory: neg for cough or shortness of breath  Cardiovascular: negative for chest pain or palpitations  Gastrointestinal: negative for abd pain, nausea, vomiting, diarrhea or constipation  Genitourinary: negative for dysuria, urgency or frequency  Integument/breast: negative for skin rash or lesions  Neurological: negative for unilateral weakness, numbness or tingling. Skeletal Muscular: no joint pain or swelling      Objective:  Physical Exam:  GEN:   A & O x3, no apparent distress  EYES: No gross abnormalities.   ENT:ENT exam normal, no neck nodes or sinus tenderness  NECK: normal, supple, no lymphadenopathy,  no carotid bruits  PULM: clear to auscultation bilaterally- no wheezes, rales or rhonchi, normal air movement, no respiratory distress  COR: regular rate & rhythm, no gallops, and 2/6 murmer  ABD:  soft, non-tender  : deferred  EXT: has minimal edema,no calf tenderness  NEURO: Motor and sensory grossly intact  SKIN:  No skin lesions or rashes        Labs:  Lab Results   Component Value Date    GLUCOSE 112 (H) 04/24/2022    BUN 18 04/24/2022    CREATININE 0.70 04/24/2022     04/24/2022    K 4.1 04/24/2022    CALCIUM 9.1 04/24/2022    CL 97 (L) 04/24/2022    CO2 28 04/24/2022    LABGLOM >60 04/24/2022       Assessment:  1. Essential hypertension    2. Obstructive sleep apnea    3. Chronic diastolic heart failure ([facility]    4. Screening for colon cancer          Plan:  1. Essential hypertension -well controlled with toprol   2. Obstructive sleep apnea - benifitting fro c pap,continue   3. Chronic diastolic heart failure ([facility] - discussed salt restriction,daily weight and call if more than 2 lb gain   4. Screening for colon cancer - screening colonocopy   Declines flu vaccine  Encouraged low fat and low sodium diet. Goal for blood pressure control is 130/80  Recommended regular exercise as tolerated, 3-5 times per day    Orders Placed This Encounter   Procedures    [id number] [facility] Gastroenterology     Referral Priority:   Routine     Referral Type:   Eval and Treat     Referral Reason:   Specialty Services Required     Requested Specialty:   Gastroenterology     Number of Visits Requested:   1       Current Outpatient Medications   Medication Sig Dispense Refill    metoprolol succinate (TOPROL XL) 50 MG extended release tablet Take 1 tablet by mouth once daily 90 tablet 0    furosemide (LASIX) 20 MG tablet TAKE 1 TABLET BY MOUTH EVERY OTHER DAY 30 tablet 0     No current facility-administered medications for this visit. No follow-ups on file. Electronically signed by [address], MD on 12/13/2022 at 2:44 PM    Discussed with the patient the current USPSTF guidelines released March 9, 2021 for screening for lung cancer.     For adults aged 48 to [de-identified] years who have a 20 pack-year smoking history and currently smoke or have quit within the past 15 years the grade B recommendation is to:  Screen for lung cancer with low-dose computed tomography (LDCT) every year. Stop screening once a person has not smoked for 15 years or has a health problem that limits life expectancy or the ability to have lung surgery. The patient  reports that he quit smoking about 1 years ago. His smoking use included cigarettes. He has a 34.00 pack-year smoking history. He has never used smokeless tobacco.. Discussed with patient the risks and benefits of screening, including over-diagnosis, false positive rate, and total radiation exposure. The patient currently exhibits no signs or symptoms suggestive of lung cancer. Discussed with patient the importance of compliance with yearly annual lung cancer screenings and willingness to undergo diagnosis and treatment if screening scan is positive. In addition, the patient was counseled regarding the importance of remaining smoke free and/or total smoking cessation.     Also reviewed the following if the patient has Medicare that as of February 10, 2022, Medicare only covers LDCT screening in patients aged 51-72 with at least a 20 pack-year smoking history who currently smoke or have quit in the last 15 years

## 2022-12-13 NOTE — PATIENT INSTRUCTIONS
SURVEY:    You may be receiving a survey from [employer] regarding your visit today. You may get this in the mail, through your MyChart, or in your email. Please complete the survey to enable us to provide the highest quality of care to you and your family. If you cannot score us a very good (5 Stars) on any question, please call the office to discuss how we could of made your experience exceptional.    Thank you! NAWAF Tao RN   [facility], [address] [place] [address]    Phone: [phone]  Fax: [phone]    Office Hours:   Cristo Foot, TH, F: 8-5       Learning About Lung Cancer Screening  What is screening for lung cancer? Lung cancer screening is a way to find some lung cancers early, before a person has any symptoms of the cancer. Lung cancer screening may help those who have the highest risk for lung cancer--people age 48 and older who are or were heavy smokers. For most people, who aren't at increased risk, screening for lung cancer probably isn't helpful. Screening won't prevent cancer. And it may not find all lung cancers. Lung cancer screening may lower the risk of dying from lung cancer in a small number of people. How is it done? Lung cancer screening is done with a low-dose CT (computed tomography) scan. A CT scan uses X-rays, or radiation, to make detailed pictures of your body. Experts recommend that screening be done in medical centers that focus on finding and treating lung cancer. Who is screening recommended for? Lung cancer screening is recommended for people age 48 and older who are or were heavy smokers. That means people with a smoking history of at least 20 pack years. A pack year is a way to measure how heavy a smoker you are or were. To figure out your pack years, multiply how many packs a day on average (assuming 20 cigarettes per pack) you have smoked by how many years you have smoked. For example: If you smoked 1 pack a day for 20 years, that's 1 times 20. So you have a smoking history of 20 pack years. If you smoked 2 packs a day for 10 years, that's 2 times 10. So you have a smoking history of 20 pack years. Experts agree that screening is for people who have a high risk of lung cancer. But experts don't agree on what high risk means. Some say people age 48 or older with at least a 20-pack-year smoking history are high risk. Others say it's people age 54 or older with a 30-pack-year history. To see if you could benefit from screening, first find out if you are at high risk for lung cancer. Your doctor can help you decide your lung cancer risk. What are the risks of screening? CT screening for lung cancer isn't perfect. It can show an abnormal result when it turns out there wasn't any cancer. This is called a false-positive result. This means you may need more tests to make sure you don't have cancer. These tests can be harmful and cause a lot of worry. These tests may include more CT scans and invasive testing like a lung biopsy. In a biopsy, the doctor takes a sample of tissue from inside your lung so it can be looked at under a microscope. A biopsy is the only way to tell if you have lung cancer. If the biopsy finds cancer, you and your doctor will have to decide how or whether to treat it. Some lung cancers found on CT scans are harmless and would not have caused a problem if they had not been found through screening. But because doctors can't tell which ones will turn out to be harmless, most will be treated. This means that you may get treatment--including surgery, radiation, or chemotherapy--that you don't need. There is a risk of damage to cells or tissue from being exposed to radiation, including the small amounts used in CTs, X-rays, and other medical tests. Over time, exposure to radiation may cause cancer and other health problems. But in most cases, the risk of getting cancer from being exposed to small amounts of radiation is low.  It's not a reason to avoid these tests for most people. What are the benefits of screening? Your scan may be normal (negative). For some people who are at higher risk, screening lowers the chance of dying of lung cancer. How much and how long you smoked helps to determine your risk level. Screening can find some cancers early, when treatment may be more likely to work. What happens after screening? The results of your CT scan will be sent to your doctor. Someone from your care team will explain the results of your scan and answer any questions you may have. If you need any follow-up, he or she will help you understand what to do next. After a lung cancer screening, you can go back to your usual activities right away. A lung cancer screening test can't tell if you have lung cancer. If your results are positive, your doctor can't tell whether an abnormal finding is a harmless nodule, cancer, or something else without doing more tests. What can you do to help prevent lung cancer? Some lung cancers can't be prevented. But if you smoke, quitting smoking is the best step you can take to prevent lung cancer. If you want to quit, your doctor can recommend medicines or other ways to help. Follow-up care is a key part of your treatment and safety. Be sure to make and go to all appointments, and call your doctor if you are having problems. It's also a good idea to know your test results and keep a list of the medicines you take. Where can you learn more? Go to http://www.balderas.com/ and enter Q940 to learn more about \"Learning About Lung Cancer Screening. \"  Current as of: May 4, 2022               Content Version: 13.5  © 1267-9830 [employer]. Care instructions adapted under license by [employer].  If you have questions about a medical condition or this instruction, always ask your healthcare professional. [place] [address] any warranty or liability for your use of this information.

## 2022-12-15 ENCOUNTER — TELEPHONE (OUTPATIENT)
Dept: ONCOLOGY | Age: 57
End: 2022-12-15

## 2022-12-15 NOTE — LETTER
12/15/2022        [address] [facility]    Dear Jose Odom: Your healthcare provider has ordered a low dose CT scan of the chest for lung cancer screening. Enclosed you will find information about CT lung screening and smoking cessation resources. If you are unable to keep you appointment for you CT lung screening, please call our scheduling department at [phone]. Keep in mind that CT lung screening does not take the place of smoking cessation. Please do not hesitate to contact me if you have any questions or concerns.     [facility],      [facility] Scre[facility]  [phone]

## 2022-12-25 NOTE — DISCHARGE INSTR - DIET
[address]  Discharge Summary      Patient ID:  Zaina Hernandes  [id number]  68 y.o.  1946    Admit date: 12/22/2022    Discharge date and time: 12/25/2022    [facility]     Admitting Physician: Desiree Burris MD     Discharge Physician: Mart Schilder, MD    Consults: cardiology    Admission Diagnoses: Shortness of breath [R06.02]  Hyperkalemia [E87.5]  Acute decompensated heart failure ([facility] [I50.9]  Acute deep vein thrombosis (DVT) of proximal vein of right lower extremity ([facility] [I82.4Y1]    Discharge Diagnoses: Principal Problem (Resolved):    Acute decompensated heart failure [facility])  Active Problems:    Chronic renal disease, stage III ([facility] [598270]    DVT (deep venous thrombosis) (HCC)    Elevated troponin    Hypertension    Mixed hyperlipidemia    Coronary artery disease involving native coronary artery of native heart without angina pectoris    PVD (peripheral vascular disease) ([facility]    Acute on chronic systolic heart failure ([facility]    Diabetes mellitus type 2 in nonobese [facility])  Resolved Problems:    Shortness of breath      Hospital Course: Dipak Saldivar is a 69 yo male with a PMHx of HFrEF with an EF of 40 - 45%, CAD, DMII, HTN, CKD who presented with SOB and bilateral leg swelling and was admitted for acute on chronic heart failure and DVT. On admission, proBNP was 16, 742, CXR showed mild cardiomegaly with mild bilateral pleural effusion and CTA was negative for PE but did show cardiomegaly with dependent pulmonary edema and abdominal ascites with cirrhotic appearing liver. Cardiology was consulted and patient's  heart failure locations were adjusted: Replaced losartan with Entresto 24/26 twice daily and added Jardiance. Echo from 12/24/2022 showed worsening biventricular function with EF of 15% and severe LVH, grade 3 diastolic dysfunction and valvular dysfunction.   Patient was also found to have DVT of the right mid  Good nutrition is important when healing from an illness, injury, or surgery. Follow any nutrition recommendations given to you during your hospital stay.  If you were given an oral nutrition supplement while in the hospital, continue to take this supplement at home. You can take it with meals, in-between meals, and/or before bedtime. These supplements can be purchased at most local grocery stores, pharmacies, and chain super-stores.  If you have any questions about your diet or nutrition, call the hospital and ask for the dietitian.    Heart Healthy Diet:  Low fat, Low sodium superficial femoral to distal superficial femoral vein. Patient was diuresed with Lasix 40 mg IV twice daily within net I's/O of -1.4 L. Patient was discharged home on Demadex 20 mg daily. BNP on discharge was 13,140. Patient will need to follow-up with CHF clinic and Dr. Roxanne Cramer, cardiology in 2 weeks for further ischemic work-up. Patient was started on Eliquis 10 mg twice daily for 1 week and transition to 5 mg twice daily. DVT was likely provoked from recent COVID infection 1 month ago. Patient remained hemodynamically stable on room air during admission and was discharged home in stable condition. Physical Exam  Constitutional:       General: He is not in acute distress. Appearance: Normal appearance. HENT:      Head: Normocephalic and atraumatic. Right Ear: External ear normal.      Left Ear: External ear normal.      Mouth/Throat:      Mouth: Mucous membranes are moist.      Pharynx: Oropharynx is clear. Eyes:      Extraocular Movements: Extraocular movements intact. Conjunctiva/sclera: Conjunctivae normal.   Cardiovascular:      Rate and Rhythm: Normal rate and regular rhythm. Pulses: Normal pulses. Heart sounds: Normal heart sounds. No murmur heard. Pulmonary:      Effort: Pulmonary effort is normal.      Breath sounds: Normal breath sounds. No wheezing. Musculoskeletal:      Cervical back: Normal range of motion. Right lower leg: Edema (1+) present. Left lower leg: Edema (1+) present. Skin:     General: Skin is warm and dry. Neurological:      General: No focal deficit present. Mental Status: He is alert and oriented to person, place, and time. Psychiatric:         Attention and Perception: Attention normal.         Mood and Affect: Mood normal.        Post Discharge Follow Up Issues: Follow-up with the CHF clinic next week. Follow-up with Dr. Roxanne Cramer within 2 weeks. Medication changes: Added Jardiance, Demadex and Entresto.   Discontinued Cozaar. Discharged Condition: stable    Significant Diagnostic Studies:     Echocardiogram from 12/24/2022: EF is 15%. Severe LVH, grade 3 diastolic dysfunction. Moderate to severe RV dysfunction. Moderate mitral as well as tricuspid regurgitation. Mild pulmonary hypertension. Decline in biventricular function compared to before. CTA PULMONARY W CONTRAST   Final Result   No evidence of pulmonary embolism. Cardiomegaly with dependent pulmonary edema and bilateral small pleural   effusions consistent with congestive heart failure acute exacerbation. Reflux of injected contrast into dilated hepatic veins suggests a component   of right heart failure. Partially visualized abdominal ascites and cirrhotic appearing liver, which   may be cardiogenic. Correlate clinically. CT Head W/O Contrast   Final Result   No acute intracranial abnormality. Diffuse cerebral and cerebellar volume loss and chronic small vessel ischemic   white matter change. US DUP LOWER EXTREMITIES BILATERAL VENOUS   Final Result   DVT, right mid superficial femoral to distal superficial femoral vein. No evidence of DVT in the left lower extremity. Critical results were called by Dr. Jo Rashid to Mirella Agrawal CNP on   12/22/2022 at 7:31 p.m. XR CHEST (2 VW)   Final Result   Mild cardiomegaly and mild bilateral pleural effusion.               Disposition: home    Patient Instructions:      Medication List        START taking these medications      * apixaban 5 MG Tabs tablet  Commonly known as: ELIQUIS  Take 2 tablets by mouth 2 times daily for 9 doses     * apixaban 5 MG Tabs tablet  Commonly known as: ELIQUIS  Take 1 tablet by mouth 2 times daily  Start taking on: December 30, 2022     empagliflozin 10 MG tablet  Commonly known as: JARDIANCE  Take 1 tablet by mouth daily  Start taking on: December 26, 2022     sacubitril-valsartan 24-26 MG per tablet  Commonly known as: ENTRESTO  Take 1 tablet by mouth 2 times daily     torsemide 20 MG tablet  Commonly known as: DEMADEX  Take 1 tablet by mouth daily  Start taking on: December 26, 2022           * This list has 2 medication(s) that are the same as other medications prescribed for you. Read the directions carefully, and ask your doctor or other care provider to review them with you. CHANGE how you take these medications      traZODone 50 MG tablet  Commonly known as: DESYREL  TAKE 1 TABLET DAILY EVERY NIGHT  What changed:   when to take this  reasons to take this            CONTINUE taking these medications      * blood glucose test strips strip  Commonly known as: ASCENSIA AUTODISC VI;ONE TOUCH ULTRA TEST VI  1 each by In Vitro route 2 times daily As needed. * FREESTYLE TEST STRIPS strip  Generic drug: blood glucose test strips  1 each by In Vitro route daily As needed. FreeStyle Lancets Misc  TEST EVERY DAY     glipiZIDE 10 MG tablet  Commonly known as: GLUCOTROL  TAKE 1 TABLET TWICE A DAY BEFORE MEALS     Insulin Pen Needle 31G X 5 MM Misc  1 each by Does not apply route daily     Januvia 100 MG tablet  Generic drug: SITagliptin  TAKE 1 TABLET DAILY     Lantus SoloStar 100 UNIT/ML injection pen  Generic drug: insulin glargine  Inject 10 Units into the skin nightly     * metFORMIN 1000 MG tablet  Commonly known as: GLUCOPHAGE  TAKE 1 TABLET TWICE A DAY WITH MEALS     * metFORMIN 1000 MG tablet  Commonly known as: GLUCOPHAGE  Take 1 tablet by mouth 2 times daily (with meals)     metoprolol succinate 25 MG extended release tablet  Commonly known as: TOPROL XL  TAKE 1 TABLET TWICE A DAY     rosuvastatin 10 MG tablet  Commonly known as: CRESTOR  TAKE 1 TABLET DAILY     spironolactone 25 MG tablet  Commonly known as: ALDACTONE  TAKE ONE-HALF (1/2) TABLET DAILY     therapeutic multivitamin-minerals tablet           * This list has 4 medication(s) that are the same as other medications prescribed for you.  Read the directions carefully, and ask your doctor or other care provider to review them with you.                 STOP taking these medications      losartan 50 MG tablet  Commonly known as: COZAAR               Where to Get Your Medications        These medications were sent to [address] [place] [address],  [facility]  [facility] [place] [phone]  [place] [id number]      Phone: [phone]   apixaban 5 MG Tabs tablet  apixaban 5 MG Tabs tablet  empagliflozin 10 MG tablet  sacubitril-valsartan 24-26 MG per tablet  torsemide 20 MG tablet       Activity: activity as tolerated  Diet: cardiac diet    [address]     Schedule an appointment as soon as possible for a visit in 2 day(s)  follow-up    Zana Jensen MD  [facility]  [address] [facility],[address] [phone]    Schedule an appointment as soon as possible for a visit in 2 day(s)      [address] [address]  [phone]  Schedule an appointment as soon as possible for a visit in 1 day(s)      Signed:  Jose Palmer MD PGY-2  12/25/2022  8:15 PM

## 2023-01-04 ENCOUNTER — HOSPITAL ENCOUNTER (OUTPATIENT)
Dept: CT IMAGING | Age: 58
Discharge: HOME | End: 2023-01-06
Payer: OTHER GOVERNMENT

## 2023-01-04 ENCOUNTER — TELEPHONE (OUTPATIENT)
Dept: CASE MANAGEMENT | Age: 58
End: 2023-01-04

## 2023-01-04 DIAGNOSIS — Z87.891 PERSONAL HISTORY OF TOBACCO USE: ICD-10-CM

## 2023-01-04 PROCEDURE — 71271 CT THORAX LUNG CANCER SCR C-: CPT

## 2023-01-04 NOTE — TELEPHONE ENCOUNTER
Lung Navigator received message from Jerry Zhu in Dr. Castillo Manual office and wanting to confirm pt. Enrolled in [employer] information and smoking cessation referral info via mail on 12/15. Lung Screening completed today and reviewed per provider. Pt.  Due for annual 1/4/24.

## 2023-03-13 RX ORDER — METOPROLOL SUCCINATE 50 MG/1
TABLET, EXTENDED RELEASE ORAL
Qty: 90 TABLET | Refills: 0 | Status: SHIPPED | OUTPATIENT
Start: 2023-03-13

## 2023-03-14 ENCOUNTER — OFFICE VISIT (OUTPATIENT)
Dept: PRIMARY CARE CLINIC | Age: 58
End: 2023-03-14
Payer: OTHER GOVERNMENT

## 2023-03-14 VITALS
DIASTOLIC BLOOD PRESSURE: 74 MMHG | SYSTOLIC BLOOD PRESSURE: 128 MMHG | HEART RATE: 70 BPM | WEIGHT: 255 LBS | BODY MASS INDEX: 37.77 KG/M2 | OXYGEN SATURATION: 96 % | RESPIRATION RATE: 18 BRPM | HEIGHT: 69 IN

## 2023-03-14 DIAGNOSIS — I50.32 CHRONIC DIASTOLIC HEART FAILURE (HCC): ICD-10-CM

## 2023-03-14 DIAGNOSIS — I10 ESSENTIAL HYPERTENSION: Primary | ICD-10-CM

## 2023-03-14 DIAGNOSIS — G47.33 OBSTRUCTIVE SLEEP APNEA: ICD-10-CM

## 2023-03-14 PROBLEM — Z96.659 ARTIFICIAL KNEE JOINT PRESENT: Status: ACTIVE | Noted: 2023-03-14

## 2023-03-14 PROBLEM — M17.9 OSTEOARTHRITIS OF KNEE: Status: ACTIVE | Noted: 2023-03-14

## 2023-03-14 PROCEDURE — 3078F DIAST BP <80 MM HG: CPT | Performed by: FAMILY MEDICINE

## 2023-03-14 PROCEDURE — 3074F SYST BP LT 130 MM HG: CPT | Performed by: FAMILY MEDICINE

## 2023-03-14 PROCEDURE — 99213 OFFICE O/P EST LOW 20 MIN: CPT | Performed by: FAMILY MEDICINE

## 2023-03-14 RX ORDER — ASPIRIN 81 MG/1
TABLET ORAL DAILY
COMMUNITY
Start: 2016-02-05 | End: 2023-03-14 | Stop reason: ALTCHOICE

## 2023-03-14 SDOH — ECONOMIC STABILITY: HOUSING INSECURITY
IN THE LAST 12 MONTHS, WAS THERE A TIME WHEN YOU DID NOT HAVE A STEADY PLACE TO SLEEP OR SLEPT IN A SHELTER (INCLUDING NOW)?: NO

## 2023-03-14 SDOH — ECONOMIC STABILITY: FOOD INSECURITY: WITHIN THE PAST 12 MONTHS, THE FOOD YOU BOUGHT JUST DIDN'T LAST AND YOU DIDN'T HAVE MONEY TO GET MORE.: NEVER TRUE

## 2023-03-14 SDOH — ECONOMIC STABILITY: INCOME INSECURITY: HOW HARD IS IT FOR YOU TO PAY FOR THE VERY BASICS LIKE FOOD, HOUSING, MEDICAL CARE, AND HEATING?: NOT HARD AT ALL

## 2023-03-14 SDOH — ECONOMIC STABILITY: FOOD INSECURITY: WITHIN THE PAST 12 MONTHS, YOU WORRIED THAT YOUR FOOD WOULD RUN OUT BEFORE YOU GOT MONEY TO BUY MORE.: NEVER TRUE

## 2023-03-14 ASSESSMENT — PATIENT HEALTH QUESTIONNAIRE - PHQ9
SUM OF ALL RESPONSES TO PHQ QUESTIONS 1-9: 0
SUM OF ALL RESPONSES TO PHQ QUESTIONS 1-9: 0
SUM OF ALL RESPONSES TO PHQ9 QUESTIONS 1 & 2: 0
SUM OF ALL RESPONSES TO PHQ QUESTIONS 1-9: 0
SUM OF ALL RESPONSES TO PHQ QUESTIONS 1-9: 0
1. LITTLE INTEREST OR PLEASURE IN DOING THINGS: 0
2. FEELING DOWN, DEPRESSED OR HOPELESS: 0

## 2023-03-14 NOTE — PATIENT INSTRUCTIONS
SURVEY:    You may be receiving a survey from [employer] regarding your visit today. You may get this in the mail, through your MyChart, or in your email. Please complete the survey to enable us to provide the highest quality of care to you and your family. If you cannot score us a very good (5 Stars) on any question, please call the office to discuss how we could of made your experience exceptional.    Thank you!     Dr. Stefany Rodgers, [facility] Rolon, [address] [place] [address]    Phone: [phone]  Fax: [phone]    Office Hours:   Jason Kennedy, [address], F: 8-5

## 2023-03-14 NOTE — PROGRESS NOTES
Hypertension: Patient here for follow-up of elevated blood pressure. He is exercising and is  adherent to low salt diet. Blood pressure is not well monitored at home. Cardiac symptoms  dizziness/lightheadedness . Patient denies chest pain, dyspnea, and fatigue. Cardiovascular risk factors: advanced age (older than 54 for men, 72 for women), hypertension, and male gender. Use of agents associated with hypertension: none. Alfredito- using c pcp regularly and benifitting  Has scheduled colonoscopy      Allergies:  Patient has no known allergies. Past Medical History:    Past Medical History:   Diagnosis Date    Aortic valve disorders     aortic regurg    Atrial fibrillation [facility])     Atrial flutter ([address]     Encounter for cardioversion procedure 05/05/2015    Dr Gari Riedel for cardioversion procedure     [facility]/ Dr. Emily Jung, states 7 times, most recent 1/2020    Frequent urination     Pt states he's developed frequent urination with 3 times in last yr noting blood in urine as well. H/O echocardiogram 12/04/2017    EF 29-06% Global LV systolic function appears preserved.  Mildly increaseed LV wall thinckness with normal LV carvity size, No definite specific wall motion abnormalities identified, LA is moderately dilated (34-39) with LA volume index of 36ml/m2, Mild mitral and tricuspid regurgitation, moderate aortic regurgitation, Evidence of mild diastolic dysfunction seen    H/O echocardiogram 02/02/2021    EF 55% patient has sigmoid interventricular septim S/P aortic vlave repair with mild to mod aortic regurg Diastoligy cannot be properly assessed due to pts rhtyhm Aortic root is mildly dilated when corrected for body surface area    Hemorrhage of rectum and anus 2013    polypectomy    History of 24 hour EKG monitoring 04/03/2014    Unremarkable except for mild increased number of PVC's with 3 ventricular couplets, asymptomatic     History of echocardiogram 04/10/2014    EF 60%, mild to mod LVH, moderate AI    History of heart surgery 2008    enlarged aorta    History of PFTs 06/25/2015    Consistent with mild obstructive ventilatory impairment. Lung volumes are normal,except mild increase in residual volume,which could be suggestive of airway trapping.  Diffusion capacity is normal.    Hx of transesophageal echocardiography (SKYLAR) for monitoring 05/05/2015    Dr Tuttle [facility]    Hypertension     pcp dr Mana Ordoñez    Impaired fasting glucose     Knee pain, bilateral     Lipoma     Obesity (BMI 30.0-34.9)     Obstructive sleep apnea (adult) (pediatric)     does not wear cpap like suppose to    Paroxysmal supraventricular tachycardia ([address]     Sleep apnea     uses CPAP    Tobacco use disorder     Urinary urgency     Ventral hernia        Past Surgical History:    Past Surgical History:   Procedure Laterality Date    ABLATION OF DYSRHYTHMIC FOCUS  2015    ACHILLES TENDON SURGERY Right 2010    CARDIAC SURGERY  2008    thoracic AAA repair and modified Coleman procedure ( aortic valve sparing ) at [facility]  06/13/2013    x 2 , bleeding polyps removed    FOOT SURGERY      reconstructive for club foot (right)    HERNIA REPAIR  11/2018    ventral    HERNIA REPAIR  03/06/2020    ventral    HERNIA REPAIR N/A 3/6/2020    XI LAPAROSCOPIC ROBOTIC VENTRAL HERNIA WITH MESH performed by Nancy Warren IV, DO at [address] [facility] Left 04/24/2022    IM Nailing with Dr. Jessica Dejesus Left 4/24/2022    HIP OPEN REDUCTION INTERNAL FIXATION performed by Saray Nova DO at [address] RPQUANG UMBILICAL HRNA 5 YRS/> REDUCIBLE N/A 38/20/5681    HERNIA UMBILICAL REPAIR performed by Venkat Araya MD at [facility] Left 05/10/2021    Dr. Abelardo Vance Left 5/10/2021    KNEE TOTAL ARTHROPLASTY performed by Chad Mcgowan MD at . [address]  10/24/2018    VASECTOMY Social History:   Social History     Tobacco Use    Smoking status: Former     Packs/day: 1.00     Years: 34.00     Pack years: 34.00     Types: Cigarettes     Quit date: 12/15/2020     Years since quittin.2    Smokeless tobacco: Never   Substance Use Topics    Alcohol use: Yes     Alcohol/week: 6.0 standard drinks     Types: 6 Cans of beer per week     Comment: twice a week       Family History:   Family History   Problem Relation Age of Onset    Colon Cancer Mother     Heart Surgery Father         enlarged aorta    Kidney Cancer Father          Review of Systems:  Constitutional: negative for fever or chills  Eyes: negative for visual disturbance   ENT: negative for sore throat or nasal congestion  Respiratory: neg for cough or shortness of breath  Cardiovascular: negative for chest pain or palpitations  Gastrointestinal: negative for abd pain, nausea, vomiting, diarrhea or constipation  Genitourinary: negative for dysuria, urgency or frequency  Integument/breast: negative for skin rash or lesions  Neurological: negative for unilateral weakness, numbness or tingling. Skeletal Muscular: Left hip joint pain -pending Ortho evaluation      Objective:  Physical Exam:  GEN:   A & O x3, no apparent distress  EYES: No gross abnormalities.   ENT:ENT exam normal, no neck nodes or sinus tenderness  NECK: normal, supple, no lymphadenopathy,  no carotid bruits  PULM: clear to auscultation bilaterally- no wheezes, rales or rhonchi, normal air movement, no respiratory distress  COR: regular rate & rhythm, no gallops, and 2/6 murmer  ABD:  soft, non-tender, non-distended, normal bowel sounds, no masses or organomegaly  : deferred  EXT: Has minimal edema,no  calf tenderness  NEURO: Motor and sensory grossly intact  SKIN:  No skin lesions or rashes        Labs:  Lab Results   Component Value Date    GLUCOSE 112 (H) 2022    BUN 18 2022    CREATININE 0.70 2022     2022    K 4.1 2022 CALCIUM 9.1 04/24/2022    CL 97 (L) 04/24/2022    CO2 28 04/24/2022    LABGLOM >60 04/24/2022       Assessment:  1. Essential hypertension    2. Obstructive sleep apnea    3. Chronic diastolic heart failure ([facility]          Plan:  1. Essential hypertension    2. Obstructive sleep apnea    3. Chronic diastolic heart failure (HCC)      Encouraged low fat and low sodium diet. Goal for blood pressure control is 130/80  Recommended regular exercise as tolerated, 3-5 times per day  Labs: fasting lipids, ALT, AST and BMP in 6 months    No orders of the defined types were placed in this encounter. Current Outpatient Medications   Medication Sig Dispense Refill    metoprolol succinate (TOPROL XL) 50 MG extended release tablet Take 1 tablet by mouth once daily 90 tablet 0    furosemide (LASIX) 20 MG tablet TAKE 1 TABLET BY MOUTH EVERY OTHER DAY 30 tablet 0     No current facility-administered medications for this visit. No follow-ups on file.       Electronically signed by Surinder Mcclain MD on 3/14/2023 at 3:45 PM

## 2023-03-19 SDOH — HEALTH STABILITY: PHYSICAL HEALTH: ON AVERAGE, HOW MANY DAYS PER WEEK DO YOU ENGAGE IN MODERATE TO STRENUOUS EXERCISE (LIKE A BRISK WALK)?: 5 DAYS

## 2023-03-19 SDOH — HEALTH STABILITY: PHYSICAL HEALTH: ON AVERAGE, HOW MANY MINUTES DO YOU ENGAGE IN EXERCISE AT THIS LEVEL?: 60 MIN

## 2023-03-19 ASSESSMENT — SOCIAL DETERMINANTS OF HEALTH (SDOH)
WITHIN THE LAST YEAR, HAVE YOU BEEN HUMILIATED OR EMOTIONALLY ABUSED IN OTHER WAYS BY YOUR PARTNER OR EX-PARTNER?: NO
WITHIN THE LAST YEAR, HAVE YOU BEEN KICKED, HIT, SLAPPED, OR OTHERWISE PHYSICALLY HURT BY YOUR PARTNER OR EX-PARTNER?: NO
WITHIN THE LAST YEAR, HAVE YOU BEEN AFRAID OF YOUR PARTNER OR EX-PARTNER?: NO
WITHIN THE LAST YEAR, HAVE TO BEEN RAPED OR FORCED TO HAVE ANY KIND OF SEXUAL ACTIVITY BY YOUR PARTNER OR EX-PARTNER?: NO

## 2023-03-22 ENCOUNTER — OFFICE VISIT (OUTPATIENT)
Dept: ORTHOPEDIC SURGERY | Age: 58
End: 2023-03-22

## 2023-03-22 VITALS — BODY MASS INDEX: 37.77 KG/M2 | HEIGHT: 69 IN | WEIGHT: 255 LBS

## 2023-03-22 DIAGNOSIS — S72.002K: ICD-10-CM

## 2023-03-22 DIAGNOSIS — R52 PAIN: Primary | ICD-10-CM

## 2023-03-22 DIAGNOSIS — Z01.818 PRE-OP TESTING: ICD-10-CM

## 2023-03-22 NOTE — PROGRESS NOTES
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 Patient sustained a left transcervical neck fracture which he received a [address] short nail in his left hip on 4/23/2022. Patient has a significant past medical history including aortic regurgitation and aneurysm s/p aortic root remodel, A-fib s/p radiofrequency ablation. He has a left total knee arthroplasty performed on 5/2021 by Dr. Marina Pace. Patient seen and evaluated in clinic today significant left hip pain. He states that his hip pain has been constant since his procedure in April 2022. He states since that time he is progressively noticed increasing right leg pain and significant leg length discrepancy. He has developed right knee and hip pain due to compensating for his left hip. He is referred to our clinic for potential surgical intervention. He denies new trauma, weakness, or numbness in the left lower extremity. Past Medical History:    Past Medical History:   Diagnosis Date    Aortic valve disorders     aortic regurg    Atrial fibrillation [facility])     Atrial flutter ([facility] [address]     Encounter for cardioversion procedure 05/05/2015    Dr Olegario Rodrigues for cardioversion procedure     [facility]/ Dr. Veronica Cadet, states 7 times, most recent 1/2020    Frequent urination     Pt states he's developed frequent urination with 3 times in last yr noting blood in urine as well. H/O echocardiogram 12/04/2017    EF 48-39% Global LV systolic function appears preserved.  Mildly

## 2023-03-27 RX ORDER — SODIUM CHLORIDE, SODIUM LACTATE, POTASSIUM CHLORIDE, CALCIUM CHLORIDE 600; 310; 30; 20 MG/100ML; MG/100ML; MG/100ML; MG/100ML
1000 INJECTION, SOLUTION INTRAVENOUS CONTINUOUS
OUTPATIENT
Start: 2023-03-27

## 2023-03-27 NOTE — DISCHARGE INSTRUCTIONS
Preoperative Instructions:    Stop eating solid foods at midnight the night prior to surgery. Stop drinking clear liquids at midnight the night prior to surgery. Arrive at the surgery center (Entrance B) by 6:10 on 4/13/2023  (or as directed by your surgeon's office). If you have been given a blood band, you must bring it with you the day of surgery. Please stop any blood thinning medications as directed by your surgeon or prescribing physician. Failure to stop certain medications may interfere with your scheduled surgery. These may include:  Aspirin, Warfarin (Coumadin), Clopidogrel (Plavix), Ibuprofen (Motrin, Advil), Naproxen (Aleve), Meloxicam (Mobic), Celecoxib (Celebrex), Eliquis, Pradaxa, Xarelto, Effient, Fish Oil, Herbal supplements. You may continue the rest of your medications through the night before surgery unless instructed otherwise. Please take only the following medication(s) the day of surgery with a small sip of water:  Toprol/metoprolol    Please use and bring inhalers the day of surgery. Please bring CPAP the day of surgery. PLEASE NOTE:  THE ABOVE (IF ANY) DISCONTINUED MEDS MAY ONLY BE FROM   \"CLEANING UP\" THE MED LIST AND WERE NOT ACTUALLY CANCELLED; SEE CHART FOR DETAILS AND ALWAYS CHECK WITH PRESCRIBING PROVIDER BEFORE DISCONTINUING ANY MEDICATIONS          ____________________________  ____________________________  Signature (Patient)           Signature/date(Provider)      REMINDERS:  ** If you are going home the day of your procedure, you will need a friend or family member to drive you home after your procedure. Your  must be 25years of age or older and able to sign off on your discharge instructions. Taxi cabs or any form of public transportation is not acceptable. ** It is preferable that the friend or family member stay at the hospital throughout your procedure.   ** If you are going home the same day as your procedure, someone must remain with you

## 2023-03-28 ENCOUNTER — HOSPITAL ENCOUNTER (OUTPATIENT)
Dept: GENERAL RADIOLOGY | Age: 58
Discharge: HOME | End: 2023-03-30
Payer: OTHER GOVERNMENT

## 2023-03-28 ENCOUNTER — HOSPITAL ENCOUNTER (OUTPATIENT)
Age: 58
Discharge: HOME | End: 2023-03-28
Payer: OTHER GOVERNMENT

## 2023-03-28 ENCOUNTER — HOSPITAL ENCOUNTER (OUTPATIENT)
Dept: PREADMISSION TESTING | Age: 58
Discharge: HOME | End: 2023-04-01
Payer: OTHER GOVERNMENT

## 2023-03-28 VITALS
TEMPERATURE: 97.3 F | SYSTOLIC BLOOD PRESSURE: 129 MMHG | OXYGEN SATURATION: 97 % | WEIGHT: 255 LBS | RESPIRATION RATE: 20 BRPM | HEART RATE: 59 BPM | HEIGHT: 67 IN | BODY MASS INDEX: 40.02 KG/M2 | DIASTOLIC BLOOD PRESSURE: 56 MMHG

## 2023-03-28 DIAGNOSIS — Z01.818 PRE-OP TESTING: ICD-10-CM

## 2023-03-28 DIAGNOSIS — S72.002K: ICD-10-CM

## 2023-03-28 LAB
25(OH)D3 SERPL-MCNC: 14.7 NG/ML
ALBUMIN SERPL-MCNC: 4.3 G/DL (ref 3.5–5.2)
ALBUMIN/GLOBULIN RATIO: 1.9 (ref 1–2.5)
ALP SERPL-CCNC: 96 U/L (ref 40–129)
ALT SERPL-CCNC: 32 U/L (ref 5–41)
ANION GAP SERPL CALCULATED.3IONS-SCNC: 11 MMOL/L (ref 9–17)
AST SERPL-CCNC: 26 U/L
BILIRUB SERPL-MCNC: 0.3 MG/DL (ref 0.3–1.2)
BILIRUBIN URINE: NEGATIVE
BUN SERPL-MCNC: 23 MG/DL (ref 6–20)
CA-I BLD-SCNC: 1.13 MMOL/L (ref 1.13–1.33)
CALCIUM SERPL-MCNC: 9.1 MG/DL (ref 8.6–10.4)
CHLORIDE SERPL-SCNC: 101 MMOL/L (ref 98–107)
CO2 SERPL-SCNC: 28 MMOL/L (ref 20–31)
COLOR: YELLOW
COMMENT UA: NORMAL
CREAT SERPL-MCNC: 0.87 MG/DL (ref 0.7–1.2)
CRP SERPL HS-MCNC: <3 MG/L (ref 0–5)
ERYTHROCYTE [SEDIMENTATION RATE] IN BLOOD BY WESTERGREN METHOD: 3 MM/HR (ref 0–20)
GFR SERPL CREATININE-BSD FRML MDRD: >60 ML/MIN/1.73M2
GLUCOSE SERPL-MCNC: 96 MG/DL (ref 70–99)
GLUCOSE UR STRIP.AUTO-MCNC: NEGATIVE MG/DL
HCT VFR BLD AUTO: 38.1 % (ref 40.7–50.3)
HGB BLD-MCNC: 12.9 G/DL (ref 13–17)
KETONES UR STRIP.AUTO-MCNC: NEGATIVE MG/DL
LEUKOCYTE ESTERASE UR QL STRIP.AUTO: NEGATIVE
MCH RBC QN AUTO: 31.1 PG (ref 25.2–33.5)
MCHC RBC AUTO-ENTMCNC: 33.9 G/DL (ref 28.4–34.8)
MCV RBC AUTO: 91.8 FL (ref 82.6–102.9)
NITRITE UR QL STRIP.AUTO: NEGATIVE
NRBC AUTOMATED: 0 PER 100 WBC
PDW BLD-RTO: 12.5 % (ref 11.8–14.4)
PLATELET # BLD AUTO: 181 K/UL (ref 138–453)
PMV BLD AUTO: 10.4 FL (ref 8.1–13.5)
POTASSIUM SERPL-SCNC: 3.6 MMOL/L (ref 3.7–5.3)
PREALB SERPL-MCNC: 31.4 MG/DL (ref 20–40)
PROT SERPL-MCNC: 6.6 G/DL (ref 6.4–8.3)
PROT UR STRIP.AUTO-MCNC: 6 MG/DL (ref 5–8)
PROT UR STRIP.AUTO-MCNC: NEGATIVE MG/DL
PTH-INTACT SERPL-MCNC: 76.9 PG/ML (ref 14–72)
RBC # BLD: 4.15 M/UL (ref 4.21–5.77)
SODIUM SERPL-SCNC: 140 MMOL/L (ref 135–144)
SPECIFIC GRAVITY UA: 1.02 (ref 1–1.03)
T4 FREE SERPL-MCNC: 0.93 NG/DL (ref 0.93–1.7)
TESTOST SERPL-MCNC: 251 NG/DL (ref 220–1000)
TSH SERPL-ACNC: 2.45 UIU/ML (ref 0.3–5)
TURBIDITY: CLEAR
URINE HGB: NEGATIVE
UROBILINOGEN, URINE: NORMAL
WBC # BLD AUTO: 3.9 K/UL (ref 3.5–11.3)

## 2023-03-28 PROCEDURE — 80053 COMPREHEN METABOLIC PANEL: CPT

## 2023-03-28 PROCEDURE — 81003 URINALYSIS AUTO W/O SCOPE: CPT

## 2023-03-28 PROCEDURE — 83519 RIA NONANTIBODY: CPT

## 2023-03-28 PROCEDURE — 85027 COMPLETE CBC AUTOMATED: CPT

## 2023-03-28 PROCEDURE — 85652 RBC SED RATE AUTOMATED: CPT

## 2023-03-28 PROCEDURE — 83970 ASSAY OF PARATHORMONE: CPT

## 2023-03-28 PROCEDURE — 86140 C-REACTIVE PROTEIN: CPT

## 2023-03-28 PROCEDURE — 36415 COLL VENOUS BLD VENIPUNCTURE: CPT

## 2023-03-28 PROCEDURE — 82306 VITAMIN D 25 HYDROXY: CPT

## 2023-03-28 PROCEDURE — 84443 ASSAY THYROID STIM HORMONE: CPT

## 2023-03-28 PROCEDURE — 93005 ELECTROCARDIOGRAM TRACING: CPT

## 2023-03-28 PROCEDURE — 71046 X-RAY EXAM CHEST 2 VIEWS: CPT

## 2023-03-28 PROCEDURE — 84134 ASSAY OF PREALBUMIN: CPT

## 2023-03-28 PROCEDURE — 84439 ASSAY OF FREE THYROXINE: CPT

## 2023-03-28 PROCEDURE — 82330 ASSAY OF CALCIUM: CPT

## 2023-03-28 PROCEDURE — 84403 ASSAY OF TOTAL TESTOSTERONE: CPT

## 2023-03-28 PROCEDURE — 87641 MR-STAPH DNA AMP PROBE: CPT

## 2023-03-28 NOTE — H&P
grossly WNL. NOSE:  Nares patent. No rhinorrhea   MOUTH/THROAT:  benign  NECK:good ROM   LUNGS: Clear to auscultation bilaterally, no wheezes. CARDIOVASCULAR: systolic murmur noted. Regular rate and rhythm. ABDOMEN: soft, non tender, non distended   EXTREMITIES: no edema bilateral lower extremities. Antalgic gait, mild BLE edema noted. Testing:   EK23  Labs pending: drawn 3/28/2023   Chest XRay:  23    IMPRESSIONS:      has a past medical history of AAA (abdominal aortic aneurysm) (), Aortic valve disorders, Atrial fibrillation ([address] Atrial flutter ([address] Encounter for cardioversion procedure, Frequent urination, H/O echocardiogram (2017), H/O echocardiogram (2021), Hemorrhage of rectum and anus (), History of 24 hour EKG monitoring (2014), History of echocardiogram (04/10/2014), History of PFTs (2015), transesophageal echocardiography ([facility]) for monitoring (2015), Hypertension, Impaired fasting glucose, Knee pain, bilateral, Lipoma, Obesity (BMI 30.0-34.9), Paroxysmal supraventricular tachycardia ([address] Periodontal disease, Sleep apnea, Tobacco use disorder, Under care of team, Under care of team, Under care of team, Urinary urgency, and Ventral hernia.    PLANS:   Removal of hip hardware, total hip arthroplasty    ELANA Almonte PA-C  Electronically signed 3/28/2023 at 2:08 PM

## 2023-03-28 NOTE — PROGRESS NOTES
Anesthesia Focused Assessment    Has patient ever tested positive for COVID? No    STOP-BANG Sleep Apnea Questionnaire    SNORE loudly (heard through closed doors)? Yes  TIRED, fatigued, sleepy during daytime? Yes  OBSERVED stopping breathing during sleep? Yes  High blood PRESSURE being treated? Yes    BMI over 35? Yes  AGE over 48? Yes  NECK circumference over 16\"? Yes  GENDER (male)? Yes             Total 8  High risk 5-8  Intermediate risk 3-4  Low risk 0-2    Obstructive Sleep Apnea: yes  If YES, machine used: cpap-instructed to bring day of surgery. Type 1 DM:   no  T2DM:  no    Coronary Artery Disease:  atrial fibrillation/flutter, follows with [facility] (appointment tomorrow for clearance.)  Hypertension:  yes    Active smoker:  1 PPD for 34 years, quit 2020. Drinks Alcohol:  weekly    Dentition: scheduled for periodontal surgery this week. Defib / AICD / Pacemaker: no      Renal Failure/dialysis:  no    Patient was evaluated in PAT & anesthesia guidelines were applied. NPO guidelines, medication instructions and scheduled arrival time were reviewed with patient. I advised patient to please contact the surgeon's office, ahead of time if possible, if any new signs or symptoms of illness, infection, rash, etc    Hx of anesthesia complications:  no  Family hx of anesthesia complications:  no                                                                                                                     Anesthesia contacted:   no  Medical or cardiac clearance ordered: patient has appointment tomorrow with cardiology for clearance, will request copy of notes and clearance for chart. He is currently active without cardiac or pulmonary complaints.     Spencer Pang PA-C  3/28/23  1:34 PM

## 2023-03-29 ENCOUNTER — HOSPITAL ENCOUNTER (OUTPATIENT)
Dept: NON INVASIVE DIAGNOSTICS | Age: 58
Discharge: HOME | End: 2023-03-29
Payer: OTHER GOVERNMENT

## 2023-03-29 ENCOUNTER — OFFICE VISIT (OUTPATIENT)
Dept: CARDIOLOGY | Age: 58
End: 2023-03-29
Payer: OTHER GOVERNMENT

## 2023-03-29 ENCOUNTER — HOSPITAL ENCOUNTER (OUTPATIENT)
Age: 58
Discharge: HOME | End: 2023-03-29
Payer: OTHER GOVERNMENT

## 2023-03-29 VITALS
HEIGHT: 67 IN | RESPIRATION RATE: 18 BRPM | DIASTOLIC BLOOD PRESSURE: 67 MMHG | WEIGHT: 258 LBS | OXYGEN SATURATION: 95 % | BODY MASS INDEX: 40.49 KG/M2 | SYSTOLIC BLOOD PRESSURE: 116 MMHG | HEART RATE: 65 BPM

## 2023-03-29 DIAGNOSIS — I35.1 MODERATE AORTIC REGURGITATION: ICD-10-CM

## 2023-03-29 DIAGNOSIS — Z01.818 PREOPERATIVE CLEARANCE: ICD-10-CM

## 2023-03-29 DIAGNOSIS — R53.83 FATIGUE, UNSPECIFIED TYPE: ICD-10-CM

## 2023-03-29 DIAGNOSIS — Z86.79 HISTORY OF ATRIAL FLUTTER: ICD-10-CM

## 2023-03-29 DIAGNOSIS — R06.02 SOB (SHORTNESS OF BREATH): ICD-10-CM

## 2023-03-29 DIAGNOSIS — R00.2 PALPITATIONS: ICD-10-CM

## 2023-03-29 DIAGNOSIS — Z01.818 PREOPERATIVE CLEARANCE: Primary | ICD-10-CM

## 2023-03-29 LAB
EKG ATRIAL RATE: 57 BPM
EKG P AXIS: 31 DEGREES
EKG P-R INTERVAL: 170 MS
EKG Q-T INTERVAL: 462 MS
EKG QRS DURATION: 100 MS
EKG QTC CALCULATION (BAZETT): 449 MS
EKG R AXIS: 0 DEGREES
EKG T AXIS: 7 DEGREES
EKG VENTRICULAR RATE: 57 BPM
MRSA, DNA, NASAL: NEGATIVE
POTASSIUM SERPL-SCNC: 4.2 MMOL/L (ref 3.7–5.3)
SPECIMEN DESCRIPTION: NORMAL

## 2023-03-29 PROCEDURE — 3074F SYST BP LT 130 MM HG: CPT | Performed by: PHYSICIAN ASSISTANT

## 2023-03-29 PROCEDURE — 36415 COLL VENOUS BLD VENIPUNCTURE: CPT

## 2023-03-29 PROCEDURE — 3078F DIAST BP <80 MM HG: CPT | Performed by: PHYSICIAN ASSISTANT

## 2023-03-29 PROCEDURE — 84132 ASSAY OF SERUM POTASSIUM: CPT

## 2023-03-29 PROCEDURE — 93243 EXT ECG>48HR<7D SCAN A/R: CPT

## 2023-03-29 PROCEDURE — 93242 EXT ECG>48HR<7D RECORDING: CPT

## 2023-03-29 PROCEDURE — 99214 OFFICE O/P EST MOD 30 MIN: CPT | Performed by: PHYSICIAN ASSISTANT

## 2023-03-29 PROCEDURE — 93010 ELECTROCARDIOGRAM REPORT: CPT | Performed by: INTERNAL MEDICINE

## 2023-03-29 NOTE — PROGRESS NOTES
perioperative period. Additional Testing List: I ordered a echocardiogram to better assess for the etiology of this problem and to help guide future management, Because of current signs and symptoms which can certainly be caused by significant coronary artery disease, I ordered a treadmill stress test with SPECT imaging to try and rule out this possibility. , and I ordered an EVENT MONITOR to try and pinpoint the etiology of their symptoms  I also ordered a repeat potassium level to be drawn. Fatigue:  Symptoms have improved since decreasing metoprolol and stopping his diuretic. No further episodes of dizziness. History of Atrial Flutter/Fibrillation:  Currently maintaining sinus rhythm. Repeat Cardioversion on 2/26/2021 - Not successful - Was sent to [facility] for Ablation is Dr. Fantasma Vicente. Antiplatelet Agent: Continue Aspirin 325 mg daily. Beta Blocker: Continue Metoprolol succinate (Toprol XL) 50 mg daily. [id number] Score for Atrial Fibrillation Stroke Risk   Risk   Factors  Component Value   C CHF Yes 1   H HTN Yes 1   A2 Age >= 75 No,  (59 y.o.) 0   D DM No 0   S2 Prior Stroke/TIA No 0   V Vascular Disease No 0   A Age 74-69 No,  (59 y.o.) 0   Sc Sex male 0    [id number]  Score  2   Score last updated 1/80/47 69:11 PM EDT  Click here for a link to the UpToDate guideline \"Atrial Fibrillation: Anticoagulation therapy to prevent embolization  Disclaimer: Risk Score calculation is dependent on accuracy of patient problem list and past encounter diagnosis. Stroke Risk: CHADS2-VASc Score: 2/9 (2.2% stroke risk)  Anticoagulation: Not indicated at this time. Patient maintaining sinus rhythm since ablation. Moderate Aortic Regurgitation:  No signs of volume overload  We will continue to monitor. Beta Blocker: Continue Metoprolol succinate (Toprol XL) 50 mg daily.    Because of his known history of valvular heart disease, I ordered a echocardiogram to better assess the severity of this

## 2023-03-29 NOTE — PATIENT INSTRUCTIONS
SURVEY:    You may be receiving a survey from [employer] regarding your visit today. Please complete the survey to enable us to provide the highest quality of care to you and your family. If you cannot score us a very good on any question, please call the office to discuss how we could have made your experience a very good one. Thank you.

## 2023-03-30 ENCOUNTER — TELEPHONE (OUTPATIENT)
Dept: CARDIOLOGY | Age: 58
End: 2023-03-30

## 2023-03-30 NOTE — TELEPHONE ENCOUNTER
----- Message from Peterson Solitario PA-C sent at 3/30/2023 10:17 AM EDT -----  Please notify patient that their lab results are normal.   Please continue current treatment and follow up.

## 2023-04-03 LAB — PTH RELATED PEPTIDE: 2.3 PMOL/L (ref 0–2.3)

## 2023-04-06 ENCOUNTER — HOSPITAL ENCOUNTER (OUTPATIENT)
Dept: NON INVASIVE DIAGNOSTICS | Age: 58
Discharge: HOME | End: 2023-04-06
Payer: OTHER GOVERNMENT

## 2023-04-06 ENCOUNTER — NURSE ONLY (OUTPATIENT)
Dept: CARDIOLOGY | Age: 58
End: 2023-04-06

## 2023-04-06 ENCOUNTER — HOSPITAL ENCOUNTER (OUTPATIENT)
Dept: CT IMAGING | Age: 58
Discharge: HOME | End: 2023-04-08
Payer: OTHER GOVERNMENT

## 2023-04-06 DIAGNOSIS — Z86.79 HISTORY OF ATRIAL FLUTTER: ICD-10-CM

## 2023-04-06 DIAGNOSIS — R00.2 PALPITATIONS: ICD-10-CM

## 2023-04-06 DIAGNOSIS — R06.02 SOB (SHORTNESS OF BREATH): ICD-10-CM

## 2023-04-06 DIAGNOSIS — R53.83 FATIGUE, UNSPECIFIED TYPE: ICD-10-CM

## 2023-04-06 DIAGNOSIS — S72.002K: ICD-10-CM

## 2023-04-06 DIAGNOSIS — I35.1 MODERATE AORTIC REGURGITATION: ICD-10-CM

## 2023-04-06 DIAGNOSIS — I50.33 ACUTE ON CHRONIC DIASTOLIC CHF (CONGESTIVE HEART FAILURE), NYHA CLASS 3 (HCC): Primary | ICD-10-CM

## 2023-04-06 DIAGNOSIS — Z01.818 PREOPERATIVE CLEARANCE: ICD-10-CM

## 2023-04-06 LAB
LV EF: 55 %
LVEF MODALITY: NORMAL

## 2023-04-06 PROCEDURE — A9500 TC99M SESTAMIBI: HCPCS | Performed by: PHYSICIAN ASSISTANT

## 2023-04-06 PROCEDURE — 3430000000 HC RX DIAGNOSTIC RADIOPHARMACEUTICAL: Performed by: PHYSICIAN ASSISTANT

## 2023-04-06 PROCEDURE — 73700 CT LOWER EXTREMITY W/O DYE: CPT

## 2023-04-06 PROCEDURE — 93017 CV STRESS TEST TRACING ONLY: CPT

## 2023-04-06 PROCEDURE — 93306 TTE W/DOPPLER COMPLETE: CPT

## 2023-04-06 RX ORDER — TETRAKIS(2-METHOXYISOBUTYLISOCYANIDE)COPPER(I) TETRAFLUOROBORATE 1 MG/ML
30 INJECTION, POWDER, LYOPHILIZED, FOR SOLUTION INTRAVENOUS
Status: COMPLETED | OUTPATIENT
Start: 2023-04-06 | End: 2023-04-06

## 2023-04-06 RX ADMIN — Medication 30 MILLICURIE: at 11:48

## 2023-04-06 NOTE — PROGRESS NOTES
EKG completed after resting for 1/2 hour. EKG showed back in normal sinus rhythm & he is okay to have second portion of stress completed tomorrow per Dr. Navjot Ojeda.

## 2023-04-06 NOTE — PROGRESS NOTES
Reviewed with Dr Riley Perez stress test EKGs. He states to have pt return to his office today after test imaging completed to be evaluated. Updated pt and he is in agreement.

## 2023-04-07 ENCOUNTER — HOSPITAL ENCOUNTER (OUTPATIENT)
Dept: NON INVASIVE DIAGNOSTICS | Age: 58
Discharge: HOME | End: 2023-04-07
Payer: OTHER GOVERNMENT

## 2023-04-07 ENCOUNTER — TELEPHONE (OUTPATIENT)
Dept: CARDIOLOGY | Age: 58
End: 2023-04-07

## 2023-04-07 PROCEDURE — 78452 HT MUSCLE IMAGE SPECT MULT: CPT

## 2023-04-07 PROCEDURE — 3430000000 HC RX DIAGNOSTIC RADIOPHARMACEUTICAL: Performed by: FAMILY MEDICINE

## 2023-04-07 PROCEDURE — A9500 TC99M SESTAMIBI: HCPCS | Performed by: FAMILY MEDICINE

## 2023-04-07 RX ORDER — TETRAKIS(2-METHOXYISOBUTYLISOCYANIDE)COPPER(I) TETRAFLUOROBORATE 1 MG/ML
30 INJECTION, POWDER, LYOPHILIZED, FOR SOLUTION INTRAVENOUS
Status: COMPLETED | OUTPATIENT
Start: 2023-04-07 | End: 2023-04-07

## 2023-04-07 RX ADMIN — Medication 30 MILLICURIE: at 14:04

## 2023-04-07 NOTE — TELEPHONE ENCOUNTER
----- Message from Tj Whitfield PA-C sent at 4/7/2023  8:59 AM EDT -----  Please let them know their echo looks about the same since 2021, will discuss at follow up appointment. Thanks.

## 2023-04-07 NOTE — RESULT ENCOUNTER NOTE
Please let them know their echo looks about the same since 2021, will discuss at follow up appointment. Thanks.

## 2023-04-13 ENCOUNTER — APPOINTMENT (OUTPATIENT)
Dept: GENERAL RADIOLOGY | Age: 58
DRG: 522 | End: 2023-04-13
Attending: STUDENT IN AN ORGANIZED HEALTH CARE EDUCATION/TRAINING PROGRAM
Payer: OTHER GOVERNMENT

## 2023-04-13 ENCOUNTER — HOSPITAL ENCOUNTER (INPATIENT)
Age: 58
LOS: 2 days | Discharge: HOME | DRG: 522 | End: 2023-04-15
Attending: STUDENT IN AN ORGANIZED HEALTH CARE EDUCATION/TRAINING PROGRAM | Admitting: STUDENT IN AN ORGANIZED HEALTH CARE EDUCATION/TRAINING PROGRAM
Payer: OTHER GOVERNMENT

## 2023-04-13 DIAGNOSIS — Z96.642 S/P TOTAL LEFT HIP ARTHROPLASTY: Primary | ICD-10-CM

## 2023-04-13 LAB
ALLEN TEST: ABNORMAL
CA-I BLD-SCNC: 1.2 MMOL/L (ref 1.13–1.33)
CARBOXYHEMOGLOBIN: 0.5 % (ref 0–5)
CHLORIDE, WHOLE BLOOD: 106 MMOL/L (ref 98–110)
FIO2: ABNORMAL
GLUCOSE BLD-MCNC: 149 MG/DL (ref 75–110)
HCO3 ARTERIAL: 24.5 MMOL/L (ref 22–27)
HCT VFR BLD AUTO: 35.2 % (ref 40.7–50.3)
HCT VFR BLD CALC: 39.5 % (ref 40.7–50.3)
HEMOGLOBIN: 12.9 GM/DL (ref 13–17)
HGB BLD-MCNC: 11.6 G/DL (ref 13–17)
NEGATIVE BASE EXCESS, ART: 1.2 MMOL/L (ref 0–2)
O2 SAT, ARTERIAL: 96.3 % (ref 94–100)
PATIENT TEMP: 37
PCO2 ARTERIAL: 47.4 MMHG (ref 32–45)
PH ARTERIAL: 7.33 (ref 7.35–7.45)
PO2 ARTERIAL: 91.5 MMHG (ref 75–95)
POC POTASSIUM: 4 MMOL/L (ref 3.5–4.5)
POTASSIUM SERPL-SCNC: 5 MMOL/L (ref 3.7–5.3)
POTASSIUM, WHOLE BLOOD: 5.3 MMOL/L (ref 3.6–5)
SODIUM, WHOLE BLOOD: 140 MMOL/L (ref 136–145)

## 2023-04-13 PROCEDURE — 7100000001 HC PACU RECOVERY - ADDTL 15 MIN: Performed by: STUDENT IN AN ORGANIZED HEALTH CARE EDUCATION/TRAINING PROGRAM

## 2023-04-13 PROCEDURE — 2709999900 HC NON-CHARGEABLE SUPPLY: Performed by: STUDENT IN AN ORGANIZED HEALTH CARE EDUCATION/TRAINING PROGRAM

## 2023-04-13 PROCEDURE — 3600000014 HC SURGERY LEVEL 4 ADDTL 15MIN: Performed by: STUDENT IN AN ORGANIZED HEALTH CARE EDUCATION/TRAINING PROGRAM

## 2023-04-13 PROCEDURE — C1776 JOINT DEVICE (IMPLANTABLE): HCPCS | Performed by: STUDENT IN AN ORGANIZED HEALTH CARE EDUCATION/TRAINING PROGRAM

## 2023-04-13 PROCEDURE — 84132 ASSAY OF SERUM POTASSIUM: CPT

## 2023-04-13 PROCEDURE — 27132 TOTAL HIP ARTHROPLASTY: CPT | Performed by: STUDENT IN AN ORGANIZED HEALTH CARE EDUCATION/TRAINING PROGRAM

## 2023-04-13 PROCEDURE — 85014 HEMATOCRIT: CPT

## 2023-04-13 PROCEDURE — 1200000000 HC SEMI PRIVATE

## 2023-04-13 PROCEDURE — 82805 BLOOD GASES W/O2 SATURATION: CPT

## 2023-04-13 PROCEDURE — 6360000002 HC RX W HCPCS: Performed by: STUDENT IN AN ORGANIZED HEALTH CARE EDUCATION/TRAINING PROGRAM

## 2023-04-13 PROCEDURE — 73502 X-RAY EXAM HIP UNI 2-3 VIEWS: CPT

## 2023-04-13 PROCEDURE — 2580000003 HC RX 258: Performed by: ORTHOPAEDIC SURGERY

## 2023-04-13 PROCEDURE — 3209999900 FLUORO FOR SURGICAL PROCEDURES

## 2023-04-13 PROCEDURE — 0SRB0JA REPLACEMENT OF LEFT HIP JOINT WITH SYNTHETIC SUBSTITUTE, UNCEMENTED, OPEN APPROACH: ICD-10-PCS | Performed by: STUDENT IN AN ORGANIZED HEALTH CARE EDUCATION/TRAINING PROGRAM

## 2023-04-13 PROCEDURE — 2720000010 HC SURG SUPPLY STERILE: Performed by: STUDENT IN AN ORGANIZED HEALTH CARE EDUCATION/TRAINING PROGRAM

## 2023-04-13 PROCEDURE — 7100000000 HC PACU RECOVERY - FIRST 15 MIN: Performed by: STUDENT IN AN ORGANIZED HEALTH CARE EDUCATION/TRAINING PROGRAM

## 2023-04-13 PROCEDURE — 85018 HEMOGLOBIN: CPT

## 2023-04-13 PROCEDURE — 3700000001 HC ADD 15 MINUTES (ANESTHESIA): Performed by: STUDENT IN AN ORGANIZED HEALTH CARE EDUCATION/TRAINING PROGRAM

## 2023-04-13 PROCEDURE — 6360000002 HC RX W HCPCS: Performed by: ORTHOPAEDIC SURGERY

## 2023-04-13 PROCEDURE — 20680 REMOVAL OF IMPLANT DEEP: CPT | Performed by: STUDENT IN AN ORGANIZED HEALTH CARE EDUCATION/TRAINING PROGRAM

## 2023-04-13 PROCEDURE — 0QP904Z REMOVAL OF INTERNAL FIXATION DEVICE FROM LEFT FEMORAL SHAFT, OPEN APPROACH: ICD-10-PCS | Performed by: STUDENT IN AN ORGANIZED HEALTH CARE EDUCATION/TRAINING PROGRAM

## 2023-04-13 PROCEDURE — 3600000004 HC SURGERY LEVEL 4 BASE: Performed by: STUDENT IN AN ORGANIZED HEALTH CARE EDUCATION/TRAINING PROGRAM

## 2023-04-13 PROCEDURE — 6370000000 HC RX 637 (ALT 250 FOR IP): Performed by: ORTHOPAEDIC SURGERY

## 2023-04-13 PROCEDURE — 2580000003 HC RX 258: Performed by: STUDENT IN AN ORGANIZED HEALTH CARE EDUCATION/TRAINING PROGRAM

## 2023-04-13 PROCEDURE — 2580000003 HC RX 258: Performed by: ANESTHESIOLOGY

## 2023-04-13 PROCEDURE — 82330 ASSAY OF CALCIUM: CPT

## 2023-04-13 PROCEDURE — 3700000000 HC ANESTHESIA ATTENDED CARE: Performed by: STUDENT IN AN ORGANIZED HEALTH CARE EDUCATION/TRAINING PROGRAM

## 2023-04-13 DEVICE — ELIMINATOR H APEX FOR 48-60MM PINN HIP SHELL: Type: IMPLANTABLE DEVICE | Site: HIP | Status: FUNCTIONAL

## 2023-04-13 DEVICE — HIP H3 TOT ADV DUAL MOB IMPL CAPPED H3: Type: IMPLANTABLE DEVICE | Site: HIP | Status: FUNCTIONAL

## 2023-04-13 DEVICE — SCREW BNE L45MM DIA6.5MM CANC HIP S STL GRIPTION FULL THRD: Type: IMPLANTABLE DEVICE | Site: HIP | Status: FUNCTIONAL

## 2023-04-13 DEVICE — HEAD FEM DIA28MM +8.5MM OFFSET HIP ULT STD ARTC EZ 12/14: Type: IMPLANTABLE DEVICE | Site: HIP | Status: FUNCTIONAL

## 2023-04-13 DEVICE — SCREW BNE L50MM DIA6.5MM CANC HIP DOME PINN: Type: IMPLANTABLE DEVICE | Site: HIP | Status: FUNCTIONAL

## 2023-04-13 DEVICE — IMPLANTABLE DEVICE: Type: IMPLANTABLE DEVICE | Site: HIP | Status: FUNCTIONAL

## 2023-04-13 DEVICE — BI MENTUM PFR PE LINER 49MM28MM: Type: IMPLANTABLE DEVICE | Site: HIP | Status: FUNCTIONAL

## 2023-04-13 DEVICE — CUP ACET DIA58MM HIP GRIPTION PRI CEMENTLESS FIX SECT SER: Type: IMPLANTABLE DEVICE | Site: HIP | Status: FUNCTIONAL

## 2023-04-13 RX ORDER — ONDANSETRON 2 MG/ML
4 INJECTION INTRAMUSCULAR; INTRAVENOUS EVERY 6 HOURS PRN
Status: DISCONTINUED | OUTPATIENT
Start: 2023-04-13 | End: 2023-04-15 | Stop reason: HOSPADM

## 2023-04-13 RX ORDER — SODIUM CHLORIDE 9 MG/ML
INJECTION, SOLUTION INTRAVENOUS PRN
Status: DISCONTINUED | OUTPATIENT
Start: 2023-04-13 | End: 2023-04-15 | Stop reason: HOSPADM

## 2023-04-13 RX ORDER — SODIUM CHLORIDE 9 MG/ML
INJECTION, SOLUTION INTRAVENOUS CONTINUOUS
Status: DISCONTINUED | OUTPATIENT
Start: 2023-04-13 | End: 2023-04-15 | Stop reason: HOSPADM

## 2023-04-13 RX ORDER — BISACODYL 5 MG/1
5 TABLET, DELAYED RELEASE ORAL DAILY PRN
Status: DISCONTINUED | OUTPATIENT
Start: 2023-04-13 | End: 2023-04-15 | Stop reason: HOSPADM

## 2023-04-13 RX ORDER — SENNA AND DOCUSATE SODIUM 50; 8.6 MG/1; MG/1
1 TABLET, FILM COATED ORAL 2 TIMES DAILY
Status: DISCONTINUED | OUTPATIENT
Start: 2023-04-13 | End: 2023-04-15 | Stop reason: HOSPADM

## 2023-04-13 RX ORDER — ASPIRIN 81 MG/1
81 TABLET ORAL 2 TIMES DAILY
Qty: 84 TABLET | Refills: 0 | Status: SHIPPED | OUTPATIENT
Start: 2023-04-13 | End: 2023-05-25

## 2023-04-13 RX ORDER — MORPHINE SULFATE 2 MG/ML
2 INJECTION, SOLUTION INTRAMUSCULAR; INTRAVENOUS
Status: DISCONTINUED | OUTPATIENT
Start: 2023-04-13 | End: 2023-04-15 | Stop reason: HOSPADM

## 2023-04-13 RX ORDER — KETOROLAC TROMETHAMINE 30 MG/ML
30 INJECTION, SOLUTION INTRAMUSCULAR; INTRAVENOUS EVERY 6 HOURS
Status: DISPENSED | OUTPATIENT
Start: 2023-04-13 | End: 2023-04-15

## 2023-04-13 RX ORDER — ONDANSETRON 2 MG/ML
4 INJECTION INTRAMUSCULAR; INTRAVENOUS
Status: DISCONTINUED | OUTPATIENT
Start: 2023-04-13 | End: 2023-04-13 | Stop reason: HOSPADM

## 2023-04-13 RX ORDER — SODIUM CHLORIDE 0.9 % (FLUSH) 0.9 %
5-40 SYRINGE (ML) INJECTION EVERY 12 HOURS SCHEDULED
Status: DISCONTINUED | OUTPATIENT
Start: 2023-04-13 | End: 2023-04-13 | Stop reason: HOSPADM

## 2023-04-13 RX ORDER — VANCOMYCIN HYDROCHLORIDE 1 G/20ML
INJECTION, POWDER, LYOPHILIZED, FOR SOLUTION INTRAVENOUS PRN
Status: DISCONTINUED | OUTPATIENT
Start: 2023-04-13 | End: 2023-04-13 | Stop reason: ALTCHOICE

## 2023-04-13 RX ORDER — OXYCODONE HYDROCHLORIDE 5 MG/1
5 TABLET ORAL EVERY 4 HOURS PRN
Status: DISCONTINUED | OUTPATIENT
Start: 2023-04-13 | End: 2023-04-15 | Stop reason: HOSPADM

## 2023-04-13 RX ORDER — ONDANSETRON 4 MG/1
4 TABLET, ORALLY DISINTEGRATING ORAL EVERY 8 HOURS PRN
Status: DISCONTINUED | OUTPATIENT
Start: 2023-04-13 | End: 2023-04-15 | Stop reason: HOSPADM

## 2023-04-13 RX ORDER — FENTANYL CITRATE 50 UG/ML
50 INJECTION, SOLUTION INTRAMUSCULAR; INTRAVENOUS EVERY 5 MIN PRN
Status: DISCONTINUED | OUTPATIENT
Start: 2023-04-13 | End: 2023-04-13 | Stop reason: HOSPADM

## 2023-04-13 RX ORDER — FENTANYL CITRATE 50 UG/ML
25 INJECTION, SOLUTION INTRAMUSCULAR; INTRAVENOUS EVERY 5 MIN PRN
Status: DISCONTINUED | OUTPATIENT
Start: 2023-04-13 | End: 2023-04-13 | Stop reason: HOSPADM

## 2023-04-13 RX ORDER — SODIUM CHLORIDE 0.9 % (FLUSH) 0.9 %
5-40 SYRINGE (ML) INJECTION EVERY 12 HOURS SCHEDULED
Status: DISCONTINUED | OUTPATIENT
Start: 2023-04-13 | End: 2023-04-15 | Stop reason: HOSPADM

## 2023-04-13 RX ORDER — FUROSEMIDE 20 MG/1
20 TABLET ORAL EVERY OTHER DAY
Status: DISCONTINUED | OUTPATIENT
Start: 2023-04-14 | End: 2023-04-15 | Stop reason: HOSPADM

## 2023-04-13 RX ORDER — SODIUM CHLORIDE, SODIUM LACTATE, POTASSIUM CHLORIDE, CALCIUM CHLORIDE 600; 310; 30; 20 MG/100ML; MG/100ML; MG/100ML; MG/100ML
1000 INJECTION, SOLUTION INTRAVENOUS CONTINUOUS
Status: DISCONTINUED | OUTPATIENT
Start: 2023-04-13 | End: 2023-04-13 | Stop reason: HOSPADM

## 2023-04-13 RX ORDER — TOBRAMYCIN 1.2 G/30ML
INJECTION, POWDER, LYOPHILIZED, FOR SOLUTION INTRAVENOUS PRN
Status: DISCONTINUED | OUTPATIENT
Start: 2023-04-13 | End: 2023-04-13 | Stop reason: ALTCHOICE

## 2023-04-13 RX ORDER — METOPROLOL SUCCINATE 50 MG/1
50 TABLET, EXTENDED RELEASE ORAL DAILY
Status: DISCONTINUED | OUTPATIENT
Start: 2023-04-14 | End: 2023-04-14

## 2023-04-13 RX ORDER — OXYCODONE HYDROCHLORIDE 5 MG/1
10 TABLET ORAL EVERY 4 HOURS PRN
Status: DISCONTINUED | OUTPATIENT
Start: 2023-04-13 | End: 2023-04-15 | Stop reason: HOSPADM

## 2023-04-13 RX ORDER — SODIUM CHLORIDE 0.9 % (FLUSH) 0.9 %
5-40 SYRINGE (ML) INJECTION PRN
Status: DISCONTINUED | OUTPATIENT
Start: 2023-04-13 | End: 2023-04-13 | Stop reason: HOSPADM

## 2023-04-13 RX ORDER — MAGNESIUM HYDROXIDE 1200 MG/15ML
LIQUID ORAL CONTINUOUS PRN
Status: COMPLETED | OUTPATIENT
Start: 2023-04-13 | End: 2023-04-13

## 2023-04-13 RX ORDER — SODIUM CHLORIDE 0.9 % (FLUSH) 0.9 %
5-40 SYRINGE (ML) INJECTION PRN
Status: DISCONTINUED | OUTPATIENT
Start: 2023-04-13 | End: 2023-04-15 | Stop reason: HOSPADM

## 2023-04-13 RX ORDER — SODIUM CHLORIDE 9 MG/ML
INJECTION, SOLUTION INTRAVENOUS PRN
Status: DISCONTINUED | OUTPATIENT
Start: 2023-04-13 | End: 2023-04-13 | Stop reason: HOSPADM

## 2023-04-13 RX ORDER — ASPIRIN 81 MG/1
81 TABLET ORAL 2 TIMES DAILY
Status: DISCONTINUED | OUTPATIENT
Start: 2023-04-14 | End: 2023-04-15 | Stop reason: HOSPADM

## 2023-04-13 RX ORDER — ACETAMINOPHEN 325 MG/1
650 TABLET ORAL EVERY 6 HOURS
Status: DISCONTINUED | OUTPATIENT
Start: 2023-04-13 | End: 2023-04-15 | Stop reason: HOSPADM

## 2023-04-13 RX ADMIN — SODIUM CHLORIDE, POTASSIUM CHLORIDE, SODIUM LACTATE AND CALCIUM CHLORIDE 1000 ML: 600; 310; 30; 20 INJECTION, SOLUTION INTRAVENOUS at 06:18

## 2023-04-13 RX ADMIN — SODIUM CHLORIDE: 9 INJECTION, SOLUTION INTRAVENOUS at 16:32

## 2023-04-13 RX ADMIN — ACETAMINOPHEN 650 MG: 325 TABLET ORAL at 22:05

## 2023-04-13 RX ADMIN — DOCUSATE SODIUM 50 MG AND SENNOSIDES 8.6 MG 1 TABLET: 8.6; 5 TABLET, FILM COATED ORAL at 22:05

## 2023-04-13 RX ADMIN — Medication 2000 MG: at 17:06

## 2023-04-13 ASSESSMENT — PAIN - FUNCTIONAL ASSESSMENT: PAIN_FUNCTIONAL_ASSESSMENT: 0-10

## 2023-04-13 ASSESSMENT — PAIN DESCRIPTION - DESCRIPTORS: DESCRIPTORS: DISCOMFORT

## 2023-04-13 ASSESSMENT — PAIN SCALES - GENERAL: PAINLEVEL_OUTOF10: 1

## 2023-04-13 ASSESSMENT — PAIN DESCRIPTION - ORIENTATION: ORIENTATION: LEFT

## 2023-04-13 ASSESSMENT — PAIN DESCRIPTION - LOCATION: LOCATION: LEG

## 2023-04-13 NOTE — DISCHARGE INSTRUCTIONS
Orthopaedic Instructions:  -Weight bearing status: Ok to walk/place full body weight on the operative leg as tolerated. Use assisted devices as needed to start with.  -Leave dressing in place until follow up appointment. Ok to shower. If the dressing becomes saturated or peels off after 2 days, okay to remove and leave open to air.  -Ice (20 minutes on and off 1 hour) left hip to reduce swelling and throbbing pain.  -Work with physical therapy for gait training and strengthening/endurance.  -Call the office or come to Emergency Room if signs of infection appear (hot, swollen, red, draining pus, fever)  -Take medications as prescribed.  -Wean off narcotics (percocet/norco) as soon as possible. Do not take tylenol if still taking narcotics.  -Follow up with the outpatient orthopedic resident clinic in office on  4/26/23 at 10:30AM . Call [phone] to schedule/confirm.      Electronically signed by Rosa Elena George DO on 4/13/2023 at 12:12 PM.

## 2023-04-13 NOTE — INTERVAL H&P NOTE
Pt Name: Irineo Calderón  MRN: [id number]  YOB: 1965  Date of evaluation: 4/13/2023    I have reviewed the patient's history and physical examination completed in pre-admission testing. Changes to history or on examination, if any, are as follows:  none, cardiology clearance is in the paper chart, as well as cardiology note in Epic and testing. Patient denies any health history changes.     Michelle Brown PA-C  4/13/23  7:17 AM

## 2023-04-13 NOTE — BRIEF OP NOTE
Brief Postoperative Note      Patient: Myranda Durand  YOB: 1965  MRN: [id number]    Date of Procedure: 4/13/2023    Pre-Op Diagnosis Codes:  Left basicervical femoral neck nonunion      Post-Op Diagnosis: Left basicervical femoral neck nonunion       Procedure(s):  Left total hip arthroplasty in the setting of prior hip fixation  Removal of deep orthopedic implants    Surgeon(s):  Digna Watkins DO    Assistant:  Jose Winters CNP    Anesthesia: General    Estimated Blood Loss (mL): 1500 cc    Fluids: 2000 cc crystalloid    Complications: None    Specimens:   * No specimens in log *    Implants:  Implant Name Type Inv.  Item Serial No.  Lot No. LRB No. Used Action   NAIL IM L18CM [id number] [id number]  INTERTROCHANTERIC [employer] CARLOS - [id number]  NAIL IM L18CM [id number] 125DEG [facility] [id number] Left 1 Explanted   KIT SCR LAG L105MM [id number] COMPR L100MM DIA7MM INTEGR - [id number]  KIT SCR LAG L105MM [id number] COMPR L100MM DIA7MM INTEGR  [place] [facility] [id number] Left 1 Explanted   SCREW BNE L40MM DIA5MM WESLEY TIB TI INT HEX LO PROF FOR IM - [id number]  SCREW BNE L40MM DIA5MM WESLEY TIB TI INT HEX LO PROF FOR [facility] [id number] Left 1 Explanted   CUP ACET WDH31GN HIP GRIPTION GLADIS CEMENTLESS FIX SECT SER - [id number]  CUP ACET MEQ44NM HIP GRIPTION GLADIS CEMENTLESS FIX SECT SER  [facility] [id number] Left 1 Implanted   SCREW BNE L45MM DIA6.5MM CANC HIP S STL GRIPTION FULL THRD - [id number]  SCREW BNE L45MM DIA6.5MM CANC HIP S STL GRIPTION FULL THRD  [facility] [employer] [facility] [id number] Left 1 Implanted   ELIMINATOR H APEX FOR 48-60MM PINN HIP SHELL - [id number]  ELIMINATOR H APEX FOR 48-60MM PINN HIP SHELL  [facility] [employer] [facility] [id number] Left 1 Implanted   LINER ACET 2 MOBILITY 58X49 MM 28X49 MM HIP SHELL PINNACLE - [id number]  LINER ACET 2 MOBILITY 58X49 MM

## 2023-04-14 LAB
ABSOLUTE EOS #: <0.03 K/UL (ref 0–0.44)
ABSOLUTE IMMATURE GRANULOCYTE: 0.07 K/UL (ref 0–0.3)
ABSOLUTE LYMPH #: 1 K/UL (ref 1.1–3.7)
ABSOLUTE MONO #: 0.91 K/UL (ref 0.1–1.2)
ALBUMIN SERPL-MCNC: 3.1 G/DL (ref 3.5–5.2)
ALBUMIN/GLOBULIN RATIO: 1.5 (ref 1–2.5)
ALP SERPL-CCNC: 71 U/L (ref 40–129)
ALT SERPL-CCNC: 16 U/L (ref 5–41)
ANION GAP SERPL CALCULATED.3IONS-SCNC: 10 MMOL/L (ref 9–17)
AST SERPL-CCNC: 28 U/L
BASOPHILS # BLD: 0 % (ref 0–2)
BASOPHILS ABSOLUTE: 0.03 K/UL (ref 0–0.2)
BILIRUB SERPL-MCNC: 0.3 MG/DL (ref 0.3–1.2)
BUN SERPL-MCNC: 38 MG/DL (ref 6–20)
CALCIUM SERPL-MCNC: 7.8 MG/DL (ref 8.6–10.4)
CHLORIDE SERPL-SCNC: 101 MMOL/L (ref 98–107)
CO2 SERPL-SCNC: 23 MMOL/L (ref 20–31)
CREAT SERPL-MCNC: 1.34 MG/DL (ref 0.7–1.2)
EOSINOPHILS RELATIVE PERCENT: 0 % (ref 1–4)
GFR SERPL CREATININE-BSD FRML MDRD: >60 ML/MIN/1.73M2
GLUCOSE SERPL-MCNC: 131 MG/DL (ref 70–99)
HCT VFR BLD AUTO: 27.4 % (ref 40.7–50.3)
HGB BLD-MCNC: 8.9 G/DL (ref 13–17)
IMMATURE GRANULOCYTES: 1 %
LYMPHOCYTES # BLD: 9 % (ref 24–43)
MAGNESIUM SERPL-MCNC: 2 MG/DL (ref 1.6–2.6)
MCH RBC QN AUTO: 30.3 PG (ref 25.2–33.5)
MCHC RBC AUTO-ENTMCNC: 32.5 G/DL (ref 28.4–34.8)
MCV RBC AUTO: 93.2 FL (ref 82.6–102.9)
MONOCYTES # BLD: 8 % (ref 3–12)
NRBC AUTOMATED: 0 PER 100 WBC
PARTIAL THROMBOPLASTIN TIME: 21.6 SEC (ref 23–36.5)
PARTIAL THROMBOPLASTIN TIME: 27.9 SEC (ref 23–36.5)
PDW BLD-RTO: 12.8 % (ref 11.8–14.4)
PLATELET # BLD AUTO: 172 K/UL (ref 138–453)
PMV BLD AUTO: 10.8 FL (ref 8.1–13.5)
POTASSIUM SERPL-SCNC: 5 MMOL/L (ref 3.7–5.3)
PROT SERPL-MCNC: 5.2 G/DL (ref 6.4–8.3)
RBC # BLD: 2.94 M/UL (ref 4.21–5.77)
SEG NEUTROPHILS: 82 % (ref 36–65)
SEGMENTED NEUTROPHILS ABSOLUTE COUNT: 8.99 K/UL (ref 1.5–8.1)
SODIUM SERPL-SCNC: 134 MMOL/L (ref 135–144)
WBC # BLD AUTO: 11 K/UL (ref 3.5–11.3)

## 2023-04-14 PROCEDURE — 6360000002 HC RX W HCPCS: Performed by: STUDENT IN AN ORGANIZED HEALTH CARE EDUCATION/TRAINING PROGRAM

## 2023-04-14 PROCEDURE — 2500000003 HC RX 250 WO HCPCS: Performed by: STUDENT IN AN ORGANIZED HEALTH CARE EDUCATION/TRAINING PROGRAM

## 2023-04-14 PROCEDURE — 2580000003 HC RX 258: Performed by: ORTHOPAEDIC SURGERY

## 2023-04-14 PROCEDURE — 83735 ASSAY OF MAGNESIUM: CPT

## 2023-04-14 PROCEDURE — 6360000002 HC RX W HCPCS: Performed by: ORTHOPAEDIC SURGERY

## 2023-04-14 PROCEDURE — 2060000000 HC ICU INTERMEDIATE R&B

## 2023-04-14 PROCEDURE — 6370000000 HC RX 637 (ALT 250 FOR IP): Performed by: ORTHOPAEDIC SURGERY

## 2023-04-14 PROCEDURE — 97535 SELF CARE MNGMENT TRAINING: CPT

## 2023-04-14 PROCEDURE — 85025 COMPLETE CBC W/AUTO DIFF WBC: CPT

## 2023-04-14 PROCEDURE — 97166 OT EVAL MOD COMPLEX 45 MIN: CPT

## 2023-04-14 PROCEDURE — 97530 THERAPEUTIC ACTIVITIES: CPT

## 2023-04-14 PROCEDURE — 2580000003 HC RX 258: Performed by: STUDENT IN AN ORGANIZED HEALTH CARE EDUCATION/TRAINING PROGRAM

## 2023-04-14 PROCEDURE — 85730 THROMBOPLASTIN TIME PARTIAL: CPT

## 2023-04-14 PROCEDURE — 93005 ELECTROCARDIOGRAM TRACING: CPT | Performed by: STUDENT IN AN ORGANIZED HEALTH CARE EDUCATION/TRAINING PROGRAM

## 2023-04-14 PROCEDURE — 6370000000 HC RX 637 (ALT 250 FOR IP): Performed by: STUDENT IN AN ORGANIZED HEALTH CARE EDUCATION/TRAINING PROGRAM

## 2023-04-14 PROCEDURE — 36415 COLL VENOUS BLD VENIPUNCTURE: CPT

## 2023-04-14 PROCEDURE — 80053 COMPREHEN METABOLIC PANEL: CPT

## 2023-04-14 PROCEDURE — 6370000000 HC RX 637 (ALT 250 FOR IP)

## 2023-04-14 PROCEDURE — 97162 PT EVAL MOD COMPLEX 30 MIN: CPT

## 2023-04-14 RX ORDER — DOCUSATE SODIUM 100 MG/1
100 CAPSULE, LIQUID FILLED ORAL 2 TIMES DAILY PRN
Qty: 60 CAPSULE | Refills: 0 | Status: SHIPPED | OUTPATIENT
Start: 2023-04-14 | End: 2023-04-15 | Stop reason: SDUPTHER

## 2023-04-14 RX ORDER — HEPARIN SODIUM 10000 [USP'U]/100ML
5-30 INJECTION, SOLUTION INTRAVENOUS CONTINUOUS
Status: DISCONTINUED | OUTPATIENT
Start: 2023-04-14 | End: 2023-04-15

## 2023-04-14 RX ORDER — CYCLOBENZAPRINE HCL 10 MG
10 TABLET ORAL 3 TIMES DAILY PRN
Qty: 30 TABLET | Refills: 0 | Status: SHIPPED | OUTPATIENT
Start: 2023-04-14 | End: 2023-04-15 | Stop reason: SDUPTHER

## 2023-04-14 RX ORDER — METOPROLOL TARTRATE 5 MG/5ML
5 INJECTION INTRAVENOUS ONCE
Status: COMPLETED | OUTPATIENT
Start: 2023-04-14 | End: 2023-04-14

## 2023-04-14 RX ORDER — DIGOXIN 0.25 MG/ML
250 INJECTION INTRAMUSCULAR; INTRAVENOUS ONCE
Status: COMPLETED | OUTPATIENT
Start: 2023-04-14 | End: 2023-04-14

## 2023-04-14 RX ORDER — HEPARIN SODIUM 1000 [USP'U]/ML
2000 INJECTION, SOLUTION INTRAVENOUS; SUBCUTANEOUS PRN
Status: DISCONTINUED | OUTPATIENT
Start: 2023-04-14 | End: 2023-04-15

## 2023-04-14 RX ORDER — OXYCODONE HYDROCHLORIDE AND ACETAMINOPHEN 5; 325 MG/1; MG/1
1 TABLET ORAL EVERY 6 HOURS PRN
Qty: 28 TABLET | Refills: 0 | Status: SHIPPED | OUTPATIENT
Start: 2023-04-14 | End: 2023-04-15 | Stop reason: SDUPTHER

## 2023-04-14 RX ORDER — HEPARIN SODIUM 1000 [USP'U]/ML
4000 INJECTION, SOLUTION INTRAVENOUS; SUBCUTANEOUS PRN
Status: DISCONTINUED | OUTPATIENT
Start: 2023-04-14 | End: 2023-04-15

## 2023-04-14 RX ORDER — LIDOCAINE HYDROCHLORIDE 20 MG/ML
15 SOLUTION OROPHARYNGEAL
Status: DISCONTINUED | OUTPATIENT
Start: 2023-04-14 | End: 2023-04-15 | Stop reason: HOSPADM

## 2023-04-14 RX ADMIN — SODIUM CHLORIDE: 9 INJECTION, SOLUTION INTRAVENOUS at 16:18

## 2023-04-14 RX ADMIN — DIGOXIN 250 MCG: 0.25 INJECTION INTRAMUSCULAR; INTRAVENOUS at 14:33

## 2023-04-14 RX ADMIN — METOPROLOL TARTRATE 5 MG: 1 INJECTION, SOLUTION INTRAVENOUS at 13:48

## 2023-04-14 RX ADMIN — Medication 2000 MG: at 08:47

## 2023-04-14 RX ADMIN — ACETAMINOPHEN 650 MG: 325 TABLET ORAL at 17:46

## 2023-04-14 RX ADMIN — AMIODARONE HYDROCHLORIDE 1 MG/MIN: 50 INJECTION, SOLUTION INTRAVENOUS at 15:06

## 2023-04-14 RX ADMIN — KETOROLAC TROMETHAMINE 30 MG: 30 INJECTION, SOLUTION INTRAMUSCULAR; INTRAVENOUS at 17:46

## 2023-04-14 RX ADMIN — ASPIRIN 81 MG: 81 TABLET, COATED ORAL at 08:46

## 2023-04-14 RX ADMIN — DOCUSATE SODIUM 50 MG AND SENNOSIDES 8.6 MG 1 TABLET: 8.6; 5 TABLET, FILM COATED ORAL at 08:46

## 2023-04-14 RX ADMIN — HEPARIN SODIUM 8 UNITS/KG/HR: 10000 INJECTION, SOLUTION INTRAVENOUS at 21:45

## 2023-04-14 RX ADMIN — ASPIRIN 81 MG: 81 TABLET, COATED ORAL at 20:48

## 2023-04-14 RX ADMIN — LIDOCAINE HYDROCHLORIDE 15 ML: 20 SOLUTION ORAL at 23:32

## 2023-04-14 RX ADMIN — FUROSEMIDE 20 MG: 20 TABLET ORAL at 08:46

## 2023-04-14 RX ADMIN — METOPROLOL TARTRATE 25 MG: 25 TABLET, FILM COATED ORAL at 20:49

## 2023-04-14 RX ADMIN — SODIUM CHLORIDE, PRESERVATIVE FREE 10 ML: 5 INJECTION INTRAVENOUS at 08:47

## 2023-04-14 RX ADMIN — KETOROLAC TROMETHAMINE 30 MG: 30 INJECTION, SOLUTION INTRAMUSCULAR; INTRAVENOUS at 11:23

## 2023-04-14 RX ADMIN — AMIODARONE HYDROCHLORIDE 0.5 MG/MIN: 50 INJECTION, SOLUTION INTRAVENOUS at 21:41

## 2023-04-14 RX ADMIN — AMIODARONE HYDROCHLORIDE 150 MG: 150 INJECTION, SOLUTION INTRAVENOUS at 14:53

## 2023-04-14 RX ADMIN — ACETAMINOPHEN 650 MG: 325 TABLET ORAL at 04:59

## 2023-04-14 RX ADMIN — SODIUM CHLORIDE: 9 INJECTION, SOLUTION INTRAVENOUS at 08:46

## 2023-04-14 RX ADMIN — DOCUSATE SODIUM 50 MG AND SENNOSIDES 8.6 MG 1 TABLET: 8.6; 5 TABLET, FILM COATED ORAL at 21:42

## 2023-04-14 RX ADMIN — METOPROLOL SUCCINATE 50 MG: 50 TABLET, EXTENDED RELEASE ORAL at 08:46

## 2023-04-14 RX ADMIN — ACETAMINOPHEN 650 MG: 325 TABLET ORAL at 11:22

## 2023-04-14 RX ADMIN — KETOROLAC TROMETHAMINE 30 MG: 30 INJECTION, SOLUTION INTRAMUSCULAR; INTRAVENOUS at 05:00

## 2023-04-14 RX ADMIN — ACETAMINOPHEN 650 MG: 325 TABLET ORAL at 23:32

## 2023-04-14 RX ADMIN — KETOROLAC TROMETHAMINE 30 MG: 30 INJECTION, SOLUTION INTRAMUSCULAR; INTRAVENOUS at 01:15

## 2023-04-14 RX ADMIN — Medication 2000 MG: at 01:16

## 2023-04-14 ASSESSMENT — PAIN SCALES - GENERAL
PAINLEVEL_OUTOF10: 0

## 2023-04-14 NOTE — CONSULTS
04/13/23  1103 04/13/23  1335 04/14/23  0549     --  134*   K 5.3* 5.0 5.0   CO2  --   --  23   BUN  --   --  38*   CREATININE  --   --  1.34*   LABGLOM  --   --  >60   GLUCOSE  --   --  131*     BNP: No results for input(s): BNP in the last 72 hours. PT/INR: No results for input(s): PROTIME, INR in the last 72 hours. APTT:No results for input(s): APTT in the last 72 hours. CARDIAC ENZYMES:No results for input(s): CKTOTAL, CKMB, CKMBINDEX, TROPONINI in the last 72 hours.   FASTING LIPID PANEL:  Lab Results   Component Value Date/Time    HDL 96 12/22/2016 03:27 PM    TRIG 41 12/22/2016 03:27 PM     LIVER PROFILE:  Recent Labs     04/14/23  0549   AST 28   ALT 16   LABALBU 3.1*       IMPRESSION:    Patient Active Problem List   Diagnosis    Family history of colon cancer    H/O ascending aorta repair    Hx of amiodarone therapy    Aneurysm of thoracic aorta (HCC)    Varicose veins of right lower extremity    Typical atrial flutter (HCC) / HX    Atrial flutter with rapid ventricular response (HCC)    Moderate aortic regurgitation    Tobacco abuse    Tobacco abuse counseling    Atrial fibrillation with rapid ventricular response (HCC)    Impaired fasting glucose    Hypertension    Atrial fibrillation with RVR (HCC)    Acute on chronic diastolic CHF (congestive heart failure), NYHA class 3 (HCC)    A-fib (HCC)    S/P total knee replacement using cement, left    Hip fracture requiring operative repair, left, closed, initial encounter ([facility] [address]    Artificial knee joint present    Osteoarthritis of knee    S/P total left hip arthroplasty     [id number] Score for Atrial Fibrillation Stroke Risk   Risk   Factors  Component Value   C CHF Yes 1   H HTN Yes 1   A2 Age >= 75 No,  (59 y.o.) 0   D DM No 0   S2 Prior Stroke/TIA No 0   V Vascular Disease No 0   A Age 74-69 No,  (59 y.o.) 0   Sc Sex male 0    [id number]  Score  2   Score last updated 4/14/23 6:29 PM EDT    Click here for a link to the UpToDate guideline

## 2023-04-14 NOTE — PLAN OF CARE
Problem: Discharge Planning  Goal: Discharge to home or other facility with appropriate resources  4/14/2023 0342 by Marcy Padgett RN  Outcome: Progressing  4/13/2023 1638 by Shira Jackson RN  Outcome: Progressing     Problem: Chronic Conditions and Co-morbidities  Goal: Patient's chronic conditions and co-morbidity symptoms are monitored and maintained or improved  Outcome: Progressing     Problem: Pain  Goal: Verbalizes/displays adequate comfort level or baseline comfort level  Outcome: Progressing     Problem: Safety - Adult  Goal: Free from fall injury  Outcome: Progressing     Problem: Skin/Tissue Integrity  Goal: Absence of new skin breakdown  Description: 1. Monitor for areas of redness and/or skin breakdown  2. Assess vascular access sites hourly  3. Every 4-6 hours minimum:  Change oxygen saturation probe site  4. Every 4-6 hours:  If on nasal continuous positive airway pressure, respiratory therapy assess nares and determine need for appliance change or resting period.   Outcome: Progressing     Problem: ABCDS Injury Assessment  Goal: Absence of physical injury  Outcome: Progressing

## 2023-04-14 NOTE — OP NOTE
and  placed in chart. All questions were answered appropriately. Surgical  risks including but not limited to bleeding; blood clots; infection;  damage to nearby tissues, vessels, and nerves; wound healing  complications; failure of procedure; stiffness; loss of motion; leg  length inequality; stiffness; loss of motion; anesthesia risk; loss of  limb and loss of life were all discussed with the patient. Knowing  these risks, the patient wished to proceed with surgery as indicated. OPERATIVE PROCEDURE:  The patient was taken to the operating suite,  placed under general anesthesia without any complications. 2 gm of  Ancef and TXA were given prior to the procedure. At this time, all team  members paused to identify proper patient name, indications, site, and  allergies. All team members were in agreeance. The patient was placed  on the [place] table. All bony prominences were well padded. Left lower  extremity were prepped and draped in normal sterile fashion. Using a  marking pen, I mapped our incision about the prior scars from his  cephalomedullary nail placement. Skin was incised over the area of the  compression lag screw. Guidewire was placed within the cannulated lag  screw. The screwdriver was attached. We then removed the compression  screw inferiorly followed by the lag screw proximally without  complications. This was done with direct fluoroscopic guidance. We  then turned our attention to our anterior hip. Skin was incised,  dissection was carried down through the skin and subcutaneous tissue. We exploited the interval between the TFL and sartorius, fascia was  opened, further dissection was carried down between the rectus and the  gluteus medius, femoral neck was visualized. Femoral neck cut was  performed. We did leave the nail in during the acetabular portion of  the procedure. The labrum was debrided as well as part of the capsule. The hip was exposed.   We then sequentially

## 2023-04-14 NOTE — PLAN OF CARE
Irineo Calderón was evaluated today and a DME order was entered for a standard walker because he requires this to successfully complete daily living tasks of personal cares and ambulating. A standard walker is necessary due to the patient's impaired ambulation and mobility restrictions and he can ambulate only by using a walker instead of a lesser assistive device, such as a cane or crutch. The need for this equipment was discussed with the patient and he understands and is in agreement.       Filipe Greco MD  Orthopedic Surgery Resident, PGY-1  [facility]

## 2023-04-14 NOTE — CARE COORDINATION
Case Management Assessment  Initial Evaluation    Date/Time of Evaluation: 4/14/2023 12:37 PM  Assessment Completed by: Giselle Urrutia RN    If patient is discharged prior to next notation, then this note serves as note for discharge by case management. 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 (P) Yes    Chart Reviewed: Yes      History Provided by: (P) Patient  Patient Orientation: (P) Alert and Oriented    Patient Cognition: (P) Alert    Hospitalization in the last 30 days (Readmission):  No    If yes, Readmission Assessment in CM Navigator will be completed.     Advance Directives:      Code Status: Full Code   Patient's Primary Decision Maker is: (P) Legal Next of Kin    Primary Decision Maker: Cuco Hunt - [phone]    Discharge Planning:    Patient lives with: (P) Spouse/Significant Other Type of Home: (P) House  Primary Care Giver: (P) Self  Patient Support Systems include: (P) Spouse/Significant Other   Current Financial resources:    Current community resources: (P) None  Current services prior to admission: (P) Durable Medical Equipment            Current DME: (P) Cpap            Type of Home Care services:  (P) None    ADLS  Prior functional level: (P) Independent in ADLs/IADLs  Current functional level: (P) Independent in ADLs/IADLs    PT AM-PAC: 20 /24  OT AM-PAC: 21 /24    Family can provide assistance at DC: (P) Yes  Would you like Case Management to discuss the discharge plan with any other family members/significant others, and if so, who? (P) No  Plans to Return to Present Housing: (P)

## 2023-04-14 NOTE — PLAN OF CARE
Problem: Discharge Planning  Goal: Discharge to home or other facility with appropriate resources  4/14/2023 1245 by Kareem Mcmahan RN  Outcome: Progressing  4/14/2023 0342 by Karli Coyle RN  Outcome: Progressing     Problem: Chronic Conditions and Co-morbidities  Goal: Patient's chronic conditions and co-morbidity symptoms are monitored and maintained or improved  4/14/2023 1245 by Kareem Mcmahan RN  Outcome: Progressing  4/14/2023 0342 by Karli Coyle RN  Outcome: Progressing     Problem: Pain  Goal: Verbalizes/displays adequate comfort level or baseline comfort level  4/14/2023 1245 by Kareem Mcmahan RN  Outcome: Progressing  4/14/2023 0342 by Karli Coyle RN  Outcome: Progressing     Problem: Safety - Adult  Goal: Free from fall injury  4/14/2023 1245 by Kareem Mcmahan RN  Outcome: Progressing  4/14/2023 0342 by Karli Coyle RN  Outcome: Progressing     Problem: Skin/Tissue Integrity  Goal: Absence of new skin breakdown  Description: 1. Monitor for areas of redness and/or skin breakdown  2. Assess vascular access sites hourly  3. Every 4-6 hours minimum:  Change oxygen saturation probe site  4. Every 4-6 hours:  If on nasal continuous positive airway pressure, respiratory therapy assess nares and determine need for appliance change or resting period.   4/14/2023 0342 by Karli Coyle RN  Outcome: Progressing     Problem: ABCDS Injury Assessment  Goal: Absence of physical injury  4/14/2023 0342 by Karli Coyle RN  Outcome: Progressing

## 2023-04-15 VITALS
SYSTOLIC BLOOD PRESSURE: 104 MMHG | RESPIRATION RATE: 18 BRPM | OXYGEN SATURATION: 95 % | HEIGHT: 68 IN | TEMPERATURE: 98.1 F | DIASTOLIC BLOOD PRESSURE: 50 MMHG | BODY MASS INDEX: 39.1 KG/M2 | HEART RATE: 78 BPM | WEIGHT: 258 LBS

## 2023-04-15 LAB
EKG ATRIAL RATE: 108 BPM
EKG Q-T INTERVAL: 318 MS
EKG QRS DURATION: 90 MS
EKG QTC CALCULATION (BAZETT): 477 MS
EKG R AXIS: 15 DEGREES
EKG T AXIS: -144 DEGREES
EKG VENTRICULAR RATE: 135 BPM
PARTIAL THROMBOPLASTIN TIME: 32 SEC (ref 23–36.5)

## 2023-04-15 PROCEDURE — 97110 THERAPEUTIC EXERCISES: CPT

## 2023-04-15 PROCEDURE — 2580000003 HC RX 258: Performed by: ORTHOPAEDIC SURGERY

## 2023-04-15 PROCEDURE — 6360000002 HC RX W HCPCS: Performed by: ORTHOPAEDIC SURGERY

## 2023-04-15 PROCEDURE — 6370000000 HC RX 637 (ALT 250 FOR IP): Performed by: ORTHOPAEDIC SURGERY

## 2023-04-15 PROCEDURE — 97116 GAIT TRAINING THERAPY: CPT

## 2023-04-15 PROCEDURE — 6360000002 HC RX W HCPCS: Performed by: STUDENT IN AN ORGANIZED HEALTH CARE EDUCATION/TRAINING PROGRAM

## 2023-04-15 PROCEDURE — 6370000000 HC RX 637 (ALT 250 FOR IP): Performed by: STUDENT IN AN ORGANIZED HEALTH CARE EDUCATION/TRAINING PROGRAM

## 2023-04-15 PROCEDURE — 36415 COLL VENOUS BLD VENIPUNCTURE: CPT

## 2023-04-15 PROCEDURE — 6370000000 HC RX 637 (ALT 250 FOR IP): Performed by: NURSE PRACTITIONER

## 2023-04-15 PROCEDURE — 85730 THROMBOPLASTIN TIME PARTIAL: CPT

## 2023-04-15 RX ORDER — AMIODARONE HYDROCHLORIDE 200 MG/1
200 TABLET ORAL DAILY
Status: DISCONTINUED | OUTPATIENT
Start: 2023-04-15 | End: 2023-04-15 | Stop reason: HOSPADM

## 2023-04-15 RX ORDER — DOCUSATE SODIUM 100 MG/1
100 CAPSULE, LIQUID FILLED ORAL 2 TIMES DAILY PRN
Qty: 60 CAPSULE | Refills: 0 | Status: SHIPPED | OUTPATIENT
Start: 2023-04-15

## 2023-04-15 RX ORDER — CYCLOBENZAPRINE HCL 10 MG
10 TABLET ORAL 3 TIMES DAILY PRN
Qty: 30 TABLET | Refills: 0 | Status: SHIPPED | OUTPATIENT
Start: 2023-04-15 | End: 2023-04-25

## 2023-04-15 RX ORDER — OXYCODONE HYDROCHLORIDE AND ACETAMINOPHEN 5; 325 MG/1; MG/1
1 TABLET ORAL EVERY 6 HOURS PRN
Qty: 28 TABLET | Refills: 0 | Status: SHIPPED | OUTPATIENT
Start: 2023-04-15 | End: 2023-04-22

## 2023-04-15 RX ADMIN — SODIUM CHLORIDE, PRESERVATIVE FREE 10 ML: 5 INJECTION INTRAVENOUS at 08:08

## 2023-04-15 RX ADMIN — ASPIRIN 81 MG: 81 TABLET, COATED ORAL at 08:07

## 2023-04-15 RX ADMIN — ACETAMINOPHEN 650 MG: 325 TABLET ORAL at 11:54

## 2023-04-15 RX ADMIN — HEPARIN SODIUM 2000 UNITS: 1000 INJECTION INTRAVENOUS; SUBCUTANEOUS at 06:48

## 2023-04-15 RX ADMIN — OXYCODONE 10 MG: 5 TABLET ORAL at 14:19

## 2023-04-15 RX ADMIN — SODIUM CHLORIDE: 9 INJECTION, SOLUTION INTRAVENOUS at 01:00

## 2023-04-15 RX ADMIN — KETOROLAC TROMETHAMINE 30 MG: 30 INJECTION, SOLUTION INTRAMUSCULAR; INTRAVENOUS at 11:53

## 2023-04-15 RX ADMIN — AMIODARONE HYDROCHLORIDE 200 MG: 200 TABLET ORAL at 08:07

## 2023-04-15 RX ADMIN — METOPROLOL TARTRATE 25 MG: 25 TABLET, FILM COATED ORAL at 08:07

## 2023-04-15 RX ADMIN — APIXABAN 5 MG: 5 TABLET, FILM COATED ORAL at 08:07

## 2023-04-15 RX ADMIN — SODIUM CHLORIDE, PRESERVATIVE FREE 10 ML: 5 INJECTION INTRAVENOUS at 11:54

## 2023-04-15 RX ADMIN — ACETAMINOPHEN 650 MG: 325 TABLET ORAL at 06:16

## 2023-04-15 ASSESSMENT — PAIN DESCRIPTION - DESCRIPTORS
DESCRIPTORS: SORE
DESCRIPTORS: DISCOMFORT;SORE

## 2023-04-15 ASSESSMENT — PAIN DESCRIPTION - LOCATION
LOCATION: HIP
LOCATION: HIP

## 2023-04-15 ASSESSMENT — PAIN SCALES - GENERAL
PAINLEVEL_OUTOF10: 2
PAINLEVEL_OUTOF10: 0
PAINLEVEL_OUTOF10: 3

## 2023-04-15 ASSESSMENT — PAIN DESCRIPTION - ORIENTATION: ORIENTATION: LEFT

## 2023-04-15 NOTE — PROGRESS NOTES
1129 The patient was noted to be in Afib RVR with -180 Ortho contacted immediately with patient's vitals and condition. Patient asymptomatic vitals stable. Awaiting orders. 18 Ortho stated that \"Dr morales is okay with discharge if patient feels comfortable. Have the patient follow up with his heart doctor. \" Gorge Encinas questioned ortho if it would be safe to discharge the patient. 26 Ortho stated that they would contact [facility] for management of this issue. 631 R.B. Shaan [facility] contacted [facility] to see if they had any further orders as the patient remains in afib RVR and no orders had yet appeared. [facility] stated that they are not seeing the patient and that they asked for ortho to consult cardiology. 1300 ortho consulted cardiology. [address] contacted cardiology see orders. Nury 5 gave 5mg metoprolol IVP. 1408 Patient's HR still 160-170's cardiology notified of situation and they stated that they wanted him to receive digoxin and that they would come see the patient. See orders for details.
Direct oral anticoagulant (DOAC) - Initial Pharmacy Review  New start? Yes  DOAC/dose: 5 mg BID   Indication: A-FIB    Recent Labs     04/13/23  1251 04/14/23  0549   HGB 11.6* 8.9*   HCT 35.2* 27.4*   PLT  --  172     No results for input(s): INR in the last 72 hours. Recent Labs     04/14/23  0549   CREATININE 1.34*     Estimated Creatinine Clearance: 75 mL/min (A) (based on SCr of 1.34 mg/dL (H)). Estimated CrCl using Actual Body Weight: ~100 mL/min (based on  kg)    Significant Drug-Drug Interactions: ASA    Notes: Will continue to follow. Nikki Ho, PharmD  4/15/2023 7:58 AM
[facility]  Progress Note                   Date:   4/15/2023  Patient name: Susanna Youssef  Date of admission:  4/13/2023  5:05 AM  MRN:   [id number]  YOB: 1965  PCP: Stacia Wheat MD    Reason for Admission: Closed disp articular fracture of head of left femur with nonunion [S72.062K]  S/P total left hip arthroplasty [Z96.642]    Subjective:       Clinical Changes /Abnormalities: Seen & examined in room. No acute CV issues/concerns overnight. Tx to LONG TERM ACUTE CARE [facility] bed d/t Afib RVR prior to discharge. Has remained SR overnight. Review of Systems    Medications:   Scheduled Meds:   metoprolol tartrate  25 mg Oral BID    furosemide  20 mg Oral Every Other Day    sodium chloride flush  5-40 mL IntraVENous 2 times per day    acetaminophen  650 mg Oral Q6H    sennosides-docusate sodium  1 tablet Oral BID    aspirin  81 mg Oral BID    ketorolac  30 mg IntraVENous Q6H     Continuous Infusions:   amiodarone 0.5 mg/min (04/14/23 2141)    heparin (PORCINE) Infusion 10 Units/kg/hr (04/15/23 0647)    sodium chloride 125 mL/hr at 04/15/23 0100    sodium chloride       CBC:   Recent Labs     04/13/23  1103 04/13/23  1251 04/14/23  0549   WBC  --   --  11.0   HGB 12.9* 11.6* 8.9*   PLT  --   --  172     BMP:    Recent Labs     04/13/23  1103 04/13/23  1335 04/14/23  0549     --  134*   K 5.3* 5.0 5.0   CL  --   --  101   CO2  --   --  23   BUN  --   --  38*   CREATININE  --   --  1.34*   GLUCOSE  --   --  131*     Hepatic:  Recent Labs     04/14/23  0549   AST 28   ALT 16   BILITOT 0.3   ALKPHOS 71     Troponin: No results for input(s): TROPHS in the last 72 hours. BNP: No results for input(s): BNP in the last 72 hours. Lipids: No results for input(s): CHOL, HDL in the last 72 hours. Invalid input(s): LDLCALCU  INR: No results for input(s): INR in the last 72 hours.     Objective:   Vitals: BP (!) 143/62   Pulse 77   Temp 98.1 °F (36.7 °C) (Oral)   Resp 14   Ht 5' 8\" (1.727 m)   Wt
Obtained cardiac clearance for OR on 4/13/2023.
Orthopedic Progress Note     Patient:  Anna Varma  YOB: 1965     62 y.o. male    Subjective  Patient seen and examined at bedside. No complaints or concerns, per patient and nursing. No issue overnight. Pain controlled. Denies fever, HA, CP, SOB, N/V, dysuria. BM- Flatus+  PT to evaluate and treat today. Was able to stand with nursing yesterday evening to toilet. Vitals reviewed, afebrile. Objective  Vitals:    04/14/23 0100   BP: 123/66   Pulse: 95   Resp: 16   Temp: 97.6 °F (36.4 °C)   SpO2: 97%     Gen: NAD, Cooperative. Cardiovascular: Regular Rate  Respiratory: No Acute Respiratory Distress  MSK:  LLE   Optifoam dressings clean/dry/intact. Drain in, 300cc bloody output since placement; 100cc this morning bloody output. Appropriately tender to palpation about the left incision sites. Compartments soft. EHL/FHL/TA/GSC motor intact. Sural, Saphenous, Superficial/Deep Peroneal, and Plantar Nerve distribution SILT. Foot warm and well perfused. Recent Labs     04/13/23  1103 04/13/23  1251 04/13/23  1335   HGB 12.9* 11.6*  --    HCT 39.5* 35.2*  --      --   --    K 5.3*  --  5.0      Meds: See Rec for Complete List    Impression 62 y.o. 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  - Accordion drain in LLE, please maintain. Please continue to record output each nursing shift.   - WB status: Weight bearing as tolerated with the left leg  - Dressings: on LLE, please maintain and reinforce as needed.   - Primary Team: Orthopaedic Surgery    - Pain Management: Multimodal    - DVT ppx:  ASA 81mg BID    - Encourage Incentive Spirometry use  - PT/OT  - Dispo: OK to discharge pending PT/OT evaluation.  - F/u in Orthopaedic trauma clinic In 14 days.  - Please page the On Call Ortho resident with any
Orthopedic Progress Note    Patient:  Susanna Youssef  YOB: 1965     62 y.o. male    Subjective:  Patient seen and examined at bedside this morning  No complaints or concerns  Pain controlled, resting comfortably   Cardiology consulted after episodes of Afib with RVR yesterday, subsequently taken to cardiology unit for monitoring. Started on Amiodirone drip   Hemovac drain pulled out spontaneously per patient yesterday   Denies fever, HA, CP, SOB, N/V, dysuria  PT: ambulated 75 ft with RW      Vitals reviewed, afebrile    Objective:   Vitals:    04/15/23 0344   BP: (!) 143/62   Pulse: 77   Resp: 14   Temp: 98.1 °F (36.7 °C)   SpO2: 96%     Gen: NAD, cooperative   Cardiovascular: Regular rate  Respiratory: Chest symmetric, no accessory muscle use    Orthopedic exam:    LLE: Optifoam dressings on which are clean/dry/intact. Compartments soft and easily compressible. EHL/FHL/TA/GS complex motor intact. Sural/saphenous/SPN/DPN/plantar nerve distribution SILT. DP and PT pulses 2+ with BCR. Recent Labs     04/14/23  0549   WBC 11.0   HGB 8.9*   HCT 27.4*      *   K 5.0   BUN 38*   CREATININE 1.34*   GLUCOSE 131*      Meds:  Chemical AC: Heparin/ASA  ABX: None  See rec for complete list    Impression/plan: 62 y.o. 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 Contact ortho if saturated  -Latest hgb 8.9 (4/14)  -DVT ppx: ASA 81 bid for 4 weeks   -WB status: WBAT LLE  -Pain control PO/IV Medication. Attempt to Wean IV medications.    -Ice (20 min, 1 hour off) for edema/pain control  -Encourage deep breathing and incentive spirometry use  -Continue to work with PT/OT  -DC once medically stable  -Follow-up with Dr. Dai Sinclair in 14 days  -Please page
Patient unable to start heparin drip at this time due to 3 unsuccessful IV attempts by writer. Charge nurse also attempted to start access and was unsuccessful. IV consult order was placed for an ultrasound guided IV. Patient currently only have 1 IV site which is infusing amiodarone and that medication is not compatible with heparin.
Physical Therapy      Facility/Department: [facility] CAR 2- STEPDOWN  Physical Therapy Treatment Note    Name: Anna Varma  : 1965  MRN: [id number]  Date of Service: 4/15/2023    Discharge Recommendations:  Patient would benefit from continued therapy after discharge   PT Equipment Recommendations  Equipment Needed: No  Other: pt owns RW      Patient Diagnosis(es): The encounter diagnosis was S/P total left hip arthroplasty. Past Medical History:  has a past medical history of AAA (abdominal aortic aneurysm) ([address] Aortic regurgitation, Atrial fibrillation ([address] Atrial flutter ([address] Encounter for cardioversion procedure, Frequent urination, H/O echocardiogram, H/O echocardiogram, Hemorrhage of rectum and anus, History of 24 hour EKG monitoring, History of echocardiogram, History of PFTs, Hx of transesophageal echocardiography (SKYL[facility]) for monitoring, Hypertension, Impaired fasting glucose, Knee pain, bilateral, Lipoma, Obesity (BMI 30.0-34.9), Paroxysmal supraventricular tachycardia ([address] Periodontal disease, Sleep apnea, Tobacco use disorder, Under care of team, Under care of team, Under care of team, Urinary urgency, and Ventral hernia. Past Surgical History:  has a past surgical history that includes Achilles tendon surgery (Right, ); Vasectomy; Foot surgery; ablation of dysrhythmic focus (); pr rpr umbilical hrna 5 yrs/> reducible (N/A, 10/24/2018); hernia repair (2018); Colonoscopy (2013); Thoracic aortic aneurysm repair (); hernia repair (N/A, 2020); Total knee arthroplasty (Left, 05/10/2021); Hip fracture surgery (Left, 2022); Hip Arthroplasty (Left, 2023); and hip surgery (Left, 2023). Assessment   Body Structures, Functions, Activity Limitations Requiring Skilled Therapeutic Intervention: Decreased functional mobility ; Decreased strength;Decreased endurance  Assessment: Pt able to complete functional transfers with CGA, ambulated 100ft with RW CGA.  Pt able
Physician Progress Note      Estephania Ervin  CSN #:                  [id number]  :                       1965  ADMIT DATE:       2023 5:05 AM  DISCH DATE:  RESPONDING  PROVIDER #:        Robin Archer          QUERY TEXT:    Pt admitted with   Left basicervical femoral neck nonunion . Pt noted to have   HMG 12.9>8.9 with HR , B/P's 99/67, 95/76. If possible, please document   in the progress notes and discharge summary if you are evaluating and/or   treating any of the following: The medical record reflects the following:  Risk Factors: Nonunion FX Left femeral neck  Clinical Indicators:  HMG 12.9>8.9 with HR , B/P's 99/67, 95/76. OP   Note Removal of deep implant, left hip. EBL 1500cc. 2000cc Crystalloid. PN    Ortho Drain in, 300cc bloody output since placement; 100cc this morning   bloody output. Appropriately tender to palpation about the left incision   sites. Compartments soft. Treatment: .9 NS 125cc/hr, labs, monitor, Removal deep implant left hip    Any questions please contact:  Ignacio Zamudio RN,[email or web address]  [phone]-Between the hours of 6:30am-3pm.  Options provided:  -- Acute blood loss anemia  -- Postoperative acute blood loss anemia  -- [Dilutional anemia  -- Precipitous drop in Hemoglobin and Hematocrit  -- Other - I will add my own diagnosis  -- Disagree - Not applicable / Not valid  -- Disagree - Clinically unable to determine / Unknown  -- Refer to Clinical Documentation Reviewer    PROVIDER RESPONSE TEXT:    This patient has acute blood loss anemia. Query created by:  Mirian Mahajan on 2023 10:17 AM      Electronically signed by:  Robin Archer 4/15/2023 8:04 AM
Oriented X4  Cognition  Overall Cognitive Status: WFL     Objective   Heart Rate: 94  Heart Rate Source: Monitor  BP: 139/71  BP Location: Left upper arm  BP Method: Automatic  MAP (Calculated): 94  Resp: 18  SpO2: 100 %  O2 Device: Nasal cannula        AROM RLE (degrees)  RLE AROM: WNL  AROM LLE (degrees)  LLE AROM : WFL  AROM RUE (degrees)  RUE AROM : WNL  AROM LUE (degrees)  LUE AROM : WNL  Strength RLE  Strength RLE: WNL  Strength LLE  Comment: N/T  Strength RUE  Strength RUE: WNL  Strength LUE  Strength LUE: WNL        Bed mobility  Supine to Sit: Minimal assistance  Sit to Supine: Minimal assistance  Scooting: Minimal assistance  Transfers  Sit to Stand: Contact guard assistance  Stand to Sit: Contact guard assistance  Ambulation  Surface: Level tile  Device: Rolling Walker  Assistance: Contact guard assistance  Distance: amb 75 ft with a RW x CGA with WBAT L LE     Up to a chair with  a RW x CGA  Balance  Posture: Good  Sitting - Static: Good  Sitting - Dynamic: Fair  Standing - Static: Good  Standing - Dynamic: Fair     OutComes Score     AM-PAC Score  AM-PAC Inpatient Mobility Raw Score : 20 (04/14/23 0957)  AM-PAC Inpatient T-Scale Score : 47.67 (04/14/23 0957)  Mobility Inpatient CMS 0-100% Score: 35.83 (04/14/23 0957)  Mobility Inpatient CMS G-Code Modifier : CJ (04/14/23 0957)     Tinneti Score       Goals  Short Term Goals  Time Frame for Short Term Goals: 6 visits  Short Term Goal 1: transfers with SBA  Short Term Goal 2: amb 200 ft with a RW x SBA with WBAT L LE  Short Term Goal 3: ascend/descend 4 steps with SBA  Short Term Goal 4: total hip exercises x CGA       Education  Patient Education  Education Given To: Patient  Education Provided: Role of Therapy;Plan of Care  Education Method: Verbal  Barriers to Learning: None  Education Outcome: Verbalized understanding      Therapy Time   Individual Concurrent Group Co-treatment   Time In 0800         Time Out 0838         Minutes 38             1 of
Level: Oriented X4                  Education Given To: Patient  Education Provided: Role of Therapy;Plan of Care;Transfer Training;Precautions; ADL Adaptive Strategies  Education Provided Comments: dressing affected leg first, transfer safety, RW use, WBAT, acending/decending steps; Good return  Education Method: Verbal  Barriers to Learning: None  Education Outcome: Verbalized understanding;Demonstrated understanding         AM-PAC Score        AM-PAC Inpatient Daily Activity Raw Score: 21 (04/14/23 1140)  AM-PAC Inpatient ADL T-Scale Score : 44.27 (04/14/23 1140)  ADL Inpatient CMS 0-100% Score: 32.79 (04/14/23 1140)  ADL Inpatient CMS G-Code Modifier : CJ (04/14/23 1140)    Goals  Short Term Goals  Time Frame for Short Term Goals: By discharge  Short Term Goal 1: Pt will complete LB ADL's SBA with AE/DME/modified techniques as needed  Short Term Goal 2: Pt will complete functional mobility/transfers SBA with LRD  Short Term Goal 3: Pt will demo Good safety throughout session without VC's  Short Term Goal 4: Pt will tolerate standing 10+ mins reaching out of NEYMAR without LOB during functional tasks       Therapy Time   Individual Concurrent Group Co-treatment   Time In 1010         Time Out 1030         Minutes 20         Timed Code Treatment Minutes: [address], OTR/L

## 2023-04-15 NOTE — PLAN OF CARE
Problem: Discharge Planning  Goal: Discharge to home or other facility with appropriate resources  4/14/2023 1245 by Jackie Cornell RN  Outcome: Progressing     Problem: Chronic Conditions and Co-morbidities  Goal: Patient's chronic conditions and co-morbidity symptoms are monitored and maintained or improved  4/14/2023 1245 by Jackie Cornell RN  Outcome: Progressing     Problem: Pain  Goal: Verbalizes/displays adequate comfort level or baseline comfort level  4/14/2023 1245 by Jackie Cornell RN  Outcome: Progressing     Problem: Safety - Adult  Goal: Free from fall injury  4/14/2023 1245 by Jackie Cornell RN  Outcome: Progressing

## 2023-04-15 NOTE — CARE COORDINATION
DME    Abdifatah walker order has been placed for pt. Solomon Frost with [facility] notified of order. Order faxed to [facility]. Per Solomon Frost, walker will be delivered this evening. Jimena SOLORIO updated.

## 2023-04-17 ENCOUNTER — TELEPHONE (OUTPATIENT)
Dept: PRIMARY CARE CLINIC | Age: 58
End: 2023-04-17

## 2023-04-17 NOTE — TELEPHONE ENCOUNTER
Care Transitions Initial Follow Up Call    Outreach made within 2 business days of discharge: Yes    Patient: Justin Guaman Patient : 1965   MRN: [id number]  Reason for Admission: There are no discharge diagnoses documented for the most recent discharge. Discharge Date: 4/15/23       Spoke with: Shonda Adrian    Discharge department/facility: [facility]    TCM Interactive Patient Contact:  Was patient able to fill all prescriptions: Yes  Was patient instructed to bring all medications to the follow-up visit: Yes  Is patient taking all medications as directed in the discharge summary?  Yes  Does patient understand their discharge instructions: Yes  Does patient have questions or concerns that need addressed prior to 7-14 day follow up office visit: no    Scheduled appointment with PCP within 7-14 days    Follow Up  Future Appointments   Date Time Provider Lyn Archer   2023  9:45 AM AMADOR Robles - CNP [facility]   2023 10:00 AM Sade Narayan MD [facility]   2023 10:30 AM SCHEDULE, [facility] Emy Katz [facility]   2023  8:20 AM Shell Slater MD [facility]   2023  3:15 PM Sade Narayan MD [place], [place]

## 2023-04-20 ENCOUNTER — OFFICE VISIT (OUTPATIENT)
Dept: GASTROENTEROLOGY | Age: 58
End: 2023-04-20

## 2023-04-20 VITALS
DIASTOLIC BLOOD PRESSURE: 70 MMHG | SYSTOLIC BLOOD PRESSURE: 112 MMHG | RESPIRATION RATE: 18 BRPM | BODY MASS INDEX: 41.03 KG/M2 | HEIGHT: 67 IN | HEART RATE: 80 BPM | OXYGEN SATURATION: 98 % | WEIGHT: 261.4 LBS

## 2023-04-20 DIAGNOSIS — Z80.0 FAMILY HISTORY OF COLON CANCER IN MOTHER: ICD-10-CM

## 2023-04-20 DIAGNOSIS — Z12.11 COLON CANCER SCREENING: Primary | ICD-10-CM

## 2023-04-20 ASSESSMENT — ENCOUNTER SYMPTOMS
ALLERGIC/IMMUNOLOGIC NEGATIVE: 1
GASTROINTESTINAL NEGATIVE: 1
RESPIRATORY NEGATIVE: 1

## 2023-04-20 NOTE — PROGRESS NOTES
positions and was abnormal: hemorrhoids     The colon was decompressed and the scope was removed. The patient tolerated the procedure well. Electronically signed by Jaret Dickerson MD  on 6/24/2013 at 12:59 PM      Assessment  Tomas Garcia is a 62 y.o. old male who has a past medical history of abdominal aortic aneurysm S/P repair, atrial fibrillation, atrial flutter, hypertension, impaired fasting glucose, MARIA TERESA s/p post left hip replacement presenting for his interval colon cancer screening colonoscopy. ASA: 3  Mallampati Score: 2    Plan    1. Colon cancer screening  -Discussed with Dany Shira at this time the concern for the risk with proceeding with a elective procedure and anesthesia due to his recent hip replacement, anemia and extensive cardiac history. Encouraged to recover and return for reevaluation for screening colon cancer colonoscopy. 2. Family history of colon cancer in mother    Spent 21 minutes with the patient with greater than 48 percent of the time was spent on face-to-face time in discussion with the patient regarding diagnostic options/results, treatment options, counseling, and follow-up plan. AMADOR Medellin - CNP    *This report was transcribed using voice recognition software. Every effort was made to ensure accuracy; however, inadvertent computerized transcription errors may be present.

## 2023-04-20 NOTE — PATIENT INSTRUCTIONS
SURVEY:    You may be receiving a survey from [employer] regarding your visit today. Please complete the survey to enable us to provide the highest quality of care to you and your family. If you cannot score us a very good on any question, please call the office to discuss how we could have made your experience a very good one. Thank you.   Mavis Garcia LPN

## 2023-05-01 RX ORDER — FUROSEMIDE 20 MG/1
20 TABLET ORAL EVERY OTHER DAY
Qty: 30 TABLET | Refills: 0 | Status: SHIPPED | OUTPATIENT
Start: 2023-05-01 | End: 2023-06-21

## 2023-05-08 ENCOUNTER — OFFICE VISIT (OUTPATIENT)
Dept: ORTHOPEDIC SURGERY | Age: 58
End: 2023-05-08

## 2023-05-08 VITALS — WEIGHT: 261 LBS | BODY MASS INDEX: 40.97 KG/M2 | HEIGHT: 67 IN

## 2023-05-08 DIAGNOSIS — S72.002K: Primary | ICD-10-CM

## 2023-05-08 NOTE — PROGRESS NOTES
[facility]  [id number] [facility]  Dept Phone: [phone]  Dept Fax: [phone]      Orthopaedic Trauma Clinic Follow Up      Subjective:   Date of Surgery: 4/13/2023    Maddy Boswer is a 62y.o. year old male who presents to the clinic today for routine follow up 3.5 weeks post operatively from removal of hardware from the left hip with a left total hip arthroplasty for left femoral neck nonunion. Patient states he has overall been doing well postoperatively, ambulating without any assistive devices. States his hip feels great he just has some soreness in the musculature from hardware removal in the anterior-lateral thigh. Denies any new injuries or falls. Denies any numbness or tingling. 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 Review of Systems  Gen: no fever, chills, malaise  CV: no chest pain or palpitations  Resp: no cough or shortness of breath  GI: no nausea, vomiting, diarrhea, or constipation  Neuro: no seizures, vertigo, or headache  Msk: left hip pain   10 remaining systems reviewed and negative    Objective : There were no vitals filed for this visit. Body mass index is 40.88 kg/m². General: No acute distress, resting comfortably in the clinic  Neuro: alert. oriented  Eyes: Extra-ocular muscles intact  Pulm: Respirations unlabored and regular. Skin: warm, well perfused  Psych:   Patient has good fund of knowledge and displays understanding of exam, diagnosis, and plan. LLE:  Skin intact. Surgical incisions well approximated and healing without evidence of dehiscence, drainage or erythema. Able to actively flex and extend the hip without pain. Negative log roll. Able to straight leg raise. Compartments soft. 2+ DP pulse. TA/EHL/FHL/GS motor intact. Deep and Superficial Peroneal/Saphenous/Sural SILT.       Radiology:  Imaging studies from today were independently reviewed and

## 2023-05-25 ENCOUNTER — OFFICE VISIT (OUTPATIENT)
Dept: CARDIOLOGY | Age: 58
End: 2023-05-25
Payer: OTHER GOVERNMENT

## 2023-05-25 VITALS
BODY MASS INDEX: 39.4 KG/M2 | HEART RATE: 65 BPM | DIASTOLIC BLOOD PRESSURE: 64 MMHG | WEIGHT: 260 LBS | SYSTOLIC BLOOD PRESSURE: 137 MMHG | HEIGHT: 68 IN | RESPIRATION RATE: 18 BRPM | OXYGEN SATURATION: 98 %

## 2023-05-25 DIAGNOSIS — E66.01 MORBID OBESITY (HCC): ICD-10-CM

## 2023-05-25 DIAGNOSIS — I48.0 PAF (PAROXYSMAL ATRIAL FIBRILLATION) (HCC): Primary | ICD-10-CM

## 2023-05-25 DIAGNOSIS — G47.33 OSA (OBSTRUCTIVE SLEEP APNEA): ICD-10-CM

## 2023-05-25 DIAGNOSIS — I35.1 MODERATE AORTIC REGURGITATION: ICD-10-CM

## 2023-05-25 PROCEDURE — 3075F SYST BP GE 130 - 139MM HG: CPT | Performed by: INTERNAL MEDICINE

## 2023-05-25 PROCEDURE — 99213 OFFICE O/P EST LOW 20 MIN: CPT | Performed by: INTERNAL MEDICINE

## 2023-05-25 PROCEDURE — 3078F DIAST BP <80 MM HG: CPT | Performed by: INTERNAL MEDICINE

## 2023-05-25 NOTE — PROGRESS NOTES
TSH 2.45 03/28/2023    PSA 1.58 12/22/2016    INR 1.0 04/23/2022     ASSESSMENT:   1. PAF (paroxysmal atrial fibrillation) ([facility] [address] [facility]    2. Moderate aortic regurgitation    3. Morbid obesity ([address]    4. MARIA TERESA (obstructive sleep apnea)        PLAN:     Functional capacity is 5.3 METs according to his most recent stress test.  Echo reviewed, ejection fraction is normal.  Aortic valve regurgitation is stable. Patient has no history of coronary artery disease or prior cardiac catheterization back in 2008. His stress test is abnormal only because of atrial fibrillation during the stress test and abnormal ejection fraction on gated SPECT secondary to variable R-R interval.  Myocardial perfusion is equivocal and is consistent with low risk for ischemia. Completely asymptomatic. No further ischemic work-up indicated. History of Atrial Flutter/Fibrillation:  Currently in sinus rhythm. Repeat Cardioversion on 2/26/2021 - Not successful - Was sent to [facility] [employer] [email or web address] for Ablation is Dr. Filipe Salter. Went in atrial fibrillation during his stress test, converted to normal sinus rhythm spontaneously. Went in atrial fibrillation after surgery, converted to normal sinus rhythm spontaneously denies any further problems. Beta Blocker: Continue Metoprolol succinate (Toprol XL) 50 mg daily. Stroke Risk: CHADS2-VASc Score: 2/9 (2.2% stroke risk) (history of hypertension history of CHF)  Anticoagulation: Discussed in great details there is a stroke risk. Patient is reluctant to take oral anticoagulant therapy. He said he does feel when you go in atrial fibrillation. I told him he can go in atrial fibrillation even at night. He is insisting to stay on the aspirin daily at this point promised to call if he has more episodes of atrial fibrillation so we can start him on oral anticoagulant. Moderate Aortic Regurgitation: No signs of volume overload. We will continue to monitor.   Beta Blocker: Continue Metoprolol succinate (Toprol

## 2023-05-25 NOTE — PATIENT INSTRUCTIONS
SURVEY:    You may be receiving a survey from [employer] regarding your visit today. Please complete the survey to enable us to provide the highest quality of care to you and your family. If you cannot score us a very good on any question, please call the office to discuss how we could have made your experience a very good one. Thank you.

## 2023-06-21 RX ORDER — FUROSEMIDE 20 MG/1
20 TABLET ORAL EVERY OTHER DAY
Qty: 30 TABLET | Refills: 0 | Status: SHIPPED | OUTPATIENT
Start: 2023-06-21

## 2023-06-27 ENCOUNTER — OFFICE VISIT (OUTPATIENT)
Dept: PRIMARY CARE CLINIC | Age: 58
End: 2023-06-27
Payer: OTHER GOVERNMENT

## 2023-06-27 VITALS
WEIGHT: 259.2 LBS | SYSTOLIC BLOOD PRESSURE: 114 MMHG | HEART RATE: 77 BPM | DIASTOLIC BLOOD PRESSURE: 66 MMHG | BODY MASS INDEX: 39.41 KG/M2 | OXYGEN SATURATION: 97 %

## 2023-06-27 DIAGNOSIS — Z13.220 LIPID SCREENING: ICD-10-CM

## 2023-06-27 DIAGNOSIS — R73.01 IMPAIRED FASTING GLUCOSE: ICD-10-CM

## 2023-06-27 DIAGNOSIS — I10 ESSENTIAL HYPERTENSION: Primary | ICD-10-CM

## 2023-06-27 DIAGNOSIS — E55.9 HYPOVITAMINOSIS D: ICD-10-CM

## 2023-06-27 DIAGNOSIS — G47.00 INSOMNIA, UNSPECIFIED TYPE: ICD-10-CM

## 2023-06-27 PROCEDURE — 3078F DIAST BP <80 MM HG: CPT | Performed by: STUDENT IN AN ORGANIZED HEALTH CARE EDUCATION/TRAINING PROGRAM

## 2023-06-27 PROCEDURE — 3074F SYST BP LT 130 MM HG: CPT | Performed by: STUDENT IN AN ORGANIZED HEALTH CARE EDUCATION/TRAINING PROGRAM

## 2023-06-27 PROCEDURE — 99214 OFFICE O/P EST MOD 30 MIN: CPT | Performed by: STUDENT IN AN ORGANIZED HEALTH CARE EDUCATION/TRAINING PROGRAM

## 2023-06-27 RX ORDER — TRAZODONE HYDROCHLORIDE 50 MG/1
50 TABLET ORAL NIGHTLY PRN
Qty: 30 TABLET | Refills: 0 | Status: SHIPPED | OUTPATIENT
Start: 2023-06-27

## 2023-07-17 DIAGNOSIS — G47.00 INSOMNIA, UNSPECIFIED TYPE: ICD-10-CM

## 2023-07-18 RX ORDER — TRAZODONE HYDROCHLORIDE 50 MG/1
50 TABLET ORAL NIGHTLY PRN
Qty: 30 TABLET | Refills: 0 | Status: SHIPPED | OUTPATIENT
Start: 2023-07-18

## 2023-07-19 ENCOUNTER — HOSPITAL ENCOUNTER (OUTPATIENT)
Age: 58
Discharge: HOME | End: 2023-07-19
Payer: OTHER GOVERNMENT

## 2023-07-19 DIAGNOSIS — Z13.220 LIPID SCREENING: ICD-10-CM

## 2023-07-19 DIAGNOSIS — I10 ESSENTIAL HYPERTENSION: ICD-10-CM

## 2023-07-19 DIAGNOSIS — R73.01 IMPAIRED FASTING GLUCOSE: ICD-10-CM

## 2023-07-19 DIAGNOSIS — E55.9 HYPOVITAMINOSIS D: ICD-10-CM

## 2023-07-19 LAB
25(OH)D3 SERPL-MCNC: 30 NG/ML
ALBUMIN SERPL-MCNC: 4.1 G/DL (ref 3.5–5.2)
ALBUMIN/GLOB SERPL: 1.4 {RATIO} (ref 1–2.5)
ALP SERPL-CCNC: 128 U/L (ref 40–129)
ALT SERPL-CCNC: 18 U/L (ref 5–41)
ANION GAP SERPL CALCULATED.3IONS-SCNC: 9 MMOL/L (ref 9–17)
AST SERPL-CCNC: 16 U/L
BASOPHILS # BLD: 0.04 K/UL (ref 0–0.2)
BASOPHILS NFR BLD: 1 % (ref 0–2)
BILIRUB SERPL-MCNC: 0.2 MG/DL (ref 0.3–1.2)
BUN SERPL-MCNC: 21 MG/DL (ref 6–20)
BUN/CREAT SERPL: 30 (ref 9–20)
CALCIUM SERPL-MCNC: 9.8 MG/DL (ref 8.6–10.4)
CHLORIDE SERPL-SCNC: 104 MMOL/L (ref 98–107)
CHOLEST SERPL-MCNC: 197 MG/DL
CHOLESTEROL/HDL RATIO: 4
CO2 SERPL-SCNC: 30 MMOL/L (ref 20–31)
CREAT SERPL-MCNC: 0.7 MG/DL (ref 0.7–1.2)
EOSINOPHIL # BLD: 0.16 K/UL (ref 0–0.44)
EOSINOPHILS RELATIVE PERCENT: 4 % (ref 1–4)
ERYTHROCYTE [DISTWIDTH] IN BLOOD BY AUTOMATED COUNT: 14.6 % (ref 11.8–14.4)
GFR SERPL CREATININE-BSD FRML MDRD: >60 ML/MIN/1.73M2
GLUCOSE SERPL-MCNC: 82 MG/DL (ref 70–99)
HCT VFR BLD AUTO: 40.9 % (ref 40.7–50.3)
HDLC SERPL-MCNC: 49 MG/DL
HGB BLD-MCNC: 12.4 G/DL (ref 13–17)
IMM GRANULOCYTES # BLD AUTO: <0.03 K/UL (ref 0–0.3)
IMM GRANULOCYTES NFR BLD: 0 %
LDLC SERPL CALC-MCNC: 123 MG/DL (ref 0–130)
LYMPHOCYTES NFR BLD: 1.11 K/UL (ref 1.1–3.7)
LYMPHOCYTES RELATIVE PERCENT: 27 % (ref 24–43)
MCH RBC QN AUTO: 25.8 PG (ref 25.2–33.5)
MCHC RBC AUTO-ENTMCNC: 30.3 G/DL (ref 28.4–34.8)
MCV RBC AUTO: 85.2 FL (ref 82.6–102.9)
MONOCYTES NFR BLD: 0.41 K/UL (ref 0.1–1.2)
MONOCYTES NFR BLD: 10 % (ref 3–12)
NEUTROPHILS NFR BLD: 58 % (ref 36–65)
NEUTS SEG NFR BLD: 2.45 K/UL (ref 1.5–8.1)
NRBC BLD-RTO: 0 PER 100 WBC
PLATELET # BLD AUTO: 184 K/UL (ref 138–453)
PMV BLD AUTO: 10.4 FL (ref 8.1–13.5)
POTASSIUM SERPL-SCNC: 4 MMOL/L (ref 3.7–5.3)
PROT SERPL-MCNC: 7 G/DL (ref 6.4–8.3)
RBC # BLD AUTO: 4.8 M/UL (ref 4.21–5.77)
SODIUM SERPL-SCNC: 143 MMOL/L (ref 135–144)
TRIGL SERPL-MCNC: 126 MG/DL
WBC OTHER # BLD: 4.2 K/UL (ref 3.5–11.3)

## 2023-07-19 PROCEDURE — 85027 COMPLETE CBC AUTOMATED: CPT

## 2023-07-19 PROCEDURE — 36415 COLL VENOUS BLD VENIPUNCTURE: CPT

## 2023-07-19 PROCEDURE — 80053 COMPREHEN METABOLIC PANEL: CPT

## 2023-07-19 PROCEDURE — 80061 LIPID PANEL: CPT

## 2023-07-19 PROCEDURE — 83036 HEMOGLOBIN GLYCOSYLATED A1C: CPT

## 2023-07-19 PROCEDURE — 82306 VITAMIN D 25 HYDROXY: CPT

## 2023-07-20 LAB
EST. AVERAGE GLUCOSE BLD GHB EST-MCNC: 108 MG/DL
HBA1C MFR BLD: 5.4 % (ref 4–6)

## 2023-07-27 PROBLEM — Z13.220 LIPID SCREENING: Status: RESOLVED | Noted: 2023-06-27 | Resolved: 2023-07-27

## 2023-08-01 ENCOUNTER — HOSPITAL ENCOUNTER (OUTPATIENT)
Dept: CT IMAGING | Age: 58
Discharge: HOME | End: 2023-08-03
Attending: STUDENT IN AN ORGANIZED HEALTH CARE EDUCATION/TRAINING PROGRAM
Payer: OTHER GOVERNMENT

## 2023-08-01 ENCOUNTER — OFFICE VISIT (OUTPATIENT)
Dept: PRIMARY CARE CLINIC | Age: 58
End: 2023-08-01
Payer: OTHER GOVERNMENT

## 2023-08-01 ENCOUNTER — HOSPITAL ENCOUNTER (OUTPATIENT)
Age: 58
Discharge: HOME | End: 2023-08-01
Attending: STUDENT IN AN ORGANIZED HEALTH CARE EDUCATION/TRAINING PROGRAM
Payer: OTHER GOVERNMENT

## 2023-08-01 ENCOUNTER — HOSPITAL ENCOUNTER (OUTPATIENT)
Age: 58
Discharge: HOME | End: 2023-08-03
Attending: STUDENT IN AN ORGANIZED HEALTH CARE EDUCATION/TRAINING PROGRAM
Payer: OTHER GOVERNMENT

## 2023-08-01 VITALS
BODY MASS INDEX: 39.38 KG/M2 | OXYGEN SATURATION: 98 % | DIASTOLIC BLOOD PRESSURE: 62 MMHG | WEIGHT: 259 LBS | RESPIRATION RATE: 18 BRPM | HEART RATE: 68 BPM | SYSTOLIC BLOOD PRESSURE: 124 MMHG

## 2023-08-01 VITALS
BODY MASS INDEX: 39.25 KG/M2 | SYSTOLIC BLOOD PRESSURE: 124 MMHG | WEIGHT: 259 LBS | DIASTOLIC BLOOD PRESSURE: 62 MMHG | HEIGHT: 68 IN

## 2023-08-01 DIAGNOSIS — I48.91 ATRIAL FIBRILLATION WITH RAPID VENTRICULAR RESPONSE (HCC): ICD-10-CM

## 2023-08-01 DIAGNOSIS — R00.2 PALPITATIONS: Primary | ICD-10-CM

## 2023-08-01 DIAGNOSIS — R00.2 PALPITATIONS: ICD-10-CM

## 2023-08-01 DIAGNOSIS — I50.32 CHRONIC DIASTOLIC HEART FAILURE (HCC): ICD-10-CM

## 2023-08-01 DIAGNOSIS — I10 ESSENTIAL HYPERTENSION: ICD-10-CM

## 2023-08-01 LAB
ALBUMIN SERPL-MCNC: 4.1 G/DL (ref 3.5–5.2)
ALBUMIN/GLOB SERPL: 1.5 {RATIO} (ref 1–2.5)
ALP SERPL-CCNC: 129 U/L (ref 40–129)
ALT SERPL-CCNC: 28 U/L (ref 5–41)
ANION GAP SERPL CALCULATED.3IONS-SCNC: 9 MMOL/L (ref 9–17)
AST SERPL-CCNC: 22 U/L
BASOPHILS # BLD: 0.05 K/UL (ref 0–0.2)
BASOPHILS NFR BLD: 1 % (ref 0–2)
BILIRUB SERPL-MCNC: 0.2 MG/DL (ref 0.3–1.2)
BNP SERPL-MCNC: 132 PG/ML
BUN SERPL-MCNC: 22 MG/DL (ref 6–20)
BUN/CREAT SERPL: 28 (ref 9–20)
CALCIUM SERPL-MCNC: 9.2 MG/DL (ref 8.6–10.4)
CHLORIDE SERPL-SCNC: 102 MMOL/L (ref 98–107)
CO2 SERPL-SCNC: 27 MMOL/L (ref 20–31)
CREAT SERPL-MCNC: 0.8 MG/DL (ref 0.7–1.2)
ECHO AO ROOT DIAM: 4.3 CM
ECHO AO ROOT INDEX: 1.89 CM/M2
ECHO AV ACCELERATION TIME: 99 MS
ECHO AV CUSP MM: 2.8 CM
ECHO AV MEAN GRADIENT: 11 MMHG
ECHO AV MEAN VELOCITY: 1.5 M/S
ECHO AV PEAK GRADIENT: 19 MMHG
ECHO AV PEAK VELOCITY: 2.2 M/S
ECHO AV VTI: 46.9 CM
ECHO BSA: 2.37 M2
ECHO BSA: 2.37 M2
ECHO EST RA PRESSURE: 3 MMHG
ECHO LA MAJOR AXIS: 5.6 CM
ECHO LA VOL 4C: 52 ML (ref 18–58)
ECHO LA VOLUME INDEX A4C: 23 ML/M2 (ref 16–34)
ECHO LV E' LATERAL VELOCITY: 10 CM/S
ECHO LV EDV A4C: 99 ML
ECHO LV EDV INDEX A4C: 43 ML/M2
ECHO LV EJECTION FRACTION BIPLANE: 56 % (ref 55–100)
ECHO LV ESV A4C: 43 ML
ECHO LV ESV INDEX A4C: 19 ML/M2
ECHO LV FRACTIONAL SHORTENING: 16 % (ref 28–44)
ECHO LV INTERNAL DIMENSION DIASTOLE INDEX: 2.76 CM/M2
ECHO LV INTERNAL DIMENSION DIASTOLIC: 6.3 CM (ref 4.2–5.9)
ECHO LV INTERNAL DIMENSION SYSTOLIC INDEX: 2.32 CM/M2
ECHO LV INTERNAL DIMENSION SYSTOLIC: 5.3 CM
ECHO LV IVSD: 1.5 CM (ref 0.6–1)
ECHO LV IVSS: 1.7 CM
ECHO LV MASS 2D: 440.4 G (ref 88–224)
ECHO LV MASS INDEX 2D: 193.2 G/M2 (ref 49–115)
ECHO LV POSTERIOR WALL DIASTOLIC: 1.4 CM (ref 0.6–1)
ECHO LV POSTERIOR WALL SYSTOLIC: 1.7 CM
ECHO LV RELATIVE WALL THICKNESS RATIO: 0.44
ECHO LVOT AV VTI INDEX: 0.44
ECHO LVOT MEAN GRADIENT: 1 MMHG
ECHO LVOT VTI: 20.8 CM
ECHO MV A VELOCITY: 0.41 M/S
ECHO MV E DECELERATION TIME (DT): 243.1 MS
ECHO MV E VELOCITY: 0.57 M/S
ECHO MV E/A RATIO: 1.39
ECHO MV E/E' LATERAL: 5.7
ECHO PV MAX VELOCITY: 0.7 M/S
ECHO PV PEAK GRADIENT: 2 MMHG
ECHO RIGHT VENTRICULAR SYSTOLIC PRESSURE (RVSP): 29 MMHG
ECHO TV REGURGITANT MAX VELOCITY: 2.53 M/S
ECHO TV REGURGITANT PEAK GRADIENT: 26 MMHG
EOSINOPHIL # BLD: 0.2 K/UL (ref 0–0.44)
EOSINOPHILS RELATIVE PERCENT: 4 % (ref 1–4)
ERYTHROCYTE [DISTWIDTH] IN BLOOD BY AUTOMATED COUNT: 15.7 % (ref 11.8–14.4)
GFR SERPL CREATININE-BSD FRML MDRD: >60 ML/MIN/1.73M2
GLUCOSE SERPL-MCNC: 114 MG/DL (ref 70–99)
HCT VFR BLD AUTO: 41.8 % (ref 40.7–50.3)
HGB BLD-MCNC: 12.9 G/DL (ref 13–17)
IMM GRANULOCYTES # BLD AUTO: <0.03 K/UL (ref 0–0.3)
IMM GRANULOCYTES NFR BLD: 0 %
LYMPHOCYTES NFR BLD: 0.96 K/UL (ref 1.1–3.7)
LYMPHOCYTES RELATIVE PERCENT: 21 % (ref 24–43)
MAGNESIUM SERPL-MCNC: 2.3 MG/DL (ref 1.6–2.6)
MCH RBC QN AUTO: 26.1 PG (ref 25.2–33.5)
MCHC RBC AUTO-ENTMCNC: 30.9 G/DL (ref 28.4–34.8)
MCV RBC AUTO: 84.4 FL (ref 82.6–102.9)
MONOCYTES NFR BLD: 0.44 K/UL (ref 0.1–1.2)
MONOCYTES NFR BLD: 10 % (ref 3–12)
NEUTROPHILS NFR BLD: 64 % (ref 36–65)
NEUTS SEG NFR BLD: 2.94 K/UL (ref 1.5–8.1)
NRBC BLD-RTO: 0 PER 100 WBC
PLATELET # BLD AUTO: 188 K/UL (ref 138–453)
PMV BLD AUTO: 10.6 FL (ref 8.1–13.5)
POTASSIUM SERPL-SCNC: 4.4 MMOL/L (ref 3.7–5.3)
PROT SERPL-MCNC: 6.9 G/DL (ref 6.4–8.3)
RBC # BLD AUTO: 4.95 M/UL (ref 4.21–5.77)
SODIUM SERPL-SCNC: 138 MMOL/L (ref 135–144)
TROPONIN I SERPL HS-MCNC: 15 NG/L (ref 0–22)
TSH SERPL DL<=0.05 MIU/L-ACNC: 1.77 UIU/ML (ref 0.3–5)
WBC OTHER # BLD: 4.6 K/UL (ref 3.5–11.3)

## 2023-08-01 PROCEDURE — 3078F DIAST BP <80 MM HG: CPT | Performed by: STUDENT IN AN ORGANIZED HEALTH CARE EDUCATION/TRAINING PROGRAM

## 2023-08-01 PROCEDURE — 85025 COMPLETE CBC W/AUTO DIFF WBC: CPT

## 2023-08-01 PROCEDURE — 71275 CT ANGIOGRAPHY CHEST: CPT

## 2023-08-01 PROCEDURE — 93242 EXT ECG>48HR<7D RECORDING: CPT

## 2023-08-01 PROCEDURE — 80053 COMPREHEN METABOLIC PANEL: CPT

## 2023-08-01 PROCEDURE — 83735 ASSAY OF MAGNESIUM: CPT

## 2023-08-01 PROCEDURE — 83880 ASSAY OF NATRIURETIC PEPTIDE: CPT

## 2023-08-01 PROCEDURE — 3074F SYST BP LT 130 MM HG: CPT | Performed by: STUDENT IN AN ORGANIZED HEALTH CARE EDUCATION/TRAINING PROGRAM

## 2023-08-01 PROCEDURE — 99215 OFFICE O/P EST HI 40 MIN: CPT | Performed by: STUDENT IN AN ORGANIZED HEALTH CARE EDUCATION/TRAINING PROGRAM

## 2023-08-01 PROCEDURE — 93306 TTE W/DOPPLER COMPLETE: CPT

## 2023-08-01 PROCEDURE — 84443 ASSAY THYROID STIM HORMONE: CPT

## 2023-08-01 PROCEDURE — 6360000004 HC RX CONTRAST MEDICATION: Performed by: STUDENT IN AN ORGANIZED HEALTH CARE EDUCATION/TRAINING PROGRAM

## 2023-08-01 PROCEDURE — 93005 ELECTROCARDIOGRAM TRACING: CPT

## 2023-08-01 PROCEDURE — 84484 ASSAY OF TROPONIN QUANT: CPT

## 2023-08-01 PROCEDURE — 36415 COLL VENOUS BLD VENIPUNCTURE: CPT

## 2023-08-01 RX ADMIN — IOPAMIDOL 75 ML: 755 INJECTION, SOLUTION INTRAVENOUS at 10:49

## 2023-08-01 NOTE — PROGRESS NOTES
[address], [place] [address] , [place], [address] is a 62 y.o. male with  has a past medical history of AAA (abdominal aortic aneurysm) ([address]), Aortic regurgitation, Atrial fibrillation ([address]), Atrial flutter ([address]), Encounter for cardioversion procedure, Frequent urination, H/O echocardiogram, H/O echocardiogram, Hemorrhage of rectum and anus, History of 24 hour EKG monitoring, History of echocardiogram, History of PFTs, Hx of transesophageal echocardiography (SKYLAR) for monitoring, Hypertension, Impaired fasting glucose, Knee pain, bilateral, Lipoma, Obesity (BMI 30.0-34.9), Paroxysmal supraventricular tachycardia ([address]), Periodontal disease, Sleep apnea, Tobacco use disorder, Under care of team, Under care of team, Under care of team, Urinary urgency, and Ventral hernia. Presented to the office today for:  Chief Complaint   Patient presents with    Shortness of Breath    Other     Chest Pressure     Assessment/Plan   1. Palpitations  -     EKG 12 lead; Future  -     Longterm Continuous Cardiac Event Monitor; Future  -     CBC with Auto Differential; Future  -     Comprehensive Metabolic Panel; Future  -     Magnesium; Future  -     TSH With Reflex Ft4; Future  -     Transthoracic echocardiogram (TTE) complete with contrast, bubble, strain, and 3D PRN; Future  -     Troponin; Future  -     Brain Natriuretic Peptide; Future  -     CTA CHEST W CONTRAST; Future  2. Essential hypertension  -     EKG 12 lead; Future  -     Longterm Continuous Cardiac Event Monitor; Future  -     CBC with Auto Differential; Future  -     Comprehensive Metabolic Panel; Future  -     Magnesium; Future  -     TSH With Reflex Ft4; Future  -     Transthoracic echocardiogram (TTE) complete with contrast, bubble, strain, and 3D PRN; Future  -     Troponin; Future  -     Brain Natriuretic Peptide; Future  -     CTA CHEST W CONTRAST; Future  3.  Atrial fibrillation with rapid ventricular

## 2023-08-01 NOTE — PATIENT INSTRUCTIONS
SURVEY:    You may be receiving a survey from [employer] regarding your visit today. Please complete the survey to enable us to provide the highest quality of care to you and your family. If you cannot score us a very good on any question, please call the office to discuss how we could have made your experience a very good one. Thank you.

## 2023-08-02 LAB
EKG ATRIAL RATE: 59 BPM
EKG P AXIS: 27 DEGREES
EKG P-R INTERVAL: 156 MS
EKG Q-T INTERVAL: 442 MS
EKG QRS DURATION: 98 MS
EKG QTC CALCULATION (BAZETT): 437 MS
EKG R AXIS: -5 DEGREES
EKG T AXIS: 21 DEGREES
EKG VENTRICULAR RATE: 59 BPM

## 2023-08-02 PROCEDURE — 93010 ELECTROCARDIOGRAM REPORT: CPT | Performed by: INTERNAL MEDICINE

## 2023-08-03 ENCOUNTER — OFFICE VISIT (OUTPATIENT)
Dept: CARDIOLOGY | Age: 58
End: 2023-08-03
Payer: OTHER GOVERNMENT

## 2023-08-03 VITALS
WEIGHT: 259 LBS | DIASTOLIC BLOOD PRESSURE: 69 MMHG | BODY MASS INDEX: 39.25 KG/M2 | HEART RATE: 68 BPM | SYSTOLIC BLOOD PRESSURE: 125 MMHG | OXYGEN SATURATION: 97 % | RESPIRATION RATE: 18 BRPM | HEIGHT: 68 IN

## 2023-08-03 DIAGNOSIS — R00.2 HEART PALPITATIONS: ICD-10-CM

## 2023-08-03 DIAGNOSIS — E66.01 MORBID OBESITY (HCC): ICD-10-CM

## 2023-08-03 DIAGNOSIS — I71.23 ANEURYSM OF DESCENDING THORACIC AORTA WITHOUT RUPTURE (HCC): ICD-10-CM

## 2023-08-03 DIAGNOSIS — R07.89 CHEST HEAVINESS: Primary | ICD-10-CM

## 2023-08-03 DIAGNOSIS — I48.0 PAF (PAROXYSMAL ATRIAL FIBRILLATION) (HCC): ICD-10-CM

## 2023-08-03 DIAGNOSIS — Z99.89 OSA ON CPAP: ICD-10-CM

## 2023-08-03 DIAGNOSIS — G47.33 OSA ON CPAP: ICD-10-CM

## 2023-08-03 DIAGNOSIS — R06.02 SOB (SHORTNESS OF BREATH): ICD-10-CM

## 2023-08-03 DIAGNOSIS — I35.1 MODERATE AORTIC REGURGITATION: ICD-10-CM

## 2023-08-03 PROCEDURE — 3078F DIAST BP <80 MM HG: CPT | Performed by: INTERNAL MEDICINE

## 2023-08-03 PROCEDURE — 99214 OFFICE O/P EST MOD 30 MIN: CPT | Performed by: INTERNAL MEDICINE

## 2023-08-03 PROCEDURE — 3074F SYST BP LT 130 MM HG: CPT | Performed by: INTERNAL MEDICINE

## 2023-08-03 NOTE — PROGRESS NOTES
Charan Alvarez am scribing for and in the presence of Hari Hess MD, F.A.C.C..    Patient: Moe Cruz  : 1965  Date of Visit: August 3, 2023    REASON FOR VISIT / CONSULTATION: Atrial Fibrillation (HX: PAF, Moderate Aortic Reg. C/o: Chest heaviness on and off. Can occur for two weeks straight then five good days. It can very on duration. Worse with exertion. Palpitations with these episodes. Lightheaded and dizziness no falls or loss of consciousness. Denies: SOB. Wearing CAM monitor now. )    Dear Dr. Yovany Mcknight MD     I had the opportunity to see Moe Cruz at the office today for follow up. 1-He has a history of thoracic aorta surgery at the [facility] clinic in . He said he had a repair with no artificial grafts put in. He had a heart catheterization prior to the aorta surgery and was told his coronary arteries were fine. No history of MI or angina. 2-He does have a history of atrial fibrillation/Atypical flutter which predates his thoracic aorta surgery but after his thoracic aorta surgery he had a couple of recurrences, multiple cardioversion and finally underwent atypical flutter ablation on 2015 at [facility] [employer] clinic. 3-Echocardiogram done 17 EF 55-60% Mildly increased left ventricular wall thickness with a normal left ventricular cavity size. No definite specific wall motion abnormalities were identified. The left atrium is moderately dilated (34-39) with a left atrial volume index of 36 ml/m2. Mild mitral and tricuspid regurgitation. Moderate aortic regurgitation. Evidence of mild diastolic dysfunction is seen     4-Another SKYLAR and cardioversion of atrial flutter on 2020. This episode seems to be precipitated by binge drinking. 5- He presented again to [facility] on 2021 with atrial flutter with rapid ventricular response. We ended up doing a cardioversion on 2/3/2021.   No SKYLAR was needed because patient symptoms were less than 48

## 2023-08-03 NOTE — PATIENT INSTRUCTIONS
SURVEY:    You may be receiving a survey from [employer] regarding your visit today. Please complete the survey to enable us to provide the highest quality of care to you and your family. If you cannot score us a very good on any question, please call the office to discuss how we could have made your experience a very good one. Thank you.

## 2023-08-16 LAB — ECHO BSA: 2.37 M2

## 2023-08-19 DIAGNOSIS — G47.00 INSOMNIA, UNSPECIFIED TYPE: ICD-10-CM

## 2023-08-21 ENCOUNTER — HOSPITAL ENCOUNTER (OUTPATIENT)
Dept: NUCLEAR MEDICINE | Age: 58
Discharge: HOME | End: 2023-08-23
Attending: INTERNAL MEDICINE
Payer: OTHER GOVERNMENT

## 2023-08-21 ENCOUNTER — HOSPITAL ENCOUNTER (OUTPATIENT)
Age: 58
Discharge: HOME | End: 2023-08-23
Attending: INTERNAL MEDICINE
Payer: OTHER GOVERNMENT

## 2023-08-21 DIAGNOSIS — I35.1 MODERATE AORTIC REGURGITATION: ICD-10-CM

## 2023-08-21 DIAGNOSIS — E66.01 MORBID OBESITY (HCC): ICD-10-CM

## 2023-08-21 DIAGNOSIS — R00.2 HEART PALPITATIONS: ICD-10-CM

## 2023-08-21 DIAGNOSIS — R06.02 SOB (SHORTNESS OF BREATH): ICD-10-CM

## 2023-08-21 DIAGNOSIS — R07.89 CHEST HEAVINESS: ICD-10-CM

## 2023-08-21 DIAGNOSIS — I48.0 PAF (PAROXYSMAL ATRIAL FIBRILLATION) (HCC): ICD-10-CM

## 2023-08-21 DIAGNOSIS — Z99.89 OSA ON CPAP: ICD-10-CM

## 2023-08-21 DIAGNOSIS — G47.33 OSA ON CPAP: ICD-10-CM

## 2023-08-21 DIAGNOSIS — I71.23 ANEURYSM OF DESCENDING THORACIC AORTA WITHOUT RUPTURE (HCC): ICD-10-CM

## 2023-08-21 PROCEDURE — 93017 CV STRESS TEST TRACING ONLY: CPT

## 2023-08-21 PROCEDURE — 6360000002 HC RX W HCPCS: Performed by: FAMILY MEDICINE

## 2023-08-21 PROCEDURE — A9500 TC99M SESTAMIBI: HCPCS | Performed by: INTERNAL MEDICINE

## 2023-08-21 PROCEDURE — 3430000000 HC RX DIAGNOSTIC RADIOPHARMACEUTICAL: Performed by: INTERNAL MEDICINE

## 2023-08-21 RX ORDER — TETRAKIS(2-METHOXYISOBUTYLISOCYANIDE)COPPER(I) TETRAFLUOROBORATE 1 MG/ML
30 INJECTION, POWDER, LYOPHILIZED, FOR SOLUTION INTRAVENOUS
Status: COMPLETED | OUTPATIENT
Start: 2023-08-21 | End: 2023-08-21

## 2023-08-21 RX ORDER — TRAZODONE HYDROCHLORIDE 50 MG/1
50 TABLET ORAL NIGHTLY PRN
Qty: 30 TABLET | Refills: 0 | Status: SHIPPED | OUTPATIENT
Start: 2023-08-21

## 2023-08-21 RX ORDER — REGADENOSON 0.08 MG/ML
0.4 INJECTION, SOLUTION INTRAVENOUS
Status: COMPLETED | OUTPATIENT
Start: 2023-08-21 | End: 2023-08-21

## 2023-08-21 RX ADMIN — Medication 30 MILLICURIE: at 10:14

## 2023-08-21 RX ADMIN — REGADENOSON 0.4 MG: 0.08 INJECTION, SOLUTION INTRAVENOUS at 10:29

## 2023-08-22 ENCOUNTER — HOSPITAL ENCOUNTER (OUTPATIENT)
Dept: NUCLEAR MEDICINE | Age: 58
Discharge: HOME | End: 2023-08-24
Attending: INTERNAL MEDICINE
Payer: OTHER GOVERNMENT

## 2023-08-22 LAB
NUC STRESS EJECTION FRACTION: 42 %
STRESS BASELINE DIAS BP: 80 MMHG
STRESS BASELINE HR: 79 BPM
STRESS BASELINE ST DEPRESSION: 0 MM
STRESS BASELINE SYS BP: 138 MMHG
STRESS PEAK DIAS BP: 76 MMHG
STRESS PEAK SYS BP: 148 MMHG
STRESS PERCENT HR ACHIEVED: 102 %
STRESS POST PEAK HR: 166 BPM
STRESS RATE PRESSURE PRODUCT: NORMAL BPM*MMHG
STRESS STAGE RECOVERY 1 BP: NORMAL MMHG
STRESS STAGE RECOVERY 1 DURATION: 0 MIN:SEC
STRESS STAGE RECOVERY 1 HR: 134 BPM
STRESS STAGE RECOVERY 2 DURATION: 1 MIN:SEC
STRESS STAGE RECOVERY 2 HR: 148 BPM
STRESS STAGE RECOVERY 3 BP: NORMAL MMHG
STRESS STAGE RECOVERY 3 DURATION: 3 MIN:SEC
STRESS STAGE RECOVERY 3 HR: 85 BPM
STRESS STAGE RECOVERY 4 BP: NORMAL MMHG
STRESS STAGE RECOVERY 4 DURATION: 5 MIN:SEC
STRESS STAGE RECOVERY 4 HR: 81 BPM
STRESS TARGET HR: 162 BPM
TID: 1.01

## 2023-08-22 PROCEDURE — 3430000000 HC RX DIAGNOSTIC RADIOPHARMACEUTICAL: Performed by: INTERNAL MEDICINE

## 2023-08-22 PROCEDURE — A9500 TC99M SESTAMIBI: HCPCS | Performed by: INTERNAL MEDICINE

## 2023-08-22 RX ORDER — TETRAKIS(2-METHOXYISOBUTYLISOCYANIDE)COPPER(I) TETRAFLUOROBORATE 1 MG/ML
30 INJECTION, POWDER, LYOPHILIZED, FOR SOLUTION INTRAVENOUS
Status: COMPLETED | OUTPATIENT
Start: 2023-08-22 | End: 2023-08-22

## 2023-08-22 RX ADMIN — Medication 30 MILLICURIE: at 14:27

## 2023-08-23 ENCOUNTER — HOSPITAL ENCOUNTER (OUTPATIENT)
Age: 58
Discharge: HOME | End: 2023-08-23
Payer: OTHER GOVERNMENT

## 2023-08-23 ENCOUNTER — OFFICE VISIT (OUTPATIENT)
Dept: CARDIOLOGY | Age: 58
End: 2023-08-23
Payer: OTHER GOVERNMENT

## 2023-08-23 VITALS
DIASTOLIC BLOOD PRESSURE: 63 MMHG | RESPIRATION RATE: 18 BRPM | WEIGHT: 259 LBS | HEIGHT: 68 IN | HEART RATE: 79 BPM | SYSTOLIC BLOOD PRESSURE: 107 MMHG | BODY MASS INDEX: 39.25 KG/M2

## 2023-08-23 DIAGNOSIS — E66.9 CLASS 2 OBESITY WITH BODY MASS INDEX (BMI) OF 39.0 TO 39.9 IN ADULT, UNSPECIFIED OBESITY TYPE, UNSPECIFIED WHETHER SERIOUS COMORBIDITY PRESENT: ICD-10-CM

## 2023-08-23 DIAGNOSIS — I71.9 AORTIC ANEURYSM WITHOUT RUPTURE, UNSPECIFIED PORTION OF AORTA (HCC): ICD-10-CM

## 2023-08-23 DIAGNOSIS — R07.89 CHEST HEAVINESS: ICD-10-CM

## 2023-08-23 DIAGNOSIS — I48.0 PAF (PAROXYSMAL ATRIAL FIBRILLATION) (HCC): Primary | ICD-10-CM

## 2023-08-23 DIAGNOSIS — I48.0 PAF (PAROXYSMAL ATRIAL FIBRILLATION) (HCC): ICD-10-CM

## 2023-08-23 DIAGNOSIS — Z99.89 OSA ON CPAP: ICD-10-CM

## 2023-08-23 DIAGNOSIS — I42.8 NICM (NONISCHEMIC CARDIOMYOPATHY) (HCC): ICD-10-CM

## 2023-08-23 DIAGNOSIS — G47.33 OSA ON CPAP: ICD-10-CM

## 2023-08-23 DIAGNOSIS — R06.02 SOB (SHORTNESS OF BREATH): ICD-10-CM

## 2023-08-23 LAB
ANION GAP SERPL CALCULATED.3IONS-SCNC: 11 MMOL/L (ref 9–17)
BUN SERPL-MCNC: 27 MG/DL (ref 6–20)
BUN/CREAT SERPL: 34 (ref 9–20)
CALCIUM SERPL-MCNC: 9.8 MG/DL (ref 8.6–10.4)
CHLORIDE SERPL-SCNC: 102 MMOL/L (ref 98–107)
CO2 SERPL-SCNC: 27 MMOL/L (ref 20–31)
CREAT SERPL-MCNC: 0.8 MG/DL (ref 0.7–1.2)
ERYTHROCYTE [DISTWIDTH] IN BLOOD BY AUTOMATED COUNT: 17.1 % (ref 11.8–14.4)
GFR SERPL CREATININE-BSD FRML MDRD: >60 ML/MIN/1.73M2
GLUCOSE SERPL-MCNC: 102 MG/DL (ref 70–99)
HCT VFR BLD AUTO: 39.3 % (ref 40.7–50.3)
HGB BLD-MCNC: 12.7 G/DL (ref 13–17)
MCH RBC QN AUTO: 26.5 PG (ref 25.2–33.5)
MCHC RBC AUTO-ENTMCNC: 32.3 G/DL (ref 28.4–34.8)
MCV RBC AUTO: 81.9 FL (ref 82.6–102.9)
NRBC BLD-RTO: 0 PER 100 WBC
PLATELET # BLD AUTO: 179 K/UL (ref 138–453)
PMV BLD AUTO: 10.3 FL (ref 8.1–13.5)
POTASSIUM SERPL-SCNC: 3.5 MMOL/L (ref 3.7–5.3)
RBC # BLD AUTO: 4.8 M/UL (ref 4.21–5.77)
SODIUM SERPL-SCNC: 140 MMOL/L (ref 135–144)
WBC OTHER # BLD: 5.8 K/UL (ref 3.5–11.3)

## 2023-08-23 PROCEDURE — 85027 COMPLETE CBC AUTOMATED: CPT

## 2023-08-23 PROCEDURE — 3078F DIAST BP <80 MM HG: CPT | Performed by: INTERNAL MEDICINE

## 2023-08-23 PROCEDURE — 99214 OFFICE O/P EST MOD 30 MIN: CPT | Performed by: INTERNAL MEDICINE

## 2023-08-23 PROCEDURE — 3074F SYST BP LT 130 MM HG: CPT | Performed by: INTERNAL MEDICINE

## 2023-08-23 PROCEDURE — 36415 COLL VENOUS BLD VENIPUNCTURE: CPT

## 2023-08-23 PROCEDURE — 80048 BASIC METABOLIC PNL TOTAL CA: CPT

## 2023-08-23 RX ORDER — LOSARTAN POTASSIUM 25 MG/1
25 TABLET ORAL DAILY
Qty: 90 TABLET | Refills: 1 | Status: SHIPPED | OUTPATIENT
Start: 2023-08-23

## 2023-08-23 NOTE — PROGRESS NOTES
28 08/01/2023    AST 22 08/01/2023     08/01/2023    K 4.4 08/01/2023     08/01/2023    CREATININE 0.8 08/01/2023    BUN 22 (H) 08/01/2023    CO2 27 08/01/2023    TSH 1.77 08/01/2023    PSA 1.58 12/22/2016    INR 1.0 04/23/2022    LABA1C 5.4 07/19/2023     ASSESSMENT:   1. PAF (paroxysmal atrial fibrillation) ([address])    2. NICM (nonischemic cardiomyopathy) ([facility])    3. Chest heaviness    4. SOB (shortness of breath)    5. Aortic aneurysm without rupture, unspecified portion of aorta (HCC)    6. Class 2 obesity with body mass index (BMI) of 39.0 to 39.9 in adult, unspecified obesity type, unspecified whether serious comorbidity present    7. MARIA TERESA on CPAP      PLAN:   Paroxysmal Atrial Fibrillation: Switching to Rhythm Control Symptomatic at this time. He is having chest tightness and shortness of breath as well. I do feel like he is going in and out of atrial fibrillation and this is now becoming a problem due to this causing his heart muscle now to become weak. Echo done on 8/1/2023 showed an EF of 50-55% with severely increased wall thickness. CAM done on 8/1/2023 Paroxysmal atrial fibrillation noted, A-fib burden 4.2% with the longest episode being 1.4 hours. Average heart rate during atrial fibrillation is 140 bpm. Atrial flutter also noted 2.5%, longest episode 1.5 hours. Average heart rate during atrial flutter is 150 bpm.  Also during his stress test he developed atrial fibrillation with rapid ventricular response during stress and converted to sinus rhythm spontaneously. Beta Blocker: Continue Metoprolol succinate (Toprol XL) 50 mg daily. Anti-Arrhythmic: From a cardiovascular standpoint, I believe that Mr. Gay Chatman is current suffering from symptomatic atrial fibrillation with increased shortness of breath with exertion and a moderate reduction in his quality of life.  Because of this, I spent about 20 minutes discussing the multiple treatment options including rate control and rhythm control as

## 2023-08-23 NOTE — PATIENT INSTRUCTIONS
SURVEY:    You may be receiving a survey from [employer] regarding your visit today. Please complete the survey to enable us to provide the highest quality of care to you and your family. If you cannot score us a very good on any question, please call the office to discuss how we could have made your experience a very good one. Thank you.

## 2023-08-24 ENCOUNTER — TELEPHONE (OUTPATIENT)
Dept: CARDIOLOGY | Age: 58
End: 2023-08-24

## 2023-08-24 NOTE — TELEPHONE ENCOUNTER
Patient stress test was denied and they are requesting a peer to peer.    [employer] [phone]   reference # [id number]

## 2023-08-28 ENCOUNTER — HOSPITAL ENCOUNTER (INPATIENT)
Age: 58
LOS: 2 days | Discharge: ANOTHER HOSPITAL | DRG: 309 | End: 2023-08-30
Attending: INTERNAL MEDICINE | Admitting: STUDENT IN AN ORGANIZED HEALTH CARE EDUCATION/TRAINING PROGRAM
Payer: OTHER GOVERNMENT

## 2023-08-28 DIAGNOSIS — I48.0 PAF (PAROXYSMAL ATRIAL FIBRILLATION) (HCC): Primary | ICD-10-CM

## 2023-08-28 PROBLEM — Z79.899 VISIT FOR MONITORING TIKOSYN THERAPY: Status: ACTIVE | Noted: 2023-08-28

## 2023-08-28 PROBLEM — Z51.81 VISIT FOR MONITORING TIKOSYN THERAPY: Status: ACTIVE | Noted: 2023-08-28

## 2023-08-28 LAB
ANION GAP SERPL CALCULATED.3IONS-SCNC: 11 MMOL/L (ref 9–17)
BASOPHILS # BLD: 0.07 K/UL (ref 0–0.2)
BASOPHILS NFR BLD: 1 % (ref 0–2)
BUN SERPL-MCNC: 23 MG/DL (ref 6–20)
BUN/CREAT SERPL: 29 (ref 9–20)
CALCIUM SERPL-MCNC: 9.2 MG/DL (ref 8.6–10.4)
CHLORIDE SERPL-SCNC: 105 MMOL/L (ref 98–107)
CO2 SERPL-SCNC: 27 MMOL/L (ref 20–31)
CREAT SERPL-MCNC: 0.8 MG/DL (ref 0.7–1.2)
EKG ATRIAL RATE: 65 BPM
EKG ATRIAL RATE: 70 BPM
EKG P AXIS: 34 DEGREES
EKG P AXIS: 42 DEGREES
EKG P-R INTERVAL: 150 MS
EKG P-R INTERVAL: 160 MS
EKG Q-T INTERVAL: 420 MS
EKG Q-T INTERVAL: 446 MS
EKG QRS DURATION: 100 MS
EKG QRS DURATION: 100 MS
EKG QTC CALCULATION (BAZETT): 453 MS
EKG QTC CALCULATION (BAZETT): 463 MS
EKG R AXIS: -2 DEGREES
EKG R AXIS: 7 DEGREES
EKG T AXIS: 34 DEGREES
EKG T AXIS: 58 DEGREES
EKG VENTRICULAR RATE: 65 BPM
EKG VENTRICULAR RATE: 70 BPM
EOSINOPHIL # BLD: 0.55 K/UL (ref 0–0.44)
EOSINOPHILS RELATIVE PERCENT: 10 % (ref 1–4)
ERYTHROCYTE [DISTWIDTH] IN BLOOD BY AUTOMATED COUNT: 18 % (ref 11.8–14.4)
GFR SERPL CREATININE-BSD FRML MDRD: >60 ML/MIN/1.73M2
GLUCOSE SERPL-MCNC: 109 MG/DL (ref 70–99)
HCT VFR BLD AUTO: 40.9 % (ref 40.7–50.3)
HGB BLD-MCNC: 13.3 G/DL (ref 13–17)
IMM GRANULOCYTES # BLD AUTO: 0.03 K/UL (ref 0–0.3)
IMM GRANULOCYTES NFR BLD: 1 %
LYMPHOCYTES NFR BLD: 1.27 K/UL (ref 1.1–3.7)
LYMPHOCYTES RELATIVE PERCENT: 22 % (ref 24–43)
MAGNESIUM SERPL-MCNC: 2.2 MG/DL (ref 1.6–2.6)
MCH RBC QN AUTO: 26.8 PG (ref 25.2–33.5)
MCHC RBC AUTO-ENTMCNC: 32.5 G/DL (ref 28.4–34.8)
MCV RBC AUTO: 82.5 FL (ref 82.6–102.9)
MONOCYTES NFR BLD: 0.46 K/UL (ref 0.1–1.2)
MONOCYTES NFR BLD: 8 % (ref 3–12)
NEUTROPHILS NFR BLD: 58 % (ref 36–65)
NEUTS SEG NFR BLD: 3.36 K/UL (ref 1.5–8.1)
NRBC BLD-RTO: 0 PER 100 WBC
PLATELET # BLD AUTO: 192 K/UL (ref 138–453)
PMV BLD AUTO: 10.8 FL (ref 8.1–13.5)
POTASSIUM SERPL-SCNC: 4.3 MMOL/L (ref 3.7–5.3)
RBC # BLD AUTO: 4.96 M/UL (ref 4.21–5.77)
SODIUM SERPL-SCNC: 143 MMOL/L (ref 135–144)
WBC OTHER # BLD: 5.7 K/UL (ref 3.5–11.3)

## 2023-08-28 PROCEDURE — 6370000000 HC RX 637 (ALT 250 FOR IP): Performed by: NURSE PRACTITIONER

## 2023-08-28 PROCEDURE — 94761 N-INVAS EAR/PLS OXIMETRY MLT: CPT

## 2023-08-28 PROCEDURE — 99254 IP/OBS CNSLTJ NEW/EST MOD 60: CPT | Performed by: PHYSICIAN ASSISTANT

## 2023-08-28 PROCEDURE — 1200000000 HC SEMI PRIVATE

## 2023-08-28 PROCEDURE — 93005 ELECTROCARDIOGRAM TRACING: CPT | Performed by: INTERNAL MEDICINE

## 2023-08-28 PROCEDURE — 93010 ELECTROCARDIOGRAM REPORT: CPT | Performed by: FAMILY MEDICINE

## 2023-08-28 PROCEDURE — 36415 COLL VENOUS BLD VENIPUNCTURE: CPT

## 2023-08-28 PROCEDURE — 80048 BASIC METABOLIC PNL TOTAL CA: CPT

## 2023-08-28 PROCEDURE — 83735 ASSAY OF MAGNESIUM: CPT

## 2023-08-28 PROCEDURE — 6370000000 HC RX 637 (ALT 250 FOR IP): Performed by: INTERNAL MEDICINE

## 2023-08-28 PROCEDURE — 85025 COMPLETE CBC W/AUTO DIFF WBC: CPT

## 2023-08-28 RX ORDER — DOFETILIDE 0.25 MG/1
250 CAPSULE ORAL EVERY 12 HOURS SCHEDULED
Status: DISCONTINUED | OUTPATIENT
Start: 2023-08-28 | End: 2023-08-29

## 2023-08-28 RX ORDER — ASPIRIN 81 MG/1
81 TABLET ORAL 2 TIMES DAILY
Status: DISCONTINUED | OUTPATIENT
Start: 2023-08-28 | End: 2023-08-30 | Stop reason: HOSPADM

## 2023-08-28 RX ORDER — ACETAMINOPHEN 325 MG/1
650 TABLET ORAL EVERY 6 HOURS PRN
Status: DISCONTINUED | OUTPATIENT
Start: 2023-08-28 | End: 2023-08-30 | Stop reason: HOSPADM

## 2023-08-28 RX ORDER — FUROSEMIDE 20 MG/1
20 TABLET ORAL EVERY OTHER DAY
Status: DISCONTINUED | OUTPATIENT
Start: 2023-08-29 | End: 2023-08-30 | Stop reason: HOSPADM

## 2023-08-28 RX ORDER — LOSARTAN POTASSIUM 25 MG/1
25 TABLET ORAL DAILY
Status: DISCONTINUED | OUTPATIENT
Start: 2023-08-28 | End: 2023-08-30 | Stop reason: HOSPADM

## 2023-08-28 RX ORDER — FUROSEMIDE 20 MG/1
20 TABLET ORAL EVERY OTHER DAY
Qty: 30 TABLET | Refills: 0 | OUTPATIENT
Start: 2023-08-28

## 2023-08-28 RX ORDER — ACETAMINOPHEN 650 MG/1
650 SUPPOSITORY RECTAL EVERY 6 HOURS PRN
Status: DISCONTINUED | OUTPATIENT
Start: 2023-08-28 | End: 2023-08-30 | Stop reason: HOSPADM

## 2023-08-28 RX ORDER — TRAZODONE HYDROCHLORIDE 50 MG/1
50 TABLET ORAL NIGHTLY PRN
Status: DISCONTINUED | OUTPATIENT
Start: 2023-08-28 | End: 2023-08-30 | Stop reason: HOSPADM

## 2023-08-28 RX ADMIN — DOFETILIDE 250 MCG: 0.25 CAPSULE ORAL at 21:01

## 2023-08-28 RX ADMIN — ASPIRIN 81 MG: 81 TABLET, COATED ORAL at 21:01

## 2023-08-28 RX ADMIN — DOFETILIDE 250 MCG: 0.25 CAPSULE ORAL at 10:36

## 2023-08-28 ASSESSMENT — LIFESTYLE VARIABLES
HOW MANY STANDARD DRINKS CONTAINING ALCOHOL DO YOU HAVE ON A TYPICAL DAY: 5 OR 6
HOW OFTEN DO YOU HAVE A DRINK CONTAINING ALCOHOL: 2-3 TIMES A WEEK

## 2023-08-28 NOTE — CARE COORDINATION
Yes  Other Identified Issues/Barriers to RETURNING to current housing: None Noted  Potential Assistance needed at discharge: N/A            Potential DME:    Patient expects to discharge to: [address] [place] [address] [place] for transportation at discharge: Ray Willis: Briana Moreland / Plan: Briana Moreland  [facility] / Product Type: *No Product type* /     Does insurance require precert for SNF: Yes    Potential assistance Purchasing Medications: No  Meds-to-Beds request:        [facility] [address] [place] [address], [address] [place] [address] Ron Dawson [phone]  [address] [facility]  Phone: [phone] Fax: [phone]      Notes:    Factors facilitating achievement of predicted outcomes: Family support, Friend support, Motivated, Cooperative, Pleasant, and Good insight into deficits    Barriers to discharge: Medical complications (potential)    Additional Case Management Notes: see LSW note dated 8/28/2023    The Plan for Transition of Care is related to the following treatment goals of A-fib [facility]) [I48.91]  Visit for monitoring Tikosyn therapy [Z51.81, F94.351]    IF APPLICABLE: The Patient and/or patient representative Cassia Scott and his family were provided with a choice of provider and agrees with the discharge plan. Freedom of choice list with basic dialogue that supports the patient's individualized plan of care/goals and shares the quality data associated with the providers was provided to:     Patient Representative Name:       The Patient and/or Patient Representative Agree with the Discharge Plan?   Yes    Fanny Peters, [address] [place] [address]  Case Management Department  Ph: [phone] Fax: [phone]

## 2023-08-28 NOTE — H&P
products, herbals, cannabinoid products and bitamin/mineral/dietary/nutritional supplements. Reuben 'yes\", Koki White     []  The patient is not eligible for medication reconciliation; the patient is in an emergent medical situation where delaying treatment would jeopardize the patient's health    []  I did NOT confirm, update or review the patient's current list of medications today. [DOES NOT SATISFY [employer]]        [facility] Advanced Care Planning documentation:  [x] I have confirmed that the patient's Advance Care Plan is present, Code Status is documented, or surrogate decision maker is listed in the patient's medical record  [If \"yes\", STOP HERE]     [] The patient's Advance Care Plan is NOT present because:    []  I confirmed today that the patient does not wish or was not able to name a   surrogate decision maker or provide and advance care plan.    [] Hospice care is currently being provided or has been provided within the   calendar year. []  I did NOT confirm today the presence of an [address] [place] [address] or surrogate   decision maker documented within the patient's medical record. [DOES NOT SATISFY [facility] PERFORMANCE]    CORE MEASURES  DVT prophylaxis: SCD  Decubitus ulcer present on admission: No  CODE STATUS: FULL CODE  Nutrition Status: good   Physical therapy: No   Old Charts reviewed: Yes  EKG Reviewed:  Yes  Advance Directive Addressed: Yes    Scarlette Cogan, APRN - CNP, AMADOR, NP-C  8/28/2023, 9:18 AM

## 2023-08-28 NOTE — CONSULTS
succinate (Toprol XL) 50 mg daily. Anti-Arrhythmic: From a cardiovascular standpoint, I believe that Mr. Marshall Miller is current suffering from symptomatic atrial fibrillation with increased shortness of breath with exertion and a moderate reduction in his quality of life. Because of his history of abnormal stress test, as well as his age and the side effects associated with amiodarone, I think that Tikosyn would probably be his best option. [id number] Score for Atrial Fibrillation Stroke Risk   Risk   Factors  Component Value   C CHF Yes 1   H HTN Yes 1   A2 Age >= 75 No,  (59 y.o.) 0   D DM No 0   S2 Prior Stroke/TIA No 0   V Vascular Disease No 0   A Age 77-78 No,  (59 y.o.) 0   Sc Sex male 0    [id number]  Score  2   Score last updated 3/06/38 1:27 PM EDT  Click here for a link to the UpToDate guideline \"Atrial Fibrillation: Anticoagulation therapy to prevent embolization  Disclaimer: Risk Score calculation is dependent on accuracy of patient problem list and past encounter diagnosis. Stroke Risk: CHADS2-VASc Score: 2/9 (2.2% stroke risk)  Anticoagulation: Not indicated at this time. Non Ischemic Cardiomyopathy: He is having chest heaviness and shortness of breath as well. I do feel like he is going in and out of atrial fibrillation and this is now becoming a problem due to this causing his heart muscle now to become weak. Echo done on 8/1/2023 showed an EF of 50-55% with severely increased wall thickness. Stress test is abnormal but no significant ischemia. Beta Blocker: Continue Metoprolol succinate (Toprol XL) 50 mg daily. ACE Inibitor/ARB: Continue losartan (Cozaar) 25 mg daily. Diuretics: Continue furosemide (Lasix) 20 mg every other day. Heart failure counseling: I advised them to try and keep their legs up whenever possible and to limit salt in their diet.     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

## 2023-08-29 ENCOUNTER — TELEPHONE (OUTPATIENT)
Dept: CARDIOLOGY | Age: 58
End: 2023-08-29

## 2023-08-29 PROBLEM — G47.33 OSA (OBSTRUCTIVE SLEEP APNEA): Status: ACTIVE | Noted: 2023-08-29

## 2023-08-29 PROBLEM — I42.8 NICM (NONISCHEMIC CARDIOMYOPATHY) (HCC): Status: ACTIVE | Noted: 2023-08-29

## 2023-08-29 LAB
ANION GAP SERPL CALCULATED.3IONS-SCNC: 10 MMOL/L (ref 9–17)
ANION GAP SERPL CALCULATED.3IONS-SCNC: 11 MMOL/L (ref 9–17)
BUN SERPL-MCNC: 21 MG/DL (ref 6–20)
BUN SERPL-MCNC: 24 MG/DL (ref 6–20)
BUN/CREAT SERPL: 26 (ref 9–20)
BUN/CREAT SERPL: 30 (ref 9–20)
CALCIUM SERPL-MCNC: 8.8 MG/DL (ref 8.6–10.4)
CALCIUM SERPL-MCNC: 9.6 MG/DL (ref 8.6–10.4)
CHLORIDE SERPL-SCNC: 98 MMOL/L (ref 98–107)
CHLORIDE SERPL-SCNC: 99 MMOL/L (ref 98–107)
CO2 SERPL-SCNC: 27 MMOL/L (ref 20–31)
CO2 SERPL-SCNC: 28 MMOL/L (ref 20–31)
CREAT SERPL-MCNC: 0.8 MG/DL (ref 0.7–1.2)
CREAT SERPL-MCNC: 0.8 MG/DL (ref 0.7–1.2)
EKG ATRIAL RATE: 67 BPM
EKG ATRIAL RATE: 77 BPM
EKG P AXIS: 32 DEGREES
EKG P AXIS: 34 DEGREES
EKG P-R INTERVAL: 116 MS
EKG P-R INTERVAL: 162 MS
EKG Q-T INTERVAL: 414 MS
EKG Q-T INTERVAL: 452 MS
EKG QRS DURATION: 102 MS
EKG QRS DURATION: 106 MS
EKG QTC CALCULATION (BAZETT): 468 MS
EKG QTC CALCULATION (BAZETT): 477 MS
EKG R AXIS: -3 DEGREES
EKG R AXIS: 4 DEGREES
EKG T AXIS: 36 DEGREES
EKG T AXIS: 73 DEGREES
EKG VENTRICULAR RATE: 67 BPM
EKG VENTRICULAR RATE: 77 BPM
GFR SERPL CREATININE-BSD FRML MDRD: >60 ML/MIN/1.73M2
GFR SERPL CREATININE-BSD FRML MDRD: >60 ML/MIN/1.73M2
GLUCOSE SERPL-MCNC: 107 MG/DL (ref 70–99)
GLUCOSE SERPL-MCNC: 161 MG/DL (ref 70–99)
MAGNESIUM SERPL-MCNC: 2.3 MG/DL (ref 1.6–2.6)
MAGNESIUM SERPL-MCNC: 2.3 MG/DL (ref 1.6–2.6)
POTASSIUM SERPL-SCNC: 3.8 MMOL/L (ref 3.7–5.3)
POTASSIUM SERPL-SCNC: 4.1 MMOL/L (ref 3.7–5.3)
SODIUM SERPL-SCNC: 136 MMOL/L (ref 135–144)
SODIUM SERPL-SCNC: 137 MMOL/L (ref 135–144)

## 2023-08-29 PROCEDURE — 94761 N-INVAS EAR/PLS OXIMETRY MLT: CPT

## 2023-08-29 PROCEDURE — 6370000000 HC RX 637 (ALT 250 FOR IP): Performed by: NURSE PRACTITIONER

## 2023-08-29 PROCEDURE — 83735 ASSAY OF MAGNESIUM: CPT

## 2023-08-29 PROCEDURE — 80048 BASIC METABOLIC PNL TOTAL CA: CPT

## 2023-08-29 PROCEDURE — 93010 ELECTROCARDIOGRAM REPORT: CPT | Performed by: FAMILY MEDICINE

## 2023-08-29 PROCEDURE — 1200000000 HC SEMI PRIVATE

## 2023-08-29 PROCEDURE — 6370000000 HC RX 637 (ALT 250 FOR IP): Performed by: INTERNAL MEDICINE

## 2023-08-29 PROCEDURE — 99232 SBSQ HOSP IP/OBS MODERATE 35: CPT | Performed by: INTERNAL MEDICINE

## 2023-08-29 PROCEDURE — 93005 ELECTROCARDIOGRAM TRACING: CPT | Performed by: INTERNAL MEDICINE

## 2023-08-29 PROCEDURE — 36415 COLL VENOUS BLD VENIPUNCTURE: CPT

## 2023-08-29 RX ORDER — METOPROLOL SUCCINATE 50 MG/1
50 TABLET, EXTENDED RELEASE ORAL DAILY
Status: DISCONTINUED | OUTPATIENT
Start: 2023-08-30 | End: 2023-08-30 | Stop reason: HOSPADM

## 2023-08-29 RX ORDER — AMIODARONE HYDROCHLORIDE 200 MG/1
200 TABLET ORAL 2 TIMES DAILY
Status: DISCONTINUED | OUTPATIENT
Start: 2023-08-29 | End: 2023-08-30

## 2023-08-29 RX ADMIN — AMIODARONE HYDROCHLORIDE 200 MG: 200 TABLET ORAL at 21:54

## 2023-08-29 RX ADMIN — LOSARTAN POTASSIUM 25 MG: 25 TABLET, FILM COATED ORAL at 09:01

## 2023-08-29 RX ADMIN — DOFETILIDE 250 MCG: 0.25 CAPSULE ORAL at 09:01

## 2023-08-29 RX ADMIN — ASPIRIN 81 MG: 81 TABLET, COATED ORAL at 21:54

## 2023-08-29 RX ADMIN — FUROSEMIDE 20 MG: 20 TABLET ORAL at 09:01

## 2023-08-29 RX ADMIN — ASPIRIN 81 MG: 81 TABLET, COATED ORAL at 09:01

## 2023-08-29 NOTE — PLAN OF CARE
Problem: Discharge Planning  Goal: Discharge to home or other facility with appropriate resources  8/29/2023 0930 by Smith Grimm RN  Outcome: Progressing  Flowsheets (Taken 8/29/2023 0740)  Discharge to home or other facility with appropriate resources: Identify barriers to discharge with patient and caregiver  8/28/2023 2252 by Ervin Black RN  Outcome: Progressing  Flowsheets (Taken 8/28/2023 2252)  Discharge to home or other facility with appropriate resources: Identify barriers to discharge with patient and caregiver     Problem: Chronic Conditions and Co-morbidities  Goal: Patient's chronic conditions and co-morbidity symptoms are monitored and maintained or improved  8/29/2023 0930 by Smith Grimm RN  Outcome: Progressing  Flowsheets (Taken 8/29/2023 0740)  Care Plan - Patient's Chronic Conditions and Co-Morbidity Symptoms are Monitored and Maintained or Improved: Monitor and assess patient's chronic conditions and comorbid symptoms for stability, deterioration, or improvement  8/28/2023 2252 by Ervin Black RN  Outcome: Progressing  Flowsheets (Taken 8/28/2023 2252)  Care Plan - Patient's Chronic Conditions and Co-Morbidity Symptoms are Monitored and Maintained or Improved: Monitor and assess patient's chronic conditions and comorbid symptoms for stability, deterioration, or improvement     Problem: Cardiovascular - Adult  Goal: Maintains optimal cardiac output and hemodynamic stability  8/29/2023 0930 by Smith rGimm RN  Outcome: Progressing  Flowsheets (Taken 8/29/2023 0740)  Maintains optimal cardiac output and hemodynamic stability: Monitor blood pressure and heart rate  8/28/2023 2252 by Ervin Black RN  Outcome: Progressing  Flowsheets (Taken 8/28/2023 2252)  Maintains optimal cardiac output and hemodynamic stability:   Monitor blood pressure and heart rate   Monitor urine output and notify Licensed Independent Practitioner for values outside of normal range   Assess for signs of

## 2023-08-29 NOTE — PLAN OF CARE
Problem: Discharge Planning  Goal: Discharge to home or other facility with appropriate resources  8/28/2023 2252 by Odalys Jimenes RN  Outcome: Progressing  Flowsheets (Taken 8/28/2023 2252)  Discharge to home or other facility with appropriate resources: Identify barriers to discharge with patient and caregiver     Problem: Chronic Conditions and Co-morbidities  Goal: Patient's chronic conditions and co-morbidity symptoms are monitored and maintained or improved  Outcome: Progressing  Flowsheets (Taken 8/28/2023 2252)  Care Plan - Patient's Chronic Conditions and Co-Morbidity Symptoms are Monitored and Maintained or Improved: Monitor and assess patient's chronic conditions and comorbid symptoms for stability, deterioration, or improvement     Problem: Cardiovascular - Adult  Goal: Maintains optimal cardiac output and hemodynamic stability  Outcome: Progressing  Flowsheets (Taken 8/28/2023 2252)  Maintains optimal cardiac output and hemodynamic stability:   Monitor blood pressure and heart rate   Monitor urine output and notify Licensed Independent Practitioner for values outside of normal range   Assess for signs of decreased cardiac output

## 2023-08-30 ENCOUNTER — APPOINTMENT (OUTPATIENT)
Age: 58
DRG: 309 | End: 2023-08-30
Attending: INTERNAL MEDICINE
Payer: OTHER GOVERNMENT

## 2023-08-30 ENCOUNTER — HOSPITAL ENCOUNTER (INPATIENT)
Age: 58
LOS: 3 days | Discharge: HOME | End: 2023-09-02
Attending: INTERNAL MEDICINE
Payer: OTHER GOVERNMENT

## 2023-08-30 VITALS
DIASTOLIC BLOOD PRESSURE: 36 MMHG | HEART RATE: 69 BPM | RESPIRATION RATE: 19 BRPM | BODY MASS INDEX: 40.01 KG/M2 | TEMPERATURE: 98 F | OXYGEN SATURATION: 96 % | WEIGHT: 264 LBS | HEIGHT: 68 IN | SYSTOLIC BLOOD PRESSURE: 98 MMHG

## 2023-08-30 PROBLEM — I47.20 V TACH (HCC): Status: ACTIVE | Noted: 2023-08-30

## 2023-08-30 LAB
ANION GAP SERPL CALCULATED.3IONS-SCNC: 12 MMOL/L (ref 9–17)
APTT PPP: 27 SECONDS (ref 22–38)
BNP SERPL-MCNC: 59 PG/ML
BUN SERPL-MCNC: 19 MG/DL (ref 6–20)
BUN/CREAT SERPL: 24 (ref 9–20)
CALCIUM SERPL-MCNC: 9.5 MG/DL (ref 8.6–10.4)
CHLORIDE SERPL-SCNC: 99 MMOL/L (ref 98–107)
CO2 SERPL-SCNC: 26 MMOL/L (ref 20–31)
CREAT SERPL-MCNC: 0.8 MG/DL (ref 0.7–1.2)
DEPRECATED RDW RBC AUTO: 55 FL (ref 35–45)
ECHO AO ROOT DIAM: 4.2 CM
ECHO AO ROOT INDEX: 1.83 CM/M2
ECHO AV ACCELERATION TIME: 79.52 MS
ECHO AV MEAN GRADIENT: 13 MMHG
ECHO AV MEAN VELOCITY: 1.6 M/S
ECHO AV PEAK VELOCITY: 2.3 M/S
ECHO AV REGURGITANT PHT: 435.3 MILLISECOND
ECHO AV VTI: 48 CM
ECHO BSA: 2.4 M2
ECHO LA DIAMETER INDEX: 1.87 CM/M2
ECHO LA DIAMETER: 4.3 CM
ECHO LA MAJOR AXIS: 5.6 CM
ECHO LA TO AORTIC ROOT RATIO: 1.02
ECHO LA VOL 2C: 84 ML (ref 18–58)
ECHO LA VOL 4C: 77 ML (ref 18–58)
ECHO LA VOL BP: 81 ML (ref 18–58)
ECHO LA VOL/BSA BIPLANE: 35 ML/M2 (ref 16–34)
ECHO LA VOLUME AREA LENGTH: 86 ML
ECHO LA VOLUME INDEX A2C: 37 ML/M2 (ref 16–34)
ECHO LA VOLUME INDEX A4C: 33 ML/M2 (ref 16–34)
ECHO LA VOLUME INDEX AREA LENGTH: 37 ML/M2 (ref 16–34)
ECHO LV E' LATERAL VELOCITY: 11 CM/S
ECHO LV EDV A2C: 95 ML
ECHO LV EDV A4C: 134 ML
ECHO LV EDV BP: 113 ML (ref 67–155)
ECHO LV EDV INDEX A4C: 58 ML/M2
ECHO LV EDV INDEX BP: 49 ML/M2
ECHO LV EDV NDEX A2C: 41 ML/M2
ECHO LV EJECTION FRACTION BIPLANE: 56 % (ref 55–100)
ECHO LV ESV A2C: 40 ML
ECHO LV ESV A4C: 61 ML
ECHO LV ESV BP: 50 ML (ref 22–58)
ECHO LV ESV INDEX A2C: 17 ML/M2
ECHO LV ESV INDEX A4C: 27 ML/M2
ECHO LV ESV INDEX BP: 22 ML/M2
ECHO LV FRACTIONAL SHORTENING: 27 % (ref 28–44)
ECHO LV INTERNAL DIMENSION DIASTOLE INDEX: 2.39 CM/M2
ECHO LV INTERNAL DIMENSION DIASTOLIC: 5.5 CM (ref 4.2–5.9)
ECHO LV INTERNAL DIMENSION SYSTOLIC INDEX: 1.74 CM/M2
ECHO LV INTERNAL DIMENSION SYSTOLIC: 4 CM
ECHO LV IVSD: 1.6 CM (ref 0.6–1)
ECHO LV IVSS: 2.3 CM
ECHO LV MASS 2D: 410.1 G (ref 88–224)
ECHO LV MASS INDEX 2D: 178.3 G/M2 (ref 49–115)
ECHO LV POSTERIOR WALL DIASTOLIC: 1.6 CM (ref 0.6–1)
ECHO LV POSTERIOR WALL SYSTOLIC: 2.1 CM
ECHO LV RELATIVE WALL THICKNESS RATIO: 0.58
ECHO LVOT AV VTI INDEX: 0.42
ECHO LVOT MEAN GRADIENT: 1 MMHG
ECHO LVOT VTI: 20.3 CM
ECHO MV A VELOCITY: 0.47 M/S
ECHO MV E DECELERATION TIME (DT): 258.4 MS
ECHO MV E VELOCITY: 0.59 M/S
ECHO MV E/A RATIO: 1.26
ECHO MV E/E' LATERAL: 5.36
ECHO PV MAX VELOCITY: 1 M/S
ERYTHROCYTE [DISTWIDTH] IN BLOOD BY AUTOMATED COUNT: 18 % (ref 11.5–14.5)
GFR SERPL CREATININE-BSD FRML MDRD: >60 ML/MIN/1.73M2
GLUCOSE SERPL-MCNC: 109 MG/DL (ref 70–99)
HCT VFR BLD AUTO: 39.8 % (ref 42–52)
HEPARIN UNFRACTIONATED: < 0.04 U/ML (ref 0.3–0.7)
HGB BLD-MCNC: 12.7 GM/DL (ref 14–18)
INR PPP: 0.87 (ref 0.85–1.13)
MAGNESIUM SERPL-MCNC: 2.3 MG/DL (ref 1.6–2.6)
MCH RBC QN AUTO: 27 PG (ref 26–33)
MCHC RBC AUTO-ENTMCNC: 31.9 GM/DL (ref 32.2–35.5)
MCV RBC AUTO: 84.7 FL (ref 80–94)
PLATELET # BLD AUTO: 172 THOU/MM3 (ref 130–400)
PMV BLD AUTO: 10.7 FL (ref 9.4–12.4)
POTASSIUM SERPL-SCNC: 4.2 MMOL/L (ref 3.7–5.3)
RBC # BLD AUTO: 4.7 MILL/MM3 (ref 4.7–6.1)
SODIUM SERPL-SCNC: 137 MMOL/L (ref 135–144)
TROPONIN I SERPL HS-MCNC: 13 NG/L (ref 0–22)
WBC # BLD AUTO: 5.3 THOU/MM3 (ref 4.8–10.8)

## 2023-08-30 PROCEDURE — 83735 ASSAY OF MAGNESIUM: CPT

## 2023-08-30 PROCEDURE — 99232 SBSQ HOSP IP/OBS MODERATE 35: CPT | Performed by: INTERNAL MEDICINE

## 2023-08-30 PROCEDURE — 6360000002 HC RX W HCPCS

## 2023-08-30 PROCEDURE — 93005 ELECTROCARDIOGRAM TRACING: CPT

## 2023-08-30 PROCEDURE — 2580000003 HC RX 258: Performed by: NURSE PRACTITIONER

## 2023-08-30 PROCEDURE — 6370000000 HC RX 637 (ALT 250 FOR IP)

## 2023-08-30 PROCEDURE — 94761 N-INVAS EAR/PLS OXIMETRY MLT: CPT

## 2023-08-30 PROCEDURE — 93306 TTE W/DOPPLER COMPLETE: CPT | Performed by: FAMILY MEDICINE

## 2023-08-30 PROCEDURE — 85520 HEPARIN ASSAY: CPT

## 2023-08-30 PROCEDURE — 93306 TTE W/DOPPLER COMPLETE: CPT

## 2023-08-30 PROCEDURE — 2500000003 HC RX 250 WO HCPCS

## 2023-08-30 PROCEDURE — 80048 BASIC METABOLIC PNL TOTAL CA: CPT

## 2023-08-30 PROCEDURE — 6370000000 HC RX 637 (ALT 250 FOR IP): Performed by: NURSE PRACTITIONER

## 2023-08-30 PROCEDURE — 93005 ELECTROCARDIOGRAM TRACING: CPT | Performed by: INTERNAL MEDICINE

## 2023-08-30 PROCEDURE — 2140000000 HC CCU INTERMEDIATE R&B

## 2023-08-30 PROCEDURE — 99255 IP/OBS CONSLTJ NEW/EST HI 80: CPT

## 2023-08-30 PROCEDURE — 85027 COMPLETE CBC AUTOMATED: CPT

## 2023-08-30 PROCEDURE — 36415 COLL VENOUS BLD VENIPUNCTURE: CPT

## 2023-08-30 PROCEDURE — 85610 PROTHROMBIN TIME: CPT

## 2023-08-30 PROCEDURE — 6360000002 HC RX W HCPCS: Performed by: NURSE PRACTITIONER

## 2023-08-30 PROCEDURE — 83880 ASSAY OF NATRIURETIC PEPTIDE: CPT

## 2023-08-30 PROCEDURE — 6370000000 HC RX 637 (ALT 250 FOR IP): Performed by: INTERNAL MEDICINE

## 2023-08-30 PROCEDURE — 84484 ASSAY OF TROPONIN QUANT: CPT

## 2023-08-30 PROCEDURE — 85730 THROMBOPLASTIN TIME PARTIAL: CPT

## 2023-08-30 PROCEDURE — 2580000003 HC RX 258

## 2023-08-30 RX ORDER — ASPIRIN 81 MG/1
81 TABLET ORAL 2 TIMES DAILY
Status: DISCONTINUED | OUTPATIENT
Start: 2023-08-30 | End: 2023-09-02 | Stop reason: HOSPADM

## 2023-08-30 RX ORDER — ACETAMINOPHEN 325 MG/1
650 TABLET ORAL EVERY 6 HOURS PRN
Status: DISCONTINUED | OUTPATIENT
Start: 2023-08-30 | End: 2023-09-02 | Stop reason: HOSPADM

## 2023-08-30 RX ORDER — ONDANSETRON 2 MG/ML
4 INJECTION INTRAMUSCULAR; INTRAVENOUS EVERY 6 HOURS PRN
Status: DISCONTINUED | OUTPATIENT
Start: 2023-08-30 | End: 2023-09-02 | Stop reason: HOSPADM

## 2023-08-30 RX ORDER — METOPROLOL SUCCINATE 50 MG/1
50 TABLET, EXTENDED RELEASE ORAL DAILY
Status: DISCONTINUED | OUTPATIENT
Start: 2023-08-31 | End: 2023-08-31

## 2023-08-30 RX ORDER — HEPARIN SODIUM 1000 [USP'U]/ML
2000 INJECTION, SOLUTION INTRAVENOUS; SUBCUTANEOUS PRN
Status: DISCONTINUED | OUTPATIENT
Start: 2023-08-31 | End: 2023-08-31

## 2023-08-30 RX ORDER — LOSARTAN POTASSIUM 25 MG/1
25 TABLET ORAL DAILY
Status: DISCONTINUED | OUTPATIENT
Start: 2023-08-31 | End: 2023-09-02 | Stop reason: HOSPADM

## 2023-08-30 RX ORDER — ONDANSETRON 4 MG/1
4 TABLET, ORALLY DISINTEGRATING ORAL EVERY 8 HOURS PRN
Status: DISCONTINUED | OUTPATIENT
Start: 2023-08-30 | End: 2023-09-02 | Stop reason: HOSPADM

## 2023-08-30 RX ORDER — HEPARIN SODIUM 1000 [USP'U]/ML
4000 INJECTION, SOLUTION INTRAVENOUS; SUBCUTANEOUS ONCE
Status: COMPLETED | OUTPATIENT
Start: 2023-08-30 | End: 2023-08-30

## 2023-08-30 RX ORDER — HEPARIN SODIUM 10000 [USP'U]/100ML
5-30 INJECTION, SOLUTION INTRAVENOUS CONTINUOUS
Status: DISCONTINUED | OUTPATIENT
Start: 2023-08-30 | End: 2023-08-31

## 2023-08-30 RX ORDER — POTASSIUM CHLORIDE 20 MEQ/1
40 TABLET, EXTENDED RELEASE ORAL PRN
Status: DISCONTINUED | OUTPATIENT
Start: 2023-08-30 | End: 2023-09-02 | Stop reason: HOSPADM

## 2023-08-30 RX ORDER — MAGNESIUM SULFATE IN WATER 40 MG/ML
2000 INJECTION, SOLUTION INTRAVENOUS PRN
Status: DISCONTINUED | OUTPATIENT
Start: 2023-08-30 | End: 2023-09-02 | Stop reason: HOSPADM

## 2023-08-30 RX ORDER — TRAZODONE HYDROCHLORIDE 50 MG/1
50 TABLET ORAL NIGHTLY PRN
Status: DISCONTINUED | OUTPATIENT
Start: 2023-08-30 | End: 2023-09-02 | Stop reason: HOSPADM

## 2023-08-30 RX ORDER — ACETAMINOPHEN 650 MG/1
650 SUPPOSITORY RECTAL EVERY 6 HOURS PRN
Status: DISCONTINUED | OUTPATIENT
Start: 2023-08-30 | End: 2023-09-02 | Stop reason: HOSPADM

## 2023-08-30 RX ORDER — SODIUM CHLORIDE 0.9 % (FLUSH) 0.9 %
5-40 SYRINGE (ML) INJECTION EVERY 12 HOURS SCHEDULED
Status: DISCONTINUED | OUTPATIENT
Start: 2023-08-30 | End: 2023-09-02 | Stop reason: HOSPADM

## 2023-08-30 RX ORDER — POTASSIUM CHLORIDE 7.45 MG/ML
10 INJECTION INTRAVENOUS PRN
Status: DISCONTINUED | OUTPATIENT
Start: 2023-08-30 | End: 2023-09-02 | Stop reason: HOSPADM

## 2023-08-30 RX ORDER — POLYETHYLENE GLYCOL 3350 17 G/17G
17 POWDER, FOR SOLUTION ORAL DAILY PRN
Status: DISCONTINUED | OUTPATIENT
Start: 2023-08-30 | End: 2023-09-02 | Stop reason: HOSPADM

## 2023-08-30 RX ORDER — SODIUM CHLORIDE 0.9 % (FLUSH) 0.9 %
5-40 SYRINGE (ML) INJECTION PRN
Status: DISCONTINUED | OUTPATIENT
Start: 2023-08-30 | End: 2023-09-02 | Stop reason: HOSPADM

## 2023-08-30 RX ORDER — HEPARIN SODIUM 1000 [USP'U]/ML
4000 INJECTION, SOLUTION INTRAVENOUS; SUBCUTANEOUS PRN
Status: DISCONTINUED | OUTPATIENT
Start: 2023-08-31 | End: 2023-08-31

## 2023-08-30 RX ORDER — SODIUM CHLORIDE 9 MG/ML
INJECTION, SOLUTION INTRAVENOUS PRN
Status: DISCONTINUED | OUTPATIENT
Start: 2023-08-30 | End: 2023-09-02 | Stop reason: HOSPADM

## 2023-08-30 RX ORDER — FUROSEMIDE 20 MG/1
20 TABLET ORAL EVERY OTHER DAY
Status: DISCONTINUED | OUTPATIENT
Start: 2023-08-31 | End: 2023-09-02 | Stop reason: HOSPADM

## 2023-08-30 RX ORDER — ASPIRIN 81 MG/1
81 TABLET ORAL 2 TIMES DAILY
COMMUNITY

## 2023-08-30 RX ADMIN — AMIODARONE HYDROCHLORIDE 1 MG/MIN: 50 INJECTION, SOLUTION INTRAVENOUS at 13:48

## 2023-08-30 RX ADMIN — METOPROLOL SUCCINATE 50 MG: 50 TABLET, EXTENDED RELEASE ORAL at 08:46

## 2023-08-30 RX ADMIN — ASPIRIN 81 MG: 81 TABLET, COATED ORAL at 20:58

## 2023-08-30 RX ADMIN — AMIODARONE HYDROCHLORIDE 0.5 MG/MIN: 1.8 INJECTION, SOLUTION INTRAVENOUS at 21:59

## 2023-08-30 RX ADMIN — EMPAGLIFLOZIN 10 MG: 10 TABLET, FILM COATED ORAL at 08:46

## 2023-08-30 RX ADMIN — HEPARIN SODIUM AND DEXTROSE 8 UNITS/KG/HR: 10000; 5 INJECTION INTRAVENOUS at 20:54

## 2023-08-30 RX ADMIN — LOSARTAN POTASSIUM 25 MG: 25 TABLET, FILM COATED ORAL at 08:45

## 2023-08-30 RX ADMIN — ASPIRIN 81 MG: 81 TABLET, COATED ORAL at 08:45

## 2023-08-30 RX ADMIN — HEPARIN SODIUM 4000 UNITS: 1000 INJECTION INTRAVENOUS; SUBCUTANEOUS at 20:54

## 2023-08-30 RX ADMIN — SODIUM CHLORIDE, PRESERVATIVE FREE 10 ML: 5 INJECTION INTRAVENOUS at 20:57

## 2023-08-30 RX ADMIN — AMIODARONE HYDROCHLORIDE 200 MG: 200 TABLET ORAL at 08:45

## 2023-08-30 ASSESSMENT — PAIN SCALES - GENERAL: PAINLEVEL_OUTOF10: 0

## 2023-08-30 NOTE — PROGRESS NOTES
met with pt to discuss ACP. Pt reports that his sons are his decision maker and that they are aware of his wishes. Pt denied any other needs at this time.
2hr post tikosyn EKG completed at this time. EKG faxed to cardiology office and sent to Dr. Yony Figueroa email.
AJ RN Supervisor attempted to contact Dr. Nilam Fraga at this time and continues to go to voicemail.
Called Dr. Eugene Hogue cell phone at this time. Left a message on voicemail to call back for rhythm changes for 329. Awaiting call back.
Dr. Marianne Montenegro called back at this time. Update him on patient's rhythm changes and run of V-Tach and SVT. Dr. Marianne Montenegro acknowledged and stated that it is why he was started on PO Amiodarone and to go ahead and give it. Will do more outpatient tests and monitoring for patient.
Labwork ordered obtained and sent down to lab at this time.
Left another message on Dr. Tyree Cruz at this time to give call back. Awaiting a call back.
MICU ETA 1700. Patient aware.
Met with Patient this p.m. to discuss discharge planning. Patient is a 62year old , white male, admitted with a diagnosis of A-fib. Patient is alert and oriented, polite and cooperative with this assessment. States that his plan is to return home following this hospitalization. Patient resides in [place] with his significant other. 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 Patient uses a CPAP machine as his only reported DME. He has no outside resources or services in place currently. Patient is independent with all ADL's. He drives himself and provides for his own transportation needs. PCP is Dr. Romina Gutierrez. Patient has medical insurance and denies need for financial assistance with the cost of his prescription medication. Provided 'Good Rx' card and discussed options for if Tikosyn is costly after his insurance. Discharge plan is home when stable. Patient is a 'Full Code' status and voices no interest in pursuing medical directives at this time. His son is his designated decision maker, per Patient. No additional needs or concerns related to his discharge home are identified by Patient. LSW to monitor and assist with any/all needs as they arise.     [address] [place], [place]  8/28/2023
Mirtha Yousif CNP updated on patient's rhythm changes and shown telemetry strips.
Nutrition Assessment     Type and Reason for Visit: Initial    Nutrition Recommendations/Plan:   Encourage heart healthy eating  Enocurage regular activity for weight loss. Malnutrition Assessment:  Malnutrition Status: No malnutrition    Nutrition Assessment:  Obesity r/t excess energy intakes relative to expenditure, AEB BMI >40 with weight gains over time. Attributes weight gains to work on knees and hip and decreased activity that he hopes to be able to resume. Diet-wise is aware of heart heatlhy eating and declines repeat education. Nutrition Related Findings:   +2 BLE education. Wound Type: None    Current Nutrition Therapies:    ADULT DIET;  Regular    Anthropometric Measures:  Height: 5' 8\" (172.7 cm)  Current Body Wt: 264 lb 8 oz (120 kg)   BMI: 40.2    Nutrition Diagnosis:   Overweight/Obese related to excessive energy intake as evidenced by BMI    Lab Results   Component Value Date     08/30/2023    K 4.2 08/30/2023    CL 99 08/30/2023    CO2 26 08/30/2023    BUN 19 08/30/2023    CREATININE 0.8 08/30/2023    GLUCOSE 109 (H) 08/30/2023    CALCIUM 9.5 08/30/2023    PROT 6.9 08/01/2023    LABALBU 4.1 08/01/2023    BILITOT 0.2 (L) 08/01/2023    ALKPHOS 129 08/01/2023    AST 22 08/01/2023    ALT 28 08/01/2023    LABGLOM >60 08/30/2023    GFRAA >60 04/24/2022    GLOB NOT REPORTED 04/03/2014     Hemoglobin A1C   Date Value Ref Range Status   07/19/2023 5.4 4.0 - 6.0 % Final     Lab Results   Component Value Date    VITD25 30.0 07/19/2023     Nutrition Interventions:   Food and/or Nutrient Delivery: Continue Current Diet  Nutrition Education/Counseling: Education declined  Coordination of Nutrition Care: Continue to monitor while inpatient  Plan of Care discussed with: patient    Goals:     Goals: Meet at least 75% of estimated needs       Nutrition Monitoring and Evaluation:   Behavioral-Environmental Outcomes: None Identified  Food/Nutrient Intake Outcomes: Food and Nutrient Intake  Physical
Patient ambulating the hallways and sitting at table down by [address] at this time. Has no complaints or needs at this time.
Patient independently ambulating the halls at this time.
Patient sitting up in the chair visiting with family at this time. Vital signs and assessment completed. Vital signs WDLs. Patient denies pain. Patient denies any needs at this time. Call light, bedside table and personal belongings within reach. Patient is alert and orientated. Patient is stable on feet and capable of moving about in the room independently. Patient encouraged to call out if patient is feeling lightheadness, unsteady or is in need of any assistance.
[facility]. Patient in NSR. Denies any chest pain or pressure. Girlfriend at bedside. Lung sounds clear and diminished. Will continue to monitor.
Popeye Esquivel NP says pt does not require IV access.
Pt arrived to room 329, vitals and assessment completed. Pt denies chest pain/ tightness or sob at the moment, but admits he has episodes where he feels palpitations and feels sob with heaviness on his chest. Pt has +2 pitting edema in BLE. EKG completed, and telemetry on. Will attempt IV, will continue to monitor patient.
Pt is being transferred to the ICU for amiodarone drip.
Pt transferred to ICU to room 308; report given to Cathy.
Report given to MICU. Amiodorone infusing as ordered. Patient switched over to transports monitors.
Report [facility] OSMIN [facility]riv[facility] [address] [facility] [address] [facility] [address]
Rhythm changes noted at 20:24 on telemetry of patient having run of V-Tach followed by a run of SVT following by normal sinus rhythm and normal heart heart. Writer to bedside. Patient is now asymptomatic but states he had just ambulated back to his room which is when he felt some palpitations. EKG completed. Patient is back to NSR at this time and now asymptomatic. Writer to call Dr. Jesika Jimenez.
Vitals and assessment done at this time. See flowsheet for more details. Pt resting in bed at this time. Pt denied any shortness of breath, dizziness,light headedness, numbness & tingling in hands or feet. Pt also denied any chest pain or palpitations. Pt denied any further needs at this time. Call light within reach, will continue to monitor.
Writer spoke with Dr. Leeanna Vega at this time. Updated him of what is going on with patient and rhythm changes and informed him that unable to reach Dr. Marcela Christina. Writer confirmed to Dr. Leeanna Vega that at this time patient's rhythm is NSR per EKG and patient is asymptomatic, Dr. Leeanna Vega acknowledged. Writer stated to Dr. Leeanna Vega that María Elena Levine has not given him his dose of amiodarone nightly yet. Dr. Leeanna Vega stated he is okay with holding off a little longer to try and get ahold of Dr. Marcela Christina or even possibly reaching out to Dr. Reji Harris to confirm okay to give medications. If unable to get ahold of cardiology to go ahead and give the scheduled amiodarone PO. Also per Dr. Leeanna Vega, IF patient were to have another run of V-tach, obtained labwork for BNP w/ Mag.
Writer to bedside to complete morning assessment. Upon entry to room, pt awake and sitting on the edge of the bed, respirations even and unlabored while on room air. Vitals obtained and assessment completed, see flow sheet for details. Pt denies needs from writer at this time. Call light in reach. Care ongoing.
Ahmad  Start Toprol-XL 50 mg daily  Medication Monitoring / High Risk Medications: none     PAF    Condition is stable  Treatment plan: Consultation: Dr Jalil Kelley  Imaging: no further imaging studies ordered today  Medications:   Increase Toprol XL to 50 mg daily  Start Amiodarone 200 mg bid due to VT  Stop Tikosyn    Nonischemic cardiomyopathy  Condition is a chronic stable condition  Treatment plan: Consultation: Dr. Jalil Kelley  Imaging: no further imaging studies ordered today  Medications:   Continue Lasix, losartan  Increase Toprol XL to 50 mg daily  Stop Tikosyn    Nutrition status: Well developed, well nourished with no malnutrition  Dietician consult initiated     Hospital Prophylaxis:   DVT: SCD's   Stress Ulcer:  na       Disposition:  Shared decision making: All test results, treatment options and disposition options were discussed with the patient today  Social determinants of health that may impact management: none  Code status: Full Code   Disposition: Discharge plan is home    [address] documentation:  [x] I have confirmed that the patient's Advance Care Plan is present, Code Status is documented, or surrogate decision maker is listed in the patient's medical record  [If \"yes\", STOP HERE]     [] The patient's Advance Care Plan is NOT present because:    []  I confirmed today that the patient does not wish or was not able to name a   surrogate decision maker or provide and advance care plan.    [] Hospice care is currently being provided or has been provided within the   calendar year. []  I did NOT confirm today the presence of an [address] [place] [address] or surrogate   decision maker documented within the patient's medical record.    [DOES NOT SATISFY [address] - CNP , AMADOR, NP-C  Hospitalist Medicine        8/30/2023, 7:25 AM
continue this  Medication Monitoring / High Risk Medications: none     PAF    Condition is stable  Treatment plan: Consultation: Dr Josh Corral  Imaging: no further imaging studies ordered today  Medications:   Hold Toprol XL    Nonischemic cardiomyopathy  Condition is a chronic stable condition  Treatment plan: Consultation: Dr. Josh Corral  Imaging: no further imaging studies ordered today  Medications:   Continue Lasix, losartan  Hold Toprol-XL will defer to Dr. Josh Corral for this  Start Tikosyn today    Nutrition status: Well developed, well nourished with no malnutrition  Dietician consult initiated     Hospital Prophylaxis:   DVT: SCD's   Stress Ulcer:  na       Disposition:  Shared decision making: All test results, treatment options and disposition options were discussed with the patient today  Social determinants of health that may impact management: none  Code status: Full Code   Disposition: Discharge plan is home    [address] documentation:  [x] I have confirmed that the patient's Advance Care Plan is present, Code Status is documented, or surrogate decision maker is listed in the patient's medical record  [If \"yes\", STOP HERE]     [] The patient's Advance Care Plan is NOT present because:    []  I confirmed today that the patient does not wish or was not able to name a   surrogate decision maker or provide and advance care plan.    [] Hospice care is currently being provided or has been provided within the   calendar year. []  I did NOT confirm today the presence of an [address] [place] [address] or surrogate   decision maker documented within the patient's medical record.    [DOES NOT SATISFY [address], AMADOR - CNP , AMADOR, NP-C  Hospitalist Medicine        8/29/2023, 9:38 AM
27 08/29/2023    TSH 1.77 08/01/2023    PSA 1.58 12/22/2016    INR 1.0 04/23/2022    LABA1C 5.4 07/19/2023     ASSESSMENT:   History of aortic aneurysm. Moderate aortic regurgitation. Nonischemic dilated cardiomyopathy. Paroxysmal atrial fibrillation. History of atrial flutter status post ablation. Obstructive sleep apnea syndrome on CPAP therapy. PLAN:   Patient is admitted to the hospital for Tikosyn loading. Started on Tikosyn 250 mcg twice daily yesterday but unfortunately he had an episode of nonsustained ventricular tachycardia around 6 PM last night. Although his QTc interval is way below 500 ms, I discussed with Dr. Pedro Doherty at the [facility] and knowing that Tikosyn is not effective in ventricular arrhythmia, probably not the best treatment for his atrial fibrillation/flutter. Discontinue Tikosyn with amiodarone 200 mg twice daily. Restart Toprol-XL 50 mg daily, first dose tomorrow morning. Continue Lasix, Jardiance and losartan for his nonischemic cardiomyopathy. We will get repeat echo tomorrow morning to assess his aortic regurgitation. We will consider EP evaluation as an outpatient either in Transfer clinic or at the [facility]. Finally, I recommended that he continue his other medications and follow up with you as previously scheduled. Sincerely,  Lis Shah MS, F.A. C. [address], [facility], [address] [facility]  Phone: [phone]; Fax: [phone]    I believe that the risk of significant morbidity and mortality related to the patient's current medical conditions are: intermediate-high. Approximately 40 minutes were spent during prep work, discussion and exam of the patient, and follow up documentation and all of their questions were answered. The documentation recorded by the scribe, accurately and completely reflects the services I personally performed and the decisions made by me. Lis Shah MD, F.A.C.C.  August 29,
No cyanosis and no clubbing. Pulses are 2+ radial and carotid pulses. 2+ dorsalis pedis and posterior tibial pulses bilaterally. Neurological: He is alert and oriented to person, place, and time. No evidence of gross cranial nerve deficit. Coordination appeared normal.   Skin: Skin is warm and dry. There is no rash or diaphoresis. Psychiatric: He has a normal mood and affect. His speech is normal and behavior is normal.          Lab Results   Component Value Date    WBC 5.7 08/28/2023    HGB 13.3 08/28/2023    HCT 40.9 08/28/2023     08/28/2023    CHOL 197 07/19/2023    TRIG 126 07/19/2023    HDL 49 07/19/2023    ALT 28 08/01/2023    AST 22 08/01/2023     08/30/2023    K 4.2 08/30/2023    CL 99 08/30/2023    CREATININE 0.8 08/30/2023    BUN 19 08/30/2023    CO2 26 08/30/2023    TSH 1.77 08/01/2023    PSA 1.58 12/22/2016    INR 1.0 04/23/2022    LABA1C 5.4 07/19/2023     ASSESSMENT:   History of aortic aneurysm. Moderate aortic regurgitation. Nonischemic dilated cardiomyopathy. Paroxysmal atrial fibrillation. History of atrial flutter status post ablation. Obstructive sleep apnea syndrome on CPAP therapy. PLAN:   Patient is admitted to the hospital for Tikosyn loading. Started on Tikosyn 250 mcg twice daily but unfortunately he had an episode of nonsustained ventricular tachycardia around 6 PM on Monday even though his QTc interval was less than 470 ms. Although his QTc interval is way below 500 ms, I discussed with Dr. Noe Jones at the [facility] and knowing that Tikosyn is not effective in ventricular arrhythmia, probably not the best treatment for his atrial fibrillation/flutter. We started him on amiodarone 200 mg twice daily, last night he had very fast atrial tachycardia and another short run of nonsustained wide QRS tachycardia most likely ventricular tachycardia. Echo is grossly unchanged from prior, ejection fraction is preserved.   Moderate aortic regurgitation

## 2023-08-30 NOTE — PLAN OF CARE
Problem: Discharge Planning  Goal: Discharge to home or other facility with appropriate resources  8/29/2023 2310 by Adam Mena RN  Outcome: Progressing  Flowsheets (Taken 8/29/2023 2310)  Discharge to home or other facility with appropriate resources: Identify barriers to discharge with patient and caregiver     Problem: Chronic Conditions and Co-morbidities  Goal: Patient's chronic conditions and co-morbidity symptoms are monitored and maintained or improved  8/29/2023 2310 by Adam Mena RN  Outcome: Progressing  Flowsheets (Taken 8/29/2023 2310)  Care Plan - Patient's Chronic Conditions and Co-Morbidity Symptoms are Monitored and Maintained or Improved:   Monitor and assess patient's chronic conditions and comorbid symptoms for stability, deterioration, or improvement   Collaborate with multidisciplinary team to address chronic and comorbid conditions and prevent exacerbation or deterioration     Problem: Cardiovascular - Adult  Goal: Maintains optimal cardiac output and hemodynamic stability  8/29/2023 2310 by Adam Mena RN  Outcome: Progressing  Flowsheets (Taken 8/29/2023 2310)  Maintains optimal cardiac output and hemodynamic stability:   Monitor blood pressure and heart rate   Monitor urine output and notify Licensed Independent Practitioner for values outside of normal range   Assess for signs of decreased cardiac output

## 2023-08-30 NOTE — PROGRESS NOTES
Transfer from [facility]   pt had come in for a Tikosyn load for Afib and 24 hours later had 11 beats of V tach gave him Toprol XL last night had them again with no LOC ever, he feels it when it happens, echo 50-55% repeat today but no results yet, had stress first of August non isch cardiomyopathy 42% Hx AVR, cardioversions now on Amio gtt prob needs atrial ablasion, spoke with Yonatan already, BNP ok lytes fine, 113/57 72 14 97.9 95% ,  Dr Lue Schaumann

## 2023-08-30 NOTE — FLOWSHEET NOTE
08/30/23 1857   Treatment Team Notification   Reason for Communication Review case   Name of Team Member Notified Dr. Elaina Goodman Team Role Attending Provider   Method of Communication Secure Message   Response See orders   Notification Time 1856     He just arrived to 3B room 26 from [facility].

## 2023-08-30 NOTE — DISCHARGE SUMMARY
Discharge Summary    Corine Pace [facility]  :  1965  MRN:  [id number]    Admit date:  2023      Discharge date: 2023     Admitting Physician:  Mauricio Douglas MD    Discharge Diagnoses:    Principal Problem:    Visit for monitoring Tikosyn therapy  Active Problems:    Nonrheumatic aortic valve insufficiency    Chronic diastolic heart failure (HCC)    PAF (paroxysmal atrial fibrillation) (HCC)    NICM (nonischemic cardiomyopathy) (HCC)    MARIA TERESA (obstructive sleep apnea)  Resolved Problems:    * No resolved hospital problems. *      Hospital Course:   Lorena Ng is a 62 y.o. male admitted with Tikosyn therapy. He presented as a direct admission for Tikosyn Loading. Patient follows with Dr. Karlos Whatley currently. He states that he has been going in and out of atrial fibrillation. He states he is symptomatic with that including shortness of breath, fatigue and palpitations. He states with exertion he does develop some chest heaviness as well. He denied syncope or falls. He is compliant with CPAP at night. Patient recently underwent stress test on 2023 which was suggestive of nonischemic cardiomyopathy with an EF of 42%. Patient also had echocardiogram at the same time which showed an EF of 50 to 55%. Patient does have history of aortic valve repair. During patient's admission cardiology was consulted and Toprol-XL initially was held. Tikosyn was started per cardiology however patient developed intermittent runs of atrial tachycardia and or ventricular tachycardia. Patient was placed on Toprol-XL 50 mg and placed on amiodarone 200 mg and Tikosyn was stopped. Patient then had oral Amio stopped and he was placed on IV amiodarone. Cardiology spoke with Dr. Betty george at [facility] who is agreeable for patient for cardiology work-up with possible ablation. I did call and speak with the hospitalist service who accepted him.   Plan will be to transfer patient today to [facility]

## 2023-08-30 NOTE — H&P
Hospitalist History and Physical          Patient Info:   Name: Lorena Ng, : 1965, PCP: Mauricio Douglas MD  Unit/Bed:3B-26/026-A      Admitted on: 2023  Date of Service: Pt seen/examined on 23 and Admitted to Inpatient with expected LOS greater than two midnights due to medical therapy. Assessment and Plan:  Paroxysmal A-fib: [id number]: 1. Patient stated that he has been having this issue for years. Possible ablation per EP. Continue Amiodarone at 0.5 mg/min  Consult Electrophysiology  Heparin started per cardiology request  Strict I/O  Daily weights  Resume lopressor    VTACH, resolved: Patient noted to be in [facility] at OSH, no evidence upon chart review. Appears to have been self limiting and occurred while receiving Tikosyn therapy. Telemetry  Resume amio    Shortness of breath, resolved: likely 2/2 to #1 due to resolution after episode ends quickly after rhythm goes back into NSR  Maintain SPO2 > 92%    Atypical chest pain, resolved: Likely 2/2 to #1 due to resolution after episode ends quickly after rhythm goes back into NSR. High sensitivity troponin was 13  Trend EKG when chest pain present    Chronic diastolic heart failure, not in exacerbation: Holding lasix at this time, due every other day in anticipation of procedure tomorrow. Resume when appropriate. Patient did not appear to be exacerbation upon assessment. MARIA TERESA on Cpap: resume home cpap      Data reviewed  EKG:  I have reviewed the EKG with the following interpretation: Pending  Imaging: N/A      ===================================================================      Chief Complaint:  Shortness of breath    History Of Present Illness:  Lorena Ng is a 62 y.o. male with PMHx of A-fib who presents to [facility] with Shortness of breath. Patient states that he has a long history of heart problems with a valve replacement and multiple cardioversion attempts and ablation.  He states that he was almost

## 2023-08-31 LAB
ANION GAP SERPL CALC-SCNC: 13 MEQ/L (ref 8–16)
BASOPHILS ABSOLUTE: 0.1 THOU/MM3 (ref 0–0.1)
BASOPHILS NFR BLD AUTO: 0.9 %
BUN SERPL-MCNC: 21 MG/DL (ref 7–22)
CA-I BLD ISE-SCNC: 1.09 MMOL/L (ref 1.12–1.32)
CALCIUM SERPL-MCNC: 9.1 MG/DL (ref 8.5–10.5)
CHLORIDE SERPL-SCNC: 105 MEQ/L (ref 98–111)
CO2 SERPL-SCNC: 25 MEQ/L (ref 23–33)
CREAT SERPL-MCNC: 0.8 MG/DL (ref 0.4–1.2)
DEPRECATED RDW RBC AUTO: 56.8 FL (ref 35–45)
EKG ATRIAL RATE: 60 BPM
EKG ATRIAL RATE: 64 BPM
EKG ATRIAL RATE: 65 BPM
EKG ATRIAL RATE: 73 BPM
EKG P AXIS: 19 DEGREES
EKG P AXIS: 22 DEGREES
EKG P AXIS: 28 DEGREES
EKG P AXIS: 37 DEGREES
EKG P-R INTERVAL: 112 MS
EKG P-R INTERVAL: 120 MS
EKG P-R INTERVAL: 126 MS
EKG P-R INTERVAL: 134 MS
EKG Q-T INTERVAL: 416 MS
EKG Q-T INTERVAL: 434 MS
EKG Q-T INTERVAL: 442 MS
EKG Q-T INTERVAL: 456 MS
EKG QRS DURATION: 100 MS
EKG QRS DURATION: 102 MS
EKG QRS DURATION: 102 MS
EKG QRS DURATION: 98 MS
EKG QTC CALCULATION (BAZETT): 447 MS
EKG QTC CALCULATION (BAZETT): 456 MS
EKG QTC CALCULATION (BAZETT): 458 MS
EKG QTC CALCULATION (BAZETT): 459 MS
EKG R AXIS: -10 DEGREES
EKG R AXIS: -2 DEGREES
EKG R AXIS: 13 DEGREES
EKG R AXIS: 6 DEGREES
EKG T AXIS: 29 DEGREES
EKG T AXIS: 58 DEGREES
EKG T AXIS: 63 DEGREES
EKG T AXIS: 88 DEGREES
EKG VENTRICULAR RATE: 60 BPM
EKG VENTRICULAR RATE: 64 BPM
EKG VENTRICULAR RATE: 65 BPM
EKG VENTRICULAR RATE: 73 BPM
EOSINOPHIL NFR BLD AUTO: 6.3 %
EOSINOPHILS ABSOLUTE: 0.4 THOU/MM3 (ref 0–0.4)
ERYTHROCYTE [DISTWIDTH] IN BLOOD BY AUTOMATED COUNT: 18.3 % (ref 11.5–14.5)
GFR SERPL CREATININE-BSD FRML MDRD: > 60 ML/MIN/1.73M2
GLUCOSE SERPL-MCNC: 108 MG/DL (ref 70–108)
HCT VFR BLD AUTO: 40.7 % (ref 42–52)
HEPARIN UNFRACTIONATED: 0.15 U/ML (ref 0.3–0.7)
HEPARIN UNFRACTIONATED: 0.27 U/ML (ref 0.3–0.7)
HGB BLD-MCNC: 12.7 GM/DL (ref 14–18)
IMM GRANULOCYTES # BLD AUTO: 0.02 THOU/MM3 (ref 0–0.07)
IMM GRANULOCYTES NFR BLD AUTO: 0.4 %
LYMPHOCYTES ABSOLUTE: 1.3 THOU/MM3 (ref 1–4.8)
LYMPHOCYTES NFR BLD AUTO: 22.1 %
MAGNESIUM SERPL-MCNC: 2.3 MG/DL (ref 1.6–2.4)
MCH RBC QN AUTO: 26.8 PG (ref 26–33)
MCHC RBC AUTO-ENTMCNC: 31.2 GM/DL (ref 32.2–35.5)
MCV RBC AUTO: 86 FL (ref 80–94)
MONOCYTES ABSOLUTE: 0.5 THOU/MM3 (ref 0.4–1.3)
MONOCYTES NFR BLD AUTO: 8.4 %
NEUTROPHILS NFR BLD AUTO: 61.9 %
NRBC BLD AUTO-RTO: 0 /100 WBC
PHOSPHATE SERPL-MCNC: 4.3 MG/DL (ref 2.4–4.7)
PLATELET # BLD AUTO: 167 THOU/MM3 (ref 130–400)
PMV BLD AUTO: 10.3 FL (ref 9.4–12.4)
POTASSIUM SERPL-SCNC: 4.1 MEQ/L (ref 3.5–5.2)
RBC # BLD AUTO: 4.73 MILL/MM3 (ref 4.7–6.1)
SEGMENTED NEUTROPHILS ABSOLUTE COUNT: 3.5 THOU/MM3 (ref 1.8–7.7)
SODIUM SERPL-SCNC: 143 MEQ/L (ref 135–145)
WBC # BLD AUTO: 5.7 THOU/MM3 (ref 4.8–10.8)

## 2023-08-31 PROCEDURE — 6370000000 HC RX 637 (ALT 250 FOR IP): Performed by: INTERNAL MEDICINE

## 2023-08-31 PROCEDURE — 93010 ELECTROCARDIOGRAM REPORT: CPT | Performed by: FAMILY MEDICINE

## 2023-08-31 PROCEDURE — 99254 IP/OBS CNSLTJ NEW/EST MOD 60: CPT | Performed by: INTERNAL MEDICINE

## 2023-08-31 PROCEDURE — 93010 ELECTROCARDIOGRAM REPORT: CPT | Performed by: NUCLEAR MEDICINE

## 2023-08-31 PROCEDURE — 85520 HEPARIN ASSAY: CPT

## 2023-08-31 PROCEDURE — 84100 ASSAY OF PHOSPHORUS: CPT

## 2023-08-31 PROCEDURE — 82330 ASSAY OF CALCIUM: CPT

## 2023-08-31 PROCEDURE — 93005 ELECTROCARDIOGRAM TRACING: CPT | Performed by: INTERNAL MEDICINE

## 2023-08-31 PROCEDURE — 99232 SBSQ HOSP IP/OBS MODERATE 35: CPT | Performed by: INTERNAL MEDICINE

## 2023-08-31 PROCEDURE — 80048 BASIC METABOLIC PNL TOTAL CA: CPT

## 2023-08-31 PROCEDURE — 36415 COLL VENOUS BLD VENIPUNCTURE: CPT

## 2023-08-31 PROCEDURE — 6360000002 HC RX W HCPCS

## 2023-08-31 PROCEDURE — 2140000000 HC CCU INTERMEDIATE R&B

## 2023-08-31 PROCEDURE — 2580000003 HC RX 258

## 2023-08-31 PROCEDURE — 6370000000 HC RX 637 (ALT 250 FOR IP)

## 2023-08-31 PROCEDURE — 6360000002 HC RX W HCPCS: Performed by: INTERNAL MEDICINE

## 2023-08-31 PROCEDURE — 83735 ASSAY OF MAGNESIUM: CPT

## 2023-08-31 PROCEDURE — 85025 COMPLETE CBC W/AUTO DIFF WBC: CPT

## 2023-08-31 RX ORDER — CALCIUM GLUCONATE 20 MG/ML
2000 INJECTION, SOLUTION INTRAVENOUS ONCE
Status: DISCONTINUED | OUTPATIENT
Start: 2023-08-31 | End: 2023-09-02 | Stop reason: HOSPADM

## 2023-08-31 RX ORDER — SOTALOL HYDROCHLORIDE 80 MG/1
80 TABLET ORAL 2 TIMES DAILY
Status: DISCONTINUED | OUTPATIENT
Start: 2023-08-31 | End: 2023-09-02 | Stop reason: HOSPADM

## 2023-08-31 RX ADMIN — SODIUM CHLORIDE, PRESERVATIVE FREE 10 ML: 5 INJECTION INTRAVENOUS at 21:02

## 2023-08-31 RX ADMIN — HEPARIN SODIUM 2000 UNITS: 1000 INJECTION INTRAVENOUS; SUBCUTANEOUS at 02:44

## 2023-08-31 RX ADMIN — APIXABAN 5 MG: 5 TABLET, FILM COATED ORAL at 09:27

## 2023-08-31 RX ADMIN — ASPIRIN 81 MG: 81 TABLET, COATED ORAL at 09:27

## 2023-08-31 RX ADMIN — SOTALOL HYDROCHLORIDE 80 MG: 80 TABLET ORAL at 09:27

## 2023-08-31 RX ADMIN — SODIUM CHLORIDE, PRESERVATIVE FREE 10 ML: 5 INJECTION INTRAVENOUS at 09:58

## 2023-08-31 RX ADMIN — SOTALOL HYDROCHLORIDE 80 MG: 80 TABLET ORAL at 21:00

## 2023-08-31 RX ADMIN — ASPIRIN 81 MG: 81 TABLET, COATED ORAL at 21:01

## 2023-08-31 RX ADMIN — APIXABAN 5 MG: 5 TABLET, FILM COATED ORAL at 21:00

## 2023-08-31 NOTE — CARE COORDINATION
Case Management Assessment  Initial Evaluation    Date/Time of Evaluation: 8/31/2023 11:19 AM  Assessment Completed by: Veda Faulkner RN    If patient is discharged prior to next notation, then this note serves as note for discharge by case management. [facility]                   YOB: 1965  Diagnosis: V tach [facility]) [I47.20]                   Date / Time: 8/30/2023  6:58 PM  Location: [address] [place] [address]     Patient Admission Status: Inpatient   Readmission Risk Low 0-14, Mod 15-19), High > 20: Readmission Risk Score: 13.6    Current PCP: Jojo Milligan MD  PCP verified by CM? Chart Reviewed: Yes      History Provided by: Patient  Patient Orientation: Alert and Oriented    Patient Cognition: Alert    Hospitalization in the last 30 days (Readmission):  Yes    If yes, Readmission Assessment in CM Navigator will be completed. Advance Directives:      Code Status: Full Code   Patient's Primary Decision Maker is: Patient Declined (Legal Next of Kin Remains as Decision Maker)    Primary Decision Maker: Aditya Summers - Child - [phone]    Discharge Planning:    Patient lives with: Spouse/Significant Other Type of Home: House  Primary Care Giver: Self  Patient Support Systems include: Spouse/Significant Other, Children   Current Financial resources: Other (Comment) (Commercial insurance)  Current community resources: None  Current services prior to admission: C-pap, Durable Medical Equipment (CPAP from [employer])            Current DME: Cpap (CPAP from [employer])            Type of Home Care services:  None    ADLS  Prior functional level: Independent in ADLs/IADLs  Current functional level: Independent in ADLs/IADLs    Family can provide assistance at DC: Yes  Would you like Case Management to discuss the discharge plan with any other family members/significant others, and if so, who?  No  Plans to Return to Present Housing: Yes  Other Identified Issues/Barriers to RETURNING to current

## 2023-08-31 NOTE — PROGRESS NOTES
Reported off to Primary RN Eliza. She is informed of patient's request to bath after eating. Patient is comfortable, up in chair, with telemetry on.   [facility]imee Ramirez [facility]

## 2023-08-31 NOTE — PLAN OF CARE
Problem: Discharge Planning  Goal: Discharge to home or other facility with appropriate resources  Outcome: Progressing  Flowsheets (Taken 8/31/2023 0038)  Discharge to home or other facility with appropriate resources:   Identify barriers to discharge with patient and caregiver   Identify discharge learning needs (meds, wound care, etc)     Problem: Safety - Adult  Goal: Free from fall injury  Outcome: Progressing  Note: Bed locked & in low position, call light in reach, side-rails up x2, bed/chair alarm utilized, non-slip socks on when ambulating, reminded patient to use call light to call for assistance.       Problem: ABCDS Injury Assessment  Goal: Absence of physical injury  Outcome: Progressing  Flowsheets (Taken 8/31/2023 0038)  Absence of Physical Injury: Implement safety measures based on patient assessment     Problem: Cardiovascular - Adult  Goal: Maintains optimal cardiac output and hemodynamic stability  Outcome: Progressing  Flowsheets (Taken 8/31/2023 0038)  Maintains optimal cardiac output and hemodynamic stability:   Monitor blood pressure and heart rate   Monitor urine output and notify Licensed Independent Practitioner for values outside of normal range   Assess for signs of decreased cardiac output     Problem: Cardiovascular - Adult  Goal: Absence of cardiac dysrhythmias or at baseline  Outcome: Progressing  Flowsheets (Taken 8/31/2023 0038)  Absence of cardiac dysrhythmias or at baseline:   Monitor cardiac rate and rhythm   Assess for signs of decreased cardiac output   Administer antiarrhythmia medication and electrolyte replacement as ordered     Problem: Chronic Conditions and Co-morbidities  Goal: Patient's chronic conditions and co-morbidity symptoms are monitored and maintained or improved  Outcome: Progressing  Flowsheets (Taken 8/31/2023 0038)  Care Plan - Patient's Chronic Conditions and Co-Morbidity Symptoms are Monitored and Maintained or Improved:   Monitor and assess patient's

## 2023-08-31 NOTE — CARE COORDINATION
08/31/23 1117   Readmission Assessment   Number of Days since last admission? 1-7 days  (Transfer from [facility])   Previous Disposition Other (comment)  (Transfer from [facility])   Who is being Interviewed Patient   What was the patient's/caregiver's perception as to why they think they needed to return back to the hospital? Other (Comment)  (Transfer from [place] due to VT and start of Sotalol)   Did you visit your Primary Care Physician after you left the hospital, before you returned this time? No   Why weren't you able to visit your PCP? Other (Comment)  (Transfer from [facility])   Did you see a specialist, such as Cardiac, Pulmonary, Orthopedic Physician, etc. after you left the hospital? Yes  (Transfer from [facility])   Who advised the patient to return to the hospital? Physician  (Transfer from [facility])   Does the patient report anything that got in the way of taking their medications? No  (Transfer from [facility])   In our efforts to provide the best possible care to you and others like you, can you think of anything that we could have done to help you after you left the hospital the first time, so that you might not have needed to return so soon?  Other (Comment)  (Transfer from [place])

## 2023-08-31 NOTE — PROCEDURES
12 lead EKG completed. [place] [facility] [place]  Sherryle Halls CET

## 2023-08-31 NOTE — PROGRESS NOTES
Report on patient received from [place] with Shalini Calhoun. Patient is awake, calm and comfortable in bed. Breathing is not labored, telemetry is on, and SCDs are also running on both legs.   [facility]

## 2023-08-31 NOTE — PLAN OF CARE
Problem: Discharge Planning  Goal: Discharge to home or other facility with appropriate resources  8/31/2023 0903 by Giovanni Mcghee RN  Outcome: Progressing  Flowsheets (Taken 8/31/2023 1245)  Discharge to home or other facility with appropriate resources: Identify barriers to discharge with patient and caregiver     Problem: Safety - Adult  Goal: Free from fall injury  8/31/2023 0903 by Giovanni Mcghee RN  Outcome: Progressing  Flowsheets  Taken 8/31/2023 0903  Free From Fall Injury: Instruct family/caregiver on patient safety  Taken 8/31/2023 0800  Free From Fall Injury: Instruct family/caregiver on patient safety  Note: Bed locked & in low position, call light in reach, side-rails up x2, bed/chair alarm utilized, non-slip socks on when ambulating, reminded patient to use call light to call for assistance. Problem: Cardiovascular - Adult  Goal: Maintains optimal cardiac output and hemodynamic stability  8/31/2023 0903 by Giovanni Mcghee RN  Outcome: Progressing  Flowsheets (Taken 8/31/2023 2806)  Maintains optimal cardiac output and hemodynamic stability: Monitor blood pressure and heart rate  Note: On amio gtt, going to change to sotolol and transition to eliquis      Problem: Cardiovascular - Adult  Goal: Absence of cardiac dysrhythmias or at baseline  8/31/2023 0903 by Giovanni Mcghee RN  Outcome: Progressing  Flowsheets (Taken 8/31/2023 0903)  Absence of cardiac dysrhythmias or at baseline: Monitor cardiac rate and rhythm    Care plan reviewed with patient. Patient verbalizes understanding of the care plan and contributed to goal setting.

## 2023-09-01 LAB
ANION GAP SERPL CALC-SCNC: 12 MEQ/L (ref 8–16)
BUN SERPL-MCNC: 19 MG/DL (ref 7–22)
CA-I BLD ISE-SCNC: 1.16 MMOL/L (ref 1.12–1.32)
CALCIUM SERPL-MCNC: 9.4 MG/DL (ref 8.5–10.5)
CHLORIDE SERPL-SCNC: 99 MEQ/L (ref 98–111)
CO2 SERPL-SCNC: 28 MEQ/L (ref 23–33)
CREAT SERPL-MCNC: 0.8 MG/DL (ref 0.4–1.2)
EKG ATRIAL RATE: 60 BPM
EKG ATRIAL RATE: 60 BPM
EKG ATRIAL RATE: 61 BPM
EKG ATRIAL RATE: 66 BPM
EKG P AXIS: 26 DEGREES
EKG P AXIS: 27 DEGREES
EKG P AXIS: 32 DEGREES
EKG P AXIS: 35 DEGREES
EKG P-R INTERVAL: 124 MS
EKG P-R INTERVAL: 124 MS
EKG P-R INTERVAL: 142 MS
EKG P-R INTERVAL: 174 MS
EKG Q-T INTERVAL: 412 MS
EKG Q-T INTERVAL: 456 MS
EKG Q-T INTERVAL: 474 MS
EKG Q-T INTERVAL: 646 MS
EKG QRS DURATION: 102 MS
EKG QRS DURATION: 106 MS
EKG QRS DURATION: 106 MS
EKG QRS DURATION: 108 MS
EKG QTC CALCULATION (BAZETT): 431 MS
EKG QTC CALCULATION (BAZETT): 456 MS
EKG QTC CALCULATION (BAZETT): 477 MS
EKG QTC CALCULATION (BAZETT): 646 MS
EKG R AXIS: -10 DEGREES
EKG R AXIS: -12 DEGREES
EKG R AXIS: -3 DEGREES
EKG R AXIS: 7 DEGREES
EKG T AXIS: 109 DEGREES
EKG T AXIS: 116 DEGREES
EKG T AXIS: 56 DEGREES
EKG T AXIS: 57 DEGREES
EKG VENTRICULAR RATE: 60 BPM
EKG VENTRICULAR RATE: 60 BPM
EKG VENTRICULAR RATE: 61 BPM
EKG VENTRICULAR RATE: 66 BPM
GFR SERPL CREATININE-BSD FRML MDRD: > 60 ML/MIN/1.73M2
GLUCOSE SERPL-MCNC: 104 MG/DL (ref 70–108)
POTASSIUM SERPL-SCNC: 4.2 MEQ/L (ref 3.5–5.2)
SODIUM SERPL-SCNC: 139 MEQ/L (ref 135–145)

## 2023-09-01 PROCEDURE — 99232 SBSQ HOSP IP/OBS MODERATE 35: CPT | Performed by: STUDENT IN AN ORGANIZED HEALTH CARE EDUCATION/TRAINING PROGRAM

## 2023-09-01 PROCEDURE — 6370000000 HC RX 637 (ALT 250 FOR IP)

## 2023-09-01 PROCEDURE — 93005 ELECTROCARDIOGRAM TRACING: CPT | Performed by: INTERNAL MEDICINE

## 2023-09-01 PROCEDURE — 2580000003 HC RX 258

## 2023-09-01 PROCEDURE — 80048 BASIC METABOLIC PNL TOTAL CA: CPT

## 2023-09-01 PROCEDURE — 6370000000 HC RX 637 (ALT 250 FOR IP): Performed by: INTERNAL MEDICINE

## 2023-09-01 PROCEDURE — 82330 ASSAY OF CALCIUM: CPT

## 2023-09-01 PROCEDURE — 36415 COLL VENOUS BLD VENIPUNCTURE: CPT

## 2023-09-01 PROCEDURE — 2140000000 HC CCU INTERMEDIATE R&B

## 2023-09-01 PROCEDURE — 93010 ELECTROCARDIOGRAM REPORT: CPT | Performed by: NUCLEAR MEDICINE

## 2023-09-01 RX ADMIN — SODIUM CHLORIDE, PRESERVATIVE FREE 10 ML: 5 INJECTION INTRAVENOUS at 09:11

## 2023-09-01 RX ADMIN — SOTALOL HYDROCHLORIDE 80 MG: 80 TABLET ORAL at 09:10

## 2023-09-01 RX ADMIN — SOTALOL HYDROCHLORIDE 80 MG: 80 TABLET ORAL at 20:30

## 2023-09-01 RX ADMIN — ASPIRIN 81 MG: 81 TABLET, COATED ORAL at 20:30

## 2023-09-01 RX ADMIN — ASPIRIN 81 MG: 81 TABLET, COATED ORAL at 09:10

## 2023-09-01 RX ADMIN — SODIUM CHLORIDE, PRESERVATIVE FREE 10 ML: 5 INJECTION INTRAVENOUS at 20:30

## 2023-09-01 NOTE — PROGRESS NOTES
Hospitalist Progress Note      Patient:  Thiago Monteiro    Unit/Bed:3B-26/026-A  YOB: 1965  MRN: [id number]   Acct: [de-identified]     PCP: Zhao Child MD  Date of Admission: 8/30/2023      Assessment/Plan:    NSVT: EP following. Did have 1 episode at OSH. Currently on sotalol. Plan as noted below  Wide complex tachycardia: Multiple PACs noted on monitor.   -stop [address] per EP recs  -continue sotalol. Per EP prefers 3-5 doses prior to discharge  -monitor QTC  -ambulate patient in [place]  Paroxysmal atrial fibrillation: [id number] 1. Previously diagnosed at [facility] 8/19/2015. History of prior DCCV. Not on [address] on admission (low CHADS2 score). Treated with Tikosyn therapy for rate/rhythm control. Atypical chest pain (resolved): Noted on arrival suspect secondary to arrhythmia  Chronic HFpEF NYHA class II: Not in exacerbation: Takes Lasix every other day for pedal edema. Held by cardiology  ASD: small L to Rt shunt noted on TTE 8/30/23 w/ bubble EF 55-60%. No valvular disease  Class II obesity: BMI 39 complicated by MARIA TERESA  MARIA TERESA on CPAP: Home CPAP resumed  Hx thoracic aneurysm repair: Noted        Expected discharge date:  9/2/23     Disposition Plan: Home    Chief Complaint: Shortness of breath    Hospital Course:   Patient was admitted to [facility] on 8/30/2023 with shortness of breath. Patient has past medical history significant for paroxysmal atrial fibrillation but was not on oral anticoagulation due to a low [id number] score and was on treatment with Tikosyn. He has had previous DCCV as well as history of a thoracic aneurysm repair. He reports that he was walking at the time of the incident and works regularly as a . He reports he began having worsening dyspnea on exertion and shortness of breath and felt as if he was having chest pressure and palpitations.   Patient was started on amiodarone on arrival for rhythm control with return to normal Clear to auscultation, bilaterally without Rales/Wheezes/Rhonchi. Cardiovascular: Regular rate and rhythm with normal S1/S2 without murmurs, rubs or gallops. Abdomen: Soft, non-tender, non-distended with normal bowel sounds. Musculoskeletal: passive and active ROM x 4 extremities. Skin: Skin color, texture, turgor normal.  No rashes or lesions. Neurologic:  Neurovascularly intact without any focal sensory/motor deficits. Cranial nerves: II-XII intact, grossly non-focal.  Psychiatric: Alert and oriented, thought content appropriate, normal insight  Capillary Refill: Brisk,< 3 seconds   Peripheral Pulses: +2 palpable, equal bilaterally       Labs:   Recent Labs     08/30/23 2047 08/31/23  0822   WBC 5.3 5.7   HGB 12.7* 12.7*   HCT 39.8* 40.7*    167     Recent Labs     08/29/23  2215 08/30/23  0553 08/31/23  0822    137 143   K 3.8 4.2 4.1   CL 98 99 105   CO2 28 26 25   BUN 21* 19 21   CREATININE 0.8 0.8 0.8   CALCIUM 9.6 9.5 9.1   PHOS  --   --  4.3     No results for input(s): AST, ALT, BILIDIR, BILITOT, ALKPHOS in the last 72 hours. Recent Labs     08/30/23 2047   INR 0.87     No results for input(s): Romilda Ahr in the last 72 hours.     Microbiology:      Urinalysis:      Lab Results   Component Value Date/Time    NITRU NEGATIVE 03/28/2023 01:21 PM    WBCUA 0 TO 2 03/30/2015 11:30 AM    BACTERIA NOT REPORTED 03/30/2015 11:30 AM    RBCUA 0 TO 2 03/30/2015 11:30 AM    SPECGRAV 1.017 03/28/2023 01:21 PM    GLUCOSEU NEGATIVE 03/28/2023 01:21 PM       Radiology:  No orders to display       DVT prophylaxis: [] Lovenox                                 [] SCDs                                 [] SQ Heparin                                 [x] Encourage ambulation           [] Already on Anticoagulation     Code Status: Full Code    PT/OT Eval Status: No indications    Tele:   [x] yes             [] no        Electronically signed by Precious Nixon DO on 9/1/2023 at 8:17 AM

## 2023-09-01 NOTE — CARE COORDINATION
9/1/23, 11:54 AM EDT    DISCHARGE ON GOING EVALUATION    [id number] [address] Ramírez day: 2  Location: [address] [place] [address]- Reason for admit: V tach [facility]) [I47.20]     Barriers to Discharge: Hospitalist and Cardiology/EP following. Sotalol initiated day #2. Anticipate discharge home tomorrow if no concerns. PCP: Orly Penaloza MD  Readmission Risk Score: 13.2%  Patient Goals/Plan/Treatment Preferences: Plans home with S.O. Denies needs. Anticipate discharge tomorrow. 9/1/23, 11:58 AM EDT    Patient goals/plan/ treatment preferences discussed by  and . Patient goals/plan/ treatment preferences reviewed with patient/ family. Patient/ family verbalize understanding of discharge plan and are in agreement with goal/plan/treatment preferences. Understanding was demonstrated using the teach back method. AVS provided by RN at time of discharge, which includes all necessary medical information pertaining to the patients current course of illness, treatment, post-discharge goals of care, and treatment preferences.      Services At/After Discharge: None      Electronically signed by Ant Gonzalez RN on 9/1/2023 at 11:58 AM

## 2023-09-01 NOTE — FLOWSHEET NOTE
Patient ambulated the hallways, 60 feet. Patient tolerated it well without cornelius signs of distress.

## 2023-09-01 NOTE — PLAN OF CARE
Problem: Discharge Planning  Goal: Discharge to home or other facility with appropriate resources  9/1/2023 1037 by Chon Preston RN  Outcome: Progressing  Flowsheets (Taken 9/1/2023 1037)  Discharge to home or other facility with appropriate resources:   Identify barriers to discharge with patient and caregiver   Arrange for needed discharge resources and transportation as appropriate     Problem: Safety - Adult  Goal: Free from fall injury  9/1/2023 1037 by Chon Preston RN  Outcome: Progressing  Flowsheets (Taken 9/1/2023 1037)  Free From Fall Injury: Instruct family/caregiver on patient safety     Problem: ABCDS Injury Assessment  Goal: Absence of physical injury  9/1/2023 1037 by Chon Preston RN  Outcome: Progressing  Flowsheets (Taken 9/1/2023 1037)  Absence of Physical Injury: Implement safety measures based on patient assessment     Problem: Cardiovascular - Adult  Goal: Maintains optimal cardiac output and hemodynamic stability  9/1/2023 1037 by hCon Preston RN  Outcome: Progressing  Flowsheets (Taken 9/1/2023 1037)  Maintains optimal cardiac output and hemodynamic stability:   Monitor blood pressure and heart rate   Assess for signs of decreased cardiac output     Problem: Cardiovascular - Adult  Goal: Absence of cardiac dysrhythmias or at baseline  9/1/2023 1037 by Chon Preston RN  Outcome: Progressing  Flowsheets (Taken 9/1/2023 1037)  Absence of cardiac dysrhythmias or at baseline:   Monitor cardiac rate and rhythm   Assess for signs of decreased cardiac output     Care plan reviewed with patient. Patient verbalizes understanding of the care plan and contributed to goal setting.

## 2023-09-01 NOTE — PROCEDURES
EKG completed with gains doubled as requested by Dr Danilo Santa and given to [facility].

## 2023-09-01 NOTE — PLAN OF CARE
Problem: Discharge Planning  Goal: Discharge to home or other facility with appropriate resources  Outcome: Progressing  Flowsheets (Taken 9/1/2023 0404)  Discharge to home or other facility with appropriate resources:   Identify barriers to discharge with patient and caregiver   Identify discharge learning needs (meds, wound care, etc)     Problem: Safety - Adult  Goal: Free from fall injury  Outcome: Progressing  Note: Call light within reach. Non slip socks on when ambulating. Reminded patient to use call light for assistance.       Problem: ABCDS Injury Assessment  Goal: Absence of physical injury  Outcome: Progressing  Flowsheets (Taken 9/1/2023 0404)  Absence of Physical Injury: Implement safety measures based on patient assessment     Problem: Cardiovascular - Adult  Goal: Maintains optimal cardiac output and hemodynamic stability  Outcome: Progressing  Flowsheets (Taken 9/1/2023 0404)  Maintains optimal cardiac output and hemodynamic stability:   Monitor blood pressure and heart rate   Monitor urine output and notify Licensed Independent Practitioner for values outside of normal range   Assess for signs of decreased cardiac output  Goal: Absence of cardiac dysrhythmias or at baseline  Outcome: Progressing  Flowsheets (Taken 9/1/2023 0404)  Absence of cardiac dysrhythmias or at baseline:   Monitor cardiac rate and rhythm   Assess for signs of decreased cardiac output   Administer antiarrhythmia medication and electrolyte replacement as ordered     Problem: Chronic Conditions and Co-morbidities  Goal: Patient's chronic conditions and co-morbidity symptoms are monitored and maintained or improved  Outcome: Progressing  Flowsheets (Taken 9/1/2023 0404)  Care Plan - Patient's Chronic Conditions and Co-Morbidity Symptoms are Monitored and Maintained or Improved:   Monitor and assess patient's chronic conditions and comorbid symptoms for stability, deterioration, or improvement   Update acute care plan with

## 2023-09-02 VITALS
HEIGHT: 68 IN | WEIGHT: 260 LBS | BODY MASS INDEX: 39.4 KG/M2 | TEMPERATURE: 97.7 F | DIASTOLIC BLOOD PRESSURE: 60 MMHG | RESPIRATION RATE: 18 BRPM | SYSTOLIC BLOOD PRESSURE: 123 MMHG | OXYGEN SATURATION: 94 % | HEART RATE: 57 BPM

## 2023-09-02 LAB
ANION GAP SERPL CALC-SCNC: 10 MEQ/L (ref 8–16)
BUN SERPL-MCNC: 20 MG/DL (ref 7–22)
CALCIUM SERPL-MCNC: 9.2 MG/DL (ref 8.5–10.5)
CHLORIDE SERPL-SCNC: 99 MEQ/L (ref 98–111)
CO2 SERPL-SCNC: 28 MEQ/L (ref 23–33)
CREAT SERPL-MCNC: 0.8 MG/DL (ref 0.4–1.2)
EKG ATRIAL RATE: 56 BPM
EKG ATRIAL RATE: 57 BPM
EKG P AXIS: 22 DEGREES
EKG P AXIS: 30 DEGREES
EKG P-R INTERVAL: 168 MS
EKG P-R INTERVAL: 170 MS
EKG Q-T INTERVAL: 482 MS
EKG Q-T INTERVAL: 494 MS
EKG QRS DURATION: 102 MS
EKG QRS DURATION: 104 MS
EKG QTC CALCULATION (BAZETT): 469 MS
EKG QTC CALCULATION (BAZETT): 476 MS
EKG R AXIS: -10 DEGREES
EKG R AXIS: -2 DEGREES
EKG T AXIS: 63 DEGREES
EKG T AXIS: 69 DEGREES
EKG VENTRICULAR RATE: 56 BPM
EKG VENTRICULAR RATE: 57 BPM
GFR SERPL CREATININE-BSD FRML MDRD: > 60 ML/MIN/1.73M2
GLUCOSE SERPL-MCNC: 102 MG/DL (ref 70–108)
MAGNESIUM SERPL-MCNC: 2.3 MG/DL (ref 1.6–2.4)
POTASSIUM SERPL-SCNC: 4.1 MEQ/L (ref 3.5–5.2)
SODIUM SERPL-SCNC: 137 MEQ/L (ref 135–145)

## 2023-09-02 PROCEDURE — 6370000000 HC RX 637 (ALT 250 FOR IP)

## 2023-09-02 PROCEDURE — 80048 BASIC METABOLIC PNL TOTAL CA: CPT

## 2023-09-02 PROCEDURE — 2580000003 HC RX 258

## 2023-09-02 PROCEDURE — 93005 ELECTROCARDIOGRAM TRACING: CPT

## 2023-09-02 PROCEDURE — 83735 ASSAY OF MAGNESIUM: CPT

## 2023-09-02 PROCEDURE — 99239 HOSP IP/OBS DSCHRG MGMT >30: CPT | Performed by: STUDENT IN AN ORGANIZED HEALTH CARE EDUCATION/TRAINING PROGRAM

## 2023-09-02 PROCEDURE — 93010 ELECTROCARDIOGRAM REPORT: CPT | Performed by: NUCLEAR MEDICINE

## 2023-09-02 PROCEDURE — 99232 SBSQ HOSP IP/OBS MODERATE 35: CPT | Performed by: STUDENT IN AN ORGANIZED HEALTH CARE EDUCATION/TRAINING PROGRAM

## 2023-09-02 PROCEDURE — 6370000000 HC RX 637 (ALT 250 FOR IP): Performed by: INTERNAL MEDICINE

## 2023-09-02 PROCEDURE — 36415 COLL VENOUS BLD VENIPUNCTURE: CPT

## 2023-09-02 RX ORDER — SOTALOL HYDROCHLORIDE 80 MG/1
80 TABLET ORAL 2 TIMES DAILY
Qty: 60 TABLET | Refills: 3 | Status: SHIPPED | OUTPATIENT
Start: 2023-09-02

## 2023-09-02 RX ADMIN — SOTALOL HYDROCHLORIDE 80 MG: 80 TABLET ORAL at 09:05

## 2023-09-02 RX ADMIN — SODIUM CHLORIDE, PRESERVATIVE FREE 10 ML: 5 INJECTION INTRAVENOUS at 09:05

## 2023-09-02 RX ADMIN — ASPIRIN 81 MG: 81 TABLET, COATED ORAL at 09:05

## 2023-09-02 NOTE — PROGRESS NOTES
regurgitation. Mild stenosis of the aortic valve. AV mean gradient is 13 mmHg. Mitral Valve: Mild regurgitation. Interatrial Septum: A small inter-atrial communication appears to be present with a left to right shunt visible by color Doppler. Most likely a small PFO or ASD. Compared to the previous study on 4/6/23, no significant change was seen. Stress:     Left Heart Cath:     Lab Data:    Cardiac Enzymes:  No results for input(s): CKTOTAL, CKMB, CKMBINDEX, TROPONINI in the last 72 hours.     CBC:   Lab Results   Component Value Date/Time    WBC 5.7 08/31/2023 08:22 AM    RBC 4.73 08/31/2023 08:22 AM    HGB 12.7 08/31/2023 08:22 AM    HCT 40.7 08/31/2023 08:22 AM     08/31/2023 08:22 AM       CMP:    Lab Results   Component Value Date/Time     09/02/2023 03:54 AM    K 4.1 09/02/2023 03:54 AM    K 3.9 02/27/2021 03:58 AM    CL 99 09/02/2023 03:54 AM    CO2 28 09/02/2023 03:54 AM    BUN 20 09/02/2023 03:54 AM    CREATININE 0.8 09/02/2023 03:54 AM    GFRAA >60 04/24/2022 05:30 AM    LABGLOM >60 09/02/2023 03:54 AM    GLUCOSE 102 09/02/2023 03:54 AM    GLUCOSE 115 10/01/2011 07:42 AM    CALCIUM 9.2 09/02/2023 03:54 AM       Hepatic Function Panel:    Lab Results   Component Value Date/Time    ALKPHOS 129 08/01/2023 09:16 AM    ALT 28 08/01/2023 09:16 AM    AST 22 08/01/2023 09:16 AM    PROT 6.9 08/01/2023 09:16 AM    BILITOT 0.2 08/01/2023 09:16 AM    BILIDIR <0.08 10/30/2014 09:53 AM    IBILI CANNOT BE CALCULATED 10/30/2014 09:53 AM    LABALBU 4.1 08/01/2023 09:16 AM    LABALBU 4.9 10/01/2011 07:42 AM       Magnesium:    Lab Results   Component Value Date/Time    MG 2.3 09/02/2023 03:54 AM       PT/INR:    Lab Results   Component Value Date/Time    PROTIME 13.5 04/23/2022 02:55 PM    INR 0.87 08/30/2023 08:47 PM       HgBA1c:    Lab Results   Component Value Date/Time    LABA1C 5.4 07/19/2023 12:23 PM       FLP:    Lab Results   Component Value Date/Time    TRIG 126 07/19/2023 12:23 PM    HDL

## 2023-09-02 NOTE — PROGRESS NOTES
AVS discussed with patient. All questions and concerns addressed. Educated patient to get medications from pharmacy. Patient discharged in stable condition per wheelchair with transport.

## 2023-09-02 NOTE — DISCHARGE SUMMARY
apparent distress, appears stated age and cooperative. HEENT: Pupils equal, round, and reactive to light. Conjunctivae/corneas clear. Neck: Supple, with full range of motion. No jugular venous distention. Trachea midline. Respiratory:  Normal respiratory effort. Clear to auscultation, bilaterally without Rales/Wheezes/Rhonchi. Cardiovascular: Regular rate and rhythm with normal S1/S2 without murmurs, rubs or gallops. Abdomen: Soft, non-tender, non-distended with normal bowel sounds. Musculoskeletal: passive and active ROM x 4 extremities. Skin: Skin color, texture, turgor normal.  No rashes or lesions. Neurologic:  Neurovascularly intact without any focal sensory/motor deficits. Cranial nerves: II-XII intact, grossly non-focal.  Psychiatric: Alert and oriented, thought content appropriate, normal insight  Capillary Refill: Brisk,< 3 seconds   Peripheral Pulses: +2 palpable, equal bilaterally       Labs: For convenience and continuity at follow-up the following most recent labs are provided:      CBC:    Lab Results   Component Value Date/Time    WBC 5.7 08/31/2023 08:22 AM    HGB 12.7 08/31/2023 08:22 AM    HCT 40.7 08/31/2023 08:22 AM     08/31/2023 08:22 AM       Renal:    Lab Results   Component Value Date/Time     09/02/2023 03:54 AM    K 4.1 09/02/2023 03:54 AM    K 3.9 02/27/2021 03:58 AM    CL 99 09/02/2023 03:54 AM    CO2 28 09/02/2023 03:54 AM    BUN 20 09/02/2023 03:54 AM    CREATININE 0.8 09/02/2023 03:54 AM    CALCIUM 9.2 09/02/2023 03:54 AM    PHOS 4.3 08/31/2023 08:22 AM         Significant Diagnostic Studies    Radiology:   No orders to display          Consults:     IP CONSULT TO ELECTROPHYSIOLOGY    Disposition: Home  Condition at Discharge: Stable    Code Status:  Full Code     Patient Instructions:    Discharge lab work: dutch  Activity: activity as tolerated  Diet: ADULT DIET;  Regular      Follow-up visits:   Berenice Reyes MD  [address] [facility]   [address]  [facility] [address]  [phone]    Schedule an appointment as soon as possible for a visit in 1 week(s)      Chevy Dee MD  [address]  [phone]    Follow up           Discharge Medications:        Medication List        START taking these medications      sotalol 80 MG tablet  Commonly known as: BETAPACE  Take 1 tablet by mouth 2 times daily            CHANGE how you take these medications      aspirin 81 MG EC tablet  What changed: Another medication with the same name was removed. Continue taking this medication, and follow the directions you see here. CONTINUE taking these medications      furosemide 20 MG tablet  Commonly known as: LASIX  TAKE 1 TABLET BY MOUTH EVERY OTHER DAY     losartan 25 MG tablet  Commonly known as: COZAAR  Take 1 tablet by mouth daily     traZODone 50 MG tablet  Commonly known as: DESYREL  TAKE 1 TABLET BY MOUTH NIGHTLY AS NEEDED FOR SLEEP            STOP taking these medications      metoprolol succinate 50 MG extended release tablet  Commonly known as: TOPROL XL               Where to Get Your Medications        These medications were sent to [address] [place] [address] [address] [facility]      Phone: [phone]   sotalol 80 MG tablet          Time Spent on discharge is more than 1.5 hours in the examination, evaluation, counseling and review of medications and discharge plan. Signed: Thank you Zhao Child MD for the opportunity to be involved in this patient's care.     Electronically signed by Shauna Granado DO on 9/2/2023 at 1:56 PM

## 2023-09-02 NOTE — PLAN OF CARE
Problem: Discharge Planning  Goal: Discharge to home or other facility with appropriate resources  9/2/2023 1105 by George Ocampo RN  Outcome: Progressing  Flowsheets (Taken 9/2/2023 1105)  Discharge to home or other facility with appropriate resources: Identify barriers to discharge with patient and caregiver     Problem: Safety - Adult  Goal: Free from fall injury  9/2/2023 1105 by George Ocampo RN  Outcome: Progressing  Flowsheets (Taken 9/2/2023 1105)  Free From Fall Injury: Instruct family/caregiver on patient safety     Problem: ABCDS Injury Assessment  Goal: Absence of physical injury  9/2/2023 1105 by George Ocampo RN  Outcome: Progressing  Flowsheets (Taken 9/2/2023 1105)  Absence of Physical Injury: Implement safety measures based on patient assessment     Problem: Cardiovascular - Adult  Goal: Maintains optimal cardiac output and hemodynamic stability  9/2/2023 1105 by George Ocampo RN  Outcome: Progressing  Flowsheets (Taken 9/2/2023 1105)  Maintains optimal cardiac output and hemodynamic stability: Monitor blood pressure and heart rate     Problem: Cardiovascular - Adult  Goal: Absence of cardiac dysrhythmias or at baseline  9/2/2023 1105 by George Ocampo RN  Outcome: Progressing  Flowsheets (Taken 9/2/2023 1105)  Absence of cardiac dysrhythmias or at baseline: Monitor cardiac rate and rhythm     Problem: Chronic Conditions and Co-morbidities  Goal: Patient's chronic conditions and co-morbidity symptoms are monitored and maintained or improved  9/2/2023 1105 by George Ocampo RN  Outcome: Progressing  Flowsheets (Taken 9/2/2023 1105)  Care Plan - Patient's Chronic Conditions and Co-Morbidity Symptoms are Monitored and Maintained or Improved: Monitor and assess patient's chronic conditions and comorbid symptoms for stability, deterioration, or improvement    Care plan reviewed with patient.   Patient verbalizes understanding of the care plan and

## 2023-09-02 NOTE — DISCHARGE INSTRUCTIONS
F/u with Dr Ayush Willson as scheduled. Dr Sabiha Couch would like 4 week f/u in his office ( [facility]'s 2nd floor). If you have not heard by next week. Please call our office.      [phone]

## 2023-09-02 NOTE — PLAN OF CARE
Problem: Discharge Planning  Goal: Discharge to home or other facility with appropriate resources  9/1/2023 2327 by Ellene Ormond, RN  Outcome: Progressing  Flowsheets (Taken 9/1/2023 2327)  Discharge to home or other facility with appropriate resources: Identify barriers to discharge with patient and caregiver  9/1/2023 1037 by Brian Luna RN  Outcome: Progressing  Flowsheets (Taken 9/1/2023 1037)  Discharge to home or other facility with appropriate resources:   Identify barriers to discharge with patient and caregiver   Arrange for needed discharge resources and transportation as appropriate     Problem: Safety - Adult  Goal: Free from fall injury  9/1/2023 2327 by Ellene Ormond, RN  Outcome: Progressing  Flowsheets (Taken 9/1/2023 2327)  Free From Fall Injury: Instruct family/caregiver on patient safety  9/1/2023 1037 by Brian Luna RN  Outcome: Progressing  Flowsheets (Taken 9/1/2023 1037)  Free From Fall Injury: Instruct family/caregiver on patient safety     Problem: ABCDS Injury Assessment  Goal: Absence of physical injury  9/1/2023 2327 by Ellene Ormond, RN  Outcome: Progressing  Flowsheets (Taken 9/1/2023 2327)  Absence of Physical Injury: Implement safety measures based on patient assessment  9/1/2023 1037 by Brian Luna RN  Outcome: Progressing  Flowsheets (Taken 9/1/2023 1037)  Absence of Physical Injury: Implement safety measures based on patient assessment     Problem: Cardiovascular - Adult  Goal: Maintains optimal cardiac output and hemodynamic stability  9/1/2023 2327 by Ellene Ormond, RN  Outcome: Progressing  Flowsheets (Taken 9/1/2023 2327)  Maintains optimal cardiac output and hemodynamic stability: Monitor blood pressure and heart rate  9/1/2023 1037 by Brian Luna RN  Outcome: Progressing  Flowsheets (Taken 9/1/2023 1037)  Maintains optimal cardiac output and hemodynamic stability:   Monitor blood pressure and heart rate   Assess for signs of decreased cardiac output  Goal: Absence of cardiac dysrhythmias or at baseline  9/1/2023 2327 by Gabi Schmidt RN  Outcome: Progressing  9/1/2023 1037 by Shi Gong RN  Outcome: Progressing  Flowsheets (Taken 9/1/2023 1037)  Absence of cardiac dysrhythmias or at baseline:   Monitor cardiac rate and rhythm   Assess for signs of decreased cardiac output     Problem: Chronic Conditions and Co-morbidities  Goal: Patient's chronic conditions and co-morbidity symptoms are monitored and maintained or improved  Outcome: Progressing  Flowsheets (Taken 9/1/2023 2327)  Care Plan - Patient's Chronic Conditions and Co-Morbidity Symptoms are Monitored and Maintained or Improved: Monitor and assess patient's chronic conditions and comorbid symptoms for stability, deterioration, or improvement

## 2023-11-14 ENCOUNTER — OFFICE VISIT (OUTPATIENT)
Dept: CARDIOLOGY | Age: 58
End: 2023-11-14
Payer: OTHER GOVERNMENT

## 2023-11-14 ENCOUNTER — HOSPITAL ENCOUNTER (OUTPATIENT)
Age: 58
Discharge: HOME | End: 2023-11-16
Payer: OTHER GOVERNMENT

## 2023-11-14 ENCOUNTER — OFFICE VISIT (OUTPATIENT)
Dept: PRIMARY CARE CLINIC | Age: 58
End: 2023-11-14
Payer: OTHER GOVERNMENT

## 2023-11-14 VITALS
RESPIRATION RATE: 18 BRPM | DIASTOLIC BLOOD PRESSURE: 60 MMHG | HEART RATE: 70 BPM | SYSTOLIC BLOOD PRESSURE: 124 MMHG | HEIGHT: 68 IN | BODY MASS INDEX: 39.4 KG/M2 | OXYGEN SATURATION: 98 % | WEIGHT: 260 LBS

## 2023-11-14 VITALS
SYSTOLIC BLOOD PRESSURE: 118 MMHG | HEIGHT: 68 IN | DIASTOLIC BLOOD PRESSURE: 72 MMHG | OXYGEN SATURATION: 97 % | BODY MASS INDEX: 39.4 KG/M2 | WEIGHT: 260 LBS | HEART RATE: 58 BPM

## 2023-11-14 DIAGNOSIS — G47.33 OSA ON CPAP: ICD-10-CM

## 2023-11-14 DIAGNOSIS — I48.0 PAF (PAROXYSMAL ATRIAL FIBRILLATION) (HCC): Primary | ICD-10-CM

## 2023-11-14 DIAGNOSIS — Z79.899 ENCOUNTER FOR MONITORING SOTALOL THERAPY: ICD-10-CM

## 2023-11-14 DIAGNOSIS — E66.9 CLASS 2 OBESITY WITH BODY MASS INDEX (BMI) OF 39.0 TO 39.9 IN ADULT, UNSPECIFIED OBESITY TYPE, UNSPECIFIED WHETHER SERIOUS COMORBIDITY PRESENT: ICD-10-CM

## 2023-11-14 DIAGNOSIS — I42.8 NICM (NONISCHEMIC CARDIOMYOPATHY) (HCC): ICD-10-CM

## 2023-11-14 DIAGNOSIS — I50.32 CHRONIC DIASTOLIC HEART FAILURE (HCC): ICD-10-CM

## 2023-11-14 DIAGNOSIS — I71.23 ANEURYSM OF DESCENDING THORACIC AORTA WITHOUT RUPTURE (HCC): ICD-10-CM

## 2023-11-14 DIAGNOSIS — I10 ESSENTIAL HYPERTENSION: ICD-10-CM

## 2023-11-14 DIAGNOSIS — I48.0 PAF (PAROXYSMAL ATRIAL FIBRILLATION) (HCC): ICD-10-CM

## 2023-11-14 DIAGNOSIS — G47.00 INSOMNIA, UNSPECIFIED TYPE: Primary | ICD-10-CM

## 2023-11-14 DIAGNOSIS — Z51.81 ENCOUNTER FOR MONITORING SOTALOL THERAPY: ICD-10-CM

## 2023-11-14 DIAGNOSIS — I48.91 ATRIAL FIBRILLATION WITH RAPID VENTRICULAR RESPONSE (HCC): ICD-10-CM

## 2023-11-14 LAB — ECHO BSA: 2.38 M2

## 2023-11-14 PROCEDURE — 93247 EXT ECG>7D<15D SCAN A/R: CPT

## 2023-11-14 PROCEDURE — 3074F SYST BP LT 130 MM HG: CPT | Performed by: STUDENT IN AN ORGANIZED HEALTH CARE EDUCATION/TRAINING PROGRAM

## 2023-11-14 PROCEDURE — 3074F SYST BP LT 130 MM HG: CPT | Performed by: PHYSICIAN ASSISTANT

## 2023-11-14 PROCEDURE — 3078F DIAST BP <80 MM HG: CPT | Performed by: STUDENT IN AN ORGANIZED HEALTH CARE EDUCATION/TRAINING PROGRAM

## 2023-11-14 PROCEDURE — 3078F DIAST BP <80 MM HG: CPT | Performed by: PHYSICIAN ASSISTANT

## 2023-11-14 PROCEDURE — 99214 OFFICE O/P EST MOD 30 MIN: CPT | Performed by: STUDENT IN AN ORGANIZED HEALTH CARE EDUCATION/TRAINING PROGRAM

## 2023-11-14 PROCEDURE — 99213 OFFICE O/P EST LOW 20 MIN: CPT | Performed by: PHYSICIAN ASSISTANT

## 2023-11-14 NOTE — PROGRESS NOTES
[address], [place] [address], [place], [address] is a 62 y.o. male with  has a past medical history of AAA (abdominal aortic aneurysm) ([address]), Aortic regurgitation, Atrial fibrillation ([address]), Atrial flutter ([address]), Encounter for cardioversion procedure, Frequent urination, H/O echocardiogram, H/O echocardiogram, Hemorrhage of rectum and anus, History of 24 hour EKG monitoring, History of echocardiogram, History of PFTs, Hx of transesophageal echocardiography (SKYLAR) for monitoring, Hypertension, Impaired fasting glucose, Knee pain, bilateral, Lipoma, Obesity (BMI 30.0-34.9), PAF (paroxysmal atrial fibrillation) ([address]), Paroxysmal supraventricular tachycardia, Periodontal disease, Sleep apnea, Tobacco use disorder, Under care of team, Under care of team, Under care of team, Urinary urgency, and Ventral hernia. Presented to the office today for:  Chief Complaint   Patient presents with    Hypertension       Assessment/Plan   1. Insomnia, unspecified type  2. Essential hypertension  3. Atrial fibrillation with rapid ventricular response ([address])  4. Chronic diastolic heart failure ([address])  Return in about 6 months (around 5/14/2024) for F/U Meds. HTN - continue w/ Toprol and Lasix well tolerated at this time  Insomnia -  Discussed Sleep Hygiene today to be stated. Trazodone PRN nightly. F/U with Specialists per prior plans  Discussed possible irrigation     All patient questions answered. Pt voiced understanding. There are no discontinued medications. Patient received counseling on the following healthy behaviors: nutrition, exercise and medication adherence. I encouraged and discussed lifestyle modifications including diet and exercise and the patient was agreeable to making positive/beneficial changes to both to help improve their overall health. Discussed use, benefit, and side effects of prescribed medications.   Barriers to medication compliance

## 2023-11-14 NOTE — PROGRESS NOTES
Jamie Thorne am scribing for and in the presence of Virginia Rodriguez. Patient: Marcelo Zuñiga  : 1965  Date of Visit: 2023    REASON FOR VISIT / CONSULTATION: Congestive Heart Failure (HX: CHF. Pt states he is doing well. Denies: Cp, Palpitations, Lightheaded/ dizziness, SOB. )    Dear Dr. Beth Michel MD,     I had the opportunity to see Marcelo Zuñiga at the office today for follow up. 1-He has a history of thoracic aorta surgery at the [facility] clinic in . He said he had a repair with no artificial grafts put in. He had a heart catheterization prior to the aorta surgery and was told his coronary arteries were fine. No history of MI or angina. 2-He does have a history of atrial fibrillation/Atypical flutter which predates his thoracic aorta surgery but after his thoracic aorta surgery he had a couple of recurrences, multiple cardioversion and finally underwent atypical flutter ablation on 2015 at [facility] clinic. 3-Echocardiogram done 17 EF 55-60% Mildly increased left ventricular wall thickness with a normal left ventricular cavity size. No definite specific wall motion abnormalities were identified. The left atrium is moderately dilated (34-39) with a left atrial volume index of 36 ml/m2. Mild mitral and tricuspid regurgitation. Moderate aortic regurgitation. Evidence of mild diastolic dysfunction is seen     4-Another SKYLAR and cardioversion of atrial flutter on 2020. This episode seems to be precipitated by binge drinking. 5- He presented again to [facility] on 2021 with atrial flutter with rapid ventricular response. We ended up doing a cardioversion on 2/3/2021. No SKYLAR was needed because patient symptoms were less than 48 hours. Patient discharged home that day on anticoagulation. 6- EKG done in office on 2021 showed atrial flutter with heart rate of 135 to 140 bpm without any heart rate variability.     7- Last

## 2023-11-14 NOTE — PATIENT INSTRUCTIONS
SURVEY:    You may be receiving a survey from [employer] regarding your visit today. Please complete the survey to enable us to provide the highest quality of care to you and your family. If you cannot score us a very good on any question, please call the office to discuss how we could have made your experience a very good one. Thank you.

## 2023-12-06 LAB — ECHO BSA: 2.38 M2

## 2023-12-08 ENCOUNTER — TELEPHONE (OUTPATIENT)
Dept: CARDIOLOGY | Age: 58
End: 2023-12-08

## 2023-12-08 RX ORDER — DILTIAZEM HYDROCHLORIDE 120 MG/1
120 CAPSULE, COATED, EXTENDED RELEASE ORAL DAILY
Qty: 90 CAPSULE | Refills: 3 | Status: SHIPPED | OUTPATIENT
Start: 2023-12-08

## 2023-12-08 NOTE — TELEPHONE ENCOUNTER
----- Message from Anders Hancock PA-C sent at 12/8/2023 12:30 PM EST -----  Please notify patient that their CAM looked okay, he did have some fast heart beats. Start Cardizem 120 mg daily. Please continue current treatment and follow up.

## 2023-12-28 RX ORDER — SOTALOL HYDROCHLORIDE 80 MG/1
80 TABLET ORAL 2 TIMES DAILY
Qty: 60 TABLET | Refills: 0 | Status: SHIPPED | OUTPATIENT
Start: 2023-12-28

## 2024-01-11 ENCOUNTER — OFFICE VISIT (OUTPATIENT)
Dept: PRIMARY CARE CLINIC | Age: 59
End: 2024-01-11
Payer: OTHER GOVERNMENT

## 2024-01-11 VITALS
BODY MASS INDEX: 40.32 KG/M2 | HEIGHT: 68 IN | DIASTOLIC BLOOD PRESSURE: 62 MMHG | SYSTOLIC BLOOD PRESSURE: 132 MMHG | OXYGEN SATURATION: 99 % | HEART RATE: 63 BPM | WEIGHT: 266 LBS | RESPIRATION RATE: 18 BRPM

## 2024-01-11 DIAGNOSIS — H61.23 BILATERAL IMPACTED CERUMEN: Primary | ICD-10-CM

## 2024-01-11 PROCEDURE — 3075F SYST BP GE 130 - 139MM HG: CPT | Performed by: STUDENT IN AN ORGANIZED HEALTH CARE EDUCATION/TRAINING PROGRAM

## 2024-01-11 PROCEDURE — 69210 REMOVE IMPACTED EAR WAX UNI: CPT | Performed by: STUDENT IN AN ORGANIZED HEALTH CARE EDUCATION/TRAINING PROGRAM

## 2024-01-11 PROCEDURE — 99213 OFFICE O/P EST LOW 20 MIN: CPT | Performed by: STUDENT IN AN ORGANIZED HEALTH CARE EDUCATION/TRAINING PROGRAM

## 2024-01-11 PROCEDURE — 3078F DIAST BP <80 MM HG: CPT | Performed by: STUDENT IN AN ORGANIZED HEALTH CARE EDUCATION/TRAINING PROGRAM

## 2024-01-11 ASSESSMENT — PATIENT HEALTH QUESTIONNAIRE - PHQ9
SUM OF ALL RESPONSES TO PHQ9 QUESTIONS 1 & 2: 0
SUM OF ALL RESPONSES TO PHQ QUESTIONS 1-9: 0
1. LITTLE INTEREST OR PLEASURE IN DOING THINGS: 0
2. FEELING DOWN, DEPRESSED OR HOPELESS: NOT AT ALL
2. FEELING DOWN, DEPRESSED OR HOPELESS: 0
SUM OF ALL RESPONSES TO PHQ9 QUESTIONS 1 & 2: 0
SUM OF ALL RESPONSES TO PHQ QUESTIONS 1-9: 0
1. LITTLE INTEREST OR PLEASURE IN DOING THINGS: NOT AT ALL

## 2024-01-11 NOTE — PROGRESS NOTES
[facility] PRIMARY [facility]  [address] [place] [address] , [address]  [place], [place], [address]    Pedrito STARR Hinton is a 59 y.o. male with  has a past medical history of AAA (abdominal aortic aneurysm) (HCC), Aortic regurgitation, Atrial fibrillation (HCC), Atrial flutter (HCC), Encounter for cardioversion procedure, Frequent urination, H/O echocardiogram, H/O echocardiogram, Hemorrhage of rectum and anus, History of 24 hour EKG monitoring, History of echocardiogram, History of PFTs, Hx of transesophageal echocardiography (SKYLAR) for monitoring, Hypertension, Impaired fasting glucose, Knee pain, bilateral, Lipoma, Obesity (BMI 30.0-34.9), PAF (paroxysmal atrial fibrillation) ([facility]), Paroxysmal supraventricular tachycardia, Periodontal disease, Sleep apnea, Tobacco use disorder, Under care of team, Under care of team, Under care of team, Urinary urgency, and Ventral hernia.  Presented to the office today for:  Chief Complaint   Patient presents with    Ear Fullness       Assessment/Plan   1. Bilateral impacted cerumen  -     04714 - [place] REMOVE IMPACTED EAR WAX  Return if symptoms worsen or fail to improve, for F/U per prior plans.    Ear Cerumen Removal   Indication: ear cerumen impaction and unable to visualize tympanic membrane Risks and benefits discussed with patient He and consented.  Procedure: After placing the patient's head in the appropriate position, the patient's right/left ear canal was irrigated with the appropriate solution and curetted until all cerumen was removed and the ear canal was clear.  At this point, the procedure was complete.   The patient tolerated the procedure well.  Complications: None     All patient questions answered.  Pt voiced understanding.   There are no discontinued medications.  HM - HM items completed today as per orders. Outstanding HM items though not limited to immunizations were discussed with the patient today, including risks, benefits and alternatives. The patient will discuss

## 2024-02-15 DIAGNOSIS — I48.0 PAF (PAROXYSMAL ATRIAL FIBRILLATION) (HCC): ICD-10-CM

## 2024-02-15 DIAGNOSIS — G47.33 OSA ON CPAP: ICD-10-CM

## 2024-02-15 DIAGNOSIS — E66.9 CLASS 2 OBESITY WITH BODY MASS INDEX (BMI) OF 39.0 TO 39.9 IN ADULT, UNSPECIFIED OBESITY TYPE, UNSPECIFIED WHETHER SERIOUS COMORBIDITY PRESENT: ICD-10-CM

## 2024-02-15 DIAGNOSIS — R06.02 SOB (SHORTNESS OF BREATH): ICD-10-CM

## 2024-02-15 DIAGNOSIS — I42.8 NICM (NONISCHEMIC CARDIOMYOPATHY) (HCC): ICD-10-CM

## 2024-02-15 DIAGNOSIS — R07.89 CHEST HEAVINESS: ICD-10-CM

## 2024-02-15 DIAGNOSIS — I71.9 AORTIC ANEURYSM WITHOUT RUPTURE, UNSPECIFIED PORTION OF AORTA (HCC): ICD-10-CM

## 2024-02-15 RX ORDER — DILTIAZEM HYDROCHLORIDE 120 MG/1
120 CAPSULE, COATED, EXTENDED RELEASE ORAL DAILY
Qty: 60 CAPSULE | Refills: 5 | Status: SHIPPED | OUTPATIENT
Start: 2024-02-15

## 2024-02-15 RX ORDER — LOSARTAN POTASSIUM 25 MG/1
25 TABLET ORAL DAILY
Qty: 60 TABLET | Refills: 5 | Status: SHIPPED | OUTPATIENT
Start: 2024-02-15

## 2024-02-19 RX ORDER — SOTALOL HYDROCHLORIDE 80 MG/1
80 TABLET ORAL 2 TIMES DAILY
Qty: 180 TABLET | Refills: 3 | Status: SHIPPED | OUTPATIENT
Start: 2024-02-19

## 2024-02-27 ENCOUNTER — HOSPITAL ENCOUNTER (OUTPATIENT)
Age: 59
Discharge: HOME | End: 2024-02-27
Payer: OTHER GOVERNMENT

## 2024-02-27 ENCOUNTER — HOSPITAL ENCOUNTER (OUTPATIENT)
Age: 59
Discharge: HOME | End: 2024-02-29
Payer: OTHER GOVERNMENT

## 2024-02-27 ENCOUNTER — HOSPITAL ENCOUNTER (OUTPATIENT)
Dept: GENERAL RADIOLOGY | Age: 59
Discharge: HOME | End: 2024-02-29
Payer: OTHER GOVERNMENT

## 2024-02-27 ENCOUNTER — OFFICE VISIT (OUTPATIENT)
Dept: PRIMARY CARE CLINIC | Age: 59
End: 2024-02-27
Payer: OTHER GOVERNMENT

## 2024-02-27 VITALS
WEIGHT: 272 LBS | HEIGHT: 68 IN | DIASTOLIC BLOOD PRESSURE: 64 MMHG | BODY MASS INDEX: 41.22 KG/M2 | OXYGEN SATURATION: 97 % | HEART RATE: 95 BPM | SYSTOLIC BLOOD PRESSURE: 122 MMHG

## 2024-02-27 DIAGNOSIS — R06.02 SOB (SHORTNESS OF BREATH): ICD-10-CM

## 2024-02-27 DIAGNOSIS — R06.02 SOB (SHORTNESS OF BREATH): Primary | ICD-10-CM

## 2024-02-27 LAB
ANION GAP SERPL CALCULATED.3IONS-SCNC: 10 MMOL/L (ref 9–17)
BASOPHILS # BLD: 0.04 K/UL (ref 0–0.2)
BASOPHILS NFR BLD: 1 % (ref 0–2)
BNP SERPL-MCNC: 184 PG/ML
BUN SERPL-MCNC: 16 MG/DL (ref 6–20)
BUN/CREAT SERPL: 20 (ref 9–20)
CALCIUM SERPL-MCNC: 9.3 MG/DL (ref 8.6–10.4)
CHLORIDE SERPL-SCNC: 102 MMOL/L (ref 98–107)
CO2 SERPL-SCNC: 28 MMOL/L (ref 20–31)
CREAT SERPL-MCNC: 0.8 MG/DL (ref 0.7–1.2)
EOSINOPHIL # BLD: 0.25 K/UL (ref 0–0.44)
EOSINOPHILS RELATIVE PERCENT: 5 % (ref 1–4)
ERYTHROCYTE [DISTWIDTH] IN BLOOD BY AUTOMATED COUNT: 12.1 % (ref 11.8–14.4)
GFR SERPL CREATININE-BSD FRML MDRD: >60 ML/MIN/1.73M2
GLUCOSE SERPL-MCNC: 94 MG/DL (ref 70–99)
HCT VFR BLD AUTO: 40.4 % (ref 40.7–50.3)
HGB BLD-MCNC: 13.8 G/DL (ref 13–17)
IMM GRANULOCYTES # BLD AUTO: <0.03 K/UL (ref 0–0.3)
IMM GRANULOCYTES NFR BLD: 0 %
LYMPHOCYTES NFR BLD: 1.55 K/UL (ref 1.1–3.7)
LYMPHOCYTES RELATIVE PERCENT: 31 % (ref 24–43)
MCH RBC QN AUTO: 31.7 PG (ref 25.2–33.5)
MCHC RBC AUTO-ENTMCNC: 34.2 G/DL (ref 28.4–34.8)
MCV RBC AUTO: 92.7 FL (ref 82.6–102.9)
MONOCYTES NFR BLD: 0.53 K/UL (ref 0.1–1.2)
MONOCYTES NFR BLD: 11 % (ref 3–12)
NEUTROPHILS NFR BLD: 52 % (ref 36–65)
NEUTS SEG NFR BLD: 2.61 K/UL (ref 1.5–8.1)
NRBC BLD-RTO: 0 PER 100 WBC
PLATELET # BLD AUTO: 183 K/UL (ref 138–453)
PMV BLD AUTO: 10.1 FL (ref 8.1–13.5)
POTASSIUM SERPL-SCNC: 4.3 MMOL/L (ref 3.7–5.3)
RBC # BLD AUTO: 4.36 M/UL (ref 4.21–5.77)
SODIUM SERPL-SCNC: 140 MMOL/L (ref 135–144)
WBC OTHER # BLD: 5 K/UL (ref 3.5–11.3)

## 2024-02-27 PROCEDURE — 71046 X-RAY EXAM CHEST 2 VIEWS: CPT

## 2024-02-27 PROCEDURE — 36415 COLL VENOUS BLD VENIPUNCTURE: CPT

## 2024-02-27 PROCEDURE — 83880 ASSAY OF NATRIURETIC PEPTIDE: CPT

## 2024-02-27 PROCEDURE — 99214 OFFICE O/P EST MOD 30 MIN: CPT | Performed by: FAMILY MEDICINE

## 2024-02-27 PROCEDURE — 3078F DIAST BP <80 MM HG: CPT | Performed by: FAMILY MEDICINE

## 2024-02-27 PROCEDURE — 80048 BASIC METABOLIC PNL TOTAL CA: CPT

## 2024-02-27 PROCEDURE — 85025 COMPLETE CBC W/AUTO DIFF WBC: CPT

## 2024-02-27 PROCEDURE — 3074F SYST BP LT 130 MM HG: CPT | Performed by: FAMILY MEDICINE

## 2024-02-27 PROCEDURE — 93005 ELECTROCARDIOGRAM TRACING: CPT

## 2024-02-27 PROCEDURE — G2211 COMPLEX E/M VISIT ADD ON: HCPCS | Performed by: FAMILY MEDICINE

## 2024-02-27 NOTE — PROGRESS NOTES
[facility] Primary Care      Patient:  Pedrito Hinton 59 y.o. male     Date of Service: 2/27/24      Chief Complaint:   Chief Complaint   Patient presents with    Shortness of Breath         History of Present Illness     Shortness of breath ongoing for about 1 month.  Does not recall any specific inciting events that precipitated the shortness of breath.  Has a known history of A-fib, not currently on anticoagulation due to low [id number] score.  He does currently check his weights at home given a known history of CHF.  He notes that his weight a few weeks ago was 250 pounds.  He is now up to 272 pounds.  He currently only uses Lasix 20 mg every other day    Has known history of smoking, has been smoking for over 30 years.  Has not previously been formally diagnosed with COPD.    No current chest pain, lightheadedness, dizziness        Allergies:    Patient has no known allergies.    Medication List:    Current Outpatient Medications   Medication Sig Dispense Refill    sotalol (BETAPACE) 80 MG tablet Take 1 tablet by mouth 2 times daily 180 tablet 3    losartan (COZAAR) 25 MG tablet Take 1 tablet by mouth daily 60 tablet 5    dilTIAZem (CARDIZEM CD) 120 MG extended release capsule Take 1 capsule by mouth daily 60 capsule 5    aspirin 81 MG EC tablet Take 1 tablet by mouth 2 times daily      traZODone (DESYREL) 50 MG tablet TAKE 1 TABLET BY MOUTH NIGHTLY AS NEEDED FOR SLEEP 30 tablet 0    furosemide (LASIX) 20 MG tablet TAKE 1 TABLET BY MOUTH EVERY OTHER DAY 30 tablet 0     No current facility-administered medications for this visit.          Review of Systems   See hpi  Physical Exam   Physical Exam  Constitutional:       Appearance: Well-developed.   HENT:      Head: Normocephalic.      Right Ear: External ear normal.      Left Ear: External ear normal.   Cardiovascular:   Irregularly irregular, + murmur  Pulmonary:      Effort: Pulmonary effort is normal.      Breath sounds: Normal breath sounds. No

## 2024-02-28 LAB
EKG ATRIAL RATE: 54 BPM
EKG P AXIS: 26 DEGREES
EKG P-R INTERVAL: 116 MS
EKG Q-T INTERVAL: 460 MS
EKG QRS DURATION: 100 MS
EKG QTC CALCULATION (BAZETT): 436 MS
EKG T AXIS: 110 DEGREES
EKG VENTRICULAR RATE: 54 BPM

## 2024-03-04 ENCOUNTER — OFFICE VISIT (OUTPATIENT)
Dept: PRIMARY CARE CLINIC | Age: 59
End: 2024-03-04
Payer: OTHER GOVERNMENT

## 2024-03-04 VITALS
BODY MASS INDEX: 41.37 KG/M2 | SYSTOLIC BLOOD PRESSURE: 110 MMHG | HEIGHT: 68 IN | HEART RATE: 75 BPM | OXYGEN SATURATION: 95 % | DIASTOLIC BLOOD PRESSURE: 68 MMHG | WEIGHT: 273 LBS

## 2024-03-04 DIAGNOSIS — I50.32 CHRONIC DIASTOLIC HEART FAILURE (HCC): ICD-10-CM

## 2024-03-04 DIAGNOSIS — R06.02 SOB (SHORTNESS OF BREATH): Primary | ICD-10-CM

## 2024-03-04 PROCEDURE — 99214 OFFICE O/P EST MOD 30 MIN: CPT | Performed by: FAMILY MEDICINE

## 2024-03-04 PROCEDURE — 3074F SYST BP LT 130 MM HG: CPT | Performed by: FAMILY MEDICINE

## 2024-03-04 PROCEDURE — G2211 COMPLEX E/M VISIT ADD ON: HCPCS | Performed by: FAMILY MEDICINE

## 2024-03-04 PROCEDURE — 3078F DIAST BP <80 MM HG: CPT | Performed by: FAMILY MEDICINE

## 2024-03-04 RX ORDER — FUROSEMIDE 40 MG/1
40 TABLET ORAL DAILY
Qty: 10 TABLET | Refills: 0 | Status: SHIPPED | OUTPATIENT
Start: 2024-03-04 | End: 2024-03-14

## 2024-03-04 RX ORDER — ALBUTEROL SULFATE 90 UG/1
2 AEROSOL, METERED RESPIRATORY (INHALATION) 4 TIMES DAILY PRN
Qty: 18 G | Refills: 5 | Status: SHIPPED | OUTPATIENT
Start: 2024-03-04

## 2024-03-04 NOTE — PROGRESS NOTES
[facility] Primary [facility]      Patient:  Pedrito Hinton 59 y.o. male     Date of Service: 2/27/24      Chief Complaint:   Chief Complaint   Patient presents with    Follow-up         History of Present Illness     Shortness of breath ongoing for about 1 month.  Does not recall any specific inciting events that precipitated the shortness of breath.  Has a known history of A-fib, not currently on anticoagulation due to low [id number] score.  He does currently check his weights at home given a known history of CHF.  He notes that his weight a few weeks ago was 250 pounds.  He is now up to 273 pounds.        Patient was evaluated approximate 1 week prior and was recommended to double up Lasix and take 40 mg daily x 7 days.  However had only taken every other day.  Still feels similar to previous evaluation of the shortness of breath    Has known history of smoking, has been smoking for over 30 years.  Has not previously been formally diagnosed with COPD.    No current or associated chest pain, lightheadedness, dizziness  Allergies:    Patient has no known allergies.    Medication List:    Current Outpatient Medications   Medication Sig Dispense Refill    furosemide (LASIX) 40 MG tablet Take 1 tablet by mouth daily for 10 days 10 tablet 0    albuterol sulfate HFA (VENTOLIN HFA) 108 (90 Base) MCG/ACT inhaler Inhale 2 puffs into the lungs 4 times daily as needed for Wheezing 18 g 5    sotalol (BETAPACE) 80 MG tablet Take 1 tablet by mouth 2 times daily 180 tablet 3    losartan (COZAAR) 25 MG tablet Take 1 tablet by mouth daily 60 tablet 5    dilTIAZem (CARDIZEM CD) 120 MG extended release capsule Take 1 capsule by mouth daily 60 capsule 5    aspirin 81 MG EC tablet Take 1 tablet by mouth 2 times daily      traZODone (DESYREL) 50 MG tablet TAKE 1 TABLET BY MOUTH NIGHTLY AS NEEDED FOR SLEEP 30 tablet 0    furosemide (LASIX) 20 MG tablet TAKE 1 TABLET BY MOUTH EVERY OTHER DAY 30 tablet 0     No current

## 2024-03-13 ENCOUNTER — HOSPITAL ENCOUNTER (OUTPATIENT)
Age: 59
Discharge: HOME | End: 2024-03-13
Attending: FAMILY MEDICINE
Payer: OTHER GOVERNMENT

## 2024-03-13 ENCOUNTER — HOSPITAL ENCOUNTER (OUTPATIENT)
Dept: PULMONOLOGY | Age: 59
Discharge: HOME | End: 2024-03-13
Attending: FAMILY MEDICINE
Payer: OTHER GOVERNMENT

## 2024-03-13 DIAGNOSIS — R06.02 SOB (SHORTNESS OF BREATH): ICD-10-CM

## 2024-03-13 LAB
ANION GAP SERPL CALCULATED.3IONS-SCNC: 13 MMOL/L (ref 9–17)
BUN SERPL-MCNC: 20 MG/DL (ref 6–20)
BUN/CREAT SERPL: 25 (ref 9–20)
CALCIUM SERPL-MCNC: 9 MG/DL (ref 8.6–10.4)
CHLORIDE SERPL-SCNC: 101 MMOL/L (ref 98–107)
CO2 SERPL-SCNC: 25 MMOL/L (ref 20–31)
CREAT SERPL-MCNC: 0.8 MG/DL (ref 0.7–1.2)
GFR SERPL CREATININE-BSD FRML MDRD: >60 ML/MIN/1.73M2
GLUCOSE SERPL-MCNC: 86 MG/DL (ref 70–99)
POTASSIUM SERPL-SCNC: 4 MMOL/L (ref 3.7–5.3)
SODIUM SERPL-SCNC: 139 MMOL/L (ref 135–144)

## 2024-03-13 PROCEDURE — 6370000000 HC RX 637 (ALT 250 FOR IP): Performed by: INTERNAL MEDICINE

## 2024-03-13 PROCEDURE — 94726 PLETHYSMOGRAPHY LUNG VOLUMES: CPT

## 2024-03-13 PROCEDURE — 36415 COLL VENOUS BLD VENIPUNCTURE: CPT

## 2024-03-13 PROCEDURE — 94729 DIFFUSING CAPACITY: CPT

## 2024-03-13 PROCEDURE — 94664 DEMO&/EVAL PT USE INHALER: CPT

## 2024-03-13 PROCEDURE — 80048 BASIC METABOLIC PNL TOTAL CA: CPT

## 2024-03-13 PROCEDURE — 94060 EVALUATION OF WHEEZING: CPT

## 2024-03-13 RX ORDER — ALBUTEROL SULFATE 90 UG/1
4 AEROSOL, METERED RESPIRATORY (INHALATION) ONCE
Status: COMPLETED | OUTPATIENT
Start: 2024-03-13 | End: 2024-03-13

## 2024-03-13 RX ADMIN — ALBUTEROL SULFATE 4 PUFF: 90 AEROSOL, METERED RESPIRATORY (INHALATION) at 16:47

## 2024-03-14 ENCOUNTER — OFFICE VISIT (OUTPATIENT)
Dept: PRIMARY CARE CLINIC | Age: 59
End: 2024-03-14

## 2024-03-14 VITALS
WEIGHT: 277 LBS | DIASTOLIC BLOOD PRESSURE: 60 MMHG | HEIGHT: 68 IN | BODY MASS INDEX: 41.98 KG/M2 | OXYGEN SATURATION: 95 % | HEART RATE: 82 BPM | SYSTOLIC BLOOD PRESSURE: 128 MMHG

## 2024-03-14 DIAGNOSIS — I50.33 ACUTE ON CHRONIC DIASTOLIC CHF (CONGESTIVE HEART FAILURE), NYHA CLASS 3 (HCC): Primary | ICD-10-CM

## 2024-03-14 RX ORDER — FUROSEMIDE 40 MG/1
40 TABLET ORAL 2 TIMES DAILY
Qty: 20 TABLET | Refills: 0 | Status: SHIPPED | OUTPATIENT
Start: 2024-03-14 | End: 2024-03-24

## 2024-03-14 SDOH — ECONOMIC STABILITY: FOOD INSECURITY: WITHIN THE PAST 12 MONTHS, THE FOOD YOU BOUGHT JUST DIDN'T LAST AND YOU DIDN'T HAVE MONEY TO GET MORE.: NEVER TRUE

## 2024-03-14 SDOH — ECONOMIC STABILITY: FOOD INSECURITY: WITHIN THE PAST 12 MONTHS, YOU WORRIED THAT YOUR FOOD WOULD RUN OUT BEFORE YOU GOT MONEY TO BUY MORE.: NEVER TRUE

## 2024-03-14 SDOH — ECONOMIC STABILITY: INCOME INSECURITY: HOW HARD IS IT FOR YOU TO PAY FOR THE VERY BASICS LIKE FOOD, HOUSING, MEDICAL CARE, AND HEATING?: NOT HARD AT ALL

## 2024-03-14 NOTE — PROGRESS NOTES
visit.          Review of Systems   See hpi  Physical Exam   Physical Exam  Constitutional:       Appearance: Well-developed.   HENT:      Head: Normocephalic.      Right Ear: External ear normal.      Left Ear: External ear normal.   Cardiovascular:   Irregularly irregular, + murmur  Pulmonary:      Effort: Pulmonary effort is normal.      Breath sounds: Normal breath sounds. No wheezing.   Abdominal:      Palpations: Abdomen is soft.      Tenderness: There is no abdominal tenderness.   Musculoskeletal:         General: Normal range of motion.      Cervical back: Normal range of motion.   Skin:     General: Skin is warm.      Findings: No erythema.   Neurological:      Mental Status: Alert and oriented to person, place, and time.   Psychiatric:         Behavior: Behavior normal.     Vitals:    03/14/24 1613   BP: 128/60   Pulse: 82   SpO2: 95%         Assessment and Plan     Shortness of breath    Overall history and exam findings are reassuring at this time, patient's vitals are reassuring and overall he is well-appearing and nontoxic.  Shortness of breath consistent with developing CHF exacerbation given that he has put on over >15 pounds in the past few weeks.      Increase to lasix 40mg bid for 10 days. Will check a BMP close to completion of Lasix to evaluate renal function and electrolytes.  Also recommend checking weights.  If no significant improvement with above consider treating for COPD however does not appear to be any COPD exacerbations at this time. Consider bumex or torsemide     Will reevaluate him very soon.  Reviewed recent studies including chest x-ray, EKG, PFTs pending

## 2024-03-18 ENCOUNTER — HOSPITAL ENCOUNTER (OUTPATIENT)
Age: 59
Discharge: HOME | End: 2024-03-18
Payer: OTHER GOVERNMENT

## 2024-03-18 DIAGNOSIS — I50.33 ACUTE ON CHRONIC DIASTOLIC CHF (CONGESTIVE HEART FAILURE), NYHA CLASS 3 (HCC): ICD-10-CM

## 2024-03-18 LAB
ANION GAP SERPL CALCULATED.3IONS-SCNC: 11 MMOL/L (ref 9–17)
BNP SERPL-MCNC: 176 PG/ML
BUN SERPL-MCNC: 26 MG/DL (ref 6–20)
BUN/CREAT SERPL: 29 (ref 9–20)
CALCIUM SERPL-MCNC: 9.1 MG/DL (ref 8.6–10.4)
CHLORIDE SERPL-SCNC: 99 MMOL/L (ref 98–107)
CO2 SERPL-SCNC: 28 MMOL/L (ref 20–31)
CREAT SERPL-MCNC: 0.9 MG/DL (ref 0.7–1.2)
GFR SERPL CREATININE-BSD FRML MDRD: >60 ML/MIN/1.73M2
GLUCOSE SERPL-MCNC: 90 MG/DL (ref 70–99)
POTASSIUM SERPL-SCNC: 3.9 MMOL/L (ref 3.7–5.3)
SODIUM SERPL-SCNC: 138 MMOL/L (ref 135–144)

## 2024-03-18 PROCEDURE — 80048 BASIC METABOLIC PNL TOTAL CA: CPT

## 2024-03-18 PROCEDURE — 36415 COLL VENOUS BLD VENIPUNCTURE: CPT

## 2024-03-18 PROCEDURE — 83880 ASSAY OF NATRIURETIC PEPTIDE: CPT

## 2024-03-19 ENCOUNTER — OFFICE VISIT (OUTPATIENT)
Dept: PRIMARY CARE CLINIC | Age: 59
End: 2024-03-19
Payer: OTHER GOVERNMENT

## 2024-03-19 VITALS
OXYGEN SATURATION: 97 % | DIASTOLIC BLOOD PRESSURE: 62 MMHG | BODY MASS INDEX: 42.3 KG/M2 | RESPIRATION RATE: 18 BRPM | WEIGHT: 278.2 LBS | HEART RATE: 65 BPM | SYSTOLIC BLOOD PRESSURE: 126 MMHG

## 2024-03-19 DIAGNOSIS — R06.02 SOB (SHORTNESS OF BREATH): Primary | ICD-10-CM

## 2024-03-19 DIAGNOSIS — I50.33 ACUTE ON CHRONIC DIASTOLIC CHF (CONGESTIVE HEART FAILURE), NYHA CLASS 3 (HCC): ICD-10-CM

## 2024-03-19 DIAGNOSIS — R63.5 WEIGHT GAIN: ICD-10-CM

## 2024-03-19 PROCEDURE — 3078F DIAST BP <80 MM HG: CPT | Performed by: FAMILY MEDICINE

## 2024-03-19 PROCEDURE — 3074F SYST BP LT 130 MM HG: CPT | Performed by: FAMILY MEDICINE

## 2024-03-19 PROCEDURE — G2211 COMPLEX E/M VISIT ADD ON: HCPCS | Performed by: FAMILY MEDICINE

## 2024-03-19 PROCEDURE — 99214 OFFICE O/P EST MOD 30 MIN: CPT | Performed by: FAMILY MEDICINE

## 2024-03-19 RX ORDER — FUROSEMIDE 40 MG/1
40 TABLET ORAL 2 TIMES DAILY
Qty: 20 TABLET | Refills: 0 | Status: SHIPPED | OUTPATIENT
Start: 2024-03-19 | End: 2024-03-19

## 2024-03-19 RX ORDER — METOLAZONE 2.5 MG/1
TABLET ORAL
Qty: 3 TABLET | Refills: 0 | Status: SHIPPED | OUTPATIENT
Start: 2024-03-19

## 2024-03-19 RX ORDER — LOSARTAN POTASSIUM 25 MG/1
25 TABLET ORAL DAILY
Qty: 90 TABLET | Refills: 0 | Status: SHIPPED | OUTPATIENT
Start: 2024-03-19 | End: 2024-06-17

## 2024-03-19 RX ORDER — FUROSEMIDE 40 MG/1
40 TABLET ORAL 2 TIMES DAILY
Qty: 60 TABLET | Refills: 0 | Status: SHIPPED | OUTPATIENT
Start: 2024-03-19 | End: 2024-04-18

## 2024-03-19 NOTE — PROGRESS NOTES
[facility] Primary [facility]      Patient:  Pedrito Hinton 59 y.o. male     Date of Service: 3/19/24      Chief Complaint:   Chief Complaint   Patient presents with    Congestive Heart Failure     Patient is here for a five day follow up for congestive heart failure. He stated everything is about the same since the last time he was here. He stated he is not gaining weight, but he is still having some swelling in his legs. He also mentioned that he is getting some chest discomfort here and there, and shortness of breath with exertion. He denies chest pain at this time. He is currently taking 40 MG of Lasix in the morning, and 60 MG at night for the last two days.          History of Present Illness     Following up on persistent history of shortness of breath ongoing for several months.  He put on about 15 to 20 pounds over the past few weeks had increased leg swelling, concern for CHF exacerbation.  Was treated with Lasix 40 mg daily and then 40 mg twice daily with no significant response.  PFTs pending.  Allergies:    Patient has no known allergies.    Medication List:    Current Outpatient Medications   Medication Sig Dispense Refill    losartan (COZAAR) 25 MG tablet Take 1 tablet by mouth daily 90 tablet 0    metOLazone (ZAROXOLYN) 2.5 MG tablet Take 1 tablet by mouth 30 minutes prior to lasix take only 1 per week 3 tablet 0    furosemide (LASIX) 40 MG tablet Take 1 tablet by mouth 2 times daily 60 tablet 0    albuterol sulfate HFA (VENTOLIN HFA) 108 (90 Base) MCG/ACT inhaler Inhale 2 puffs into the lungs 4 times daily as needed for Wheezing 18 g 5    sotalol (BETAPACE) 80 MG tablet Take 1 tablet by mouth 2 times daily 180 tablet 3    dilTIAZem (CARDIZEM CD) 120 MG extended release capsule Take 1 capsule by mouth daily 60 capsule 5    aspirin 81 MG EC tablet Take 1 tablet by mouth 2 times daily      traZODone (DESYREL) 50 MG tablet TAKE 1 TABLET BY MOUTH NIGHTLY AS NEEDED FOR SLEEP 30 tablet 0     No

## 2024-03-20 ENCOUNTER — HOSPITAL ENCOUNTER (OUTPATIENT)
Age: 59
Discharge: HOME | End: 2024-03-20
Payer: OTHER GOVERNMENT

## 2024-03-20 DIAGNOSIS — R63.5 WEIGHT GAIN: ICD-10-CM

## 2024-03-20 DIAGNOSIS — R06.02 SOB (SHORTNESS OF BREATH): ICD-10-CM

## 2024-03-20 DIAGNOSIS — I50.33 ACUTE ON CHRONIC DIASTOLIC CHF (CONGESTIVE HEART FAILURE), NYHA CLASS 3 (HCC): ICD-10-CM

## 2024-03-20 LAB
ANION GAP SERPL CALCULATED.3IONS-SCNC: 11 MMOL/L (ref 9–17)
BUN SERPL-MCNC: 18 MG/DL (ref 6–20)
BUN/CREAT SERPL: 23 (ref 9–20)
CALCIUM SERPL-MCNC: 8.7 MG/DL (ref 8.6–10.4)
CHLORIDE SERPL-SCNC: 101 MMOL/L (ref 98–107)
CO2 SERPL-SCNC: 26 MMOL/L (ref 20–31)
CORTIS SERPL-MCNC: 12.6 UG/DL (ref 2.5–19.5)
CREAT SERPL-MCNC: 0.8 MG/DL (ref 0.7–1.2)
GFR SERPL CREATININE-BSD FRML MDRD: >60 ML/MIN/1.73M2
GLUCOSE SERPL-MCNC: 118 MG/DL (ref 70–99)
POTASSIUM SERPL-SCNC: 4.1 MMOL/L (ref 3.7–5.3)
SODIUM SERPL-SCNC: 138 MMOL/L (ref 135–144)
TSH SERPL DL<=0.05 MIU/L-ACNC: 2.96 UIU/ML (ref 0.3–5)

## 2024-03-20 PROCEDURE — 82533 TOTAL CORTISOL: CPT

## 2024-03-20 PROCEDURE — 84443 ASSAY THYROID STIM HORMONE: CPT

## 2024-03-20 PROCEDURE — 82024 ASSAY OF ACTH: CPT

## 2024-03-20 PROCEDURE — 36415 COLL VENOUS BLD VENIPUNCTURE: CPT

## 2024-03-20 PROCEDURE — 80048 BASIC METABOLIC PNL TOTAL CA: CPT

## 2024-03-20 PROCEDURE — 82530 CORTISOL FREE: CPT

## 2024-03-21 LAB — ACTH PLAS-MCNC: 39 PG/ML (ref 7–63)

## 2024-03-23 LAB
COLLECT DURATION TIME SPEC: NORMAL H
CORTISOL (UR), FREE: NORMAL UG/D
CORTISOL URINE, FREE (/G CRT): 13.6 UG/L
CORTISOL, URINE RATIO CREATININE: 7.64 UG/G CRT
CORTISOL, URINE: NORMAL
CREAT 24H UR-MCNC: 178 MG/DL
CREATININE URINE /24 HR: NORMAL MG/D (ref 800–2100)
SPECIMEN VOL ?TM UR: 40 ML

## 2024-03-27 ENCOUNTER — APPOINTMENT (OUTPATIENT)
Age: 59
DRG: 291 | End: 2024-03-27
Attending: STUDENT IN AN ORGANIZED HEALTH CARE EDUCATION/TRAINING PROGRAM
Payer: OTHER GOVERNMENT

## 2024-03-27 ENCOUNTER — APPOINTMENT (OUTPATIENT)
Dept: GENERAL RADIOLOGY | Age: 59
DRG: 291 | End: 2024-03-27
Attending: STUDENT IN AN ORGANIZED HEALTH CARE EDUCATION/TRAINING PROGRAM
Payer: OTHER GOVERNMENT

## 2024-03-27 ENCOUNTER — HOSPITAL ENCOUNTER (INPATIENT)
Age: 59
LOS: 2 days | Discharge: HOME | DRG: 291 | End: 2024-03-29
Attending: STUDENT IN AN ORGANIZED HEALTH CARE EDUCATION/TRAINING PROGRAM | Admitting: STUDENT IN AN ORGANIZED HEALTH CARE EDUCATION/TRAINING PROGRAM
Payer: OTHER GOVERNMENT

## 2024-03-27 ENCOUNTER — OFFICE VISIT (OUTPATIENT)
Dept: PRIMARY CARE CLINIC | Age: 59
End: 2024-03-27
Payer: OTHER GOVERNMENT

## 2024-03-27 VITALS
HEIGHT: 68 IN | BODY MASS INDEX: 42.89 KG/M2 | WEIGHT: 283 LBS | HEART RATE: 80 BPM | DIASTOLIC BLOOD PRESSURE: 62 MMHG | SYSTOLIC BLOOD PRESSURE: 148 MMHG | RESPIRATION RATE: 18 BRPM

## 2024-03-27 DIAGNOSIS — I50.32 CHRONIC DIASTOLIC HEART FAILURE (HCC): Primary | ICD-10-CM

## 2024-03-27 DIAGNOSIS — R06.02 SOB (SHORTNESS OF BREATH): Primary | ICD-10-CM

## 2024-03-27 DIAGNOSIS — I48.0 PAF (PAROXYSMAL ATRIAL FIBRILLATION) (HCC): ICD-10-CM

## 2024-03-27 DIAGNOSIS — I50.33 ACUTE ON CHRONIC DIASTOLIC CHF (CONGESTIVE HEART FAILURE), NYHA CLASS 3 (HCC): ICD-10-CM

## 2024-03-27 DIAGNOSIS — R06.02 SOB (SHORTNESS OF BREATH): ICD-10-CM

## 2024-03-27 LAB
ALBUMIN SERPL-MCNC: 4.1 G/DL (ref 3.5–5.2)
ALBUMIN/GLOB SERPL: 1.6 {RATIO} (ref 1–2.5)
ALP SERPL-CCNC: 104 U/L (ref 40–129)
ALT SERPL-CCNC: 46 U/L (ref 5–41)
ANION GAP SERPL CALCULATED.3IONS-SCNC: 12 MMOL/L (ref 9–17)
AST SERPL-CCNC: 26 U/L
BASOPHILS # BLD: 0.04 K/UL (ref 0–0.2)
BASOPHILS NFR BLD: 1 % (ref 0–2)
BILIRUB SERPL-MCNC: 0.4 MG/DL (ref 0.3–1.2)
BNP SERPL-MCNC: 350 PG/ML
BUN SERPL-MCNC: 20 MG/DL (ref 6–20)
BUN/CREAT SERPL: 25 (ref 9–20)
CALCIUM SERPL-MCNC: 9.1 MG/DL (ref 8.6–10.4)
CHLORIDE SERPL-SCNC: 103 MMOL/L (ref 98–107)
CHOLEST SERPL-MCNC: 202 MG/DL (ref 0–199)
CHOLESTEROL/HDL RATIO: 4
CO2 SERPL-SCNC: 27 MMOL/L (ref 20–31)
CREAT SERPL-MCNC: 0.8 MG/DL (ref 0.7–1.2)
ECHO AR MAX VEL PISA: 3.2 M/S
ECHO AV CUSP MM: 1.1 CM
ECHO AV MEAN GRADIENT: 11 MMHG
ECHO AV MEAN VELOCITY: 1.8 M/S
ECHO AV PEAK GRADIENT: 20 MMHG
ECHO AV PEAK VELOCITY: 2.3 M/S
ECHO AV REGURGITANT PHT: 358 MS
ECHO AV VTI: 49.4 CM
ECHO BSA: 2.48 M2
ECHO EST RA PRESSURE: 8 MMHG
ECHO LA AREA 2C: 25.1 CM2
ECHO LA AREA 4C: 24.3 CM2
ECHO LA MAJOR AXIS: 5.7 CM
ECHO LA MINOR AXIS: 5.5 CM
ECHO LA VOL BP: 86 ML (ref 18–58)
ECHO LA VOL MOD A2C: 94 ML (ref 18–58)
ECHO LA VOL MOD A4C: 77 ML (ref 18–58)
ECHO LA VOL/BSA BIPLANE: 36 ML/M2 (ref 16–34)
ECHO LA VOLUME INDEX MOD A2C: 40 ML/M2 (ref 16–34)
ECHO LA VOLUME INDEX MOD A4C: 32 ML/M2 (ref 16–34)
ECHO LV E' LATERAL VELOCITY: 12 CM/S
ECHO LV EDV A2C: 123 ML
ECHO LV EDV A4C: 117 ML
ECHO LV EDV INDEX A4C: 49 ML/M2
ECHO LV EDV NDEX A2C: 52 ML/M2
ECHO LV EJECTION FRACTION A2C: 56 %
ECHO LV EJECTION FRACTION A4C: 58 %
ECHO LV EJECTION FRACTION BIPLANE: 56 % (ref 55–100)
ECHO LV ESV A2C: 54 ML
ECHO LV ESV A4C: 49 ML
ECHO LV ESV INDEX A2C: 23 ML/M2
ECHO LV ESV INDEX A4C: 21 ML/M2
ECHO LV FRACTIONAL SHORTENING: 32 % (ref 28–44)
ECHO LV INTERNAL DIMENSION DIASTOLE INDEX: 2.53 CM/M2
ECHO LV INTERNAL DIMENSION DIASTOLIC: 6 CM (ref 4.2–5.9)
ECHO LV INTERNAL DIMENSION SYSTOLIC INDEX: 1.73 CM/M2
ECHO LV INTERNAL DIMENSION SYSTOLIC: 4.1 CM
ECHO LV IVSD: 1.7 CM (ref 0.6–1)
ECHO LV MASS 2D: 511.9 G (ref 88–224)
ECHO LV MASS INDEX 2D: 216 G/M2 (ref 49–115)
ECHO LV POSTERIOR WALL DIASTOLIC: 1.7 CM (ref 0.6–1)
ECHO LV RELATIVE WALL THICKNESS RATIO: 0.57
ECHO LVOT AREA: 3.5 CM2
ECHO LVOT DIAM: 2.1 CM
ECHO MV A VELOCITY: 0.63 M/S
ECHO MV E DECELERATION TIME (DT): 187 MS
ECHO MV E VELOCITY: 0.68 M/S
ECHO MV E/A RATIO: 1.08
ECHO MV E/E' LATERAL: 5.67
ECHO PV MAX VELOCITY: 1 M/S
ECHO PV PEAK GRADIENT: 4 MMHG
ECHO RIGHT VENTRICULAR SYSTOLIC PRESSURE (RVSP): 37 MMHG
ECHO TV REGURGITANT MAX VELOCITY: 2.7 M/S
ECHO TV REGURGITANT PEAK GRADIENT: 29 MMHG
EKG ATRIAL RATE: 59 BPM
EKG P AXIS: 19 DEGREES
EKG P-R INTERVAL: 120 MS
EKG Q-T INTERVAL: 472 MS
EKG QRS DURATION: 104 MS
EKG QTC CALCULATION (BAZETT): 467 MS
EKG R AXIS: 8 DEGREES
EKG T AXIS: 99 DEGREES
EKG VENTRICULAR RATE: 59 BPM
EOSINOPHIL # BLD: 0.23 K/UL (ref 0–0.44)
EOSINOPHILS RELATIVE PERCENT: 5 % (ref 1–4)
ERYTHROCYTE [DISTWIDTH] IN BLOOD BY AUTOMATED COUNT: 12 % (ref 11.8–14.4)
GFR SERPL CREATININE-BSD FRML MDRD: >90 ML/MIN/1.73M2
GLUCOSE SERPL-MCNC: 115 MG/DL (ref 70–99)
HCT VFR BLD AUTO: 38 % (ref 40.7–50.3)
HDLC SERPL-MCNC: 54 MG/DL
HGB BLD-MCNC: 12.8 G/DL (ref 13–17)
IMM GRANULOCYTES # BLD AUTO: <0.03 K/UL (ref 0–0.3)
IMM GRANULOCYTES NFR BLD: 0 %
INR PPP: 1
LDLC SERPL CALC-MCNC: 116 MG/DL (ref 0–100)
LYMPHOCYTES NFR BLD: 1.09 K/UL (ref 1.1–3.7)
LYMPHOCYTES RELATIVE PERCENT: 25 % (ref 24–43)
MAGNESIUM SERPL-MCNC: 2 MG/DL (ref 1.6–2.6)
MCH RBC QN AUTO: 31.4 PG (ref 25.2–33.5)
MCHC RBC AUTO-ENTMCNC: 33.7 G/DL (ref 28.4–34.8)
MCV RBC AUTO: 93.1 FL (ref 82.6–102.9)
MONOCYTES NFR BLD: 0.35 K/UL (ref 0.1–1.2)
MONOCYTES NFR BLD: 8 % (ref 3–12)
NEUTROPHILS NFR BLD: 61 % (ref 36–65)
NEUTS SEG NFR BLD: 2.6 K/UL (ref 1.5–8.1)
NRBC BLD-RTO: 0 PER 100 WBC
PARTIAL THROMBOPLASTIN TIME: 25.2 SEC (ref 26.8–34.8)
PLATELET # BLD AUTO: 155 K/UL (ref 138–453)
PMV BLD AUTO: 10.6 FL (ref 8.1–13.5)
POTASSIUM SERPL-SCNC: 3.6 MMOL/L (ref 3.7–5.3)
PROT SERPL-MCNC: 6.6 G/DL (ref 6.4–8.3)
PROTHROMBIN TIME: 12.6 SEC (ref 11.7–14.1)
RBC # BLD AUTO: 4.08 M/UL (ref 4.21–5.77)
SODIUM SERPL-SCNC: 142 MMOL/L (ref 135–144)
T4 FREE SERPL-MCNC: 1.1 NG/DL (ref 0.92–1.68)
TRIGL SERPL-MCNC: 161 MG/DL
TROPONIN I SERPL HS-MCNC: 16 NG/L (ref 0–22)
TROPONIN I SERPL HS-MCNC: 17 NG/L (ref 0–22)
TSH SERPL DL<=0.05 MIU/L-ACNC: 2.46 UIU/ML (ref 0.3–5)
VLDLC SERPL CALC-MCNC: 32 MG/DL
WBC OTHER # BLD: 4.3 K/UL (ref 3.5–11.3)

## 2024-03-27 PROCEDURE — 93010 ELECTROCARDIOGRAM REPORT: CPT | Performed by: INTERNAL MEDICINE

## 2024-03-27 PROCEDURE — 36415 COLL VENOUS BLD VENIPUNCTURE: CPT

## 2024-03-27 PROCEDURE — 3077F SYST BP >= 140 MM HG: CPT | Performed by: STUDENT IN AN ORGANIZED HEALTH CARE EDUCATION/TRAINING PROGRAM

## 2024-03-27 PROCEDURE — 6360000002 HC RX W HCPCS: Performed by: NURSE PRACTITIONER

## 2024-03-27 PROCEDURE — 71046 X-RAY EXAM CHEST 2 VIEWS: CPT

## 2024-03-27 PROCEDURE — 84443 ASSAY THYROID STIM HORMONE: CPT

## 2024-03-27 PROCEDURE — 80053 COMPREHEN METABOLIC PANEL: CPT

## 2024-03-27 PROCEDURE — 84484 ASSAY OF TROPONIN QUANT: CPT

## 2024-03-27 PROCEDURE — 6370000000 HC RX 637 (ALT 250 FOR IP): Performed by: NURSE PRACTITIONER

## 2024-03-27 PROCEDURE — 94761 N-INVAS EAR/PLS OXIMETRY MLT: CPT

## 2024-03-27 PROCEDURE — 85025 COMPLETE CBC W/AUTO DIFF WBC: CPT

## 2024-03-27 PROCEDURE — 93306 TTE W/DOPPLER COMPLETE: CPT

## 2024-03-27 PROCEDURE — 80061 LIPID PANEL: CPT

## 2024-03-27 PROCEDURE — 94664 DEMO&/EVAL PT USE INHALER: CPT

## 2024-03-27 PROCEDURE — 83880 ASSAY OF NATRIURETIC PEPTIDE: CPT

## 2024-03-27 PROCEDURE — 1200000000 HC SEMI PRIVATE

## 2024-03-27 PROCEDURE — 93005 ELECTROCARDIOGRAM TRACING: CPT | Performed by: STUDENT IN AN ORGANIZED HEALTH CARE EDUCATION/TRAINING PROGRAM

## 2024-03-27 PROCEDURE — 84439 ASSAY OF FREE THYROXINE: CPT

## 2024-03-27 PROCEDURE — 2580000003 HC RX 258: Performed by: NURSE PRACTITIONER

## 2024-03-27 PROCEDURE — 85610 PROTHROMBIN TIME: CPT

## 2024-03-27 PROCEDURE — 83735 ASSAY OF MAGNESIUM: CPT

## 2024-03-27 PROCEDURE — 99215 OFFICE O/P EST HI 40 MIN: CPT | Performed by: STUDENT IN AN ORGANIZED HEALTH CARE EDUCATION/TRAINING PROGRAM

## 2024-03-27 PROCEDURE — 3078F DIAST BP <80 MM HG: CPT | Performed by: STUDENT IN AN ORGANIZED HEALTH CARE EDUCATION/TRAINING PROGRAM

## 2024-03-27 PROCEDURE — 85730 THROMBOPLASTIN TIME PARTIAL: CPT

## 2024-03-27 RX ORDER — DILTIAZEM HYDROCHLORIDE 120 MG/1
120 CAPSULE, COATED, EXTENDED RELEASE ORAL DAILY
Status: DISCONTINUED | OUTPATIENT
Start: 2024-03-27 | End: 2024-03-29 | Stop reason: HOSPADM

## 2024-03-27 RX ORDER — POTASSIUM CHLORIDE 20 MEQ/1
40 TABLET, EXTENDED RELEASE ORAL PRN
Status: DISCONTINUED | OUTPATIENT
Start: 2024-03-27 | End: 2024-03-29 | Stop reason: HOSPADM

## 2024-03-27 RX ORDER — ENOXAPARIN SODIUM 100 MG/ML
40 INJECTION SUBCUTANEOUS DAILY
Status: DISCONTINUED | OUTPATIENT
Start: 2024-03-27 | End: 2024-03-27

## 2024-03-27 RX ORDER — FUROSEMIDE 10 MG/ML
40 INJECTION INTRAMUSCULAR; INTRAVENOUS 2 TIMES DAILY
Status: DISCONTINUED | OUTPATIENT
Start: 2024-03-27 | End: 2024-03-29

## 2024-03-27 RX ORDER — ENOXAPARIN SODIUM 100 MG/ML
30 INJECTION SUBCUTANEOUS 2 TIMES DAILY
Status: DISCONTINUED | OUTPATIENT
Start: 2024-03-27 | End: 2024-03-29 | Stop reason: HOSPADM

## 2024-03-27 RX ORDER — MAGNESIUM SULFATE IN WATER 40 MG/ML
2000 INJECTION, SOLUTION INTRAVENOUS PRN
Status: DISCONTINUED | OUTPATIENT
Start: 2024-03-27 | End: 2024-03-29 | Stop reason: HOSPADM

## 2024-03-27 RX ORDER — SODIUM CHLORIDE 0.9 % (FLUSH) 0.9 %
10 SYRINGE (ML) INJECTION EVERY 12 HOURS SCHEDULED
Status: DISCONTINUED | OUTPATIENT
Start: 2024-03-27 | End: 2024-03-29 | Stop reason: HOSPADM

## 2024-03-27 RX ORDER — TRAZODONE HYDROCHLORIDE 50 MG/1
50 TABLET ORAL NIGHTLY PRN
Status: DISCONTINUED | OUTPATIENT
Start: 2024-03-27 | End: 2024-03-29 | Stop reason: HOSPADM

## 2024-03-27 RX ORDER — METOLAZONE 2.5 MG/1
2.5 TABLET ORAL WEEKLY
Status: DISCONTINUED | OUTPATIENT
Start: 2024-03-27 | End: 2024-03-27

## 2024-03-27 RX ORDER — LOSARTAN POTASSIUM 25 MG/1
25 TABLET ORAL DAILY
Status: DISCONTINUED | OUTPATIENT
Start: 2024-03-27 | End: 2024-03-29 | Stop reason: HOSPADM

## 2024-03-27 RX ORDER — POTASSIUM CHLORIDE 7.45 MG/ML
10 INJECTION INTRAVENOUS PRN
Status: DISCONTINUED | OUTPATIENT
Start: 2024-03-27 | End: 2024-03-29 | Stop reason: HOSPADM

## 2024-03-27 RX ORDER — ACETAMINOPHEN 325 MG/1
650 TABLET ORAL EVERY 6 HOURS PRN
Status: DISCONTINUED | OUTPATIENT
Start: 2024-03-27 | End: 2024-03-29 | Stop reason: HOSPADM

## 2024-03-27 RX ORDER — ASPIRIN 81 MG/1
81 TABLET ORAL 2 TIMES DAILY
Status: DISCONTINUED | OUTPATIENT
Start: 2024-03-27 | End: 2024-03-29 | Stop reason: HOSPADM

## 2024-03-27 RX ORDER — POLYETHYLENE GLYCOL 3350 17 G/17G
17 POWDER, FOR SOLUTION ORAL DAILY PRN
Status: DISCONTINUED | OUTPATIENT
Start: 2024-03-27 | End: 2024-03-29 | Stop reason: HOSPADM

## 2024-03-27 RX ORDER — SOTALOL HYDROCHLORIDE 80 MG/1
80 TABLET ORAL 2 TIMES DAILY
Status: DISCONTINUED | OUTPATIENT
Start: 2024-03-27 | End: 2024-03-29 | Stop reason: HOSPADM

## 2024-03-27 RX ORDER — ACETAMINOPHEN 650 MG/1
650 SUPPOSITORY RECTAL EVERY 6 HOURS PRN
Status: DISCONTINUED | OUTPATIENT
Start: 2024-03-27 | End: 2024-03-29 | Stop reason: HOSPADM

## 2024-03-27 RX ORDER — SODIUM CHLORIDE 0.9 % (FLUSH) 0.9 %
10 SYRINGE (ML) INJECTION PRN
Status: DISCONTINUED | OUTPATIENT
Start: 2024-03-27 | End: 2024-03-29 | Stop reason: HOSPADM

## 2024-03-27 RX ORDER — ALBUTEROL SULFATE 90 UG/1
2 AEROSOL, METERED RESPIRATORY (INHALATION) 4 TIMES DAILY PRN
Status: DISCONTINUED | OUTPATIENT
Start: 2024-03-27 | End: 2024-03-29 | Stop reason: HOSPADM

## 2024-03-27 RX ORDER — METOLAZONE 2.5 MG/1
2.5 TABLET ORAL
Status: DISCONTINUED | OUTPATIENT
Start: 2024-03-27 | End: 2024-03-29 | Stop reason: HOSPADM

## 2024-03-27 RX ORDER — POTASSIUM CHLORIDE 20 MEQ/1
40 TABLET, EXTENDED RELEASE ORAL
Status: DISCONTINUED | OUTPATIENT
Start: 2024-03-27 | End: 2024-03-29 | Stop reason: HOSPADM

## 2024-03-27 RX ORDER — IBUPROFEN 200 MG
200 TABLET ORAL EVERY MORNING
COMMUNITY

## 2024-03-27 RX ORDER — SODIUM CHLORIDE 9 MG/ML
INJECTION, SOLUTION INTRAVENOUS PRN
Status: DISCONTINUED | OUTPATIENT
Start: 2024-03-27 | End: 2024-03-29 | Stop reason: HOSPADM

## 2024-03-27 RX ADMIN — FUROSEMIDE 40 MG: 10 INJECTION, SOLUTION INTRAMUSCULAR; INTRAVENOUS at 13:20

## 2024-03-27 RX ADMIN — ENOXAPARIN SODIUM 30 MG: 100 INJECTION SUBCUTANEOUS at 13:19

## 2024-03-27 RX ADMIN — ASPIRIN 81 MG: 81 TABLET, COATED ORAL at 20:32

## 2024-03-27 RX ADMIN — SODIUM CHLORIDE, PRESERVATIVE FREE 10 ML: 5 INJECTION INTRAVENOUS at 13:20

## 2024-03-27 RX ADMIN — ENOXAPARIN SODIUM 30 MG: 100 INJECTION SUBCUTANEOUS at 20:32

## 2024-03-27 RX ADMIN — SODIUM CHLORIDE, PRESERVATIVE FREE 10 ML: 5 INJECTION INTRAVENOUS at 20:32

## 2024-03-27 RX ADMIN — METOLAZONE 2.5 MG: 2.5 TABLET ORAL at 15:17

## 2024-03-27 RX ADMIN — POTASSIUM CHLORIDE 40 MEQ: 1500 TABLET, EXTENDED RELEASE ORAL at 13:19

## 2024-03-27 RX ADMIN — SOTALOL HYDROCHLORIDE 80 MG: 80 TABLET ORAL at 20:32

## 2024-03-27 RX ADMIN — FUROSEMIDE 40 MG: 10 INJECTION, SOLUTION INTRAMUSCULAR; INTRAVENOUS at 20:32

## 2024-03-27 NOTE — H&P
History and Physical    Patient:  Pedrito Hinton  MRN: [id number]    Chief Complaint: Shortness of breath    History Obtained From:  patient, electronic medical record    PCP: Abimael Solano MD    History of Present Illness:   The patient is a 59 y.o. male who presented as a direct admission from primary care office with complaints of a 34 pound weight gain over the last month.  Patient reported that he has had increased shortness of breath and that time.  He stated his shortness of breath increases with exertion.  He complained of his close being tight around his waist.  He complained of 13 pounds of that weight gain was in the last few days.  He stated his primary care has been adjusting his diuretics as an outpatient however he continues to worsen.  Patient does have history of thoracic AAA repair, atrial fibrillation/flutter, and ICS.  Patient also has history of MARIA TERESA as well as ventricular tachycardia.  Patient is on sotalol and is compliant with medications.        Past Medical History:        Diagnosis Date    AAA (abdominal aortic aneurysm) ([facility]) 2008    Thoracic - repaired    Aortic regurgitation     aortic regurg    Atrial fibrillation ([facility])     Atrial flutter ([facility])     Encounter for cardioversion procedure     Airam Garcia/ Dr. Olivarez, states 11 times, most recent 1/2021    Frequent urination     Pt states he's developed frequent urination with 3 times in last yr noting blood in urine as well.    H/O echocardiogram 12/04/2017    EF 50-55% Global LV systolic function appears preserved. Mildly increaseed LV wall thinckness with normal LV carvity size, No definite specific wall motion abnormalities identified, LA is moderately dilated (34-39) with LA volume index of 36ml/m2, Mild mitral and tricuspid regurgitation, moderate aortic regurgitation, Evidence of mild diastolic dysfunction seen    H/O echocardiogram 02/02/2021    EF 55% patient has sigmoid interventricular septim S/P aortic vlave repair with mild to mod

## 2024-03-27 NOTE — PROGRESS NOTES
Severity: 1-4 = Minimal depression, 5-9 = Mild depression, 10-14 = Moderate depression, 15-19 = Moderately severe depression, 20-27 = Severe depression    Review of Systems  Constitutional: Negative for activity change, appetite change, chills, diaphoresis, fatigue, fever and positive for weight gain.  HENT: Negative for sinus pressure, sinus pain, sore throat and trouble swallowing.    Respiratory: Negative for cough, positive for shortness of breath and no wheezing.    Cardiovascular: Negative for chest pain, palpitations and positive for leg swelling.   Gastrointestinal: Negative for abdominal pain, diarrhea, nausea and vomiting.   Endocrine: Negative for cold intolerance, polydipsia, polyphagia and polyuria.   Genitourinary: Negative for difficulty urinating, flank pain and frequency.   Musculoskeletal: Negative for gait problem and joint swelling. Negative for back pain, neck pain and neck stiffness.   Skin: Negative for color change and wound. Negative for pallor and rash.   Allergic/Immunologic: Negative for environmental allergies and food allergies.   Neurological: Negative for light-headedness, numbness and headaches.   Psychiatric/Behavioral: Negative for sleep disturbance. Negative for confusion and suicidal ideas.     Objective:    BP (!) 148/62   Pulse 80   Resp 18   Ht 1.727 m (5' 8\")   Wt 128.4 kg (283 lb)   BMI 43.03 kg/m²    BP Readings from Last 3 Encounters:   03/27/24 (!) 148/62   03/19/24 126/62   03/14/24 128/60     Physical Exam  Constitutional: Patient is oriented to person, place, and time. Patient appears well-developed and well-nourished. No distress.   HENT: Head: Normocephalic and atraumatic.   Eyes: Pupils are equal, round, and reactive to light. Conjunctivae are normal. Right eye exhibits no discharge. Left eye exhibits no discharge.   Cardiovascular: Normal rate, regular rhythm and normal heart sounds.   Pulmonary/Chest: Effort normal and breath sounds normal. No respiratory

## 2024-03-27 NOTE — RT PROTOCOL NOTE
RESPIRATORY ASSESSMENT PROTOCOL                                                                                              Patient Name: Pedrito Hinton Room#: 0316/0316-01 : 1965     Admitting diagnosis: Acute CHF ([facility]) [I50.9]  Acute on chronic diastolic CHF (congestive heart failure), NYHA class 3 ([facility]) [I50.33]       Medical History:   Past Medical History:   Diagnosis Date    AAA (abdominal aortic aneurysm) ([facility])     Thoracic - repaired    Aortic regurgitation     aortic regurg    Atrial fibrillation ([facility])     Atrial flutter ([facility])     Encounter for cardioversion procedure     Airam Garcia/ Dr. Olivarez, states 11 times, most recent 2021    Frequent urination     Pt states he's developed frequent urination with 3 times in last yr noting blood in urine as well.    H/O echocardiogram 2017    EF 50-55% Global LV systolic function appears preserved. Mildly increaseed LV wall thinckness with normal LV carvity size, No definite specific wall motion abnormalities identified, LA is moderately dilated (34-39) with LA volume index of 36ml/m2, Mild mitral and tricuspid regurgitation, moderate aortic regurgitation, Evidence of mild diastolic dysfunction seen    H/O echocardiogram 2021    EF 55% patient has sigmoid interventricular septim S/P aortic vlave repair with mild to mod aortic regurg Diastoligy cannot be properly assessed due to pts rhtyhm Aortic root is mildly dilated when corrected for body surface area    Hemorrhage of rectum and anus     polypectomy    History of 24 hour EKG monitoring 2014    Unremarkable except for mild increased number of PVC's with 3 ventricular couplets, asymptomatic     History of echocardiogram 04/10/2014    EF 60%, mild to mod LVH, moderate AI    History of PFTs 2015    Consistent with mild obstructive ventilatory impairment. Lung volumes are normal,except mild increase in residual volume,which could be suggestive of airway trapping. Diffusion

## 2024-03-27 NOTE — PROGRESS NOTES
[facility] Pt ambulated himself. States he does get SOB. Reads 97% RA. Pt had come to floor earlier and got permission to go home and get his CPAP. He A/Ox4 Calm and cooperative. Antelmo SOLORIO is starting IV and getting labs, Radiology waiting to take him for CXR, Tech in at this time to do ECHO. Pt is familiar with call light and white board. Informed him he is on an 1800ml fluid restriction as well. VS and assessment as charted. Will continue to monitor.

## 2024-03-28 LAB
ANION GAP SERPL CALCULATED.3IONS-SCNC: 13 MMOL/L (ref 9–17)
BASOPHILS # BLD: 0.05 K/UL (ref 0–0.2)
BASOPHILS NFR BLD: 1 % (ref 0–2)
BUN SERPL-MCNC: 20 MG/DL (ref 6–20)
BUN/CREAT SERPL: 25 (ref 9–20)
CALCIUM SERPL-MCNC: 9.3 MG/DL (ref 8.6–10.4)
CHLORIDE SERPL-SCNC: 99 MMOL/L (ref 98–107)
CO2 SERPL-SCNC: 28 MMOL/L (ref 20–31)
CREAT SERPL-MCNC: 0.8 MG/DL (ref 0.7–1.2)
EOSINOPHIL # BLD: 0.29 K/UL (ref 0–0.44)
EOSINOPHILS RELATIVE PERCENT: 6 % (ref 1–4)
ERYTHROCYTE [DISTWIDTH] IN BLOOD BY AUTOMATED COUNT: 12.4 % (ref 11.8–14.4)
GFR SERPL CREATININE-BSD FRML MDRD: >90 ML/MIN/1.73M2
GLUCOSE SERPL-MCNC: 115 MG/DL (ref 70–99)
HCT VFR BLD AUTO: 39.7 % (ref 40.7–50.3)
HGB BLD-MCNC: 13.7 G/DL (ref 13–17)
IMM GRANULOCYTES # BLD AUTO: <0.03 K/UL (ref 0–0.3)
IMM GRANULOCYTES NFR BLD: 0 %
LYMPHOCYTES NFR BLD: 1.25 K/UL (ref 1.1–3.7)
LYMPHOCYTES RELATIVE PERCENT: 26 % (ref 24–43)
MAGNESIUM SERPL-MCNC: 2.1 MG/DL (ref 1.6–2.6)
MCH RBC QN AUTO: 31.5 PG (ref 25.2–33.5)
MCHC RBC AUTO-ENTMCNC: 34.5 G/DL (ref 28.4–34.8)
MCV RBC AUTO: 91.3 FL (ref 82.6–102.9)
MONOCYTES NFR BLD: 0.51 K/UL (ref 0.1–1.2)
MONOCYTES NFR BLD: 11 % (ref 3–12)
NEUTROPHILS NFR BLD: 56 % (ref 36–65)
NEUTS SEG NFR BLD: 2.68 K/UL (ref 1.5–8.1)
NRBC BLD-RTO: 0 PER 100 WBC
PLATELET # BLD AUTO: 172 K/UL (ref 138–453)
PMV BLD AUTO: 10.3 FL (ref 8.1–13.5)
POTASSIUM SERPL-SCNC: 3.8 MMOL/L (ref 3.7–5.3)
RBC # BLD AUTO: 4.35 M/UL (ref 4.21–5.77)
SODIUM SERPL-SCNC: 140 MMOL/L (ref 135–144)
WBC OTHER # BLD: 4.8 K/UL (ref 3.5–11.3)

## 2024-03-28 PROCEDURE — 6360000002 HC RX W HCPCS: Performed by: NURSE PRACTITIONER

## 2024-03-28 PROCEDURE — 1200000000 HC SEMI PRIVATE

## 2024-03-28 PROCEDURE — 83735 ASSAY OF MAGNESIUM: CPT

## 2024-03-28 PROCEDURE — 80048 BASIC METABOLIC PNL TOTAL CA: CPT

## 2024-03-28 PROCEDURE — 6370000000 HC RX 637 (ALT 250 FOR IP): Performed by: NURSE PRACTITIONER

## 2024-03-28 PROCEDURE — 83036 HEMOGLOBIN GLYCOSYLATED A1C: CPT

## 2024-03-28 PROCEDURE — 94640 AIRWAY INHALATION TREATMENT: CPT

## 2024-03-28 PROCEDURE — 99254 IP/OBS CNSLTJ NEW/EST MOD 60: CPT | Performed by: PHYSICIAN ASSISTANT

## 2024-03-28 PROCEDURE — 94761 N-INVAS EAR/PLS OXIMETRY MLT: CPT

## 2024-03-28 PROCEDURE — 36415 COLL VENOUS BLD VENIPUNCTURE: CPT

## 2024-03-28 PROCEDURE — 85025 COMPLETE CBC W/AUTO DIFF WBC: CPT

## 2024-03-28 PROCEDURE — 2580000003 HC RX 258: Performed by: NURSE PRACTITIONER

## 2024-03-28 RX ADMIN — ASPIRIN 81 MG: 81 TABLET, COATED ORAL at 20:42

## 2024-03-28 RX ADMIN — SODIUM CHLORIDE, PRESERVATIVE FREE 10 ML: 5 INJECTION INTRAVENOUS at 16:34

## 2024-03-28 RX ADMIN — TIOTROPIUM BROMIDE INHALATION SPRAY 2 PUFF: 3.12 SPRAY, METERED RESPIRATORY (INHALATION) at 09:55

## 2024-03-28 RX ADMIN — ENOXAPARIN SODIUM 30 MG: 100 INJECTION SUBCUTANEOUS at 20:42

## 2024-03-28 RX ADMIN — POTASSIUM CHLORIDE 40 MEQ: 1500 TABLET, EXTENDED RELEASE ORAL at 08:02

## 2024-03-28 RX ADMIN — SODIUM CHLORIDE, PRESERVATIVE FREE 10 ML: 5 INJECTION INTRAVENOUS at 08:03

## 2024-03-28 RX ADMIN — SOTALOL HYDROCHLORIDE 80 MG: 80 TABLET ORAL at 08:02

## 2024-03-28 RX ADMIN — SOTALOL HYDROCHLORIDE 80 MG: 80 TABLET ORAL at 20:42

## 2024-03-28 RX ADMIN — FUROSEMIDE 40 MG: 10 INJECTION, SOLUTION INTRAMUSCULAR; INTRAVENOUS at 08:02

## 2024-03-28 RX ADMIN — ASPIRIN 81 MG: 81 TABLET, COATED ORAL at 08:02

## 2024-03-28 RX ADMIN — DILTIAZEM HYDROCHLORIDE 120 MG: 120 CAPSULE, COATED, EXTENDED RELEASE ORAL at 08:02

## 2024-03-28 RX ADMIN — FUROSEMIDE 40 MG: 10 INJECTION, SOLUTION INTRAMUSCULAR; INTRAVENOUS at 16:34

## 2024-03-28 RX ADMIN — ENOXAPARIN SODIUM 30 MG: 100 INJECTION SUBCUTANEOUS at 08:02

## 2024-03-28 RX ADMIN — SODIUM CHLORIDE, PRESERVATIVE FREE 10 ML: 5 INJECTION INTRAVENOUS at 20:42

## 2024-03-28 RX ADMIN — LOSARTAN POTASSIUM 25 MG: 25 TABLET, FILM COATED ORAL at 08:02

## 2024-03-28 ASSESSMENT — PAIN SCALES - GENERAL
PAINLEVEL_OUTOF10: 0
PAINLEVEL_OUTOF10: 0

## 2024-03-28 NOTE — CONSULTS
Pre-operative Instructions           NOTHING to eat or drink after midnight the night prior to surgery   (This includes gum, candy, mints, chewing tobacco, etc). (Follow bowel prep instructions as/if instructed by your surgeon.)    Please arrive at the surgery center (Entrance B) by 8:40 AM on 1/25/2023 (or as directed by your surgeon's office). See Directons to Surgery Center below. Please take only the following medication(s) the day of surgery with a small sip of water:     Please stop any blood thinning medications  AS DIRECTED BY PRESCRIBING PROVIDER! :  Aspirin   Failure to stop these medications as instructed (too soon or too late) may result in injury to you, or your surgery may need to be rescheduled. Below is a list of some examples for your reference. Antiplatelets : (stop blood cells (called platelets) from sticking together and forming a blood clot):   Aspirin (Bufferin, Ecotrin), Clopidogrel (Plavix), Ticagrelor (Brilinta), Prasugrel (Effient), Dipyridamole/aspirin (Aggrenox), Ticlopidine (Ticlid), Eptifibatide (Integrilin)    Anticoagulants: (slow down your body's process of making clots): Warfarin (Coumadin), Rivaroxaban (Xarelto), Dabigatran (Pradaxa), Apixaban (Eliquis), Edoxaban (Savaysa), Heparin/Enoxaparin (Lovenox), Fondaparinux (Arixtra)    NSAIDS: Aspirin (Bufferin, Ecotrin), Ibuprofen (Motrin, Nuprin,Advil), Naproxen (Aleve),Meloxicam (Mobic), Celecoxib (Celebrex), Diclofenac (Voltaren), Etodolac (Lodine), Indomethacin, Ketorolac, Nabumetone, Oxaprozin (Daypro), Piroxicam (Feldene), Excedrin (has aspirin in it)    Herbal supplements: Bromelain, Cinnamon, [employer], [place] (female ginseng), Fish oil, Garlic, Chi[place],Ginkgo biloba, Grape seed extract, Turmeric, Vitamin E, etc....)    You may continue the rest of your medications through the night before surgery unless instructed otherwise.     PLEASE NOTE:  THE ABOVE (IF ANY) DISCONTINUED MEDS MAY ONLY BE FROM   \"CLEANING UP\" THE MED LIST AND WERE NOT ACTUALLY CANCELLED; SEE CHART FOR DETAILS AND ALWAYS CHECK WITH PRESCRIBING PROVIDER BEFORE DISCONTINUING ANY MEDICATIONS    If applicable:   Do not take diabetic medications on the day of surgery. Please use/bring daily inhalers with you     1/11/23  3:27 PM      ___________________  _______________________  Signature (Provider)              Signature (Patient)     Day of Surgery/Procedure    As a patient at [address] [place] [address] you can expect quality medical and nursing care that is centered on your individual needs. Our goal is to make your surgical experience as comfortable as possible    Directions to the [address] [place] [address]. [facility] is located in the Emergency Room parking lot on [facility] or there is additional parking across the street. The address is [place] [address]. Please check in at the [facility] upon arrival.     Patient Instructions  In case of any illness please contact your surgeons office for instructions prior to coming to the hospital.    Due to current restrictions you are only allowed 2 adults to be with you. Masks are to be worn and screening will take place on arrival.     Bring your current list of medications, vitamins, herbals and anything you might take on an  \"as needed \" basis. It is important we have a correct list with dosages and frequencies. Please verify your list with your medications at home or bring all of your bottles with you. If you have been given a blood band be sure to bring it with you on the day of surgery. Do not put it on or close the clasp. Use and bring any inhalers if you are currently using one. It is ok to brush your teeth but do not swallow any water. You may be required to provide a urine sample upon your arrival to the pre-op area, so please take this into consideration prior to using the restroom.     No jewelry or piercing's to be worn into surgery because you might be injured because of them. No contact lenses to be worn. It is ok to wear your glasses in pre op but they will be removed prior to going into the operating room. Dentures/partials will likely need to be removed in pre op depending on your type of anesthesia, please do not use adhesives on the day of surgery. Bathe as instructed with the special soap given to you. No lotions, powders or creams after bathing. Wear loose comfortable clothing / shoes that are easy to get on and off over wounds or casts. If you are going to be admitted after surgery please bring your Cpap or BiPap if you have it at home. Keep patient belongings to a minimum and leave valuables at home. If you are staying overnight with us, please bring a SMALL bag of personal items. We cannot accommodate large items, like suitcases. If you are going home after anesthesia or sedation then you must have a responsible adult with you to take you home and to be with you for the first 24 hours after surgery. If you do not have someone to stay with you your surgery might get cancelled. Please contact your surgeon's office to see if other arrangements can be made if you can not find someone to stay with you. If you have any other questions on the day of surgery please contact [phone] or [phone]    If you have any other questions regarding your procedure/surgery please call  your surgeon's office. disease     ORAL SURGEON - Dr. Jesus Bowie - laser surgery 3/31/2023    Sleep apnea     uses CPAP    Tobacco use disorder     Under care of team     PCP - Dr. Tian - last visit 3/2023    Under care of team     CARDIOLOGY - Dr. Olivarez next visit 3/29/2023    Under care of team     ORTHO - Dr. Arreguin    Urinary urgency     Ventral hernia     Repaired       Past Surgical History:   Procedure Laterality Date    ABLATION OF DYSRHYTHMIC FOCUS  2015    ACHILLES TENDON SURGERY Right 2010    COLONOSCOPY  06/13/2013    x 2 , bleeding polyps removed    FOOT SURGERY      reconstructive for club foot (right)    HERNIA REPAIR  11/2018    ventral    HERNIA REPAIR N/A 03/06/2020    XI LAPAROSCOPIC ROBOTIC VENTRAL HERNIA WITH MESH performed by Omega Suresh IV, DO at [facility] OR    HIP ARTHROPLASTY Left 04/13/2023    WITH REMOVAL OF HARDWARE    HIP FRACTURE SURGERY Left 04/24/2022    HIP OPEN REDUCTION INTERNAL FIXATION performed by Chuy Mccann DO at [facility] OR    HIP SURGERY Left 4/13/2023    REMOVAL OF HIP HARDWARE, ANTERIOR TOTAL HIP ARTHROPLASTY performed by Andrea Arreguin DO at [facility] OR    IA RPR UMBILICAL HRNA 5 YRS/> REDUCIBLE N/A 10/24/2018    HERNIA UMBILICAL REPAIR performed by Christine López MD at [facility] OR    THORACIC AORTIC ANEURYSM REPAIR  2008    thoracic AAA repair and modified Coleman procedure ( aortic valve sparing ) at [facility]    TOTAL KNEE ARTHROPLASTY Left 05/10/2021    KNEE TOTAL ARTHROPLASTY performed by Tru Hall MD at [facility] OR    VASECTOMY         Family History   Problem Relation Age of Onset    Colon Cancer Mother     Heart Surgery Father         enlarged aorta    Kidney Cancer Father        Social History     Tobacco Use    Smoking status: Former     Current packs/day: 0.00     Average packs/day: 1 pack/day for 38.0 years (38.0 ttl pk-yrs)     Types: Cigarettes     Start date: 1983     Quit date: 12/15/2020     Years since quitting: 3.2    Smokeless tobacco: Never   Vaping Use

## 2024-03-28 NOTE — PROGRESS NOTES
Progress Note    SUBJECTIVE:    Patient seen for f/u of Acute on chronic diastolic CHF (congestive heart failure), NYHA class 3 (HCC).  He resting in bed no distress.  Has not ambulated yet today so unsure how SOB he is at this time.     ROS:   Constitutional: negative  for fevers, and negative for chills.  Respiratory: negative for shortness of breath, negative for cough, and negative for wheezing  Cardiovascular: negative for chest pain, and negative for palpitations  Gastrointestinal: negative for abdominal pain, negative for nausea,negative for vomiting, negative for diarrhea, and negative for constipation     All other systems were reviewed with the patient and are negative unless otherwise stated in HPI      OBJECTIVE:      Vitals:   Vitals:    03/28/24 0023   BP: 116/61   Pulse: 64   Resp: 20   Temp: 98.1 °F (36.7 °C)   SpO2: 96%     Weight - Scale: 123.4 kg (272 lb 1.6 oz)   Height: 172.7 cm (5' 7.99\")     Weight  Wt Readings from Last 3 Encounters:   03/28/24 123.4 kg (272 lb 1.6 oz)   03/27/24 128.4 kg (283 lb)   03/19/24 126.2 kg (278 lb 3.2 oz)     Body mass index is 41.38 kg/m².    24HR INTAKE/OUTPUT:      Intake/Output Summary (Last 24 hours) at 3/28/2024 0631  Last data filed at 3/28/2024 0419  Gross per 24 hour   Intake 822 ml   Output 5450 ml   Net -4628 ml     -----------------------------------------------------------------  Exam:    GEN:    Awake, alert and oriented x3.   EYES:  EOMI, pupils equal   NECK: Supple. No lymphadenopathy.  No carotid bruit  CVS:    regular rate and rhythm, 2/6 systolic murmur  PULM:  diminished with bilateral expiratory wheezing, no acute respiratory distress  ABD:    Bowels sounds normal.  Abdomen is soft.  No distention.  no tenderness to palpation.   EXT:   no edema bilaterally .  No calf tenderness.   NEURO: Moves all extremities.  Motor and sensory are grossly intact  SKIN:  No rashes.  No skin lesions.

## 2024-03-28 NOTE — CARE COORDINATION
Case Management Assessment  Initial Evaluation    Date/Time of Evaluation: 3/28/2024 11:31 AM  Assessment Completed by: Adrienne Richardson RN    If patient is discharged prior to next notation, then this note serves as note for discharge by case management.    Patient Name: Pedrito Hinton                   YOB: 1965  Diagnosis: Acute CHF (HCC) [I50.9]  Acute on chronic diastolic CHF (congestive heart failure), NYHA class 3 (HCC) [I50.33]                   Date / Time: 3/27/2024  9:04 AM    Patient Admission Status: Inpatient   Readmission Risk (Low < 19, Mod (19-27), High > 27): Readmission Risk Score: 10.5    Current PCP: Abimael Solano MD  PCP verified by CM? Yes    Chart Reviewed: Yes      History Provided by: Patient  Patient Orientation: Alert and Oriented    Patient Cognition: Alert    Hospitalization in the last 30 days (Readmission):  No    If yes, Readmission Assessment in CM Navigator will be completed.    Advance Directives:      Code Status: Full Code   Patient's Primary Decision Maker is: Patient Declined (Legal Next of Kin Remains as Decision Maker)    Primary Decision Maker: Sanjay Hinton - Child - [phone]    Discharge Planning:    Patient lives with: Spouse/Significant Other Type of Home: House  Primary Care Giver: Self  Patient Support Systems include: Spouse/Significant Other, Children, Family Members   Current Financial resources: None  Current community resources: None  Current services prior to admission: C-pap            Current DME:              Type of Home Care services:  None    ADLS  Prior functional level: Independent in ADLs/IADLs  Current functional level: Independent in ADLs/IADLs    PT AM-PAC:   /24  OT AM-PAC:   /24    Family can provide assistance at DC: Yes  Would you like Case Management to discuss the discharge plan with any other family members/significant others, and if so, who? No  Plans to Return to Present Housing: Yes  Other Identified Issues/Barriers to

## 2024-03-28 NOTE — PROGRESS NOTES
Pt's urinal emptied at this time. Pt's room cleaned and organized. Trash and linen changed. Pt denies any needs at this time. Call light within reach. Care is ongoing.

## 2024-03-28 NOTE — PROGRESS NOTES
Spiritual Services Interventions  0316/0316-01   3/28/2024  Andrea Crawley    Pedrito STARR Hinton  59 y.o. year old male    Encounter Summary  Encounter Overview/Reason : (P) Initial Encounter  Service Provided For:: (P) Patient  Referral/Consult From:: (P) Rounding  Last Encounter : (P) 03/28/24  Complexity of Encounter: (P) Moderate  Begin Time: (P) 0930  End Time : (P) 0945  Total Time Calculated: (P) 15 min     Spiritual/Emotional needs  Type: (P) Spiritual Support                    Assessment/Intervention/Outcome  Assessment: (P) Calm  Intervention: (P) Prayer (assurance of)/[place]  Outcome: (P) Encouraged

## 2024-03-28 NOTE — FLOWSHEET NOTE
Sitting up in chair at bedside. Vitals checked. Denies pain or SOB currently. No needs at present time. Call light within reach. Continue to  monitor

## 2024-03-28 NOTE — FLOWSHEET NOTE
Sitting up in bed awake. Vitals checked and assessment completed. Voices feels much better today. Denies pain. Continues with pitting edema to BLE. No needs at present time . Continue to monitor

## 2024-03-28 NOTE — PROGRESS NOTES
Comprehensive Nutrition Assessment    Type and Reason for Visit:  Initial, Consult, Patient Education    Nutrition Recommendations/Plan:   Lower sodium eating (2000 mg)  Education on above.      Malnutrition Assessment:  Malnutrition Status:  No malnutrition (03/28/24 0717)    Context:  Acute Illness     Findings of the 6 clinical characteristics of malnutrition:  Energy Intake:  No significant decrease in energy intake  Weight Loss:  No significant weight loss     Body Fat Loss:  No significant body fat loss     Muscle Mass Loss:  No significant muscle mass loss    Fluid Accumulation:  Moderate to Severe Generalized   Strength:  Not Performed    Nutrition Assessment:    Food and nutrition related knowledge deficit r/t altered cardiac status, AEB home diet high in salt with CHF.  Previously \"not adding\" salt to foods but enjoys  cuisine and many other high sodium foods.  Accepting of education for 2000 mg daily and food label reading.  Is currently down 10# from admission from diuresis. Good appetite and intakes otherwise.    Nutrition Related Findings:    +2 generalized edema. Wound Type: None       Current Nutrition Intake & Therapies:    Average Meal Intake: %  Average Supplements Intake: None Ordered  ADULT DIET; Regular; Low Sodium (2 gm); 1800 ml    Anthropometric Measures:  Height: 172.7 cm (5' 8\")  Ideal Body Weight (IBW): 154 lbs (70 kg)    Admission Body Weight: 128.4 kg (283 lb 1.1 oz)  Current Body Weight: 123.4 kg (272 lb 1.6 oz), 176.7 % IBW. Weight Source: Bed Scale  Current BMI (kg/m2): 41.4  Usual Body Weight: 120.7 kg (266 lb) (low this year)  % Weight Change (Calculated): 2.3  Weight Adjustment For: No Adjustment                 BMI Categories: Obese Class 3 (BMI 40.0 or greater)    Estimated Daily Nutrient Needs:  Energy Requirements Based On: Kcal/kg  Weight Used for Energy Requirements: Current  Energy (kcal/day): 6155-1022 (12-15)  Weight Used for Protein Requirements:

## 2024-03-29 VITALS
DIASTOLIC BLOOD PRESSURE: 65 MMHG | HEART RATE: 66 BPM | OXYGEN SATURATION: 97 % | RESPIRATION RATE: 16 BRPM | SYSTOLIC BLOOD PRESSURE: 107 MMHG | TEMPERATURE: 98.1 F | WEIGHT: 272.1 LBS | HEIGHT: 68 IN | BODY MASS INDEX: 41.24 KG/M2

## 2024-03-29 LAB
ANION GAP SERPL CALCULATED.3IONS-SCNC: 13 MMOL/L (ref 9–17)
BASOPHILS # BLD: 0.06 K/UL (ref 0–0.2)
BASOPHILS NFR BLD: 1 % (ref 0–2)
BUN SERPL-MCNC: 36 MG/DL (ref 6–20)
BUN/CREAT SERPL: 36 (ref 9–20)
CALCIUM SERPL-MCNC: 9.7 MG/DL (ref 8.6–10.4)
CHLORIDE SERPL-SCNC: 98 MMOL/L (ref 98–107)
CO2 SERPL-SCNC: 28 MMOL/L (ref 20–31)
CREAT SERPL-MCNC: 1 MG/DL (ref 0.7–1.2)
EOSINOPHIL # BLD: 0.27 K/UL (ref 0–0.44)
EOSINOPHILS RELATIVE PERCENT: 5 % (ref 1–4)
ERYTHROCYTE [DISTWIDTH] IN BLOOD BY AUTOMATED COUNT: 12.1 % (ref 11.8–14.4)
EST. AVERAGE GLUCOSE BLD GHB EST-MCNC: 103 MG/DL
EST. AVERAGE GLUCOSE BLD GHB EST-MCNC: 103 MG/DL
GFR SERPL CREATININE-BSD FRML MDRD: 87 ML/MIN/1.73M2
GLUCOSE SERPL-MCNC: 110 MG/DL (ref 70–99)
HBA1C MFR BLD: 5.2 % (ref 4–6)
HBA1C MFR BLD: 5.2 % (ref 4–6)
HCT VFR BLD AUTO: 41.2 % (ref 40.7–50.3)
HGB BLD-MCNC: 14.1 G/DL (ref 13–17)
IMM GRANULOCYTES # BLD AUTO: 0.03 K/UL (ref 0–0.3)
IMM GRANULOCYTES NFR BLD: 1 %
LYMPHOCYTES NFR BLD: 1.55 K/UL (ref 1.1–3.7)
LYMPHOCYTES RELATIVE PERCENT: 29 % (ref 24–43)
MAGNESIUM SERPL-MCNC: 2.3 MG/DL (ref 1.6–2.6)
MCH RBC QN AUTO: 31.1 PG (ref 25.2–33.5)
MCHC RBC AUTO-ENTMCNC: 34.2 G/DL (ref 28.4–34.8)
MCV RBC AUTO: 90.7 FL (ref 82.6–102.9)
MONOCYTES NFR BLD: 0.62 K/UL (ref 0.1–1.2)
MONOCYTES NFR BLD: 12 % (ref 3–12)
NEUTROPHILS NFR BLD: 52 % (ref 36–65)
NEUTS SEG NFR BLD: 2.85 K/UL (ref 1.5–8.1)
NRBC BLD-RTO: 0 PER 100 WBC
PLATELET # BLD AUTO: 171 K/UL (ref 138–453)
PMV BLD AUTO: 10.2 FL (ref 8.1–13.5)
POTASSIUM SERPL-SCNC: 3.8 MMOL/L (ref 3.7–5.3)
RBC # BLD AUTO: 4.54 M/UL (ref 4.21–5.77)
SODIUM SERPL-SCNC: 139 MMOL/L (ref 135–144)
WBC OTHER # BLD: 5.4 K/UL (ref 3.5–11.3)

## 2024-03-29 PROCEDURE — 94664 DEMO&/EVAL PT USE INHALER: CPT

## 2024-03-29 PROCEDURE — 94640 AIRWAY INHALATION TREATMENT: CPT

## 2024-03-29 PROCEDURE — 6360000002 HC RX W HCPCS: Performed by: NURSE PRACTITIONER

## 2024-03-29 PROCEDURE — 94761 N-INVAS EAR/PLS OXIMETRY MLT: CPT

## 2024-03-29 PROCEDURE — 2580000003 HC RX 258: Performed by: NURSE PRACTITIONER

## 2024-03-29 PROCEDURE — 85025 COMPLETE CBC W/AUTO DIFF WBC: CPT

## 2024-03-29 PROCEDURE — 6370000000 HC RX 637 (ALT 250 FOR IP): Performed by: NURSE PRACTITIONER

## 2024-03-29 PROCEDURE — 6370000000 HC RX 637 (ALT 250 FOR IP): Performed by: INTERNAL MEDICINE

## 2024-03-29 PROCEDURE — 83735 ASSAY OF MAGNESIUM: CPT

## 2024-03-29 PROCEDURE — 83036 HEMOGLOBIN GLYCOSYLATED A1C: CPT

## 2024-03-29 PROCEDURE — 36415 COLL VENOUS BLD VENIPUNCTURE: CPT

## 2024-03-29 PROCEDURE — 80048 BASIC METABOLIC PNL TOTAL CA: CPT

## 2024-03-29 RX ORDER — FUROSEMIDE 40 MG/1
40 TABLET ORAL 2 TIMES DAILY
Status: DISCONTINUED | OUTPATIENT
Start: 2024-03-29 | End: 2024-03-29 | Stop reason: HOSPADM

## 2024-03-29 RX ORDER — TIOTROPIUM BROMIDE 18 UG/1
18 CAPSULE ORAL; RESPIRATORY (INHALATION) DAILY
Qty: 90 CAPSULE | Refills: 0 | Status: SHIPPED | OUTPATIENT
Start: 2024-03-29

## 2024-03-29 RX ADMIN — SODIUM CHLORIDE, PRESERVATIVE FREE 10 ML: 5 INJECTION INTRAVENOUS at 08:14

## 2024-03-29 RX ADMIN — EMPAGLIFLOZIN 10 MG: 10 TABLET, FILM COATED ORAL at 17:06

## 2024-03-29 RX ADMIN — FUROSEMIDE 40 MG: 40 TABLET ORAL at 17:06

## 2024-03-29 RX ADMIN — SOTALOL HYDROCHLORIDE 80 MG: 80 TABLET ORAL at 08:14

## 2024-03-29 RX ADMIN — LOSARTAN POTASSIUM 25 MG: 25 TABLET, FILM COATED ORAL at 08:14

## 2024-03-29 RX ADMIN — POTASSIUM CHLORIDE 40 MEQ: 1500 TABLET, EXTENDED RELEASE ORAL at 07:07

## 2024-03-29 RX ADMIN — DILTIAZEM HYDROCHLORIDE 120 MG: 120 CAPSULE, COATED, EXTENDED RELEASE ORAL at 08:14

## 2024-03-29 RX ADMIN — METOLAZONE 2.5 MG: 2.5 TABLET ORAL at 14:58

## 2024-03-29 RX ADMIN — ASPIRIN 81 MG: 81 TABLET, COATED ORAL at 08:14

## 2024-03-29 RX ADMIN — TIOTROPIUM BROMIDE INHALATION SPRAY 2 PUFF: 3.12 SPRAY, METERED RESPIRATORY (INHALATION) at 10:27

## 2024-03-29 RX ADMIN — ENOXAPARIN SODIUM 30 MG: 100 INJECTION SUBCUTANEOUS at 08:14

## 2024-03-29 RX ADMIN — FUROSEMIDE 40 MG: 10 INJECTION, SOLUTION INTRAMUSCULAR; INTRAVENOUS at 08:14

## 2024-03-29 ASSESSMENT — PAIN SCALES - GENERAL: PAINLEVEL_OUTOF10: 0

## 2024-03-29 NOTE — PROGRESS NOTES
I, Christin Andres am scribing for and in the presence of Suyapa Kirkpatrick PA-C.    Patient: Pedrito Hinton  : 1965  Date of Visit: 2024    REASON FOR VISIT / CONSULTATION: No chief complaint on file.    Dear Abimael Nina MD     I had the opportunity to see Pedrito Hinton at the office today for follow up.    1-He has a history of thoracic aorta surgery at the [facility] in . He said he had a repair with no artificial grafts put in. He had a heart catheterization prior to the aorta surgery and was told his coronary arteries were fine.  No history of MI or angina.    2-He does have a history of atrial fibrillation/Atypical flutter which predates his thoracic aorta surgery but after his thoracic aorta surgery he had a couple of recurrences, multiple cardioversion and finally underwent atypical flutter ablation on 2015 at [facility].    3-Echocardiogram done 17 EF 55-60% Mildly increased left ventricular wall thickness with a normal left ventricular cavity size. No definite specific wall motion abnormalities were identified. The left atrium is moderately dilated (34-39) with a left atrial volume index of 36 ml/m2. Mild mitral and tricuspid regurgitation. Moderate aortic regurgitation. Evidence of mild diastolic dysfunction is seen     4-Another SKYLAR and cardioversion of atrial flutter on 2020.  This episode seems to be precipitated by binge drinking.    5- He presented again to [facility] on 2021 with atrial flutter with rapid ventricular response.  We ended up doing a cardioversion on 2/3/2021.  No SKYLAR was needed because patient symptoms were less than 48 hours.  Patient discharged home that day on anticoagulation.    6- EKG done in office on 2021 showed atrial flutter with heart rate of 135 to 140 bpm without any heart rate variability.    7- Last visit he was sent tot [facility] for a cardioversion and that failed, so he was sent to [facility]

## 2024-03-29 NOTE — PROGRESS NOTES
Patient in chair, watching television. Patient educated on medication to be given tonight and physician's orders to be completed. Patient stated understanding. Patient encouraged to ask questions. Patient denies of any questions at this time.    Vitals taken and documented. See flow sheet for details. Assessment completed and documented. Patient alert, oriented x4. Calm, pleasant. Speech clear. Lung sounds clear throughout lung lobes. No cough noted. Abdomen obese, round, soft, non tender to palpation. Bowel sounds active in all four quadrants. Generalized non-pitting edema noted. Scattered ecchymosis noted. Patient denies of pain, chest pain, numbness, tingling, or shortness of breath. Patient denies needs or concerns at this time. Call light and over bed table in reach.

## 2024-03-29 NOTE — DISCHARGE SUMMARY
[address] , [place], [place], [address]    Discharge Summary      NAME:  Pedrito Hinton  :  1965  MRN:  [id number]    Admit date:  3/27/2024  Discharge date:  24      Admitting Physician:  Abimael Solano MD    Primary Diagnosis on Admission:   Present on Admission:   MARIA TERESA (obstructive sleep apnea)   Acute on chronic diastolic CHF (congestive heart failure), NYHA class 3 (HCC)   NICM (nonischemic cardiomyopathy) ([facility])   PAF (paroxysmal atrial fibrillation) ([facility])      Secondary Diagnoses:  has Acute on chronic diastolic CHF (congestive heart failure), NYHA class 3 (HCC) on their pertinent problem list.    Admission Condition:  stable     Discharged Condition: good    Hospital Course:   The patient was admitted for the management of acute on chronic CHF. Cardiology was consulted. Labs were obtained. He was provided with IV Lasix/Diuresis and responded well to this. Renal function was monitored. He was started on Jardiance. Today on day of discharge pt feels better with no further complaints. Vitals and Labs are stable.  All consultants involved during this admission are agreeable to d/c. The patient will have repeat CBC/CMP in about 1 week's time.    Consults:  Cardiology    Significant Diagnostic/theraputic interventions: IV Lasix      Disposition:   home    Instructions to Patient:      Follow up with Abimael Solano MD and Cardiology in 1-2 weeks    Discharge Medications:       Medication List        START taking these medications      empagliflozin 10 MG tablet  Commonly known as: JARDIANCE  Take 1 tablet by mouth daily  Start taking on: 2024     tiotropium 18 MCG inhalation capsule  Commonly known as: SPIRIVA  Inhale 1 capsule into the lungs daily            CONTINUE taking these medications      albuterol sulfate  (90 Base) MCG/ACT inhaler  Commonly known as: Ventolin HFA  Inhale 2 puffs into the lungs 4 times daily as needed for Wheezing     aspirin 81 MG EC

## 2024-03-29 NOTE — PROGRESS NOTES
Discharge instructions reviewed with pt. Educated pt on CHF and S/Sx when to call PCP. All questions answered. Will escort pt to private car shortly.

## 2024-03-29 NOTE — PROGRESS NOTES
Writer to bedside to complete morning assessment. Upon entry to room, pt sitting at edge of bed, respirations even and unlabored while on room air. Vitals obtained and assessment completed, see flow sheet for details. Medications given at this time. See MAR for details. Pt denies needs from writer at this time. Call light in reach. Care ongoing.

## 2024-03-29 NOTE — PLAN OF CARE
Problem: Chronic Conditions and Co-morbidities  Goal: Patient's chronic conditions and co-morbidity symptoms are monitored and maintained or improved  3/29/2024 1733 by Karin Abreu RN  Outcome: Completed     Problem: Discharge Planning  Goal: Discharge to home or other facility with appropriate resources  3/29/2024 1733 by Karin Abreu RN  Outcome: Completed     Problem: Safety - Adult  Goal: Free from fall injury  3/29/2024 1733 by Karin Abreu RN  Outcome: Completed     Problem: Nutrition Deficit:  Goal: Optimize nutritional status  Outcome: Completed     Problem: Pain  Goal: Verbalizes/displays adequate comfort level or baseline comfort level  3/29/2024 1733 by Karin Abreu RN  Outcome: Completed

## 2024-03-29 NOTE — PROGRESS NOTES
Patient up in chair, awake. Reassessment completed at this time. Vitals taken and documented. Patient encouraged to ask questions. Patient denies pain or complaints. Call light and bed side table within reach.

## 2024-03-29 NOTE — PROGRESS NOTES
Evening medications given at this time per orders. Patient took medications with no issues. Patient denies any further needs or concerns at this time. Urinal emptied. Call light and over bed table within reach.

## 2024-03-29 NOTE — PROGRESS NOTES
RESPIRATORY ASSESSMENT PROTOCOL                                                                                              Patient Name: Pedrito Hinton Room#: 0314/0314-01 : 1965     Admitting diagnosis: Acute CHF ([facility]) [I50.9]  Acute on chronic diastolic CHF (congestive heart failure), NYHA class 3 ([facility]) [I50.33]       Medical History:   Past Medical History:   Diagnosis Date    AAA (abdominal aortic aneurysm) ([facility])     Thoracic - repaired    Aortic regurgitation     aortic regurg    Atrial fibrillation ([facility])     Atrial flutter ([facility])     Encounter for cardioversion procedure     Airam Garcia/ Dr. Olivarez, states 11 times, most recent 2021    Frequent urination     Pt states he's developed frequent urination with 3 times in last yr noting blood in urine as well.    H/O echocardiogram 2017    EF 50-55% Global LV systolic function appears preserved. Mildly increaseed LV wall thinckness with normal LV carvity size, No definite specific wall motion abnormalities identified, LA is moderately dilated (34-39) with LA volume index of 36ml/m2, Mild mitral and tricuspid regurgitation, moderate aortic regurgitation, Evidence of mild diastolic dysfunction seen    H/O echocardiogram 2021    EF 55% patient has sigmoid interventricular septim S/P aortic vlave repair with mild to mod aortic regurg Diastoligy cannot be properly assessed due to pts rhtyhm Aortic root is mildly dilated when corrected for body surface area    Hemorrhage of rectum and anus     polypectomy    History of 24 hour EKG monitoring 2014    Unremarkable except for mild increased number of PVC's with 3 ventricular couplets, asymptomatic     History of echocardiogram 04/10/2014    EF 60%, mild to mod LVH, moderate AI    History of PFTs 2015    Consistent with mild obstructive ventilatory impairment. Lung volumes are normal,except mild increase in residual volume,which could be suggestive of airway trapping. Diffusion

## 2024-03-29 NOTE — PROGRESS NOTES
[address] , [place], [place], [address]    Progress Note    Date:   3/29/2024  Patient name:  Pedrito Hinton  Date of admission:  3/27/2024  9:04 AM  MRN:   [id number]  YOB: 1965    SUBJECTIVE/Last 24 hours update:     Patient seen and examined at the bed side , no new acute events overnight and no new complains noted. VSS afebrile. Wt continues to decrease with good urinary output. Notes from nursing staff and Consults had been reviewed, and the overnight progress had been checked with the nursing staff as well.    REVIEW OF SYSTEMS:      CONSTITUTIONAL:  no fevers, no headcahes  EYES: negative for blury vision  HEENT: No headaches, No nasal congestion, no difficulty swallowing  RESPIRATORY: some dyspnea, no wheezing, no Cough  CARDIOVASCULAR: negative for chest pain, no palpitations  GASTROINTESTINAL: no nausea, no vomiting, no change in bowel habits, no abdominal pain   GENITOURINARY: negative for dysuria, no hematuria   MUSCULOSKELETAL: no joint pains, no muscle aches, no swelling of joints or extremities  NEUROLOGICAL: No  Weakness or numbness      PAST MEDICAL HISTORY:      has a past medical history of AAA (abdominal aortic aneurysm) ([facility]), Aortic regurgitation, Atrial fibrillation (HCC), Atrial flutter (HCC), Encounter for cardioversion procedure, Frequent urination, H/O echocardiogram, H/O echocardiogram, Hemorrhage of rectum and anus, History of 24 hour EKG monitoring, History of echocardiogram, History of PFTs, Hx of transesophageal echocardiography (SKYLAR) for monitoring, Hypertension, Impaired fasting glucose, Knee pain, bilateral, Lipoma, Obesity (BMI 30.0-34.9), PAF (paroxysmal atrial fibrillation) ([facility]), Paroxysmal supraventricular tachycardia, Periodontal disease, Sleep apnea, Tobacco use disorder, Under care of team, Under care of team, Under care of team, Urinary urgency, and Ventral hernia.    PAST SURGICAL HISTORY:      has a past surgical history that

## 2024-03-29 NOTE — PLAN OF CARE
Problem: Chronic Conditions and Co-morbidities  Goal: Patient's chronic conditions and co-morbidity symptoms are monitored and maintained or improved  Outcome: Progressing  Flowsheets (Taken 3/28/2024 2302)  Care Plan - Patient's Chronic Conditions and Co-Morbidity Symptoms are Monitored and Maintained or Improved:   Monitor and assess patient's chronic conditions and comorbid symptoms for stability, deterioration, or improvement   Collaborate with multidisciplinary team to address chronic and comorbid conditions and prevent exacerbation or deterioration   Update acute care plan with appropriate goals if chronic or comorbid symptoms are exacerbated and prevent overall improvement and discharge     Problem: Discharge Planning  Goal: Discharge to home or other facility with appropriate resources  Outcome: Progressing  Flowsheets (Taken 3/28/2024 2302)  Discharge to home or other facility with appropriate resources: Identify barriers to discharge with patient and caregiver     Problem: Safety - Adult  Goal: Free from fall injury  Outcome: Progressing  Flowsheets (Taken 3/28/2024 2302)  Free From Fall Injury: Instruct family/caregiver on patient safety     Problem: Nutrition Deficit:  Goal: Optimize nutritional status  Outcome: Progressing  Flowsheets (Taken 3/28/2024 2302)  Nutrient intake appropriate for improving, restoring, or maintaining nutritional needs: Monitor oral intake, labs, and treatment plans     Problem: Pain  Goal: Verbalizes/displays adequate comfort level or baseline comfort level  Outcome: Progressing  Flowsheets (Taken 3/28/2024 2302)  Verbalizes/displays adequate comfort level or baseline comfort level:   Encourage patient to monitor pain and request assistance   Administer analgesics based on type and severity of pain and evaluate response   Consider cultural and social influences on pain and pain management   Assess pain using appropriate pain scale   Implement non-pharmacological measures as

## 2024-03-29 NOTE — PLAN OF CARE
Problem: Chronic Conditions and Co-morbidities  Goal: Patient's chronic conditions and co-morbidity symptoms are monitored and maintained or improved  3/29/2024 0859 by Karin Abreu, RN  Outcome: Progressing  Flowsheets (Taken 3/29/2024 0859)  Care Plan - Patient's Chronic Conditions and Co-Morbidity Symptoms are Monitored and Maintained or Improved:   Monitor and assess patient's chronic conditions and comorbid symptoms for stability, deterioration, or improvement   Collaborate with multidisciplinary team to address chronic and comorbid conditions and prevent exacerbation or deterioration   Update acute care plan with appropriate goals if chronic or comorbid symptoms are exacerbated and prevent overall improvement and discharge     Problem: Discharge Planning  Goal: Discharge to home or other facility with appropriate resources  3/29/2024 0859 by Karin Abreu, RN  Outcome: Progressing  Flowsheets (Taken 3/29/2024 0859)  Discharge to home or other facility with appropriate resources:   Identify barriers to discharge with patient and caregiver   Identify discharge learning needs (meds, wound care, etc)   Refer to discharge planning if patient needs post-hospital services based on physician order or complex needs related to functional status, cognitive ability or social support system   Arrange for needed discharge resources and transportation as appropriate     Problem: Safety - Adult  Goal: Free from fall injury  3/29/2024 0859 by Karin Abreu, RN  Outcome: Progressing  Flowsheets (Taken 3/29/2024 0859)  Free From Fall Injury: Instruct family/caregiver on patient safety     Problem: Pain  Goal: Verbalizes/displays adequate comfort level or baseline comfort level  3/29/2024 0859 by Karin Abreu, RN  Outcome: Progressing  Flowsheets (Taken 3/29/2024 0859)  Verbalizes/displays adequate comfort level or baseline comfort level:   Encourage patient to monitor pain and request assistance   Administer analgesics

## 2024-04-02 ENCOUNTER — TELEPHONE (OUTPATIENT)
Dept: PRIMARY CARE CLINIC | Age: 59
End: 2024-04-02

## 2024-04-02 NOTE — TELEPHONE ENCOUNTER
Care Transitions Initial Follow Up Call    Outreach made within 2 business days of discharge: Yes    Patient: Pedrito STARR Hinton Patient : 1965   MRN: [id number]  Reason for Admission: There are no discharge diagnoses documented for the most recent discharge.  Discharge Date: 3/29/24       Left message  Kimber Garsia

## 2024-04-12 ENCOUNTER — HOSPITAL ENCOUNTER (OUTPATIENT)
Age: 59
Discharge: HOME | End: 2024-04-12
Payer: OTHER GOVERNMENT

## 2024-04-12 DIAGNOSIS — R06.02 SOB (SHORTNESS OF BREATH): ICD-10-CM

## 2024-04-12 DIAGNOSIS — I48.0 PAF (PAROXYSMAL ATRIAL FIBRILLATION) (HCC): ICD-10-CM

## 2024-04-12 LAB
ALBUMIN SERPL-MCNC: 4.2 G/DL (ref 3.5–5.2)
ALBUMIN/GLOB SERPL: 1.6 {RATIO} (ref 1–2.5)
ALP SERPL-CCNC: 103 U/L (ref 40–129)
ALT SERPL-CCNC: 42 U/L (ref 5–41)
ANION GAP SERPL CALCULATED.3IONS-SCNC: 11 MMOL/L (ref 9–17)
AST SERPL-CCNC: 29 U/L
BASOPHILS # BLD: 0.04 K/UL (ref 0–0.2)
BASOPHILS NFR BLD: 1 % (ref 0–2)
BILIRUB SERPL-MCNC: 0.2 MG/DL (ref 0.3–1.2)
BUN SERPL-MCNC: 14 MG/DL (ref 6–20)
BUN/CREAT SERPL: 13 (ref 9–20)
CALCIUM SERPL-MCNC: 9.2 MG/DL (ref 8.6–10.4)
CHLORIDE SERPL-SCNC: 104 MMOL/L (ref 98–107)
CO2 SERPL-SCNC: 28 MMOL/L (ref 20–31)
CREAT SERPL-MCNC: 1.1 MG/DL (ref 0.7–1.2)
EOSINOPHIL # BLD: 0.22 K/UL (ref 0–0.44)
EOSINOPHILS RELATIVE PERCENT: 4 % (ref 1–4)
ERYTHROCYTE [DISTWIDTH] IN BLOOD BY AUTOMATED COUNT: 12.5 % (ref 11.8–14.4)
GFR SERPL CREATININE-BSD FRML MDRD: 77 ML/MIN/1.73M2
GLUCOSE SERPL-MCNC: 100 MG/DL (ref 70–99)
HCT VFR BLD AUTO: 38.8 % (ref 40.7–50.3)
HGB BLD-MCNC: 13.3 G/DL (ref 13–17)
IMM GRANULOCYTES # BLD AUTO: <0.03 K/UL (ref 0–0.3)
IMM GRANULOCYTES NFR BLD: 0 %
LYMPHOCYTES NFR BLD: 1.32 K/UL (ref 1.1–3.7)
LYMPHOCYTES RELATIVE PERCENT: 26 % (ref 24–43)
MCH RBC QN AUTO: 31.5 PG (ref 25.2–33.5)
MCHC RBC AUTO-ENTMCNC: 34.3 G/DL (ref 28.4–34.8)
MCV RBC AUTO: 91.9 FL (ref 82.6–102.9)
MONOCYTES NFR BLD: 0.49 K/UL (ref 0.1–1.2)
MONOCYTES NFR BLD: 10 % (ref 3–12)
NEUTROPHILS NFR BLD: 59 % (ref 36–65)
NEUTS SEG NFR BLD: 2.99 K/UL (ref 1.5–8.1)
NRBC BLD-RTO: 0 PER 100 WBC
PLATELET # BLD AUTO: 195 K/UL (ref 138–453)
PMV BLD AUTO: 9.8 FL (ref 8.1–13.5)
POTASSIUM SERPL-SCNC: 4 MMOL/L (ref 3.7–5.3)
PROT SERPL-MCNC: 6.8 G/DL (ref 6.4–8.3)
RBC # BLD AUTO: 4.22 M/UL (ref 4.21–5.77)
SODIUM SERPL-SCNC: 143 MMOL/L (ref 135–144)
WBC OTHER # BLD: 5.1 K/UL (ref 3.5–11.3)

## 2024-04-12 PROCEDURE — 80053 COMPREHEN METABOLIC PANEL: CPT

## 2024-04-12 PROCEDURE — 85025 COMPLETE CBC W/AUTO DIFF WBC: CPT

## 2024-04-12 PROCEDURE — 36415 COLL VENOUS BLD VENIPUNCTURE: CPT

## 2024-04-12 RX ORDER — METOLAZONE 2.5 MG/1
TABLET ORAL
Qty: 20 TABLET | Refills: 0 | Status: SHIPPED | OUTPATIENT
Start: 2024-04-12

## 2024-04-30 ENCOUNTER — OFFICE VISIT (OUTPATIENT)
Dept: CARDIOLOGY | Age: 59
End: 2024-04-30
Payer: OTHER GOVERNMENT

## 2024-04-30 VITALS
HEIGHT: 68 IN | RESPIRATION RATE: 18 BRPM | BODY MASS INDEX: 41.22 KG/M2 | OXYGEN SATURATION: 95 % | DIASTOLIC BLOOD PRESSURE: 47 MMHG | WEIGHT: 272 LBS | SYSTOLIC BLOOD PRESSURE: 85 MMHG | HEART RATE: 65 BPM

## 2024-04-30 DIAGNOSIS — G47.33 OSA ON CPAP: ICD-10-CM

## 2024-04-30 DIAGNOSIS — I48.0 PAF (PAROXYSMAL ATRIAL FIBRILLATION) (HCC): ICD-10-CM

## 2024-04-30 DIAGNOSIS — E66.9 CLASS 2 OBESITY WITH BODY MASS INDEX (BMI) OF 39.0 TO 39.9 IN ADULT, UNSPECIFIED OBESITY TYPE, UNSPECIFIED WHETHER SERIOUS COMORBIDITY PRESENT: ICD-10-CM

## 2024-04-30 DIAGNOSIS — I42.8 NICM (NONISCHEMIC CARDIOMYOPATHY) (HCC): Primary | ICD-10-CM

## 2024-04-30 DIAGNOSIS — I71.9 AORTIC ANEURYSM WITHOUT RUPTURE, UNSPECIFIED PORTION OF AORTA (HCC): ICD-10-CM

## 2024-04-30 PROCEDURE — 99214 OFFICE O/P EST MOD 30 MIN: CPT | Performed by: INTERNAL MEDICINE

## 2024-04-30 PROCEDURE — 3074F SYST BP LT 130 MM HG: CPT | Performed by: INTERNAL MEDICINE

## 2024-04-30 PROCEDURE — 3078F DIAST BP <80 MM HG: CPT | Performed by: INTERNAL MEDICINE

## 2024-04-30 RX ORDER — METOLAZONE 2.5 MG/1
TABLET ORAL
Qty: 20 TABLET | Refills: 0 | Status: SHIPPED | OUTPATIENT
Start: 2024-04-30

## 2024-04-30 RX ORDER — TORSEMIDE 20 MG/1
20 TABLET ORAL 2 TIMES DAILY
Qty: 90 TABLET | Refills: 3 | Status: SHIPPED | OUTPATIENT
Start: 2024-04-30

## 2024-04-30 NOTE — PROGRESS NOTES
TOTAL HIP ARTHROPLASTY performed by Andrea Arreguin DO at [facility] OR    IA RPR UMBILICAL HRNA 5 YRS/> REDUCIBLE N/A 10/24/2018    HERNIA UMBILICAL REPAIR performed by Christine López MD at [facility] OR    THORACIC AORTIC ANEURYSM REPAIR  2008    thoracic AAA repair and modified Coleman procedure ( aortic valve sparing ) at [facility] [place]    TOTAL KNEE ARTHROPLASTY Left 05/10/2021    KNEE TOTAL ARTHROPLASTY performed by Tru Hall MD at [facility] OR    VASECTOMY         Family History   Problem Relation Age of Onset    Colon Cancer Mother     Heart Surgery Father         enlarged aorta    Kidney Cancer Father        Social History     Tobacco Use    Smoking status: Former     Current packs/day: 0.00     Average packs/day: 1 pack/day for 38.0 years (38.0 ttl pk-yrs)     Types: Cigarettes     Start date: 1983     Quit date: 12/15/2020     Years since quitting: 3.3    Smokeless tobacco: Never   Vaping Use    Vaping Use: Never used   Substance Use Topics    Alcohol use: Yes     Alcohol/week: 12.0 standard drinks of alcohol     Types: 12 Cans of beer per week     Comment: 6 pack 2 times/ week    Drug use: No       Current Outpatient Medications   Medication Sig Dispense Refill    metOLazone (ZAROXOLYN) 2.5 MG tablet Take 1 tablet by mouth 30 minutes prior to lasix take only Mon, wed and Fri's 20 tablet 0    empagliflozin (JARDIANCE) 10 MG tablet Take 1 tablet by mouth daily 90 tablet 0    ibuprofen (ADVIL;MOTRIN) 200 MG tablet Take 1 tablet by mouth every morning      losartan (COZAAR) 25 MG tablet Take 1 tablet by mouth daily 90 tablet 0    furosemide (LASIX) 40 MG tablet Take 1 tablet by mouth 2 times daily 60 tablet 0    albuterol sulfate HFA (VENTOLIN HFA) 108 (90 Base) MCG/ACT inhaler Inhale 2 puffs into the lungs 4 times daily as needed for Wheezing 18 g 5    sotalol (BETAPACE) 80 MG tablet Take 1 tablet by mouth 2 times daily 180 tablet 3    dilTIAZem (CARDIZEM CD) 120 MG extended release capsule Take 1 capsule by

## 2024-05-16 ENCOUNTER — OFFICE VISIT (OUTPATIENT)
Dept: PRIMARY CARE CLINIC | Age: 59
End: 2024-05-16
Payer: OTHER GOVERNMENT

## 2024-05-16 VITALS
SYSTOLIC BLOOD PRESSURE: 118 MMHG | WEIGHT: 276 LBS | HEART RATE: 60 BPM | BODY MASS INDEX: 41.83 KG/M2 | HEIGHT: 68 IN | OXYGEN SATURATION: 97 % | DIASTOLIC BLOOD PRESSURE: 70 MMHG

## 2024-05-16 DIAGNOSIS — J01.90 ACUTE BACTERIAL SINUSITIS: Primary | ICD-10-CM

## 2024-05-16 DIAGNOSIS — I50.33 ACUTE ON CHRONIC DIASTOLIC CHF (CONGESTIVE HEART FAILURE), NYHA CLASS 3 (HCC): ICD-10-CM

## 2024-05-16 DIAGNOSIS — B96.89 ACUTE BACTERIAL SINUSITIS: Primary | ICD-10-CM

## 2024-05-16 DIAGNOSIS — I50.32 CHRONIC DIASTOLIC HEART FAILURE (HCC): ICD-10-CM

## 2024-05-16 PROCEDURE — 99214 OFFICE O/P EST MOD 30 MIN: CPT | Performed by: STUDENT IN AN ORGANIZED HEALTH CARE EDUCATION/TRAINING PROGRAM

## 2024-05-16 PROCEDURE — 3074F SYST BP LT 130 MM HG: CPT | Performed by: STUDENT IN AN ORGANIZED HEALTH CARE EDUCATION/TRAINING PROGRAM

## 2024-05-16 PROCEDURE — 3078F DIAST BP <80 MM HG: CPT | Performed by: STUDENT IN AN ORGANIZED HEALTH CARE EDUCATION/TRAINING PROGRAM

## 2024-05-16 RX ORDER — METOLAZONE 5 MG/1
TABLET ORAL
Qty: 20 TABLET | Refills: 3 | Status: SHIPPED | OUTPATIENT
Start: 2024-05-16

## 2024-05-16 RX ORDER — AZITHROMYCIN 250 MG/1
TABLET, FILM COATED ORAL
Qty: 6 TABLET | Refills: 0 | Status: SHIPPED | OUTPATIENT
Start: 2024-05-16 | End: 2024-05-26

## 2024-05-16 RX ORDER — METHYLPREDNISOLONE 4 MG/1
TABLET ORAL
Qty: 1 KIT | Refills: 0 | Status: SHIPPED | OUTPATIENT
Start: 2024-05-16 | End: 2024-05-22

## 2024-05-16 NOTE — PROGRESS NOTES
[facility] PRIMARY CARE  [address] [place] [address] , [address]  [place], [place], [address]    Pedrito STARR Hinton is a 59 y.o. male with  has a past medical history of AAA (abdominal aortic aneurysm) ([facility]), Aortic regurgitation, Atrial fibrillation (HCC), Atrial flutter (HCC), Encounter for cardioversion procedure, Frequent urination, H/O echocardiogram, H/O echocardiogram, Hemorrhage of rectum and anus, History of 24 hour EKG monitoring, History of echocardiogram, History of PFTs, Hx of transesophageal echocardiography (SKYLAR) for monitoring, Hypertension, Impaired fasting glucose, Knee pain, bilateral, Lipoma, Obesity (BMI 30.0-34.9), PAF (paroxysmal atrial fibrillation) ([facility]), Paroxysmal supraventricular tachycardia ([facility]), Periodontal disease, Sleep apnea, Tobacco use disorder, Under care of team, Under care of team, Under care of team, Urinary urgency, and Ventral hernia.  Presented to the office today for:  Chief Complaint   Patient presents with    Congestive Heart Failure    Sinusitis       Assessment/Plan   1. Acute bacterial sinusitis  -     azithromycin (ZITHROMAX) 250 MG tablet; 500mg on day 1 followed by 250mg on days 2 - 5, Disp-6 tablet, R-0Normal  -     methylPREDNISolone (MEDROL DOSEPACK) 4 MG tablet; Take by mouth., Disp-1 kit, R-0Normal  2. Chronic diastolic heart failure (HCC)  -     metOLazone (ZAROXOLYN) 5 MG tablet; Take 1 tablet by mouth 30 minutes prior to lasix take only Mon, wed and Fri's, Disp-20 tablet, R-3Normal  3. Acute on chronic diastolic CHF (congestive heart failure), NYHA class 3 (HCC) [I50.33]  Return in about 6 months (around 11/16/2024) for F/U Med Management.    F/U with Specialists per prior plans  Increase Metolazone to 5mg today.  Continue w/ Demadex at 20mg BID.  Repeat labs are pending  Nasal saline sprays PRN. Neti pot PRN  Antibiotics per medication orders. If there is no resolution of symptoms, will provide an antibiotic from a different class and then consideration for

## 2024-05-23 ENCOUNTER — HOSPITAL ENCOUNTER (OUTPATIENT)
Age: 59
Discharge: HOME | End: 2024-05-23
Payer: OTHER GOVERNMENT

## 2024-05-23 DIAGNOSIS — I48.0 PAF (PAROXYSMAL ATRIAL FIBRILLATION) (HCC): ICD-10-CM

## 2024-05-23 DIAGNOSIS — I42.8 NICM (NONISCHEMIC CARDIOMYOPATHY) (HCC): ICD-10-CM

## 2024-05-23 DIAGNOSIS — I71.9 AORTIC ANEURYSM WITHOUT RUPTURE, UNSPECIFIED PORTION OF AORTA (HCC): ICD-10-CM

## 2024-05-23 DIAGNOSIS — E66.9 CLASS 2 OBESITY WITH BODY MASS INDEX (BMI) OF 39.0 TO 39.9 IN ADULT, UNSPECIFIED OBESITY TYPE, UNSPECIFIED WHETHER SERIOUS COMORBIDITY PRESENT: ICD-10-CM

## 2024-05-23 DIAGNOSIS — G47.33 OSA ON CPAP: ICD-10-CM

## 2024-05-23 LAB
ANION GAP SERPL CALCULATED.3IONS-SCNC: 15 MMOL/L (ref 9–17)
BUN SERPL-MCNC: 57 MG/DL (ref 6–20)
BUN/CREAT SERPL: 52 (ref 9–20)
CALCIUM SERPL-MCNC: 9.4 MG/DL (ref 8.6–10.4)
CHLORIDE SERPL-SCNC: 92 MMOL/L (ref 98–107)
CO2 SERPL-SCNC: 29 MMOL/L (ref 20–31)
CREAT SERPL-MCNC: 1.1 MG/DL (ref 0.7–1.2)
GFR, ESTIMATED: 77 ML/MIN/1.73M2
GLUCOSE SERPL-MCNC: 136 MG/DL (ref 70–99)
POTASSIUM SERPL-SCNC: 3 MMOL/L (ref 3.7–5.3)
SODIUM SERPL-SCNC: 136 MMOL/L (ref 135–144)

## 2024-05-23 PROCEDURE — 80048 BASIC METABOLIC PNL TOTAL CA: CPT

## 2024-05-23 PROCEDURE — 36415 COLL VENOUS BLD VENIPUNCTURE: CPT

## 2024-05-28 RX ORDER — POTASSIUM CHLORIDE 20 MEQ/1
20 TABLET, EXTENDED RELEASE ORAL DAILY
Qty: 90 TABLET | Refills: 1 | Status: SHIPPED | OUTPATIENT
Start: 2024-05-28

## 2024-05-29 ENCOUNTER — TELEPHONE (OUTPATIENT)
Dept: CARDIOLOGY | Age: 59
End: 2024-05-29

## 2024-05-29 NOTE — TELEPHONE ENCOUNTER
----- Message from Marco Olivarez MD sent at 5/28/2024 11:04 PM EDT -----  Blood work is good except for the low potassium.  Please start potassium supplement 20 meq daily. Rx sent to the pharmacy.  Please call with questions and/or concerns. Thank you

## 2024-06-24 ENCOUNTER — OFFICE VISIT (OUTPATIENT)
Dept: PULMONOLOGY | Age: 59
End: 2024-06-24

## 2024-06-24 VITALS
SYSTOLIC BLOOD PRESSURE: 105 MMHG | WEIGHT: 269.4 LBS | TEMPERATURE: 97.3 F | HEIGHT: 68 IN | OXYGEN SATURATION: 96 % | DIASTOLIC BLOOD PRESSURE: 53 MMHG | RESPIRATION RATE: 16 BRPM | BODY MASS INDEX: 40.83 KG/M2 | HEART RATE: 61 BPM

## 2024-06-24 DIAGNOSIS — Z87.891 PERSONAL HISTORY OF TOBACCO USE: ICD-10-CM

## 2024-06-24 DIAGNOSIS — G47.33 OSA TREATED WITH BIPAP: ICD-10-CM

## 2024-06-24 DIAGNOSIS — J44.9 STAGE 2 MODERATE COPD BY GOLD CLASSIFICATION (HCC): Primary | ICD-10-CM

## 2024-06-24 RX ORDER — EMPAGLIFLOZIN 10 MG/1
10 TABLET, FILM COATED ORAL DAILY
Qty: 90 TABLET | Refills: 0 | Status: SHIPPED | OUTPATIENT
Start: 2024-06-24

## 2024-06-24 RX ORDER — FLUTICASONE PROPIONATE AND SALMETEROL 250; 50 UG/1; UG/1
1 POWDER RESPIRATORY (INHALATION) EVERY 12 HOURS
Qty: 1 EACH | Refills: 2 | Status: SHIPPED | OUTPATIENT
Start: 2024-06-24 | End: 2024-09-22

## 2024-06-24 NOTE — PROGRESS NOTES
PULMONARY MEDICINE OUTPATIENT NOTE                                                                       PATIENT:  Pedrito Hinton  YOB: 1965  MRN: [id number]     REFERRED BY: Abimael Solano MD   CHIEF COMPLIANT: Sleep Apnea (New patient ref.  States has a cpap machine and remington. Well.  DME is [facility] obtaining download.) and Shortness of Breath (Patient indicated 6 month duration of SOB. Hospitalized in 3/24 for CHF)       HISTORY     Pedrito Hinton is a 59 y.o. years old male is here for an initial evaluation in the pulmonary clinic    DATE OF VISIT:6/24/2024    PULMONARY PROBLEM LIST:  1. Stage 2 moderate COPD by GOLD classification (HCC)    2. MARIA TERESA treated with BiPAP    3. Personal history of tobacco use         HISTORY OF PRESENT ILLNESS/CURRENT SYMPTOMS:   Patient is a former smoker, with greater than 35-pack-year smoking history.  He quit 3 years back.  He was hospitalized in end of March with acute decompensation of congestive cardiac failure.  He reports great to exertional dyspnea.  He is currently on as needed albuterol.    PFT (3/13/2024) shows moderate obstructive ventilatory impairment, no significant bronchodilator response, air trapping, with normal diffusion capacity.    CT chest (8/1/2023) showed multiple small pulmonary nodules, largest 6 mm in the right lower lobe.    Patient reports history of sleep apnea for more than 10 years.  Sleep study unavailable for review.  He is currently on BiPAP 16/12.  Compliance data was reviewed.  Patient is using CPAP as recommended and is benefiting from the therapy.    CURRENT BRONCHODILATORS/OTHER PULMONARY MEDS:  As needed albuterol    TOBACCO HISTORY:  He  reports that he quit smoking about 3 years ago. His smoking use included cigarettes. He started smoking about 41 years ago. He has a 38.0 pack-year smoking history. He has never used smokeless tobacco.    AVOCATION/OCCUPATIONAL EXPOSURE:     Yes No   ASBESTOS [] [x]   SILICA DUST [] [x]   COAL [] [x]

## 2024-06-24 NOTE — PATIENT INSTRUCTIONS
SURVEY:    You may be receiving a survey from [employer] regarding your visit today.    Please complete the survey to enable us to provide the highest quality of care to you and your family.    If you cannot score us a very good on any question, please call the office to discuss how we could have made your experience a very good one.    Thank you.               Learning About Lung Cancer Screening  What is screening for lung cancer?     Lung cancer screening is a way to find some lung cancers early, before a person has any symptoms of the cancer.  Lung cancer screening may help those who have the highest risk for lung cancer--people age 50 and older who are or were heavy smokers. For most people, who aren't at increased risk, screening for lung cancer probably isn't helpful.  Screening won't prevent cancer. And it may not find all lung cancers. Lung cancer screening may lower the risk of dying from lung cancer in a small number of people.  How is it done?  Lung cancer screening is done with a low-dose CT (computed tomography) scan. A CT scan uses X-rays, or radiation, to make detailed pictures of your body. Experts recommend that screening be done in medical centers that focus on finding and treating lung cancer.  Who is screening recommended for?  Lung cancer screening is recommended for people age 50 and older who are or were heavy smokers. That means people with a smoking history of at least 20 pack years. A pack year is a way to measure how heavy a smoker you are or were.  To figure out your pack years, multiply how many packs a day on average (assuming 20 cigarettes per pack) you have smoked by how many years you have smoked. For example:  If you smoked 1 pack a day for 20 years, that's 1 times 20. So you have a smoking history of 20 pack years.  If you smoked 2 packs a day for 10 years, that's 2 times 10. So you have a smoking history of 20 pack years.  Experts agree that screening is for people who have a

## 2024-07-28 PROBLEM — J44.9 STAGE 2 MODERATE COPD BY GOLD CLASSIFICATION (HCC): Status: ACTIVE | Noted: 2024-07-28

## 2024-08-19 ENCOUNTER — HOSPITAL ENCOUNTER (OUTPATIENT)
Age: 59
Discharge: HOME | End: 2024-08-19
Payer: OTHER GOVERNMENT

## 2024-08-19 ENCOUNTER — OFFICE VISIT (OUTPATIENT)
Dept: CARDIOLOGY | Age: 59
End: 2024-08-19
Payer: OTHER GOVERNMENT

## 2024-08-19 VITALS
BODY MASS INDEX: 42.44 KG/M2 | OXYGEN SATURATION: 95 % | SYSTOLIC BLOOD PRESSURE: 106 MMHG | WEIGHT: 280 LBS | HEART RATE: 70 BPM | DIASTOLIC BLOOD PRESSURE: 56 MMHG | HEIGHT: 68 IN | RESPIRATION RATE: 18 BRPM

## 2024-08-19 DIAGNOSIS — R06.02 SOB (SHORTNESS OF BREATH): ICD-10-CM

## 2024-08-19 DIAGNOSIS — I42.8 NICM (NONISCHEMIC CARDIOMYOPATHY) (HCC): Primary | ICD-10-CM

## 2024-08-19 DIAGNOSIS — I71.9 AORTIC ANEURYSM WITHOUT RUPTURE, UNSPECIFIED PORTION OF AORTA (HCC): ICD-10-CM

## 2024-08-19 DIAGNOSIS — E66.9 CLASS 2 OBESITY WITH BODY MASS INDEX (BMI) OF 39.0 TO 39.9 IN ADULT, UNSPECIFIED OBESITY TYPE, UNSPECIFIED WHETHER SERIOUS COMORBIDITY PRESENT: ICD-10-CM

## 2024-08-19 DIAGNOSIS — I42.8 NICM (NONISCHEMIC CARDIOMYOPATHY) (HCC): ICD-10-CM

## 2024-08-19 DIAGNOSIS — I48.0 PAF (PAROXYSMAL ATRIAL FIBRILLATION) (HCC): ICD-10-CM

## 2024-08-19 DIAGNOSIS — I48.0 PAF (PAROXYSMAL ATRIAL FIBRILLATION) (HCC): Primary | ICD-10-CM

## 2024-08-19 LAB
ANION GAP SERPL CALCULATED.3IONS-SCNC: 15 MMOL/L (ref 9–17)
BNP SERPL-MCNC: 305 PG/ML
BUN SERPL-MCNC: 35 MG/DL (ref 6–20)
BUN/CREAT SERPL: 32 (ref 9–20)
CALCIUM SERPL-MCNC: 9.6 MG/DL (ref 8.6–10.4)
CHLORIDE SERPL-SCNC: 96 MMOL/L (ref 98–107)
CHOLEST SERPL-MCNC: 228 MG/DL (ref 0–199)
CHOLESTEROL/HDL RATIO: 5
CO2 SERPL-SCNC: 28 MMOL/L (ref 20–31)
CREAT SERPL-MCNC: 1.1 MG/DL (ref 0.7–1.2)
ERYTHROCYTE [DISTWIDTH] IN BLOOD BY AUTOMATED COUNT: 12.5 % (ref 11.8–14.4)
EST. AVERAGE GLUCOSE BLD GHB EST-MCNC: 100 MG/DL
GFR, ESTIMATED: 77 ML/MIN/1.73M2
GLUCOSE SERPL-MCNC: 91 MG/DL (ref 70–99)
HBA1C MFR BLD: 5.1 % (ref 4–6)
HCT VFR BLD AUTO: 40.2 % (ref 40.7–50.3)
HDLC SERPL-MCNC: 50 MG/DL
HGB BLD-MCNC: 13.9 G/DL (ref 13–17)
LDLC SERPL CALC-MCNC: 141 MG/DL (ref 0–100)
MCH RBC QN AUTO: 32 PG (ref 25.2–33.5)
MCHC RBC AUTO-ENTMCNC: 34.6 G/DL (ref 28.4–34.8)
MCV RBC AUTO: 92.6 FL (ref 82.6–102.9)
NRBC BLD-RTO: 0 PER 100 WBC
PLATELET # BLD AUTO: 190 K/UL (ref 138–453)
PMV BLD AUTO: 10.5 FL (ref 8.1–13.5)
POTASSIUM SERPL-SCNC: 3.2 MMOL/L (ref 3.7–5.3)
RBC # BLD AUTO: 4.34 M/UL (ref 4.21–5.77)
SODIUM SERPL-SCNC: 139 MMOL/L (ref 135–144)
TRIGL SERPL-MCNC: 183 MG/DL
VLDLC SERPL CALC-MCNC: 37 MG/DL
WBC OTHER # BLD: 6.2 K/UL (ref 3.5–11.3)

## 2024-08-19 PROCEDURE — 3074F SYST BP LT 130 MM HG: CPT | Performed by: INTERNAL MEDICINE

## 2024-08-19 PROCEDURE — 3078F DIAST BP <80 MM HG: CPT | Performed by: INTERNAL MEDICINE

## 2024-08-19 PROCEDURE — 80048 BASIC METABOLIC PNL TOTAL CA: CPT

## 2024-08-19 PROCEDURE — 83036 HEMOGLOBIN GLYCOSYLATED A1C: CPT

## 2024-08-19 PROCEDURE — 85027 COMPLETE CBC AUTOMATED: CPT

## 2024-08-19 PROCEDURE — 36415 COLL VENOUS BLD VENIPUNCTURE: CPT

## 2024-08-19 PROCEDURE — 80061 LIPID PANEL: CPT

## 2024-08-19 PROCEDURE — 99214 OFFICE O/P EST MOD 30 MIN: CPT | Performed by: INTERNAL MEDICINE

## 2024-08-19 PROCEDURE — 83880 ASSAY OF NATRIURETIC PEPTIDE: CPT

## 2024-08-19 RX ORDER — SPIRONOLACTONE 25 MG/1
25 TABLET ORAL DAILY
Qty: 90 TABLET | Refills: 1 | Status: SHIPPED | OUTPATIENT
Start: 2024-08-19

## 2024-08-19 RX ORDER — BUMETANIDE 2 MG/1
2 TABLET ORAL DAILY
Qty: 90 TABLET | Refills: 3 | Status: SHIPPED | OUTPATIENT
Start: 2024-08-19

## 2024-08-19 NOTE — PROGRESS NOTES
day on anticoagulation.    6- EKG done in office on 2/25/2021 showed atrial flutter with heart rate of 135 to 140 bpm without any heart rate variability.    7- Last visit he was sent tot [facility] for a cardioversion and that failed, so he was sent to [facility] for an ablation procedure with Dr. Tam on 2/26/2021.    8- EKG done (5/27/21) in office showed NSR.    9- CAM done on 3/29/2023: Predominant rhythm:  Normal sinus rhythm. Atrial tachycardia:  11 episodes.  Longest, 28 seconds at average 127 bpm up to 165 bpm.  Fastest, 38 beats at average 168 bpm up to 208 bpm. PAC 0.1%. PVC 0.6%. Four days recorded.  Baseline rhythm is sinus with average heart rate of 71 bpm, ranging between 46 and 123 bpm. Rare isolated PACs and PVCs noted with 11 runs of ectopic atrial tachycardia, the longest lasted for 28 seconds at an average heart rate of 127 bpm.    10- Echo done on 4/6/2023: Global left ventricular systolic function appears preserved with an estimated ejection fraction of 55%. The left ventricular cavity size is within normal limits and the left ventricular wall thickness is severely increased. No definite specific wall motion abnormalities were identified. The left atrium is mildly dilated (29-33) with a left atrial volume index of 29 ml/m2. S/P Aortic valve repair with mean gradient of 19 mmHg. Mild to moderate aortic insufficiency. The aortic root is mildly dilated when corrected for body surface area. Mild diastolic dysfunction. Compared to the previous study of 2/2/2021, no significant change was seen.    11- Stress test done on 4/6/2023: Equivocal myocardial perfusion study. There is a small to moderate perfusion defect of mild to moderate intensity in the inferolateral, inferior and inferoapical regions during stress and rest imaging, which is most consistent with artifact, but may be due to a small degree of coronary ischemia. EF 46%. No significant electrocardiographic evidence of myocardial ischemia

## 2024-08-20 ENCOUNTER — TELEPHONE (OUTPATIENT)
Dept: CARDIOLOGY | Age: 59
End: 2024-08-20

## 2024-08-20 NOTE — TELEPHONE ENCOUNTER
----- Message from Dr. Marco Olivarez MD sent at 8/19/2024 10:15 PM EDT -----  Blood work reviewed, serum potassium is low.  Please discontinue potassium supplement and start Aldactone 25 mg daily.  Aldactone helps keep potassium inside the body.  Repeat blood work next week,  ordered.  Please call with questions and/or concerns.  Thank you

## 2024-08-29 ENCOUNTER — HOSPITAL ENCOUNTER (OUTPATIENT)
Dept: CT IMAGING | Age: 59
Discharge: HOME | End: 2024-08-31
Attending: INTERNAL MEDICINE
Payer: OTHER GOVERNMENT

## 2024-08-29 ENCOUNTER — TELEPHONE (OUTPATIENT)
Dept: CARDIOLOGY | Age: 59
End: 2024-08-29

## 2024-08-29 ENCOUNTER — HOSPITAL ENCOUNTER (OUTPATIENT)
Age: 59
Discharge: HOME | End: 2024-08-29
Attending: INTERNAL MEDICINE
Payer: OTHER GOVERNMENT

## 2024-08-29 DIAGNOSIS — Z87.891 PERSONAL HISTORY OF TOBACCO USE: ICD-10-CM

## 2024-08-29 DIAGNOSIS — I42.8 NICM (NONISCHEMIC CARDIOMYOPATHY) (HCC): ICD-10-CM

## 2024-08-29 DIAGNOSIS — I48.0 PAF (PAROXYSMAL ATRIAL FIBRILLATION) (HCC): Primary | ICD-10-CM

## 2024-08-29 DIAGNOSIS — I50.32 CHRONIC DIASTOLIC HEART FAILURE (HCC): ICD-10-CM

## 2024-08-29 LAB
ANION GAP SERPL CALCULATED.3IONS-SCNC: 14 MMOL/L (ref 9–16)
BNP SERPL-MCNC: 131 PG/ML (ref 0–125)
BUN SERPL-MCNC: 33 MG/DL (ref 6–20)
BUN/CREAT SERPL: 30 (ref 9–20)
CALCIUM SERPL-MCNC: 9.5 MG/DL (ref 8.6–10.4)
CHLORIDE SERPL-SCNC: 97 MMOL/L (ref 98–107)
CO2 SERPL-SCNC: 28 MMOL/L (ref 20–31)
CREAT SERPL-MCNC: 1.1 MG/DL (ref 0.7–1.2)
GFR, ESTIMATED: 76 ML/MIN/1.73M2
GLUCOSE SERPL-MCNC: 109 MG/DL (ref 74–99)
POTASSIUM SERPL-SCNC: 4.1 MMOL/L (ref 3.7–5.3)
SODIUM SERPL-SCNC: 139 MMOL/L (ref 136–145)

## 2024-08-29 PROCEDURE — 71271 CT THORAX LUNG CANCER SCR C-: CPT

## 2024-08-29 PROCEDURE — 36415 COLL VENOUS BLD VENIPUNCTURE: CPT

## 2024-08-29 PROCEDURE — 80048 BASIC METABOLIC PNL TOTAL CA: CPT

## 2024-08-29 PROCEDURE — 83880 ASSAY OF NATRIURETIC PEPTIDE: CPT

## 2024-08-29 NOTE — TELEPHONE ENCOUNTER
Mr. Hinton reported that at his last visit his Diltiazem was stopped by us. This is not in your note however it is taking off of his medication list. He did not take it for about two days however he noticed an increase in heart palpitations so he restarted it and has been feeling better every since. If this is ok to continue can you please sign Rx refill for him.     Please advise.   Thanks!

## 2024-08-29 NOTE — TELEPHONE ENCOUNTER
----- Message from Dr. Marco Olivarez MD sent at 8/29/2024 12:50 PM EDT -----  Blood work is better.  Continue current therapy and follow-up.  Please call with questions and/or concerns.  Thank you

## 2024-09-04 RX ORDER — DILTIAZEM HYDROCHLORIDE 120 MG/1
120 CAPSULE, COATED, EXTENDED RELEASE ORAL DAILY
Qty: 90 CAPSULE | Refills: 3 | Status: SHIPPED | OUTPATIENT
Start: 2024-09-04

## 2024-09-12 ENCOUNTER — TELEPHONE (OUTPATIENT)
Dept: CARDIOLOGY | Age: 59
End: 2024-09-12

## 2024-09-20 RX ORDER — EMPAGLIFLOZIN 10 MG/1
10 TABLET, FILM COATED ORAL DAILY
Qty: 90 TABLET | Refills: 0 | Status: SHIPPED | OUTPATIENT
Start: 2024-09-20

## 2024-09-26 DIAGNOSIS — J44.9 STAGE 2 MODERATE COPD BY GOLD CLASSIFICATION (HCC): ICD-10-CM

## 2024-09-26 RX ORDER — FLUTICASONE PROPIONATE AND SALMETEROL 250; 50 UG/1; UG/1
POWDER RESPIRATORY (INHALATION)
Qty: 60 EACH | Refills: 0 | Status: SHIPPED | OUTPATIENT
Start: 2024-09-26

## 2024-10-10 ENCOUNTER — HOSPITAL ENCOUNTER (INPATIENT)
Age: 59
LOS: 1 days | Discharge: HOME | End: 2024-10-11
Admitting: STUDENT IN AN ORGANIZED HEALTH CARE EDUCATION/TRAINING PROGRAM
Payer: OTHER GOVERNMENT

## 2024-10-10 ENCOUNTER — APPOINTMENT (OUTPATIENT)
Dept: GENERAL RADIOLOGY | Age: 59
End: 2024-10-10
Payer: OTHER GOVERNMENT

## 2024-10-10 DIAGNOSIS — E87.6 HYPOKALEMIA: ICD-10-CM

## 2024-10-10 DIAGNOSIS — I48.0 PAROXYSMAL A-FIB (HCC): Primary | ICD-10-CM

## 2024-10-10 DIAGNOSIS — R07.9 CHEST PAIN, UNSPECIFIED TYPE: ICD-10-CM

## 2024-10-10 PROBLEM — I48.91 ATRIAL FIBRILLATION (HCC): Status: ACTIVE | Noted: 2024-10-10

## 2024-10-10 LAB
ANION GAP SERPL CALCULATED.3IONS-SCNC: 14 MMOL/L (ref 9–16)
BASOPHILS # BLD: <0.03 K/UL (ref 0–0.2)
BASOPHILS NFR BLD: 0 % (ref 0–2)
BNP SERPL-MCNC: 374 PG/ML (ref 0–125)
BUN SERPL-MCNC: 35 MG/DL (ref 6–20)
BUN/CREAT SERPL: 32 (ref 9–20)
CALCIUM SERPL-MCNC: 9.1 MG/DL (ref 8.6–10.4)
CHLORIDE SERPL-SCNC: 103 MMOL/L (ref 98–107)
CO2 SERPL-SCNC: 27 MMOL/L (ref 20–31)
CREAT SERPL-MCNC: 1.1 MG/DL (ref 0.7–1.2)
EOSINOPHIL # BLD: 0.19 K/UL (ref 0–0.44)
EOSINOPHILS RELATIVE PERCENT: 4 % (ref 1–4)
ERYTHROCYTE [DISTWIDTH] IN BLOOD BY AUTOMATED COUNT: 12.2 % (ref 11.8–14.4)
GFR, ESTIMATED: 79 ML/MIN/1.73M2
GLUCOSE SERPL-MCNC: 124 MG/DL (ref 74–99)
HCT VFR BLD AUTO: 39 % (ref 40.7–50.3)
HGB BLD-MCNC: 13.6 G/DL (ref 13–17)
IMM GRANULOCYTES # BLD AUTO: <0.03 K/UL (ref 0–0.3)
IMM GRANULOCYTES NFR BLD: 0 %
LYMPHOCYTES NFR BLD: 1.39 K/UL (ref 1.1–3.7)
LYMPHOCYTES RELATIVE PERCENT: 28 % (ref 24–43)
MAGNESIUM SERPL-MCNC: 2.5 MG/DL (ref 1.6–2.6)
MCH RBC QN AUTO: 32 PG (ref 25.2–33.5)
MCHC RBC AUTO-ENTMCNC: 34.9 G/DL (ref 28.4–34.8)
MCV RBC AUTO: 91.8 FL (ref 82.6–102.9)
MONOCYTES NFR BLD: 0.34 K/UL (ref 0.1–1.2)
MONOCYTES NFR BLD: 7 % (ref 3–12)
NEUTROPHILS NFR BLD: 61 % (ref 36–65)
NEUTS SEG NFR BLD: 3.02 K/UL (ref 1.5–8.1)
NRBC BLD-RTO: 0 PER 100 WBC
PLATELET # BLD AUTO: 191 K/UL (ref 138–453)
PMV BLD AUTO: 10.6 FL (ref 8.1–13.5)
POTASSIUM SERPL-SCNC: 3.3 MMOL/L (ref 3.7–5.3)
RBC # BLD AUTO: 4.25 M/UL (ref 4.21–5.77)
SODIUM SERPL-SCNC: 144 MMOL/L (ref 136–145)
TROPONIN I SERPL HS-MCNC: 16 NG/L (ref 0–22)
TROPONIN I SERPL HS-MCNC: 17 NG/L (ref 0–22)
WBC OTHER # BLD: 5 K/UL (ref 3.5–11.3)

## 2024-10-10 PROCEDURE — 83880 ASSAY OF NATRIURETIC PEPTIDE: CPT

## 2024-10-10 PROCEDURE — 99285 EMERGENCY DEPT VISIT HI MDM: CPT

## 2024-10-10 PROCEDURE — 80048 BASIC METABOLIC PNL TOTAL CA: CPT

## 2024-10-10 PROCEDURE — 94660 CPAP INITIATION&MGMT: CPT

## 2024-10-10 PROCEDURE — 36415 COLL VENOUS BLD VENIPUNCTURE: CPT

## 2024-10-10 PROCEDURE — 6360000002 HC RX W HCPCS

## 2024-10-10 PROCEDURE — 84484 ASSAY OF TROPONIN QUANT: CPT

## 2024-10-10 PROCEDURE — 6370000000 HC RX 637 (ALT 250 FOR IP): Performed by: INTERNAL MEDICINE

## 2024-10-10 PROCEDURE — 94761 N-INVAS EAR/PLS OXIMETRY MLT: CPT

## 2024-10-10 PROCEDURE — 6370000000 HC RX 637 (ALT 250 FOR IP)

## 2024-10-10 PROCEDURE — 96374 THER/PROPH/DIAG INJ IV PUSH: CPT

## 2024-10-10 PROCEDURE — 94664 DEMO&/EVAL PT USE INHALER: CPT

## 2024-10-10 PROCEDURE — 6360000002 HC RX W HCPCS: Performed by: INTERNAL MEDICINE

## 2024-10-10 PROCEDURE — 2580000003 HC RX 258: Performed by: INTERNAL MEDICINE

## 2024-10-10 PROCEDURE — 83735 ASSAY OF MAGNESIUM: CPT

## 2024-10-10 PROCEDURE — 71045 X-RAY EXAM CHEST 1 VIEW: CPT

## 2024-10-10 PROCEDURE — 85025 COMPLETE CBC W/AUTO DIFF WBC: CPT

## 2024-10-10 PROCEDURE — 1200000000 HC SEMI PRIVATE

## 2024-10-10 PROCEDURE — 93005 ELECTROCARDIOGRAM TRACING: CPT

## 2024-10-10 RX ORDER — TRAZODONE HYDROCHLORIDE 50 MG/1
50 TABLET, FILM COATED ORAL NIGHTLY PRN
Status: DISCONTINUED | OUTPATIENT
Start: 2024-10-10 | End: 2024-10-11 | Stop reason: HOSPADM

## 2024-10-10 RX ORDER — FUROSEMIDE 10 MG/ML
10 SOLUTION ORAL DAILY
Status: ON HOLD | COMMUNITY
End: 2024-10-11 | Stop reason: HOSPADM

## 2024-10-10 RX ORDER — SODIUM CHLORIDE 0.9 % (FLUSH) 0.9 %
10 SYRINGE (ML) INJECTION PRN
Status: DISCONTINUED | OUTPATIENT
Start: 2024-10-10 | End: 2024-10-11 | Stop reason: HOSPADM

## 2024-10-10 RX ORDER — SODIUM CHLORIDE 0.9 % (FLUSH) 0.9 %
5-40 SYRINGE (ML) INJECTION EVERY 12 HOURS SCHEDULED
Status: DISCONTINUED | OUTPATIENT
Start: 2024-10-10 | End: 2024-10-11 | Stop reason: HOSPADM

## 2024-10-10 RX ORDER — BUMETANIDE 1 MG/1
2 TABLET ORAL DAILY
Status: DISCONTINUED | OUTPATIENT
Start: 2024-10-11 | End: 2024-10-11 | Stop reason: HOSPADM

## 2024-10-10 RX ORDER — BUDESONIDE AND FORMOTEROL FUMARATE DIHYDRATE 160; 4.5 UG/1; UG/1
2 AEROSOL RESPIRATORY (INHALATION)
Status: DISCONTINUED | OUTPATIENT
Start: 2024-10-10 | End: 2024-10-11 | Stop reason: HOSPADM

## 2024-10-10 RX ORDER — POLYETHYLENE GLYCOL 3350 17 G/17G
17 POWDER, FOR SOLUTION ORAL DAILY PRN
Status: DISCONTINUED | OUTPATIENT
Start: 2024-10-10 | End: 2024-10-11 | Stop reason: HOSPADM

## 2024-10-10 RX ORDER — MAGNESIUM SULFATE IN WATER 40 MG/ML
2000 INJECTION, SOLUTION INTRAVENOUS ONCE
Status: COMPLETED | OUTPATIENT
Start: 2024-10-10 | End: 2024-10-10

## 2024-10-10 RX ORDER — METOLAZONE 2.5 MG/1
5 TABLET ORAL
Status: DISCONTINUED | OUTPATIENT
Start: 2024-10-11 | End: 2024-10-11 | Stop reason: HOSPADM

## 2024-10-10 RX ORDER — ACETAMINOPHEN 325 MG/1
650 TABLET ORAL EVERY 6 HOURS PRN
Status: DISCONTINUED | OUTPATIENT
Start: 2024-10-10 | End: 2024-10-11 | Stop reason: HOSPADM

## 2024-10-10 RX ORDER — POTASSIUM CHLORIDE 1500 MG/1
40 TABLET, EXTENDED RELEASE ORAL ONCE
Status: COMPLETED | OUTPATIENT
Start: 2024-10-10 | End: 2024-10-10

## 2024-10-10 RX ORDER — ALBUTEROL SULFATE 90 UG/1
2 INHALANT RESPIRATORY (INHALATION) 4 TIMES DAILY PRN
Status: DISCONTINUED | OUTPATIENT
Start: 2024-10-10 | End: 2024-10-10

## 2024-10-10 RX ORDER — ENOXAPARIN SODIUM 100 MG/ML
30 INJECTION SUBCUTANEOUS 2 TIMES DAILY
Status: DISCONTINUED | OUTPATIENT
Start: 2024-10-10 | End: 2024-10-11 | Stop reason: HOSPADM

## 2024-10-10 RX ORDER — ALBUTEROL SULFATE 90 UG/1
2 INHALANT RESPIRATORY (INHALATION) 3 TIMES DAILY
Status: DISCONTINUED | OUTPATIENT
Start: 2024-10-11 | End: 2024-10-11 | Stop reason: HOSPADM

## 2024-10-10 RX ORDER — MAGNESIUM SULFATE IN WATER 40 MG/ML
2000 INJECTION, SOLUTION INTRAVENOUS PRN
Status: DISCONTINUED | OUTPATIENT
Start: 2024-10-10 | End: 2024-10-11 | Stop reason: HOSPADM

## 2024-10-10 RX ORDER — POTASSIUM CHLORIDE 1500 MG/1
40 TABLET, EXTENDED RELEASE ORAL PRN
Status: DISCONTINUED | OUTPATIENT
Start: 2024-10-10 | End: 2024-10-11 | Stop reason: HOSPADM

## 2024-10-10 RX ORDER — ONDANSETRON 4 MG/1
4 TABLET, ORALLY DISINTEGRATING ORAL EVERY 8 HOURS PRN
Status: DISCONTINUED | OUTPATIENT
Start: 2024-10-10 | End: 2024-10-11 | Stop reason: HOSPADM

## 2024-10-10 RX ORDER — ONDANSETRON 2 MG/ML
4 INJECTION INTRAMUSCULAR; INTRAVENOUS EVERY 6 HOURS PRN
Status: DISCONTINUED | OUTPATIENT
Start: 2024-10-10 | End: 2024-10-11 | Stop reason: HOSPADM

## 2024-10-10 RX ORDER — ASPIRIN 81 MG/1
81 TABLET ORAL 2 TIMES DAILY
Status: DISCONTINUED | OUTPATIENT
Start: 2024-10-10 | End: 2024-10-11 | Stop reason: HOSPADM

## 2024-10-10 RX ORDER — SODIUM CHLORIDE 9 MG/ML
INJECTION, SOLUTION INTRAVENOUS PRN
Status: DISCONTINUED | OUTPATIENT
Start: 2024-10-10 | End: 2024-10-11 | Stop reason: HOSPADM

## 2024-10-10 RX ORDER — LOSARTAN POTASSIUM 25 MG/1
25 TABLET ORAL DAILY
Status: DISCONTINUED | OUTPATIENT
Start: 2024-10-11 | End: 2024-10-11 | Stop reason: HOSPADM

## 2024-10-10 RX ORDER — SOTALOL HYDROCHLORIDE 80 MG/1
80 TABLET ORAL 2 TIMES DAILY
Status: DISCONTINUED | OUTPATIENT
Start: 2024-10-10 | End: 2024-10-11 | Stop reason: HOSPADM

## 2024-10-10 RX ORDER — POTASSIUM CHLORIDE 7.45 MG/ML
10 INJECTION INTRAVENOUS PRN
Status: DISCONTINUED | OUTPATIENT
Start: 2024-10-10 | End: 2024-10-11 | Stop reason: HOSPADM

## 2024-10-10 RX ORDER — ACETAMINOPHEN 650 MG/1
650 SUPPOSITORY RECTAL EVERY 6 HOURS PRN
Status: DISCONTINUED | OUTPATIENT
Start: 2024-10-10 | End: 2024-10-11 | Stop reason: HOSPADM

## 2024-10-10 RX ORDER — DILTIAZEM HYDROCHLORIDE 120 MG/1
120 CAPSULE, COATED, EXTENDED RELEASE ORAL DAILY
Status: DISCONTINUED | OUTPATIENT
Start: 2024-10-11 | End: 2024-10-11 | Stop reason: HOSPADM

## 2024-10-10 RX ADMIN — MAGNESIUM SULFATE HEPTAHYDRATE 2000 MG: 40 INJECTION, SOLUTION INTRAVENOUS at 18:25

## 2024-10-10 RX ADMIN — POTASSIUM CHLORIDE 40 MEQ: 1500 TABLET, EXTENDED RELEASE ORAL at 19:03

## 2024-10-10 RX ADMIN — SOTALOL HYDROCHLORIDE 80 MG: 80 TABLET ORAL at 21:20

## 2024-10-10 RX ADMIN — ENOXAPARIN SODIUM 30 MG: 100 INJECTION SUBCUTANEOUS at 21:18

## 2024-10-10 RX ADMIN — ASPIRIN 81 MG: 81 TABLET, COATED ORAL at 21:20

## 2024-10-10 RX ADMIN — SODIUM CHLORIDE, PRESERVATIVE FREE 10 ML: 5 INJECTION INTRAVENOUS at 21:19

## 2024-10-10 ASSESSMENT — PAIN - FUNCTIONAL ASSESSMENT: PAIN_FUNCTIONAL_ASSESSMENT: 0-10

## 2024-10-10 ASSESSMENT — PAIN SCALES - GENERAL: PAINLEVEL_OUTOF10: 5

## 2024-10-10 ASSESSMENT — PAIN DESCRIPTION - ORIENTATION: ORIENTATION: LEFT

## 2024-10-10 ASSESSMENT — PAIN DESCRIPTION - DESCRIPTORS: DESCRIPTORS: DISCOMFORT

## 2024-10-10 ASSESSMENT — PAIN DESCRIPTION - LOCATION: LOCATION: CHEST

## 2024-10-10 NOTE — ED PROVIDER NOTES
- 0.30 k/uL   Troponin   Result Value Ref Range    Troponin, High Sensitivity 17 0 - 22 ng/L   Magnesium   Result Value Ref Range    Magnesium 2.5 1.6 - 2.6 mg/dL   Brain Natriuretic Peptide   Result Value Ref Range    NT Pro- (H) 0 - 125 pg/mL   EKG 12 Lead   Result Value Ref Range    Ventricular Rate 72 BPM    Atrial Rate 72 BPM    P-R Interval 130 ms    QRS Duration 102 ms    Q-T Interval 424 ms    QTc Calculation (Bazett) 464 ms    P Axis 9 degrees    R Axis 8 degrees    T Axis -176 degrees       RADIOLOGY:   Non-plain film images such as CT, Ultrasound and MRI are read by the radiologist. Plain radiographic images are visualized and preliminarily interpreted by the ED Provider with the below findings:    Pulmonary interstitial edema    Interpretation per the Radiologist below, if available at the time of this note:    XR CHEST PORTABLE   Final Result   Pulmonary interstitial edema.           XR CHEST PORTABLE    Result Date: 10/10/2024  EXAMINATION: ONE XRAY VIEW OF THE CHEST 10/10/2024 6:25 pm COMPARISON: March 27, 2024 HISTORY: ORDERING SYSTEM PROVIDED HISTORY: cp TECHNOLOGIST PROVIDED HISTORY: cp FINDINGS: Median sternotomy wires unchanged.  Cardiomediastinal silhouette is normal size.  There are increased interstitial markings the in the hilar regions compatible with edema.  No consolidation.  No pneumothorax visualized.     Pulmonary interstitial edema.       No results found.    Procedures:      ------------------------- NURSING NOTES AND VITALS REVIEWED ---------------------------   The nursing notes within the ED encounter and vital signs as below have been reviewed by myself and ED attending.  /72   Pulse 88   Temp 97.8 °F (36.6 °C) (Tympanic)   Resp 16   Ht 1.727 m (5' 8\")   Wt 123.4 kg (272 lb)   SpO2 96%   BMI 41.36 kg/m²   Oxygen Saturation Interpretation: Normal    The patient’s available past medical records and past encounters were reviewed by myself   pneumonia, pulmonary embolism, AAA, aortic dissection, costochondritis, pleurisy, pericardial effusion and pericarditis.     EKG interpreted by me in detail on ED course.   Labs interpreted by me listed below:  CBC with no leukocytosis or significant anemia  CMP with stable renal and liver function, potassium 3.3 no electrolyte derangement  Trop 17  BNP  Magnesium 2.5  CXR with consistent pulmonary interstitial edema    Patient administered:  Orders Placed This Encounter   Medications    magnesium sulfate 2000 mg in 50 mL IVPB premix    potassium chloride (KLOR-CON M) extended release tablet 40 mEq           HEART SCORE   History    --Highly suspicious 2  --Moderately suspicious 1  --Slightly suspicious 0  1       ECG  --Significant ST-depression 2  --Non specific repolarisation disturbance 1  --Normal 0   1   Age   >= 65 years 2  45 - 65 years 1  <= 45 years 0  1   Risk Factors  >= 3 risk factors or history of atherosclerotic disease 2  1 or 2 risk factors 1  No risk factors known 0  2   Troponin   >= 3x normal limit 2  1 - 3x normal limit 1  <= normal limit 0 1           Total  6         A score of 0-3 indicates a risk of 1.6% for reaching a MACE, and therefore supports a policy of early discharge.    In case of a HEART score of 4-6 points, with a risk of MACE of 13%, immediate discharge is not an option. These patients should be admitted for clinical observation and subjected to non-invasive investigations such as repeated troponin or advanced ischemia detection.     A HEART score >= 7 points, with a risk of 50% for a MACE, calls for early aggressive treatments possibly including invasive strategies without preceding non-invasive testing.          Spoke to Dr. Olivarez who would like patient to be admitted at [facility] as he knows patient well and most of the workup will have to be done outpatient however he needs to continue changing medications to improve patient's rate    Discussion With Other

## 2024-10-11 VITALS
DIASTOLIC BLOOD PRESSURE: 48 MMHG | WEIGHT: 281.8 LBS | RESPIRATION RATE: 18 BRPM | BODY MASS INDEX: 42.71 KG/M2 | OXYGEN SATURATION: 97 % | TEMPERATURE: 97.1 F | SYSTOLIC BLOOD PRESSURE: 106 MMHG | HEART RATE: 58 BPM | HEIGHT: 68 IN

## 2024-10-11 LAB
ALBUMIN SERPL-MCNC: 3.9 G/DL (ref 3.5–5.2)
ALBUMIN/GLOB SERPL: 1.7 {RATIO} (ref 1–2.5)
ALP SERPL-CCNC: 69 U/L (ref 40–129)
ALT SERPL-CCNC: 33 U/L (ref 10–50)
ANION GAP SERPL CALCULATED.3IONS-SCNC: 7 MMOL/L (ref 9–16)
AST SERPL-CCNC: 22 U/L (ref 10–50)
BASOPHILS # BLD: 0.04 K/UL (ref 0–0.2)
BASOPHILS NFR BLD: 1 % (ref 0–2)
BILIRUB SERPL-MCNC: 0.3 MG/DL (ref 0–1.2)
BUN SERPL-MCNC: 28 MG/DL (ref 6–20)
BUN/CREAT SERPL: 28 (ref 9–20)
CALCIUM SERPL-MCNC: 8.9 MG/DL (ref 8.6–10.4)
CHLORIDE SERPL-SCNC: 108 MMOL/L (ref 98–107)
CO2 SERPL-SCNC: 28 MMOL/L (ref 20–31)
CREAT SERPL-MCNC: 1 MG/DL (ref 0.7–1.2)
EKG ATRIAL RATE: 72 BPM
EKG P AXIS: 9 DEGREES
EKG P-R INTERVAL: 130 MS
EKG Q-T INTERVAL: 424 MS
EKG QRS DURATION: 102 MS
EKG QTC CALCULATION (BAZETT): 464 MS
EKG R AXIS: 8 DEGREES
EKG T AXIS: -176 DEGREES
EKG VENTRICULAR RATE: 72 BPM
EOSINOPHIL # BLD: 0.22 K/UL (ref 0–0.44)
EOSINOPHILS RELATIVE PERCENT: 6 % (ref 1–4)
ERYTHROCYTE [DISTWIDTH] IN BLOOD BY AUTOMATED COUNT: 12.4 % (ref 11.8–14.4)
GFR, ESTIMATED: 88 ML/MIN/1.73M2
GLUCOSE SERPL-MCNC: 112 MG/DL (ref 74–99)
HCT VFR BLD AUTO: 38.8 % (ref 40.7–50.3)
HGB BLD-MCNC: 12.5 G/DL (ref 13–17)
IMM GRANULOCYTES # BLD AUTO: <0.03 K/UL (ref 0–0.3)
IMM GRANULOCYTES NFR BLD: 0 %
LYMPHOCYTES NFR BLD: 1.07 K/UL (ref 1.1–3.7)
LYMPHOCYTES RELATIVE PERCENT: 27 % (ref 24–43)
MCH RBC QN AUTO: 30.7 PG (ref 25.2–33.5)
MCHC RBC AUTO-ENTMCNC: 32.2 G/DL (ref 28.4–34.8)
MCV RBC AUTO: 95.3 FL (ref 82.6–102.9)
MONOCYTES NFR BLD: 0.47 K/UL (ref 0.1–1.2)
MONOCYTES NFR BLD: 12 % (ref 3–12)
NEUTROPHILS NFR BLD: 54 % (ref 36–65)
NEUTS SEG NFR BLD: 2.21 K/UL (ref 1.5–8.1)
NRBC BLD-RTO: 0 PER 100 WBC
PLATELET # BLD AUTO: 161 K/UL (ref 138–453)
PMV BLD AUTO: 10.3 FL (ref 8.1–13.5)
POTASSIUM SERPL-SCNC: 4.6 MMOL/L (ref 3.7–5.3)
PROT SERPL-MCNC: 6.2 G/DL (ref 6.6–8.7)
RBC # BLD AUTO: 4.07 M/UL (ref 4.21–5.77)
SODIUM SERPL-SCNC: 143 MMOL/L (ref 136–145)
WBC OTHER # BLD: 4 K/UL (ref 3.5–11.3)

## 2024-10-11 PROCEDURE — 36415 COLL VENOUS BLD VENIPUNCTURE: CPT

## 2024-10-11 PROCEDURE — 99223 1ST HOSP IP/OBS HIGH 75: CPT | Performed by: INTERNAL MEDICINE

## 2024-10-11 PROCEDURE — 85025 COMPLETE CBC W/AUTO DIFF WBC: CPT

## 2024-10-11 PROCEDURE — 80053 COMPREHEN METABOLIC PANEL: CPT

## 2024-10-11 PROCEDURE — 94669 MECHANICAL CHEST WALL OSCILL: CPT

## 2024-10-11 PROCEDURE — 94640 AIRWAY INHALATION TREATMENT: CPT

## 2024-10-11 PROCEDURE — 94761 N-INVAS EAR/PLS OXIMETRY MLT: CPT

## 2024-10-11 PROCEDURE — 93010 ELECTROCARDIOGRAM REPORT: CPT | Performed by: INTERNAL MEDICINE

## 2024-10-11 PROCEDURE — 6370000000 HC RX 637 (ALT 250 FOR IP): Performed by: INTERNAL MEDICINE

## 2024-10-11 PROCEDURE — 2580000003 HC RX 258: Performed by: INTERNAL MEDICINE

## 2024-10-11 PROCEDURE — 6370000000 HC RX 637 (ALT 250 FOR IP): Performed by: STUDENT IN AN ORGANIZED HEALTH CARE EDUCATION/TRAINING PROGRAM

## 2024-10-11 PROCEDURE — 6360000002 HC RX W HCPCS: Performed by: INTERNAL MEDICINE

## 2024-10-11 RX ADMIN — BUMETANIDE 2 MG: 1 TABLET ORAL at 09:34

## 2024-10-11 RX ADMIN — ALBUTEROL SULFATE 2 PUFF: 90 AEROSOL, METERED RESPIRATORY (INHALATION) at 11:08

## 2024-10-11 RX ADMIN — EMPAGLIFLOZIN 10 MG: 10 TABLET, FILM COATED ORAL at 09:04

## 2024-10-11 RX ADMIN — METOLAZONE 5 MG: 2.5 TABLET ORAL at 08:56

## 2024-10-11 RX ADMIN — ASPIRIN 81 MG: 81 TABLET, COATED ORAL at 08:56

## 2024-10-11 RX ADMIN — BUDESONIDE AND FORMOTEROL FUMARATE DIHYDRATE 2 PUFF: 160; 4.5 AEROSOL RESPIRATORY (INHALATION) at 11:08

## 2024-10-11 RX ADMIN — SODIUM CHLORIDE, PRESERVATIVE FREE 10 ML: 5 INJECTION INTRAVENOUS at 09:00

## 2024-10-11 RX ADMIN — SOTALOL HYDROCHLORIDE 80 MG: 80 TABLET ORAL at 09:03

## 2024-10-11 RX ADMIN — LOSARTAN POTASSIUM 25 MG: 25 TABLET, FILM COATED ORAL at 09:03

## 2024-10-11 RX ADMIN — DILTIAZEM HYDROCHLORIDE 120 MG: 120 CAPSULE, COATED, EXTENDED RELEASE ORAL at 09:34

## 2024-10-11 RX ADMIN — ENOXAPARIN SODIUM 30 MG: 100 INJECTION SUBCUTANEOUS at 08:56

## 2024-10-11 NOTE — DISCHARGE SUMMARY
Discharge Summary    Pedrito Hinton  :  1965  MRN:  [id number]    Admit date:  10/10/2024      Discharge date: 10/11/2024     Admitting Physician:  Abimael Solano MD    Discharge Diagnoses:    Principal Problem:    Atrial fibrillation (HCC)  Active Problems:    Chronic diastolic heart failure (HCC)    NICM (nonischemic cardiomyopathy) (HCC)    MARIA TERESA treated with BiPAP    Stage 2 moderate COPD by GOLD classification (HCC)  Resolved Problems:    * No resolved hospital problems. *      Hospital Course:   Pedrito Hinton is a 59 y.o. male admitted with atrial fibrillation. He presented with chest pain and irregular heartbeat. He stated his heart rate was all over the place. He stated it was ranging between 37 - 79. He does have history of atrial fibrillation but states feels different than that normally when he has atrial fibrillation. Patient is not currently anticoagulated but does take aspirin daily. He is on sotalol and Cardizem. He does follow with Dr. Olivarez. He describes some squeezing of left side of chest around noon. He reported shortness of breath but not different than his baseline. He did report lower leg edema but not different than his baseline. He does have history of COPD and has been having ongoing shortness of breath and is compliant with Advair and albuterol. He has had no recent illness or fevers or chills. Patient was evaluated and admitted. Cardiology was consulted. Patient has remained in sinus rhythm with occasional PVCs and sinus arrhythmia. Rate is controlled vital signs are stable. Plan will be to discharge patient today no medication changes. Plan will be to have a loop recorder placed as an outpatient and he will follow-up with Dr. Olivarez as an outpatient.    Consultants:  Dr. Olivarez, cardiology    Procedures: none    Complications: none    Discharge Condition: fair    Exam:  GEN:    Awake, alert and oriented x3.   EYES:   EOMI, pupils equal   NECK: Supple. No lymphadenopathy.  No carotid

## 2024-10-11 NOTE — CONSULTS
aortic insufficiency. Mild tricuspid regurgitation.    15- Stress test done on 8/1/2023: Abnormal study suggestive of non-ischemic cardiomyopathy but can not rule out a small degree of ischemia. Dilated left ventricle with moderately reduced left ventricular systolic function. Gated SPECT showed calculated EF is 42% with moderate global hypokinesis. No segmental abnormalities. No evidence of transient ischemic dilatation. Increased right ventricular free wall uptake suggestive of right ventricular hypertrophy. Patient developed atrial fibrillation with rapid ventricular response during stress and converted to sinus rhythm spontaneously.    16- Admitted in [facility] for Tikosyn Loading. He failed that and was sent to [place] to discussed doing an ablation. While there it was decided to not do the procedure and instead do Sotalol at this time.     17- CAM done on 12/6/23: 9 days and 17 hours recorded. Baseline rhythm is sinus with average heart rate of 82 bpm, ranging between 58 and 127 bpm. Sinus tachycardia represented 5% of the study duration. Occasional PACs, PACs burden 1.1% which she with frequent runs of ectopic atrial tachycardia, the longest being 56 minutes at an average heart rate of 172 bpm. Occasional PVCs, PVCs burden 2.6% with occasional ventricular couplets.  No ventricular runs.  No patient activated events recorded.    18- Echo done on 3/27/24: Left Ventricle: Normal left ventricular systolic function with a visually estimated EF of 55 - 60%. Severely increased wall thickness. Findings consistent with concentric remodeling. Grade I diastolic dysfunction with normal LAP.  Aortic Valve: Moderate regurgitation. Mild stenosis of the aortic valve. AV mean gradient is 11 mmHg. Mitral Valve: Mild regurgitation. Tricuspid Valve: Mild to moderate regurgitation with a centrally directed jet. Left Atrium: Left atrium is mildly dilated. Left atrial volume index is mildly increased (35-41 mL/m2). LA Vol Index is   he will being maintained on dronedarone, I told them that we will need to closely monitor them for potential side effects. These include re-assessment of their ECG, LFTs and renal function.  Stroke Risk: CHADS2-VASc Score: 2/9 (2.2% stroke risk)  Antiplatelet Agent: Continue Aspirin 81 mg BID. Mr. Hinton denies any current or recent bleeding problems including a history of a GI bleed, ulcers, recent or upcoming surgeries, blood in his stool or black tarry stools or blood in his urine.  Telemetry reviewed, no significant heart rate or rhythm abnormalities were detected during his hospital stay.  Giving prior history of atrial flutter, atrial fibrillation and prior ablation in addition to being symptomatic with tachycardia and bradycardia.  I told him the best way to monitor his heart rhythm/rate problems will be proceeding with an implantable loop recorder.  Additional Testing: I will schedule him for Loop recorder as out patient next week.       Non Ischemic Cardiomyopathy/ CHF: Currently doing well with no shortness of breath or increased leg swelling. Echo done on 8/28/2023 showed an EF of 55-60%.   ACE Inibitor/ARB: Continue losartan (Cozaar) 25 mg daily.  Diuretics: Continue bumetinide (Bumex) 2 mg every morning.     Continued Zaroxolyn 3 times weekly.  Heart failure counseling: I advised them to try and keep their legs up whenever possible and to limit salt in their diet.  Additional Testing List: None     Shortness of breath with mild exertion:   Multifactorial from chronic CHF, obstructive sleep apnea syndrome, COPD and obesity.  Continue Bumex  Continue Zaroxolyn 3 times weekly.  Continue to follow up with Pulmonology.      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   Diuretics: Continue Bumex 2 mg daily.   ACE Inibitor/ARB: Continue losartan (Cozaar) 25 mg daily.    Morbid obesity: Body

## 2024-10-11 NOTE — PLAN OF CARE
Problem: Chronic Conditions and Co-morbidities  Goal: Patient's chronic conditions and co-morbidity symptoms are monitored and maintained or improved  10/11/2024 1421 by Rocio Vargas RN  Outcome: Adequate for Discharge  10/11/2024 1023 by Rocio Vargas RN  Outcome: Progressing     Problem: Discharge Planning  Goal: Discharge to home or other facility with appropriate resources  10/11/2024 1421 by Rocio Vargas RN  Outcome: Adequate for Discharge  10/11/2024 1023 by Rocio Vargas RN  Outcome: Progressing     Problem: Cardiovascular - Adult  Goal: Maintains optimal cardiac output and hemodynamic stability  10/11/2024 1421 by Rocio Vargas RN  Outcome: Adequate for Discharge  10/11/2024 1023 by Rocio Vargas RN  Outcome: Progressing  Goal: Absence of cardiac dysrhythmias or at baseline  10/11/2024 1421 by Rocio Vargas RN  Outcome: Adequate for Discharge  10/11/2024 1023 by Rocio Vargas RN  Outcome: Progressing     Problem: Metabolic/Fluid and Electrolytes - Adult  Goal: Electrolytes maintained within normal limits  10/11/2024 1421 by Rocio Vargas RN  Outcome: Adequate for Discharge  10/11/2024 1023 by Rocio Vargas RN  Outcome: Progressing  Goal: Hemodynamic stability and optimal renal function maintained  10/11/2024 1421 by Rocio Vargas RN  Outcome: Adequate for Discharge  10/11/2024 1023 by Rocio Vargas RN  Outcome: Progressing

## 2024-10-11 NOTE — DISCHARGE INSTRUCTIONS
Plan for loop recorder to be placed October 17th @ 3 PM.  You will receive a phone call for instructions prior to appointment.  If you do not hear from someone by Tuesday Oct. 15th.  Call the cardiology office @ [phone]

## 2024-10-11 NOTE — PLAN OF CARE
Problem: Chronic Conditions and Co-morbidities  Goal: Patient's chronic conditions and co-morbidity symptoms are monitored and maintained or improved  10/11/2024 1023 by Rocio Vargas RN  Outcome: Progressing     Problem: Discharge Planning  Goal: Discharge to home or other facility with appropriate resources  10/11/2024 1023 by Rocio Vargas RN  Outcome: Progressing     Problem: Cardiovascular - Adult  Goal: Maintains optimal cardiac output and hemodynamic stability  10/11/2024 1023 by Rocio Vargas RN  Outcome: Progressing     Problem: Cardiovascular - Adult  Goal: Absence of cardiac dysrhythmias or at baseline  10/11/2024 1023 by Rocio Vargas RN  Outcome: Progressing     Problem: Metabolic/Fluid and Electrolytes - Adult  Goal: Electrolytes maintained within normal limits  10/11/2024 1023 by Rocio Vargas RN  Outcome: Progressing     Problem: Metabolic/Fluid and Electrolytes - Adult  Goal: Hemodynamic stability and optimal renal function maintained  10/11/2024 1023 by Rocio Vargas RN  Outcome: Progressing

## 2024-10-11 NOTE — PROGRESS NOTES
Assessment unchanged except pt denies lightheadedness or feeling of fatigue. Denies CP or SOB. Up independently in room. Call light in reach. Awaiting cardiology.   
Nutrition Assessment     Type and Reason for Visit: Initial    Nutrition Recommendations/Plan:   Continue current diet.   Encouraged sodium limit to < 2,000 mg per day.      Malnutrition Assessment:  Malnutrition Status: At risk for malnutrition (Comment)    Nutrition Assessment:  Altered nutrition related labs r/t cardiac dysfunction aeb cholesterol 228, . Overweight/obesity r/t excess energy intakes aeb BMI 42.85. Pt admitted with A Fib. Hx CHF. A1c 5.1. Discussed sodium budget per meal, 600 mg and keeping sodium tally to see if hidden sodium present in diet, states weight fluctuates down and up. Does not drink sugary beverages. Hx CHF and COPD.      Nutrition Related Findings:   + 2 BLE pitting edema. Wound Type: None    Current Nutrition Therapies:    ADULT DIET; Regular; Low Fat/Low Chol/High Fiber/2 gm Na    Anthropometric Measures:  Height: 172.7 cm (5' 8\")  Current Body Wt: 127.8 kg (281 lb 12.8 oz)   BMI: 42.9  Hematology:  Recent Labs     10/10/24  1818 10/11/24  0605   WBC 5.0 4.0   HGB 13.6 12.5*   HCT 39.0* 38.8*     Chemistry:  Recent Labs     10/10/24  1818 10/11/24  0605    143   K 3.3* 4.6    108*   CO2 27 28   GLUCOSE 124* 112*   BUN 35* 28*   CREATININE 1.1 1.0   MG 2.5  --    CALCIUM 9.1 8.9     Recent Labs     10/11/24  0605   AST 22   ALT 33   ALKPHOS 69   BILITOT 0.3       Lab Results   Component Value Date/Time    TRIG 183 08/19/2024 03:00 PM    HDL 50 08/19/2024 03:00 PM      Lab Results   Component Value Date    VITD25 30.0 07/19/2023       Nutrition Diagnosis:   Altered nutrition-related lab values related to cardiac dysfunction as evidenced by lab values  Overweight/Obese related to excessive energy intake as evidenced by BMI    Nutrition Interventions:   Food and/or Nutrient Delivery: Continue Current Diet  Nutrition Education/Counseling: Education initiated  Coordination of Nutrition Care: Continue to monitor while inpatient  Plan of Care discussed with: 
Patient arrived to floor from ED at 2040, admitted for Afib. Pt ambulated to bed without difficulty and oriented to room and call light. VS and assessment as charted. Pt is A&Ox4. Denies any current pain. Admission navigator completed other than patient is unsure of Lasix dose. Orders and plan of care reviewed with patient. Telemetry in place, NSR with occasional PVC noted. Patient aware once medications are verified writer will administer. Turkey sandwich meal and drink provided. Pt denies any other needs at this time and has call light within reach, will continue to monitor.    
Pt assessed while dangling on side of bed. Pt states he feels ok this am. Reports that he became \"fatigued\" yesterday at work after taking medications. Pt HR runs in 50's-low 60's while at hospital. Pt reports that he does not check his HR or blood pressure before taking medications in the am. Writer recommended maybe starting to check these before taking medications to ensure his HR is not too low before taking them. Cardiology is consulted and pt was encouraged to let them know that he became symptomatic after taking his medications. Pt heart rate has been SB but was olya arrhythmia this am. Pt appears to be flipping in an out of a regular rhythm on telemetry. Will continue to monitor.   
Reviewed discharge instructions with patient.  Patient aware of no new medications.   Reviewed home medications and when next dose is due.   Reviewed date/time of follow up appointments.  Patient aware of loop recorder to be placed on 10/17/24 @ 3 PM.  Instructed to follow a cardiac diet.  Educational handout given on Implantable Cardiac Monitor.   Questions answered.   Verbalizes understanding.  Copy of discharge instructions given to patient.  
The patient's Advance Care Plan is NOT present because:    []  I confirmed today that the patient does not wish or was not able to name a   surrogate decision maker or provide and advance care plan.    [] Hospice care is currently being provided or has been provided within the   calendar year.    []  I did NOT confirm today the presence of an Advance Care Plan or surrogate   decision maker documented within the patient's medical record.   [DOES NOT SATISFY [facility] PERFORMANCE]    AMADOR Abreu - CNP , AMADOR, NP-C  [facility]        10/11/2024, 9:03 AM

## 2024-10-11 NOTE — CARE COORDINATION
others, and if so, who? (P) No  Plans to Return to Present Housing: (P) Yes  Other Identified Issues/Barriers to RETURNING to current housing: none.  Potential Assistance needed at discharge: (P) N/A            Potential DME:    Patient expects to discharge to: (P) House  Plan for transportation at discharge: (P) Self    Financial    Payor: [facility] / Plan: [facility] / Product Type: *No Product type* /     Does insurance require precert for SNF: Yes    Potential assistance Purchasing Medications: (P) No  Meds-to-Beds request:        [facility] - [place], [place] - [address] - P [phone] - F [phone]  [address]  [place] [address]  Phone: [phone] Fax: [phone]      Notes:    Factors facilitating achievement of predicted outcomes: Family support, Friend support, Motivated, Cooperative, Pleasant, Sense of humor, Good insight into deficits.    Barriers to discharge: Medical complications and Medication managment    Additional Case Management Notes: The patient works full time at [employer]. He is independent in ADL's without assistive devices. He denies food or housing insecurity and states medications are covered well by insurance. He denies further concerns about discharge.   The plan for discharge is home when medically stable with no additional services at this time.  The Plan for Transition of Care is related to the following treatment goals of Atrial fibrillation (HCC) [I48.91]  Hypokalemia [E87.6]  Paroxysmal A-fib (HCC) [I48.0]  Chest pain, unspecified type [R07.9]    IF APPLICABLE: The Patient and/or patient representative Pedrito and his family were provided with a choice of provider and agrees with the discharge plan. Freedom of choice list with basic dialogue that supports the patient's individualized plan of care/goals and shares the quality data associated with the providers was provided to:     Patient Representative Name:       The Patient and/or Patient  Representative Agree with the Discharge Plan?  Yes.    Adreinne Richardson RN  Case Management Department  Ph: [phone]

## 2024-10-11 NOTE — H&P
History and Physical    Patient:  Pedrito Hinton  MRN: [id number]    Chief Complaint: Irregular heartbeat    History Obtained From:  patient, electronic medical record    PCP: Abimael Solano MD    History of Present Illness:   The patient is a 59 y.o. male who presents with chest pain and irregular heartbeat.  Patient states his heart rate has been all over the place since 730 this morning.  States the rate has ranged from 37-79.  He has a history of atrial fibrillation however he states this feels different than what it normally acts up.  He is not currently anticoagulated, however does take aspirin daily.  He is on sotalol and Cardizem for rate control.  He began having some squeezing in the left side of his chest around noon today.  He states at the present time and just feels sore.  He has had increased shortness of breath over the last 3 months.  He states he cannot walk far without having to sit and take a break.  His legs have been swelling and his weight has been fluctuating.  He has had a dry hacking cough.  He denies any abdominal pain, nausea, vomiting, diarrhea or fevers.  Past medical history includes atrial fibrillation with several cardioversions and an ablation, AAA repair, aortic regurgitation, COPD, MARIA TERESA with CPAP use, and hypertension.  Chest x-ray today showed pulmonary interstitial edema.  Potassium 3.3, proBNP 374, troponin 17 and 16.  EKG shows underlying atrial fib with occasional sinus beats.       Past Medical History:        Diagnosis Date    AAA (abdominal aortic aneurysm) ([facility]) 2008    Thoracic - repaired    Aortic regurgitation     aortic regurg    Atrial fibrillation ([facility])     Atrial flutter ([facility])     Encounter for cardioversion procedure     Airam Garcia/ Dr. Olivarez, states 11 times, most recent 1/2021    Frequent urination     Pt states he's developed frequent urination with 3 times in last yr noting blood in urine as well.    H/O echocardiogram 12/04/2017    EF 50-55% Global LV systolic  function appears preserved. Mildly increaseed LV wall thinckness with normal LV carvity size, No definite specific wall motion abnormalities identified, LA is moderately dilated (34-39) with LA volume index of 36ml/m2, Mild mitral and tricuspid regurgitation, moderate aortic regurgitation, Evidence of mild diastolic dysfunction seen    H/O echocardiogram 02/02/2021    EF 55% patient has sigmoid interventricular septim S/P aortic vlave repair with mild to mod aortic regurg Diastoligy cannot be properly assessed due to pts rhtyhm Aortic root is mildly dilated when corrected for body surface area    Hemorrhage of rectum and anus 2013    polypectomy    History of 24 hour EKG monitoring 04/03/2014    Unremarkable except for mild increased number of PVC's with 3 ventricular couplets, asymptomatic     History of echocardiogram 04/10/2014    EF 60%, mild to mod LVH, moderate AI    History of PFTs 06/25/2015    Consistent with mild obstructive ventilatory impairment. Lung volumes are normal,except mild increase in residual volume,which could be suggestive of airway trapping. Diffusion capacity is normal.    Hx of transesophageal echocardiography (SKYLAR) for monitoring 05/05/2015    Dr Olivarez/[facility]    Hypertension     pcp dr mccurdy    Impaired fasting glucose     Knee pain, bilateral     Lipoma     Obesity (BMI 30.0-34.9)     PAF (paroxysmal atrial fibrillation) (HCC) 8/28/2023    Paroxysmal supraventricular tachycardia (HCC)     Periodontal disease     ORAL SURGEON - Dr. Jesus Bowie - laser surgery 3/31/2023    Sleep apnea     uses CPAP    Tobacco use disorder     Under care of team     PCP - Dr. Mccurdy - last visit 3/2023    Under care of team     CARDIOLOGY - Dr. Olivarez next visit 3/29/2023    Under care of team     ORTHO - Dr. Arreguin    Urinary urgency     Ventral hernia     Repaired       Past Surgical History:        Procedure Laterality Date    ABLATION OF DYSRHYTHMIC FOCUS  2015    ACHILLES TENDON

## 2024-10-11 NOTE — DISCHARGE INSTR - DIET

## 2024-10-11 NOTE — RT PROTOCOL NOTE
RESPIRATORY ASSESSMENT PROTOCOL                                                                                              Patient Name: Pedrito Hinton Room#: 0326/0326-01 : 1965     Admitting diagnosis: Atrial fibrillation (HCC) [I48.91]  Hypokalemia [E87.6]  Paroxysmal A-fib (HCC) [I48.0]  Chest pain, unspecified type [R07.9]       Medical History:   Past Medical History:   Diagnosis Date    AAA (abdominal aortic aneurysm) (HCC)     Thoracic - repaired    Aortic regurgitation     aortic regurg    Atrial fibrillation (HCC)     Atrial flutter (HCC)     Encounter for cardioversion procedure     Airam Garcia/ Dr. Olivarez, states 11 times, most recent 2021    Frequent urination     Pt states he's developed frequent urination with 3 times in last yr noting blood in urine as well.    H/O echocardiogram 2017    EF 50-55% Global LV systolic function appears preserved. Mildly increaseed LV wall thinckness with normal LV carvity size, No definite specific wall motion abnormalities identified, LA is moderately dilated (34-39) with LA volume index of 36ml/m2, Mild mitral and tricuspid regurgitation, moderate aortic regurgitation, Evidence of mild diastolic dysfunction seen    H/O echocardiogram 2021    EF 55% patient has sigmoid interventricular septim S/P aortic vlave repair with mild to mod aortic regurg Diastoligy cannot be properly assessed due to pts rhtyhm Aortic root is mildly dilated when corrected for body surface area    Hemorrhage of rectum and anus     polypectomy    History of 24 hour EKG monitoring 2014    Unremarkable except for mild increased number of PVC's with 3 ventricular couplets, asymptomatic     History of echocardiogram 04/10/2014    EF 60%, mild to mod LVH, moderate AI    History of PFTs 2015    Consistent with mild obstructive ventilatory impairment. Lung volumes are normal,except mild increase in residual volume,which could be suggestive of airway trapping.  provided according to policy RT_201: (oh with an X)  ____Yes    ____ No     __x__ NA    Smoking Cessation Booklet given:  ____Yes  ____No ____Patient Refused

## 2024-10-11 NOTE — PLAN OF CARE
Problem: Chronic Conditions and Co-morbidities  Goal: Patient's chronic conditions and co-morbidity symptoms are monitored and maintained or improved  Outcome: Progressing     Problem: Discharge Planning  Goal: Discharge to home or other facility with appropriate resources  Outcome: Progressing     Problem: Cardiovascular - Adult  Goal: Maintains optimal cardiac output and hemodynamic stability  Outcome: Progressing  Flowsheets (Taken 10/10/2024 2150)  Maintains optimal cardiac output and hemodynamic stability:   Monitor blood pressure and heart rate   Monitor urine output and notify Licensed Independent Practitioner for values outside of normal range   Assess for signs of decreased cardiac output     Problem: Cardiovascular - Adult  Goal: Absence of cardiac dysrhythmias or at baseline  Outcome: Progressing  Flowsheets (Taken 10/10/2024 2150)  Absence of cardiac dysrhythmias or at baseline:   Monitor cardiac rate and rhythm   Assess for signs of decreased cardiac output   Administer antiarrhythmia medication and electrolyte replacement as ordered     Problem: Metabolic/Fluid and Electrolytes - Adult  Goal: Electrolytes maintained within normal limits  Outcome: Progressing  Flowsheets (Taken 10/10/2024 2150)  Electrolytes maintained within normal limits:   Monitor labs and assess patient for signs and symptoms of electrolyte imbalances   Administer electrolyte replacement as ordered   Monitor response to electrolyte replacements, including repeat lab results as appropriate     Problem: Metabolic/Fluid and Electrolytes - Adult  Goal: Hemodynamic stability and optimal renal function maintained  Outcome: Progressing  Flowsheets (Taken 10/10/2024 2150)  Hemodynamic stability and optimal renal function maintained:   Monitor labs and assess for signs and symptoms of volume excess or deficit   Monitor intake, output and patient weight

## 2024-10-14 ENCOUNTER — TELEPHONE (OUTPATIENT)
Dept: PRIMARY CARE CLINIC | Age: 59
End: 2024-10-14

## 2024-10-14 NOTE — TELEPHONE ENCOUNTER
Care Transitions Initial Follow Up Call    Outreach made within 2 business days of discharge: Yes    Patient: Pedrito Hinton Patient : 1965   MRN: [id number]  Reason for Admission: Atrial fibrillation   Discharge Date: 10/11/24       Spoke with: pedrito    Discharge department/facility: [facility] Patient Contact:  Was patient able to fill all prescriptions: Yes  Was patient instructed to bring all medications to the follow-up visit: No:   Is patient taking all medications as directed in the discharge summary? Yes  Does patient understand their discharge instructions: Yes  Does patient have questions or concerns that need addressed prior to 7-14 day follow up office visit: no    Additional needs identified to be addressed with provider               Scheduled appointment with PCP within 7-14 days    Follow Up  Future Appointments   Date Time Provider Department Center   10/24/2024 10:00 AM [facility]   10/24/2024  1:30 PM Abimael Solano MD TIFF HOSP [facility]   10/28/2024  2:30 PM Bhupendra Mancini MD TIFF PULM [facility]   2024  9:40 AM Marco Olivarez MD TIFF CARD [facility]   2024 10:30 AM Abimael Solano MD TIFF HOSP [facility]   2025  2:20 PM [facility]       Kimber Garsia

## 2024-10-17 ENCOUNTER — HOSPITAL ENCOUNTER (OUTPATIENT)
Age: 59
Setting detail: OUTPATIENT SURGERY
Discharge: HOME | End: 2024-10-17
Attending: INTERNAL MEDICINE | Admitting: INTERNAL MEDICINE
Payer: OTHER GOVERNMENT

## 2024-10-17 VITALS
DIASTOLIC BLOOD PRESSURE: 54 MMHG | RESPIRATION RATE: 16 BRPM | OXYGEN SATURATION: 95 % | SYSTOLIC BLOOD PRESSURE: 135 MMHG | HEART RATE: 59 BPM

## 2024-10-17 DIAGNOSIS — I48.92 ATRIAL FLUTTER, PAROXYSMAL (HCC): ICD-10-CM

## 2024-10-17 PROCEDURE — 2709999900 HC NON-CHARGEABLE SUPPLY: Performed by: INTERNAL MEDICINE

## 2024-10-17 PROCEDURE — 7100000010 HC PHASE II RECOVERY - FIRST 15 MIN: Performed by: INTERNAL MEDICINE

## 2024-10-17 PROCEDURE — 33285 INSJ SUBQ CAR RHYTHM MNTR: CPT | Performed by: INTERNAL MEDICINE

## 2024-10-17 PROCEDURE — C1764 EVENT RECORDER, CARDIAC: HCPCS | Performed by: INTERNAL MEDICINE

## 2024-10-17 PROCEDURE — 7100000011 HC PHASE II RECOVERY - ADDTL 15 MIN: Performed by: INTERNAL MEDICINE

## 2024-10-17 PROCEDURE — 2500000003 HC RX 250 WO HCPCS: Performed by: INTERNAL MEDICINE

## 2024-10-17 DEVICE — ICM LNQ22 LINQ II PRIME US
Type: IMPLANTABLE DEVICE | Site: CHEST  WALL | Status: FUNCTIONAL
Brand: LINQ II™

## 2024-10-17 RX ORDER — BUPIVACAINE HYDROCHLORIDE AND EPINEPHRINE 5; 5 MG/ML; UG/ML
INJECTION, SOLUTION EPIDURAL; INTRACAUDAL; PERINEURAL PRN
Status: DISCONTINUED | OUTPATIENT
Start: 2024-10-17 | End: 2024-10-17 | Stop reason: HOSPADM

## 2024-10-17 NOTE — DISCHARGE INSTRUCTIONS
DISCHARGE INSTRUCTIONS FOR  loop recorder    HOME CARE:    You will likely go home with steri strips/surgical glue over your incision.   Surgical dressing may be removed in 24 hours and changed daily until no   drainage is noted form site.   Keep the incision clean and dry. If used, steri strips will fall off within 1-2 weeks.   Do not take shower until 24 hours or until no further drainage from site.   Tylenol may be used for relief of tenderness around the implant site.   Also avoid anything that will rub against the incision.    Normally the device may seem to bulge slightly under your skin.  The bulge may be more prominent right after surgery but will become less noticeable the next two weeks.     CHANGES TO NOTIFY OUR OFFICE ABOUT:   Increased swelling and/or tenderness.   Increased redness of the pocket or incision.   Drainage from the incision.   A pimple that develops along the incision.   You develop a fever and do not have a cold or the flu.   Notify the doctor if you experience the symptoms you had prior to implant.  Avoid activities that may result in high impact or stress at the incision site.    DRIVING IS USUALLY PERMITTED WITHIN 24 hours.    IF YOU ARE ON BLOOD THINNERS, CHECK WITH YOUR DOCTOR WHEN TO RESUME YOUR MEDICATION.    CARRY DEVICE I.D. AND HOME MEDICATION LIST WITH YOU AT ALL TIMES.   NOTIFY ANY MEDICAL PERSONNEL THAT YOU HAVE A DEVICE BEFORE ANY PROCEDURES.    THESE ARE GENERAL GUIDELINES AFTER IMPLANTATION.  IF THERE ARE ANY EXCEPTIONS TO THESE INSTRUCTIONS, THEY WILL BE REVIEWED WITH YOU ON AN INDIVIDUAL BASIS.

## 2024-10-24 ENCOUNTER — OFFICE VISIT (OUTPATIENT)
Dept: PRIMARY CARE CLINIC | Age: 59
End: 2024-10-24

## 2024-10-24 ENCOUNTER — OFFICE VISIT (OUTPATIENT)
Dept: CARDIOLOGY | Age: 59
End: 2024-10-24
Payer: OTHER GOVERNMENT

## 2024-10-24 VITALS
HEART RATE: 64 BPM | SYSTOLIC BLOOD PRESSURE: 98 MMHG | WEIGHT: 281 LBS | OXYGEN SATURATION: 98 % | HEIGHT: 68 IN | DIASTOLIC BLOOD PRESSURE: 57 MMHG | BODY MASS INDEX: 42.59 KG/M2 | RESPIRATION RATE: 18 BRPM

## 2024-10-24 VITALS
BODY MASS INDEX: 42.28 KG/M2 | WEIGHT: 279 LBS | HEIGHT: 68 IN | HEART RATE: 76 BPM | SYSTOLIC BLOOD PRESSURE: 92 MMHG | DIASTOLIC BLOOD PRESSURE: 64 MMHG | OXYGEN SATURATION: 95 %

## 2024-10-24 DIAGNOSIS — I50.32 CHRONIC DIASTOLIC HEART FAILURE (HCC): ICD-10-CM

## 2024-10-24 DIAGNOSIS — I48.0 PAROXYSMAL ATRIAL FIBRILLATION (HCC): Primary | ICD-10-CM

## 2024-10-24 DIAGNOSIS — G47.33 OBSTRUCTIVE SLEEP APNEA: ICD-10-CM

## 2024-10-24 DIAGNOSIS — I48.0 PAROXYSMAL ATRIAL FIBRILLATION (HCC): ICD-10-CM

## 2024-10-24 DIAGNOSIS — Z09 HOSPITAL DISCHARGE FOLLOW-UP: ICD-10-CM

## 2024-10-24 DIAGNOSIS — I48.0 PAF (PAROXYSMAL ATRIAL FIBRILLATION) (HCC): ICD-10-CM

## 2024-10-24 DIAGNOSIS — R73.01 IMPAIRED FASTING GLUCOSE: ICD-10-CM

## 2024-10-24 DIAGNOSIS — I10 ESSENTIAL HYPERTENSION: ICD-10-CM

## 2024-10-24 DIAGNOSIS — Z51.89 VISIT FOR WOUND CARE: Primary | ICD-10-CM

## 2024-10-24 PROCEDURE — 93000 ELECTROCARDIOGRAM COMPLETE: CPT | Performed by: INTERNAL MEDICINE

## 2024-10-24 RX ORDER — SEMAGLUTIDE 1.34 MG/ML
0.25 INJECTION, SOLUTION SUBCUTANEOUS WEEKLY
Qty: 4 ADJUSTABLE DOSE PRE-FILLED PEN SYRINGE | Refills: 0 | Status: SHIPPED | COMMUNITY
Start: 2024-10-24

## 2024-10-24 RX ORDER — SEMAGLUTIDE 1.34 MG/ML
0.25 INJECTION, SOLUTION SUBCUTANEOUS WEEKLY
Qty: 4 ADJUSTABLE DOSE PRE-FILLED PEN SYRINGE | Refills: 0 | Status: SHIPPED | OUTPATIENT
Start: 2024-10-24 | End: 2024-10-24 | Stop reason: SDUPTHER

## 2024-10-24 RX ORDER — FLUTICASONE FUROATE, UMECLIDINIUM BROMIDE AND VILANTEROL TRIFENATATE 200; 62.5; 25 UG/1; UG/1; UG/1
1 POWDER RESPIRATORY (INHALATION) DAILY
Qty: 1 EACH | Refills: 5 | Status: SHIPPED | OUTPATIENT
Start: 2024-10-24

## 2024-10-24 NOTE — PROGRESS NOTES
0.25 OR 0.5 MG/DOSE,) 2 MG/1.5ML SOPN; Inject 0.25 mg into the skin once a week, Disp-4 Adjustable Dose Pre-filled Pen Syringe, R-0Normal  Medical Decision Making: high complexity  Return for F/u per prior plans.      F/U with Cardiology per prior plans  Increase Trelegy Ellipta 20/62.5/25 mcg -EXP 2026-JUN LOT 10 2T5W SN 21 [id number]  Discussed RBA of starting Ozempic given Cardiovascular benefits and also weight loss. Encourage ongoing caloric restrictions and exercise 150+ minutes.       Subjective:   HPI:  Follow up of Hospital problems/diagnosis(es):   Inpatient course: Discharge summary reviewed- see chart.  Interval history/Current status:     Pt presents for hospital f/u. He states his breathing is better on Trelegy higher dose 100-62.5-25mcg and needs refills / clarify on dose , he is taking it BID at this time. Complains of neck pain, had tooth surgery prior to hospital stay taking ibu 800 prn 6 hrs at this time and well tolerated at this time. He is following with Cardiology. Patient denies chest pain, chest pressure/discomfort, claudication, dyspnea, exertional chest pressure/discomfort, fatigue, irregular heart beat, lower extremity edema, near-syncope, orthopnea, palpitations, paroxysmal nocturnal dyspnea, syncope, and tachypnea.  No more symptoms noted. Since gaining weight it has been harder to breathe.    Patient Active Problem List   Diagnosis    Family history of colon cancer    H/O ascending aorta repair    Hx of amiodarone therapy    Aneurysm of thoracic aorta (HCC)    Varicose veins of right lower extremity    Typical atrial flutter (HCC) / HX    Atrial flutter (HCC)    Nonrheumatic aortic valve insufficiency    Tobacco abuse    Tobacco abuse counseling    Atrial fibrillation with rapid ventricular response (HCC)    Impaired fasting glucose    Hypertension    Atrial fibrillation with RVR ([facility])    Acute on chronic diastolic CHF (congestive heart failure), NYHA class 3 (HCC)    A-fib ([facility])

## 2024-10-24 NOTE — PROGRESS NOTES
Mr. Hinton  came in for wound check and EKG. The wound is cleansed, debrided of foreign material as much as possible, and dressed. The patient is alerted to watch for any signs of infection (redness, pus, pain, increased swelling or fever) and call if such occurs.

## 2024-11-11 ENCOUNTER — TELEPHONE (OUTPATIENT)
Dept: PRIMARY CARE CLINIC | Age: 59
End: 2024-11-11

## 2024-11-11 ENCOUNTER — OFFICE VISIT (OUTPATIENT)
Dept: CARDIOLOGY | Age: 59
End: 2024-11-11
Payer: OTHER GOVERNMENT

## 2024-11-11 VITALS
SYSTOLIC BLOOD PRESSURE: 105 MMHG | BODY MASS INDEX: 41.52 KG/M2 | RESPIRATION RATE: 18 BRPM | OXYGEN SATURATION: 98 % | DIASTOLIC BLOOD PRESSURE: 64 MMHG | HEIGHT: 68 IN | HEART RATE: 69 BPM | WEIGHT: 274 LBS

## 2024-11-11 DIAGNOSIS — Z51.81 ENCOUNTER FOR MONITORING SOTALOL THERAPY: ICD-10-CM

## 2024-11-11 DIAGNOSIS — I71.23 ANEURYSM OF DESCENDING THORACIC AORTA WITHOUT RUPTURE (HCC): ICD-10-CM

## 2024-11-11 DIAGNOSIS — Z79.899 ENCOUNTER FOR MONITORING SOTALOL THERAPY: ICD-10-CM

## 2024-11-11 DIAGNOSIS — R06.02 SOB (SHORTNESS OF BREATH): ICD-10-CM

## 2024-11-11 DIAGNOSIS — G47.33 OSA ON CPAP: ICD-10-CM

## 2024-11-11 DIAGNOSIS — I48.0 PAF (PAROXYSMAL ATRIAL FIBRILLATION) (HCC): Primary | ICD-10-CM

## 2024-11-11 DIAGNOSIS — I50.32 CHRONIC DIASTOLIC HEART FAILURE (HCC): ICD-10-CM

## 2024-11-11 DIAGNOSIS — I35.1 MODERATE AORTIC REGURGITATION: ICD-10-CM

## 2024-11-11 DIAGNOSIS — E66.01 MORBID OBESITY: ICD-10-CM

## 2024-11-11 DIAGNOSIS — I42.8 NICM (NONISCHEMIC CARDIOMYOPATHY) (HCC): ICD-10-CM

## 2024-11-11 PROCEDURE — 3078F DIAST BP <80 MM HG: CPT | Performed by: INTERNAL MEDICINE

## 2024-11-11 PROCEDURE — 3074F SYST BP LT 130 MM HG: CPT | Performed by: INTERNAL MEDICINE

## 2024-11-11 PROCEDURE — 99214 OFFICE O/P EST MOD 30 MIN: CPT | Performed by: INTERNAL MEDICINE

## 2024-11-11 NOTE — PROGRESS NOTES
urine.  He is planning on seeing Dr. Murillo in January to discuss an ablation due to recurrent atrial fibrillation. I will place a personal call to Dr. Murillo to get this moved up sooner.     Non Ischemic Cardiomyopathy/ CHF: Currently doing well with no shortness of breath or increased leg swelling. Echo done on 8/28/2023 showed an EF of 55-60%.   ACE Inibitor/ARB: Continue losartan (Cozaar) 25 mg daily.  Calcium Channel Blocker: Continue diltiazem CD (Cardizem CD) 120 mg once daily.  Diuretics: Continue bumetinide (Bumex) 2 mg every morning.     Continue Jardiance 10 mg daily  Heart failure counseling: I advised them to try and keep their legs up whenever possible and to limit salt in their diet.    Shortness of breath with mild exertion: breathing is better since startig his Advair inhaler.  Multifactorial from chronic CHF, obstructive sleep apnea syndrome, COPD and obesity.  Diuretics: Continue bumetinide (Bumex) 2 mg every morning.     Continue Zaroxolyn 3 times weekly.  Continue to follow up with Pulmonology.      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   Diuretics: Continue bumetinide (Bumex) 2 mg every morning.     ACE Inibitor/ARB: Continue losartan (Cozaar) 25 mg daily.    Morbid obesity: Body mass index is 41.66 kg/m².  I also briefly discussed both diet and exercise strategies for him to continue to loses weight and he was very receptive to this.    Obstructive Sleep Apnea: He is using his CPAP regularly.      Finally, I recommended that he continue his other medications and follow up with you as previously scheduled.     FOLLOW UP:  I told Mr. Hinton to call my office if he had any problems, but otherwise told him to Return in about 3 months (around 2/11/2025). However, I would be happy to see him sooner should the need arise.     Sincerely,  Marco Olivarez MD, [facility]

## 2024-11-11 NOTE — TELEPHONE ENCOUNTER
Confirming with patient that he was requesting to change his appointment on 12/5/2024 to 12/04/2024 through [facility]. Voicemail was left for patient to give us a call back at the office.

## 2024-11-13 ENCOUNTER — OFFICE VISIT (OUTPATIENT)
Dept: CARDIOLOGY | Age: 59
End: 2024-11-13

## 2024-11-13 VITALS — SYSTOLIC BLOOD PRESSURE: 119 MMHG | HEART RATE: 73 BPM | DIASTOLIC BLOOD PRESSURE: 61 MMHG

## 2024-11-13 DIAGNOSIS — Z51.81 ENCOUNTER FOR MONITORING SOTALOL THERAPY: ICD-10-CM

## 2024-11-13 DIAGNOSIS — I48.0 PAF (PAROXYSMAL ATRIAL FIBRILLATION) (HCC): Primary | ICD-10-CM

## 2024-11-13 DIAGNOSIS — Z79.899 ENCOUNTER FOR MONITORING SOTALOL THERAPY: ICD-10-CM

## 2024-11-13 DIAGNOSIS — Z51.89 VISIT FOR WOUND CHECK: ICD-10-CM

## 2024-11-13 RX ORDER — AMOXICILLIN AND CLAVULANATE POTASSIUM 500; 125 MG/1; MG/1
1 TABLET, FILM COATED ORAL 2 TIMES DAILY
Qty: 14 TABLET | Refills: 0 | Status: SHIPPED | OUTPATIENT
Start: 2024-11-13 | End: 2024-11-20

## 2024-11-13 NOTE — PROGRESS NOTES
Mr. Hinton  came to office today and states his loop recorder came out and he pushed it back in. Dr Olivarez cleaned with iodine and Tegaderm he ordered Augment 500 BID for one week. And will have him come to office on Monday for NV

## 2024-11-14 ENCOUNTER — OFFICE VISIT (OUTPATIENT)
Dept: PULMONOLOGY | Age: 59
End: 2024-11-14
Payer: OTHER GOVERNMENT

## 2024-11-14 ENCOUNTER — HOSPITAL ENCOUNTER (OUTPATIENT)
Dept: PULMONOLOGY | Age: 59
Discharge: HOME | End: 2024-11-14
Payer: OTHER GOVERNMENT

## 2024-11-14 VITALS
WEIGHT: 273.8 LBS | SYSTOLIC BLOOD PRESSURE: 139 MMHG | OXYGEN SATURATION: 98 % | HEIGHT: 68 IN | DIASTOLIC BLOOD PRESSURE: 69 MMHG | BODY MASS INDEX: 41.49 KG/M2 | TEMPERATURE: 95.9 F | HEART RATE: 71 BPM | RESPIRATION RATE: 16 BRPM

## 2024-11-14 DIAGNOSIS — J44.9 STAGE 2 MODERATE COPD BY GOLD CLASSIFICATION (HCC): ICD-10-CM

## 2024-11-14 DIAGNOSIS — D72.10 EOSINOPHILIA, UNSPECIFIED TYPE: ICD-10-CM

## 2024-11-14 DIAGNOSIS — G47.33 OSA TREATED WITH BIPAP: ICD-10-CM

## 2024-11-14 DIAGNOSIS — J44.9 STAGE 2 MODERATE COPD BY GOLD CLASSIFICATION (HCC): Primary | ICD-10-CM

## 2024-11-14 LAB
DISTANCE WALKED: 1632 FT
SPO2: NORMAL %

## 2024-11-14 PROCEDURE — 99214 OFFICE O/P EST MOD 30 MIN: CPT | Performed by: INTERNAL MEDICINE

## 2024-11-14 PROCEDURE — 3075F SYST BP GE 130 - 139MM HG: CPT | Performed by: INTERNAL MEDICINE

## 2024-11-14 PROCEDURE — 3078F DIAST BP <80 MM HG: CPT | Performed by: INTERNAL MEDICINE

## 2024-11-14 PROCEDURE — 94761 N-INVAS EAR/PLS OXIMETRY MLT: CPT

## 2024-11-14 NOTE — PROGRESS NOTES
PLAN/RECOMMENDATIONS     LUNG CANCER SCREENING RECOMMENDATIONS:  After reviewing the patient's smoking history, age and other clinical data, patient DOES NOT MEET the following Low Dose CT (LDCT) Lung Screening criteria:  [] Age: Screening recommended if age range is 55-77(Medicare) or 50-80 (USPST)  [] Pack-yr: Screening recommended if pack year smoking >20  [] Duration since quit: Screening recommended if still smoking or less than 15 year since quit  [] Lung cancer: Screening not recommended if there are sign or symptoms of lung cancer   [x] Duration since last CT: Screening recommended if > 11 months since last LDCT      SMOKING CESSATION RECOMMENDATIONS/COUNSELING:  Patient to continue smoking cessation.    LIFESTYLE MODIFICATION RECOMMENDATIONS/COUNSELING:  Follow healthy behaviors: nutrition, exercise and medication adherence.  Reviewed weight control and importance of maintaining an active lifestyle.    IMMUNIZATION HISTORY AND RECOMMENDATIONS:    Immunization History   Administered Date(s) Administered    COVID-19, J&J, (age 18y+), IM, 0.5 mL 05/14/2021    Influenza Vaccine, unspecified formulation 12/12/2016    Influenza Virus Vaccine 09/24/2019    Influenza, AFLURIA (age 3 y+), FLUZONE, (age 6 mo+), Quadv MDV, 0.5mL 09/24/2019    Influenza, FLUARIX, FLULAVAL, FLUZONE (age 6 mo+) and AFLURIA, (age 3 y+), Quadv PF, 0.5mL 10/05/2018    Influenza, FLUARIX, FLULAVAL, FLUZONE, (age 6 mo+), AFLURIA, (age 3 y+), IM, Trivalent PF, 0.5mL 09/13/2017    Pneumococcal, PPSV23, PNEUMOVAX 23, (age 2y+), SC/IM, 0.5mL 01/11/2019    TDaP, ADACEL (age 10y-64y), BOOSTRIX (age 10y+), IM, 0.5mL 07/18/2019     Immunization records reviewed.  Recommended influenza vaccination in the fall annually.  Recommendations were given regarding pneumococcal, RSV and COVID-19 vaccination.      All the questions that the patient had were answered to his satisfaction.  Return in about 6 months (around 5/14/2025).  Thank you for having us  LDCT      SMOKING CESSATION RECOMMENDATIONS/COUNSELING:  Patient to continue smoking cessation.  Patient admonished to quit smoking and offered support.  Asked patient to set a quit date.  Discussed prior reason for relapse and strategies to overcome this in the future.  {PLAN; SMOKING CESSATION:44277}    LIFESTYLE MODIFICATION RECOMMENDATIONS/COUNSELING:  Follow healthy behaviors: nutrition, exercise and medication adherence.  Reviewed weight control and importance of maintaining an active lifestyle.    IMMUNIZATION HISTORY AND RECOMMENDATIONS:    Immunization History   Administered Date(s) Administered    COVID-19, J&J, (age 18y+), IM, 0.5 mL 05/14/2021    Influenza Vaccine, unspecified formulation 12/12/2016    Influenza Virus Vaccine 09/24/2019    Influenza, AFLURIA (age 3 y+), FLUZONE, (age 6 mo+), Quadv MDV, 0.5mL 09/24/2019    Influenza, FLUARIX, FLULAVAL, FLUZONE (age 6 mo+) and AFLURIA, (age 3 y+), Quadv PF, 0.5mL 10/05/2018    Influenza, FLUARIX, FLULAVAL, FLUZONE, (age 6 mo+), AFLURIA, (age 3 y+), IM, Trivalent PF, 0.5mL 09/13/2017    Pneumococcal, PPSV23, PNEUMOVAX 23, (age 2y+), SC/IM, 0.5mL 01/11/2019    TDaP, ADACEL (age 10y-64y), BOOSTRIX (age 10y+), IM, 0.5mL 07/18/2019     Immunization records reviewed.  Recommended influenza vaccination in the fall annually.  Recommendations were given regarding pneumococcal, RSV and COVID-19 vaccination.    DECISION REGARDING PROCEDURE/SURGERY/HOSPITALIZATION:    All the questions that the patient had were answered to his satisfaction.  No follow-ups on file.  Thank you for having us involved in the care of your patient.  Please call us if you have any questions or concerns.    Bhupendra Mancini MD  Pulmonary and Critical Care Medicine         The patient (or guardian, if applicable) and other individuals in attendance with the patient were advised that Artificial Intelligence will be utilized during this visit to record, process the conversation to generate a

## 2024-11-18 ENCOUNTER — NURSE ONLY (OUTPATIENT)
Dept: CARDIOLOGY | Age: 59
End: 2024-11-18

## 2024-11-18 VITALS
DIASTOLIC BLOOD PRESSURE: 56 MMHG | HEIGHT: 68 IN | OXYGEN SATURATION: 95 % | HEART RATE: 64 BPM | BODY MASS INDEX: 41.63 KG/M2 | RESPIRATION RATE: 18 BRPM | SYSTOLIC BLOOD PRESSURE: 107 MMHG

## 2024-11-18 DIAGNOSIS — Z51.89 VISIT FOR WOUND CHECK: ICD-10-CM

## 2024-11-18 DIAGNOSIS — I48.0 PAF (PAROXYSMAL ATRIAL FIBRILLATION) (HCC): Primary | ICD-10-CM

## 2024-11-18 NOTE — PROGRESS NOTES
Mr. Hinton came in for a nurse visit to follow up on his loop insertion again. It came out last week and it was pushed back in. He said it fell back out again last night when he was sleeping and it came out completely this time and he brought it in a baggy with him to the office. Dr. Olivarez came in to talk with him and he said we will wait for it to heal and wait for him to see Dr. Murillo at [facility] and go from there. I will send a note to brant to stop monitoring his loop. The wound is cleansed, debrided of foreign material as much as possible, and dressed. The patient is alerted to watch for any signs of infection (redness, pus, pain, increased swelling or fever) and call if such occurs. Home wound care instructions are provided.

## 2024-11-26 DIAGNOSIS — I10 ESSENTIAL HYPERTENSION: ICD-10-CM

## 2024-11-26 DIAGNOSIS — G47.33 OBSTRUCTIVE SLEEP APNEA: ICD-10-CM

## 2024-11-26 DIAGNOSIS — I50.32 CHRONIC DIASTOLIC HEART FAILURE (HCC): ICD-10-CM

## 2024-11-26 DIAGNOSIS — I48.0 PAROXYSMAL ATRIAL FIBRILLATION (HCC): ICD-10-CM

## 2024-11-26 DIAGNOSIS — R73.01 IMPAIRED FASTING GLUCOSE: ICD-10-CM

## 2024-11-26 RX ORDER — SEMAGLUTIDE 1.34 MG/ML
0.25 INJECTION, SOLUTION SUBCUTANEOUS WEEKLY
Qty: 4 ADJUSTABLE DOSE PRE-FILLED PEN SYRINGE | Refills: 0 | Status: SHIPPED | COMMUNITY
Start: 2024-11-26

## 2024-11-26 NOTE — TELEPHONE ENCOUNTER
Patient came in stating that his pen stopped working, sample provided for patient.     Sample lot number and exp. Date added.

## 2024-12-04 ENCOUNTER — OFFICE VISIT (OUTPATIENT)
Dept: PRIMARY CARE CLINIC | Age: 59
End: 2024-12-04

## 2024-12-04 VITALS
HEIGHT: 68 IN | WEIGHT: 271 LBS | HEART RATE: 72 BPM | SYSTOLIC BLOOD PRESSURE: 104 MMHG | RESPIRATION RATE: 18 BRPM | DIASTOLIC BLOOD PRESSURE: 62 MMHG | BODY MASS INDEX: 41.07 KG/M2

## 2024-12-04 DIAGNOSIS — I50.33 ACUTE ON CHRONIC DIASTOLIC CHF (CONGESTIVE HEART FAILURE), NYHA CLASS 3 (HCC): ICD-10-CM

## 2024-12-04 DIAGNOSIS — G47.33 OBSTRUCTIVE SLEEP APNEA: ICD-10-CM

## 2024-12-04 DIAGNOSIS — I48.0 PAROXYSMAL ATRIAL FIBRILLATION (HCC): ICD-10-CM

## 2024-12-04 DIAGNOSIS — R73.01 IMPAIRED FASTING GLUCOSE: ICD-10-CM

## 2024-12-04 DIAGNOSIS — I10 ESSENTIAL HYPERTENSION: Primary | ICD-10-CM

## 2024-12-04 DIAGNOSIS — I50.32 CHRONIC DIASTOLIC HEART FAILURE (HCC): ICD-10-CM

## 2024-12-04 NOTE — PROGRESS NOTES
pain, palpitations and leg swelling.   Gastrointestinal: Negative for abdominal pain, diarrhea, nausea and vomiting.   Endocrine: Negative for cold intolerance, polydipsia, polyphagia and polyuria.   Genitourinary: Negative for difficulty urinating, flank pain and frequency.   Musculoskeletal: Negative for gait problem and joint swelling. Negative for back pain, neck pain and neck stiffness.   Skin: Negative for color change and wound. Negative for pallor and rash.   Allergic/Immunologic: Negative for environmental allergies and food allergies.   Neurological: Negative for light-headedness, numbness and headaches.   Psychiatric/Behavioral: Negative for sleep disturbance. Negative for confusion and suicidal ideas.     Objective:    /62   Pulse 72   Resp 18   Ht 1.727 m (5' 8\")   Wt 122.9 kg (271 lb)   BMI 41.21 kg/m²    BP Readings from Last 3 Encounters:   12/04/24 104/62   11/18/24 (!) 107/56   11/14/24 139/69     Physical Exam  Physical Exam    Constitutional: Patient is oriented to person, place, and time. Patient appears well-developed and well-nourished. No distress.   HENT: Head: Normocephalic and atraumatic.   Eyes: Pupils are equal, round, and reactive to light. Conjunctivae are normal. Right eye exhibits no discharge. Left eye exhibits no discharge.   Cardiovascular: Normal rate, regular rhythm and normal heart sounds.   Pulmonary/Chest: Effort normal and breath sounds normal. No respiratory distress. Patient has no wheezes.   Abdominal: Soft. Bowel sounds are normal. Patient exhibits no distension. There is no tenderness.   Musculoskeletal:  Patient exhibits no edema and tenderness. Patient exhibits no deformity.   Neurological: Patient is alert and oriented to person, place, and time.   Skin: Skin is warm and dry. Patient is not diaphoretic.   Psychiatric: Patient's speech is normal and behavior is normal. Thought content normal.   Vitals reviewed.      Lab Results   Component Value Date    WBC

## 2024-12-05 ENCOUNTER — TELEPHONE (OUTPATIENT)
Dept: CARDIAC REHAB | Age: 59
End: 2024-12-05

## 2024-12-05 NOTE — TELEPHONE ENCOUNTER
Dr. Mancini,    The pulmonary rehabilitation staff has been attempting to call Pedrito per your referral on 11/14/24. We have attempted to contact him by phoning him 3x and once via mail with no success. Therefore, we will file his information unless he contacts us wishing to start the program. Thank you for the referral!

## 2024-12-08 PROBLEM — R06.02 SOB (SHORTNESS OF BREATH): Status: RESOLVED | Noted: 2024-03-27 | Resolved: 2024-12-08

## 2024-12-08 PROBLEM — G47.33 OBSTRUCTIVE SLEEP APNEA: Status: RESOLVED | Noted: 2024-12-04 | Resolved: 2024-12-08

## 2024-12-08 PROBLEM — J01.90 ACUTE BACTERIAL SINUSITIS: Status: RESOLVED | Noted: 2024-05-16 | Resolved: 2024-12-08

## 2024-12-08 PROBLEM — B96.89 ACUTE BACTERIAL SINUSITIS: Status: RESOLVED | Noted: 2024-05-16 | Resolved: 2024-12-08

## 2024-12-17 RX ORDER — EMPAGLIFLOZIN 10 MG/1
10 TABLET, FILM COATED ORAL DAILY
Qty: 90 TABLET | Refills: 1 | Status: SHIPPED | OUTPATIENT
Start: 2024-12-17

## 2024-12-24 DIAGNOSIS — R06.02 SOB (SHORTNESS OF BREATH): ICD-10-CM

## 2024-12-29 RX ORDER — ALBUTEROL SULFATE 90 UG/1
2 INHALANT RESPIRATORY (INHALATION) 4 TIMES DAILY PRN
Qty: 18 G | Refills: 5 | Status: SHIPPED | OUTPATIENT
Start: 2024-12-29

## 2025-01-13 RX ORDER — FLUTICASONE FUROATE, UMECLIDINIUM BROMIDE AND VILANTEROL TRIFENATATE 200; 62.5; 25 UG/1; UG/1; UG/1
1 POWDER RESPIRATORY (INHALATION) DAILY
Qty: 1 EACH | Refills: 5 | Status: SHIPPED | OUTPATIENT
Start: 2025-01-13

## 2025-02-07 RX ORDER — SOTALOL HYDROCHLORIDE 80 MG/1
80 TABLET ORAL 2 TIMES DAILY
Qty: 180 TABLET | Refills: 0 | Status: SHIPPED | OUTPATIENT
Start: 2025-02-07

## 2025-02-19 DIAGNOSIS — R06.02 SOB (SHORTNESS OF BREATH): ICD-10-CM

## 2025-02-19 RX ORDER — LOSARTAN POTASSIUM 25 MG/1
25 TABLET ORAL DAILY
Qty: 60 TABLET | Refills: 0 | Status: SHIPPED | OUTPATIENT
Start: 2025-02-19

## 2025-02-26 ENCOUNTER — PATIENT MESSAGE (OUTPATIENT)
Dept: PRIMARY CARE CLINIC | Age: 60
End: 2025-02-26

## 2025-02-26 DIAGNOSIS — I50.32 CHRONIC DIASTOLIC HEART FAILURE (HCC): ICD-10-CM

## 2025-02-26 RX ORDER — METOLAZONE 5 MG/1
TABLET ORAL
Qty: 20 TABLET | Refills: 0 | Status: SHIPPED | OUTPATIENT
Start: 2025-02-26

## 2025-02-26 NOTE — TELEPHONE ENCOUNTER
Hello, yes if tolerating we can increase it to 2mg, pend if agreeable thank you.  Electronically signed by Abimael oSlano MD on 2/26/2025 at 1:07 PM

## 2025-03-23 ASSESSMENT — PATIENT HEALTH QUESTIONNAIRE - PHQ9
1. LITTLE INTEREST OR PLEASURE IN DOING THINGS: NOT AT ALL
1. LITTLE INTEREST OR PLEASURE IN DOING THINGS: NOT AT ALL
SUM OF ALL RESPONSES TO PHQ QUESTIONS 1-9: 0
2. FEELING DOWN, DEPRESSED OR HOPELESS: NOT AT ALL
2. FEELING DOWN, DEPRESSED OR HOPELESS: NOT AT ALL
SUM OF ALL RESPONSES TO PHQ9 QUESTIONS 1 & 2: 0
SUM OF ALL RESPONSES TO PHQ QUESTIONS 1-9: 0

## 2025-03-24 ENCOUNTER — OFFICE VISIT (OUTPATIENT)
Dept: PRIMARY CARE CLINIC | Age: 60
End: 2025-03-24
Payer: COMMERCIAL

## 2025-03-24 VITALS
WEIGHT: 274 LBS | RESPIRATION RATE: 18 BRPM | SYSTOLIC BLOOD PRESSURE: 112 MMHG | DIASTOLIC BLOOD PRESSURE: 60 MMHG | HEART RATE: 68 BPM | HEIGHT: 68 IN | BODY MASS INDEX: 41.52 KG/M2

## 2025-03-24 DIAGNOSIS — G47.33 OBSTRUCTIVE SLEEP APNEA: ICD-10-CM

## 2025-03-24 DIAGNOSIS — R53.83 OTHER FATIGUE: ICD-10-CM

## 2025-03-24 DIAGNOSIS — R00.2 PALPITATIONS: ICD-10-CM

## 2025-03-24 DIAGNOSIS — E55.9 HYPOVITAMINOSIS D: ICD-10-CM

## 2025-03-24 DIAGNOSIS — I10 ESSENTIAL HYPERTENSION: Primary | ICD-10-CM

## 2025-03-24 DIAGNOSIS — Z13.220 LIPID SCREENING: ICD-10-CM

## 2025-03-24 DIAGNOSIS — D64.9 ANEMIA, UNSPECIFIED TYPE: ICD-10-CM

## 2025-03-24 DIAGNOSIS — Z12.11 ENCOUNTER FOR SCREENING FOR MALIGNANT NEOPLASM OF COLON: ICD-10-CM

## 2025-03-24 PROCEDURE — 99214 OFFICE O/P EST MOD 30 MIN: CPT | Performed by: STUDENT IN AN ORGANIZED HEALTH CARE EDUCATION/TRAINING PROGRAM

## 2025-03-24 PROCEDURE — G2211 COMPLEX E/M VISIT ADD ON: HCPCS | Performed by: STUDENT IN AN ORGANIZED HEALTH CARE EDUCATION/TRAINING PROGRAM

## 2025-03-24 PROCEDURE — 3074F SYST BP LT 130 MM HG: CPT | Performed by: STUDENT IN AN ORGANIZED HEALTH CARE EDUCATION/TRAINING PROGRAM

## 2025-03-24 PROCEDURE — 1036F TOBACCO NON-USER: CPT | Performed by: STUDENT IN AN ORGANIZED HEALTH CARE EDUCATION/TRAINING PROGRAM

## 2025-03-24 PROCEDURE — G8427 DOCREV CUR MEDS BY ELIG CLIN: HCPCS | Performed by: STUDENT IN AN ORGANIZED HEALTH CARE EDUCATION/TRAINING PROGRAM

## 2025-03-24 PROCEDURE — G8417 CALC BMI ABV UP PARAM F/U: HCPCS | Performed by: STUDENT IN AN ORGANIZED HEALTH CARE EDUCATION/TRAINING PROGRAM

## 2025-03-24 PROCEDURE — 3017F COLORECTAL CA SCREEN DOC REV: CPT | Performed by: STUDENT IN AN ORGANIZED HEALTH CARE EDUCATION/TRAINING PROGRAM

## 2025-03-24 PROCEDURE — 3078F DIAST BP <80 MM HG: CPT | Performed by: STUDENT IN AN ORGANIZED HEALTH CARE EDUCATION/TRAINING PROGRAM

## 2025-03-24 NOTE — PROGRESS NOTES
[facility] PRIMARY CARE  [address] [place] [address] , [address]  [place], [place], [address]    Pedrito STARR Hinton is a 60 y.o. male with  has a past medical history of AAA (abdominal aortic aneurysm), Aortic regurgitation, Atrial fibrillation (HCC), Atrial flutter (HCC), Encounter for cardioversion procedure, Frequent urination, H/O echocardiogram, H/O echocardiogram, Hemorrhage of rectum and anus, History of 24 hour EKG monitoring, History of echocardiogram, History of PFTs, Hx of transesophageal echocardiography (SKYLAR) for monitoring, Hypertension, Impaired fasting glucose, Knee pain, bilateral, Lipoma, Obesity (BMI 30.0-34.9), PAF (paroxysmal atrial fibrillation) (HCC), Paroxysmal supraventricular tachycardia, Periodontal disease, Sleep apnea, Tobacco use disorder, Under care of team, Under care of team, Under care of team, Urinary urgency, and Ventral hernia.  Presented to the office today for:  Chief Complaint   Patient presents with    Hypertension       Assessment/Plan   1. Essential hypertension  -     CBC with Auto Differential; Future  -     Comprehensive Metabolic Panel; Future  2. BMI 40.0-44.9, adult  -     CBC with Auto Differential; Future  -     Comprehensive Metabolic Panel; Future  3. Obstructive sleep apnea  -     CBC with Auto Differential; Future  -     Comprehensive Metabolic Panel; Future  4. Palpitations  -     CBC with Auto Differential; Future  -     Comprehensive Metabolic Panel; Future  5. Other fatigue  -     Vitamin D 25 Hydroxy; Future  -     Vitamin B12 & Folate; Future  -     Vitamin B1; Future  -     Zinc; Future  -     Copper, Serum; Future  -     Vitamin A; Future  -     Vitamin K; Future  -     Vitamin B7; Future  -     Testosterone; Future  -     CBC with Auto Differential; Future  -     Comprehensive Metabolic Panel; Future  6. Hypovitaminosis D  -     Vitamin D 25 Hydroxy; Future  -     CBC with Auto Differential; Future  -     Comprehensive Metabolic Panel; Future  7. Lipid

## 2025-03-26 ENCOUNTER — HOSPITAL ENCOUNTER (OUTPATIENT)
Age: 60
Discharge: HOME | End: 2025-03-26
Payer: COMMERCIAL

## 2025-03-26 DIAGNOSIS — R00.2 PALPITATIONS: ICD-10-CM

## 2025-03-26 DIAGNOSIS — Z13.220 LIPID SCREENING: ICD-10-CM

## 2025-03-26 DIAGNOSIS — I10 ESSENTIAL HYPERTENSION: ICD-10-CM

## 2025-03-26 DIAGNOSIS — D64.9 ANEMIA, UNSPECIFIED TYPE: ICD-10-CM

## 2025-03-26 DIAGNOSIS — E55.9 HYPOVITAMINOSIS D: ICD-10-CM

## 2025-03-26 DIAGNOSIS — R53.83 OTHER FATIGUE: ICD-10-CM

## 2025-03-26 DIAGNOSIS — G47.33 OBSTRUCTIVE SLEEP APNEA: ICD-10-CM

## 2025-03-26 LAB
25(OH)D3 SERPL-MCNC: 18.4 NG/ML (ref 30–100)
ALBUMIN SERPL-MCNC: 4.2 G/DL (ref 3.5–5.2)
ALBUMIN/GLOB SERPL: 1.7 {RATIO} (ref 1–2.5)
ALP SERPL-CCNC: 92 U/L (ref 40–129)
ALT SERPL-CCNC: 22 U/L (ref 10–50)
ANION GAP SERPL CALCULATED.3IONS-SCNC: 8 MMOL/L (ref 9–16)
AST SERPL-CCNC: 17 U/L (ref 10–50)
BASOPHILS # BLD: 0.03 K/UL (ref 0–0.2)
BASOPHILS NFR BLD: 1 % (ref 0–2)
BILIRUB SERPL-MCNC: 0.4 MG/DL (ref 0–1.2)
BUN SERPL-MCNC: 33 MG/DL (ref 8–23)
BUN/CREAT SERPL: 33 (ref 9–20)
CALCIUM SERPL-MCNC: 9.5 MG/DL (ref 8.6–10.4)
CHLORIDE SERPL-SCNC: 102 MMOL/L (ref 98–107)
CHOLEST SERPL-MCNC: 213 MG/DL (ref 0–199)
CHOLESTEROL/HDL RATIO: 4.7
CO2 SERPL-SCNC: 31 MMOL/L (ref 20–31)
CREAT SERPL-MCNC: 1 MG/DL (ref 0.7–1.2)
EOSINOPHIL # BLD: 0.15 K/UL (ref 0–0.44)
EOSINOPHILS RELATIVE PERCENT: 3 % (ref 1–4)
ERYTHROCYTE [DISTWIDTH] IN BLOOD BY AUTOMATED COUNT: 12.6 % (ref 11.8–14.4)
FERRITIN SERPL-MCNC: 107 NG/ML
FOLATE SERPL-MCNC: 9.1 NG/ML (ref 4.8–24.2)
GFR, ESTIMATED: 85 ML/MIN/1.73M2
GLUCOSE SERPL-MCNC: 108 MG/DL (ref 74–99)
HCT VFR BLD AUTO: 41.7 % (ref 40.7–50.3)
HDLC SERPL-MCNC: 45 MG/DL
HGB BLD-MCNC: 13.7 G/DL (ref 13–17)
IMM GRANULOCYTES # BLD AUTO: <0.03 K/UL (ref 0–0.3)
IMM GRANULOCYTES NFR BLD: 0 %
IMM RETICS NFR: 11.7 % (ref 2.7–18.3)
IRON SATN MFR SERPL: 35 % (ref 20–55)
IRON SERPL-MCNC: 108 UG/DL (ref 61–157)
LDLC SERPL CALC-MCNC: 137 MG/DL (ref 0–100)
LYMPHOCYTES NFR BLD: 1.03 K/UL (ref 1.1–3.7)
LYMPHOCYTES RELATIVE PERCENT: 20 % (ref 24–43)
MCH RBC QN AUTO: 29.7 PG (ref 25.2–33.5)
MCHC RBC AUTO-ENTMCNC: 32.9 G/DL (ref 28.4–34.8)
MCV RBC AUTO: 90.3 FL (ref 82.6–102.9)
MONOCYTES NFR BLD: 0.47 K/UL (ref 0.1–1.2)
MONOCYTES NFR BLD: 9 % (ref 3–12)
NEUTROPHILS NFR BLD: 67 % (ref 36–65)
NEUTS SEG NFR BLD: 3.42 K/UL (ref 1.5–8.1)
NRBC BLD-RTO: 0 PER 100 WBC
PLATELET # BLD AUTO: 195 K/UL (ref 138–453)
PMV BLD AUTO: 9.9 FL (ref 8.1–13.5)
POTASSIUM SERPL-SCNC: 3.9 MMOL/L (ref 3.7–5.3)
PROT SERPL-MCNC: 6.6 G/DL (ref 6.6–8.7)
RBC # BLD AUTO: 4.62 M/UL (ref 4.21–5.77)
RETIC HEMOGLOBIN: 33.2 PG (ref 28.2–35.7)
RETICS # AUTO: 0.08 M/UL (ref 0.03–0.08)
RETICS/RBC NFR AUTO: 1.8 % (ref 0.5–1.9)
SODIUM SERPL-SCNC: 141 MMOL/L (ref 136–145)
TESTOST SERPL-MCNC: 262 NG/DL (ref 193–740)
TIBC SERPL-MCNC: 313 UG/DL (ref 250–450)
TRIGL SERPL-MCNC: 154 MG/DL
UNSATURATED IRON BINDING CAPACITY: 205 UG/DL (ref 112–347)
VIT B12 SERPL-MCNC: 619 PG/ML (ref 232–1245)
VLDLC SERPL CALC-MCNC: 31 MG/DL (ref 1–30)
WBC OTHER # BLD: 5.1 K/UL (ref 3.5–11.3)

## 2025-03-26 PROCEDURE — 80053 COMPREHEN METABOLIC PANEL: CPT

## 2025-03-26 PROCEDURE — 82306 VITAMIN D 25 HYDROXY: CPT

## 2025-03-26 PROCEDURE — 85045 AUTOMATED RETICULOCYTE COUNT: CPT

## 2025-03-26 PROCEDURE — 84590 ASSAY OF VITAMIN A: CPT

## 2025-03-26 PROCEDURE — 84403 ASSAY OF TOTAL TESTOSTERONE: CPT

## 2025-03-26 PROCEDURE — 82746 ASSAY OF FOLIC ACID SERUM: CPT

## 2025-03-26 PROCEDURE — 84597 ASSAY OF VITAMIN K: CPT

## 2025-03-26 PROCEDURE — 82607 VITAMIN B-12: CPT

## 2025-03-26 PROCEDURE — 36415 COLL VENOUS BLD VENIPUNCTURE: CPT

## 2025-03-26 PROCEDURE — 84630 ASSAY OF ZINC: CPT

## 2025-03-26 PROCEDURE — 82728 ASSAY OF FERRITIN: CPT

## 2025-03-26 PROCEDURE — 84591 ASSAY OF NOS VITAMIN: CPT

## 2025-03-26 PROCEDURE — 80061 LIPID PANEL: CPT

## 2025-03-26 PROCEDURE — 83550 IRON BINDING TEST: CPT

## 2025-03-26 PROCEDURE — 84425 ASSAY OF VITAMIN B-1: CPT

## 2025-03-26 PROCEDURE — 82525 ASSAY OF COPPER: CPT

## 2025-03-26 PROCEDURE — 83540 ASSAY OF IRON: CPT

## 2025-03-26 PROCEDURE — 85025 COMPLETE CBC W/AUTO DIFF WBC: CPT

## 2025-03-27 LAB
PATH REV BLD -IMP: NORMAL
SURGICAL PATHOLOGY REPORT: NORMAL

## 2025-03-28 LAB
COPPER SERPL-MCNC: 133.6 UG/DL (ref 70–140)
ZINC SERPL-MCNC: 81.4 UG/DL (ref 60–120)

## 2025-03-30 LAB
PHYTONADIONE SERPL-MCNC: 0.96 NMOL/L (ref 0.22–4.88)
RETINYL PALMITATE: <0.02 MG/L (ref 0–0.1)
VIT B1 PYROPHOSHATE BLD-SCNC: 114 NMOL/L (ref 70–180)
VITAMIN A LEVEL: 1.16 MG/L (ref 0.3–1.2)
VITAMIN A, INTERP: NORMAL

## 2025-04-01 ENCOUNTER — OFFICE VISIT (OUTPATIENT)
Dept: CARDIOLOGY | Age: 60
End: 2025-04-01
Payer: COMMERCIAL

## 2025-04-01 VITALS
WEIGHT: 260 LBS | OXYGEN SATURATION: 95 % | RESPIRATION RATE: 18 BRPM | BODY MASS INDEX: 39.4 KG/M2 | HEART RATE: 64 BPM | DIASTOLIC BLOOD PRESSURE: 60 MMHG | SYSTOLIC BLOOD PRESSURE: 109 MMHG | HEIGHT: 68 IN

## 2025-04-01 DIAGNOSIS — I48.0 PAF (PAROXYSMAL ATRIAL FIBRILLATION) (HCC): Primary | ICD-10-CM

## 2025-04-01 DIAGNOSIS — I50.32 CHRONIC DIASTOLIC HEART FAILURE (HCC): ICD-10-CM

## 2025-04-01 DIAGNOSIS — E66.01 MORBID OBESITY: ICD-10-CM

## 2025-04-01 DIAGNOSIS — I42.8 NICM (NONISCHEMIC CARDIOMYOPATHY) (HCC): ICD-10-CM

## 2025-04-01 DIAGNOSIS — I71.23 ANEURYSM OF DESCENDING THORACIC AORTA WITHOUT RUPTURE: ICD-10-CM

## 2025-04-01 DIAGNOSIS — R06.02 SOB (SHORTNESS OF BREATH): ICD-10-CM

## 2025-04-01 DIAGNOSIS — G47.33 OSA ON CPAP: ICD-10-CM

## 2025-04-01 DIAGNOSIS — Z79.899 ENCOUNTER FOR MONITORING SOTALOL THERAPY: ICD-10-CM

## 2025-04-01 DIAGNOSIS — Z51.81 ENCOUNTER FOR MONITORING SOTALOL THERAPY: ICD-10-CM

## 2025-04-01 PROCEDURE — 93000 ELECTROCARDIOGRAM COMPLETE: CPT | Performed by: INTERNAL MEDICINE

## 2025-04-01 PROCEDURE — 3078F DIAST BP <80 MM HG: CPT | Performed by: NURSE PRACTITIONER

## 2025-04-01 PROCEDURE — 3074F SYST BP LT 130 MM HG: CPT | Performed by: NURSE PRACTITIONER

## 2025-04-01 PROCEDURE — 99214 OFFICE O/P EST MOD 30 MIN: CPT | Performed by: NURSE PRACTITIONER

## 2025-04-01 RX ORDER — LOSARTAN POTASSIUM 25 MG/1
25 TABLET ORAL DAILY
Qty: 90 TABLET | Refills: 3 | Status: SHIPPED | OUTPATIENT
Start: 2025-04-01

## 2025-04-01 RX ORDER — DILTIAZEM HYDROCHLORIDE 120 MG/1
120 CAPSULE, COATED, EXTENDED RELEASE ORAL DAILY
Qty: 90 CAPSULE | Refills: 3 | Status: SHIPPED | OUTPATIENT
Start: 2025-04-01

## 2025-04-01 RX ORDER — BUMETANIDE 2 MG/1
2 TABLET ORAL DAILY
Qty: 90 TABLET | Refills: 3 | Status: SHIPPED | OUTPATIENT
Start: 2025-04-01

## 2025-04-01 RX ORDER — METOLAZONE 5 MG/1
TABLET ORAL
Qty: 45 TABLET | Refills: 3 | Status: SHIPPED | OUTPATIENT
Start: 2025-04-01

## 2025-04-01 NOTE — PROGRESS NOTES
daily.    Morbid obesity: Body mass index is 39.53 kg/m².  I also briefly discussed both diet and exercise strategies for him to continue to loses weight and he was very receptive to this.    Obstructive Sleep Apnea: He is using his CPAP regularly.    Finally, I recommended that he continue his other medications and follow up with you as previously scheduled.     FOLLOW UP:  I told Mr. Hinton to call my office if he had any problems, but otherwise told him to Return in about 6 months (around 10/1/2025). However, I would be happy to see him sooner should the need arise. I discussed patient's symptoms and treatment plan with Dr Olivarez, he was in agreement with the plan and follow up.      Sincerely,  Kathleen Mccarty CNP  [facility] Cardiology Specialist    [address] [place], [place] [id number]  Phone: [phone], Fax: [phone]     I believe that the risk of significant morbidity and mortality related to the patient's current medical conditions are: Intermediate. At least 30 minutes were spent during prep work, discussion and exam of the patient, and follow up documentation and all of their questions were answered.    The documentation recorded by the scribe, accurately and completely reflects the services I personally performed and the decisions made by me. AMADOR Arauz-CNP April 1, 2025

## 2025-04-04 ENCOUNTER — RESULTS FOLLOW-UP (OUTPATIENT)
Dept: INTERNAL MEDICINE CLINIC | Age: 60
End: 2025-04-04

## 2025-04-04 LAB — BIOTIN SERPL-MCNC: 0.09 NG/ML (ref 0.05–0.83)

## 2025-04-07 RX ORDER — ERGOCALCIFEROL 1.25 MG/1
50000 CAPSULE, LIQUID FILLED ORAL WEEKLY
Qty: 4 CAPSULE | Refills: 2 | Status: SHIPPED | OUTPATIENT
Start: 2025-04-07 | End: 2025-04-07

## 2025-04-07 RX ORDER — ERGOCALCIFEROL 1.25 MG/1
50000 CAPSULE, LIQUID FILLED ORAL WEEKLY
Qty: 12 CAPSULE | Refills: 0 | Status: SHIPPED | OUTPATIENT
Start: 2025-04-07

## 2025-04-07 NOTE — TELEPHONE ENCOUNTER
----- Message from Dr. Abimael Solano MD sent at 4/4/2025  2:02 PM EDT -----  Please notify the patient that their lab results are reviewed  1) lipids are slightly better than prior  2) vitD is low - what is the current intake? /I would rec 50,000iu weekly x12  3) cmp is stable - noted some glucose elevation  4) anemia stable/improved  B12/folic wnl, vitamin levels appeared to be wnl    Testosterone was noted to be low normal / which we can monitor. It was similar to a prior one completed a few years' back.    Thank you.  Electronically signed by Abimael Solano MD on 4/4/2025 at 2:02 PM

## 2025-04-22 ENCOUNTER — HOSPITAL ENCOUNTER (OUTPATIENT)
Age: 60
Discharge: HOME | End: 2025-04-24
Payer: COMMERCIAL

## 2025-04-22 VITALS
DIASTOLIC BLOOD PRESSURE: 60 MMHG | WEIGHT: 259.92 LBS | BODY MASS INDEX: 39.39 KG/M2 | HEIGHT: 68 IN | SYSTOLIC BLOOD PRESSURE: 109 MMHG

## 2025-04-22 DIAGNOSIS — I50.32 CHRONIC DIASTOLIC HEART FAILURE (HCC): ICD-10-CM

## 2025-04-22 DIAGNOSIS — G47.33 OSA ON CPAP: ICD-10-CM

## 2025-04-22 DIAGNOSIS — E66.01 MORBID OBESITY (HCC): ICD-10-CM

## 2025-04-22 DIAGNOSIS — Z51.81 ENCOUNTER FOR MONITORING SOTALOL THERAPY: ICD-10-CM

## 2025-04-22 DIAGNOSIS — I48.0 PAF (PAROXYSMAL ATRIAL FIBRILLATION) (HCC): ICD-10-CM

## 2025-04-22 DIAGNOSIS — I42.8 NICM (NONISCHEMIC CARDIOMYOPATHY) (HCC): ICD-10-CM

## 2025-04-22 DIAGNOSIS — Z79.899 ENCOUNTER FOR MONITORING SOTALOL THERAPY: ICD-10-CM

## 2025-04-22 DIAGNOSIS — I71.23 ANEURYSM OF DESCENDING THORACIC AORTA WITHOUT RUPTURE: ICD-10-CM

## 2025-04-22 DIAGNOSIS — R06.02 SOB (SHORTNESS OF BREATH): ICD-10-CM

## 2025-04-22 LAB
ECHO AO ROOT DIAM: 2.1 CM
ECHO AO ROOT INDEX: 0.92 CM/M2
ECHO AO SINUS VALSALVA DIAM: 3.5 CM
ECHO AO SINUS VALSALVA INDEX: 1.54 CM/M2
ECHO AO ST JNCT DIAM: 2.3 CM
ECHO AR MAX VEL PISA: 3.2 M/S
ECHO AV AREA PEAK VELOCITY: 1.4 CM2
ECHO AV AREA VTI: 1.6 CM2
ECHO AV AREA/BSA PEAK VELOCITY: 0.6 CM2/M2
ECHO AV AREA/BSA VTI: 0.7 CM2/M2
ECHO AV MEAN GRADIENT: 11 MMHG
ECHO AV MEAN VELOCITY: 1.5 M/S
ECHO AV PEAK GRADIENT: 20 MMHG
ECHO AV PEAK VELOCITY: 2.3 M/S
ECHO AV REGURGITANT PHT: 391 MS
ECHO AV VELOCITY RATIO: 0.43
ECHO AV VTI: 50.3 CM
ECHO BSA: 2.38 M2
ECHO EST RA PRESSURE: 3 MMHG
ECHO LA AREA 2C: 23.2 CM2
ECHO LA AREA 4C: 24.9 CM2
ECHO LA MAJOR AXIS: 5.4 CM
ECHO LA MINOR AXIS: 5.3 CM
ECHO LA VOL BP: 80 ML (ref 18–58)
ECHO LA VOL MOD A2C: 79 ML (ref 18–58)
ECHO LA VOL MOD A4C: 80 ML (ref 18–58)
ECHO LA VOL/BSA BIPLANE: 35 ML/M2 (ref 16–34)
ECHO LA VOLUME INDEX MOD A2C: 35 ML/M2 (ref 16–34)
ECHO LA VOLUME INDEX MOD A4C: 35 ML/M2 (ref 16–34)
ECHO LV E' LATERAL VELOCITY: 12.4 CM/S
ECHO LV EDV A2C: 108 ML
ECHO LV EDV A4C: 111 ML
ECHO LV EDV INDEX A4C: 49 ML/M2
ECHO LV EDV NDEX A2C: 47 ML/M2
ECHO LV EF PHYSICIAN: 60 %
ECHO LV EJECTION FRACTION A2C: 60 %
ECHO LV EJECTION FRACTION A4C: 57 %
ECHO LV EJECTION FRACTION BIPLANE: 58 % (ref 55–100)
ECHO LV ESV A2C: 43 ML
ECHO LV ESV A4C: 48 ML
ECHO LV ESV INDEX A2C: 19 ML/M2
ECHO LV ESV INDEX A4C: 21 ML/M2
ECHO LV FRACTIONAL SHORTENING: 33 % (ref 28–44)
ECHO LV INTERNAL DIMENSION DIASTOLE INDEX: 2.28 CM/M2
ECHO LV INTERNAL DIMENSION DIASTOLIC: 5.2 CM (ref 4.2–5.9)
ECHO LV INTERNAL DIMENSION SYSTOLIC INDEX: 1.54 CM/M2
ECHO LV INTERNAL DIMENSION SYSTOLIC: 3.5 CM
ECHO LV IVSD: 1.5 CM (ref 0.6–1)
ECHO LV MASS 2D: 342.4 G (ref 88–224)
ECHO LV MASS INDEX 2D: 150.2 G/M2 (ref 49–115)
ECHO LV POSTERIOR WALL DIASTOLIC: 1.5 CM (ref 0.6–1)
ECHO LV RELATIVE WALL THICKNESS RATIO: 0.58
ECHO LVOT AREA: 3.1 CM2
ECHO LVOT AV VTI INDEX: 0.5
ECHO LVOT DIAM: 2 CM
ECHO LVOT MEAN GRADIENT: 2 MMHG
ECHO LVOT PEAK GRADIENT: 4 MMHG
ECHO LVOT PEAK VELOCITY: 1 M/S
ECHO LVOT STROKE VOLUME INDEX: 34.8 ML/M2
ECHO LVOT SV: 79.4 ML
ECHO LVOT VTI: 25.3 CM
ECHO MV A VELOCITY: 0.55 M/S
ECHO MV E DECELERATION TIME (DT): 254 MS
ECHO MV E VELOCITY: 0.48 M/S
ECHO MV E/A RATIO: 0.87
ECHO MV E/E' LATERAL: 3.87
ECHO PULMONARY ARTERY END DIASTOLIC PRESSURE: 4 MMHG
ECHO PV MAX VELOCITY: 1 M/S
ECHO PV PEAK GRADIENT: 4 MMHG
ECHO PV REGURGITANT MAX VELOCITY: 1 M/S
ECHO RIGHT VENTRICULAR SYSTOLIC PRESSURE (RVSP): 26 MMHG
ECHO RV TAPSE: 1.7 CM (ref 1.7–?)
ECHO TV REGURGITANT MAX VELOCITY: 2.42 M/S
ECHO TV REGURGITANT PEAK GRADIENT: 23 MMHG

## 2025-04-22 PROCEDURE — 93306 TTE W/DOPPLER COMPLETE: CPT

## 2025-04-23 ENCOUNTER — RESULTS FOLLOW-UP (OUTPATIENT)
Dept: CARDIOLOGY | Age: 60
End: 2025-04-23

## 2025-04-23 PROBLEM — Z12.11 ENCOUNTER FOR SCREENING COLONOSCOPY: Status: RESOLVED | Noted: 2025-03-24 | Resolved: 2025-04-23

## 2025-04-23 PROBLEM — Z13.220 LIPID SCREENING: Status: RESOLVED | Noted: 2025-03-24 | Resolved: 2025-04-23

## 2025-04-23 PROBLEM — Z12.11 ENCOUNTER FOR SCREENING FOR MALIGNANT NEOPLASM OF COLON: Status: RESOLVED | Noted: 2025-03-24 | Resolved: 2025-04-23

## 2025-04-23 NOTE — RESULT ENCOUNTER NOTE
Please let patient know echo results were ok and similar to his last echocardiogram with no significant changes seen. Continue current treatment and follow up. Thank you

## 2025-04-28 ENCOUNTER — OFFICE VISIT (OUTPATIENT)
Dept: PULMONOLOGY | Age: 60
End: 2025-04-28
Payer: COMMERCIAL

## 2025-04-28 VITALS
BODY MASS INDEX: 38.71 KG/M2 | HEART RATE: 72 BPM | DIASTOLIC BLOOD PRESSURE: 50 MMHG | TEMPERATURE: 96.8 F | OXYGEN SATURATION: 96 % | HEIGHT: 68 IN | WEIGHT: 255.4 LBS | RESPIRATION RATE: 20 BRPM | SYSTOLIC BLOOD PRESSURE: 102 MMHG

## 2025-04-28 DIAGNOSIS — Z87.891 PERSONAL HISTORY OF TOBACCO USE: ICD-10-CM

## 2025-04-28 DIAGNOSIS — G47.33 OSA TREATED WITH BIPAP: ICD-10-CM

## 2025-04-28 DIAGNOSIS — D72.10 EOSINOPHILIA, UNSPECIFIED TYPE: ICD-10-CM

## 2025-04-28 DIAGNOSIS — J44.9 STAGE 2 MODERATE COPD BY GOLD CLASSIFICATION (HCC): Primary | ICD-10-CM

## 2025-04-28 PROCEDURE — 3074F SYST BP LT 130 MM HG: CPT | Performed by: INTERNAL MEDICINE

## 2025-04-28 PROCEDURE — 99214 OFFICE O/P EST MOD 30 MIN: CPT | Performed by: INTERNAL MEDICINE

## 2025-04-28 PROCEDURE — 3078F DIAST BP <80 MM HG: CPT | Performed by: INTERNAL MEDICINE

## 2025-04-28 PROCEDURE — G0296 VISIT TO DETERM LDCT ELIG: HCPCS | Performed by: INTERNAL MEDICINE

## 2025-04-28 NOTE — PROGRESS NOTES
Readings from Last 3 Encounters:   04/28/25 115.8 kg (255 lb 6.4 oz)   04/22/25 117.9 kg (259 lb 14.8 oz)   04/01/25 117.9 kg (260 lb)       SYSTEMIC EXAMINATION:  General appearance -  [x] well appearing  [] overweight  [] obese   [x] morbidly obese [] cachectic [x] comfortable  [x] in no acute distress  [] chronically ill-appearing  [] in mild to moderate respiratory distress  Mental status -  [x] alert  [] oriented to person, place, and time  [] anxious  [] depressed mood   Head-  [x] atraumatic  [x] normocephalic  Eyes -  [] pupils equal and reactive    [] extraocular eye movements intact  [x] sclera anicteric  Ears -  [x] hearing grossly normal bilaterally [] bilateral TM's and external ear canals normal  Nose -  [x] normal and patent [] no erythema  [] discharge  Mouth -  [x] mucous membranes moist  [] pharynx normal without lesions [] erythematous  [] exudate noted  Mallampati Airway Score -  [] I (soft palate, uvula, fauces, tonsillar pillars visible) [] II (soft palate, uvula, fauces visible) []  III (soft palate, base of uvula visible) [] IV (only hard palate visible)  Neck -  [x] supple  [] no significant adenopathy  [] carotids upstroke normal bilaterally [] no JVD  [] no bruit  Lymphatics -  [x] no palpable lymphadenopathy  [] no hepatosplenomegaly  Chest -   [x] normal chest excursion  [x] no chest wall tenderness  [] increased AP diameter[] pectus noted [] scoliosis noted [x] no tachypnea retraction or cyanosis [x] clear to auscultation, no wheezes, rales or rhonchi, symmetric air entry  [] wheezing noted  [] rales noted  []rhonchi noted [] decreased air entry noted bilaterally  Heart -  [x] normal rate,  [x] regular rhythm  [] irregularly irregular rhythm [x] normal S1  [x] normal S2  [x] no murmurs, rubs, clicks or gallops  [] S3 present  [] S4 present  [] systolic murmur  [] diastolic murmur  [] midsystolic click []pericardial rub present   Abdomen -  [] soft [] nontender  [] nondistended  [] no

## 2025-04-28 NOTE — PATIENT INSTRUCTIONS
SURVEY:    Thank you for allowing us to care for you today.    You may be receiving a survey from [facility] regarding your visit today- electronically or via mail.      Please help us by completing the survey as this will provide the needed feedback to ensure we are providing the very best care for you and your family.    If you cannot score us a very good on any question, please call the office to discuss how we could have made your experience a very good one.    Thank you.       STAFF:    Julee Aguilera, Elizabeth VALDEZ      CLINICAL STAFF:    Cecile MCCRACKEN, Amy VALDEZ, Angelica VALDEZ, Sveta MCCRACKEN         Learning About Lung Cancer Screening  What is screening for lung cancer?     Lung cancer screening is a way to find some lung cancers early, before a person has any symptoms of the cancer.  Lung cancer screening may help those who have the highest risk for lung cancer--people age 50 and older who are or were heavy smokers. For most people, who aren't at increased risk, screening for lung cancer probably isn't helpful.  Screening won't prevent cancer. And it may not find all lung cancers. Lung cancer screening may lower the risk of dying from lung cancer in a small number of people.  How is it done?  Lung cancer screening is done with a low-dose CT (computed tomography) scan. A CT scan uses X-rays, or radiation, to make detailed pictures of your body. Experts recommend that screening be done in medical centers that focus on finding and treating lung cancer.  Who is screening recommended for?  Lung cancer screening is recommended for people age 50 and older who are or were heavy smokers. That means people with a smoking history of at least 20 pack years. A pack year is a way to measure how heavy a smoker you are or were.  To figure out your pack years, multiply how many packs a day on average (assuming 20 cigarettes per pack) you have smoked by how many years you have smoked. For example:  If you smoked 1 pack a

## 2025-04-29 ENCOUNTER — TELEPHONE (OUTPATIENT)
Dept: SURGERY | Age: 60
End: 2025-04-29

## 2025-04-29 ENCOUNTER — OFFICE VISIT (OUTPATIENT)
Dept: GASTROENTEROLOGY | Age: 60
End: 2025-04-29
Payer: COMMERCIAL

## 2025-04-29 VITALS
SYSTOLIC BLOOD PRESSURE: 118 MMHG | WEIGHT: 263 LBS | OXYGEN SATURATION: 96 % | DIASTOLIC BLOOD PRESSURE: 60 MMHG | BODY MASS INDEX: 39.99 KG/M2 | RESPIRATION RATE: 18 BRPM | HEART RATE: 75 BPM

## 2025-04-29 DIAGNOSIS — Z80.0 FAMILY HX OF COLON CANCER: ICD-10-CM

## 2025-04-29 DIAGNOSIS — Z12.11 COLON CANCER SCREENING: Primary | ICD-10-CM

## 2025-04-29 DIAGNOSIS — Z80.0 FAMILY HISTORY OF COLON CANCER IN MOTHER: ICD-10-CM

## 2025-04-29 DIAGNOSIS — Z12.11 ENCOUNTER FOR SCREENING COLONOSCOPY: ICD-10-CM

## 2025-04-29 PROCEDURE — 3078F DIAST BP <80 MM HG: CPT | Performed by: NURSE PRACTITIONER

## 2025-04-29 PROCEDURE — 99203 OFFICE O/P NEW LOW 30 MIN: CPT | Performed by: NURSE PRACTITIONER

## 2025-04-29 PROCEDURE — 3074F SYST BP LT 130 MM HG: CPT | Performed by: NURSE PRACTITIONER

## 2025-04-29 ASSESSMENT — ENCOUNTER SYMPTOMS
GASTROINTESTINAL NEGATIVE: 1
ALLERGIC/IMMUNOLOGIC NEGATIVE: 1

## 2025-04-29 NOTE — PROGRESS NOTES
[facility]  [address] [place] [id number]    Chief Complaint   Patient presents with    Colonoscopy     Patient here today for colonoscopy consult.      HPI Pedrito Hinton is a 60 y.o. old male who has a past medical history of hypertension, atrial fibrillation, abdominal aortic aneurysm (s/p repair 2008)m MARIA TERESA on CPAP, COPD, presents today as a new referral for colon cancer screening colonoscopy preop visit with family history of colorectal cancer in his mother.     Family history of colon cancer: Yes (mother 58)  Blood in stool: No  Unintentional weight loss: No  Abdominal pain: No  Prior colonoscopy: Yes  Prior EGD: No  Constipation history: Yes (takes stool softeners)  Number of bowel movements a day: 1  Change in stool caliber: No      Colonoscopy 6/24/2013:  PREOPERATIVE DIAGNOSIS: family history of colon cancer   rectal bleeding    POSTOPERATIVE DIAGNOSIS: diverticulosis,  Mild in degree, involving the descending colon and the sigmoid   hemorrhoids internal, Moderate in size          Past Surgical History:   Procedure Laterality Date    ABLATION OF DYSRHYTHMIC FOCUS  2015    ACHILLES TENDON SURGERY Right 2010    CARDIAC SURGERY  2008    COLONOSCOPY  06/13/2013    x 2 , bleeding polyps removed    EP DEVICE PROCEDURE N/A 10/17/2024    Loop recorder insert performed by Marco Olivarez MD at [facility] CARDIAC CATH/IR LAB    FOOT SURGERY      reconstructive for club foot (right)    HERNIA REPAIR  11/2018    ventral    HERNIA REPAIR N/A 03/06/2020    XI LAPAROSCOPIC ROBOTIC VENTRAL HERNIA WITH MESH performed by Omega Suresh IV, DO at [facility] OR    HIP ARTHROPLASTY Left 04/13/2023    WITH REMOVAL OF HARDWARE    HIP FRACTURE SURGERY Left 04/24/2022    HIP OPEN REDUCTION INTERNAL FIXATION performed by Chuy Mccann DO at [facility] OR    HIP SURGERY Left 04/13/2023    REMOVAL OF HIP HARDWARE, ANTERIOR TOTAL HIP ARTHROPLASTY performed by Andrea Arreguin DO at [facility] OR    HI RPR UMBILICAL HRNA 5 YRS/> REDUCIBLE

## 2025-04-29 NOTE — TELEPHONE ENCOUNTER
Patient scheduled for colonoscopy  on 5/21/2025 with Dr. Interiano. Written prep instructions reviewed with patient. Questions asked and answered. Patient also advised to read instructions again 1 week prior to the procedure and to call the office with any questions. Cardiac clearance to be obtained. Patient advised to stop his ozempic 14 days prior to his procedure. Also made aware that the PAT department will reach out to review medical history and medication instructions about a week prior to procedure date.

## 2025-04-29 NOTE — TELEPHONE ENCOUNTER
Patient is scheduled to have a colonoscopy procedure scheduled for 5/21/25.    Patient had EKG and echo completed recently  Seen by cardiology 4/1/25    PLEASE INDICATE WHETHER PATIENT IS CLEARED FOR SURGERY.

## 2025-04-29 NOTE — PATIENT INSTRUCTIONS
SURVEY:    You may be receiving a survey from [facility] regarding your visit today.    You may get this in the mail, through your MyChart, or in your email.     Please complete the survey to enable us to provide the highest quality of care to you and your family.    If you cannot score us a very good (5 Stars) on any question, please call the office to discuss how we could of made your experience exceptional.    Thank you!    General Surgery    MD Dr. Kecia Huffman, DO  Dr. Jhoan Ellison, DO  Merissa Tejada, AMADOR-CNP    Pain Mgmt.  Dr. Marcio Bird, DO  LD Pham, NAWAF Quigley LPN Jena Adams, MA Emily Akers, MA    Phone: [phone]  Fax: [phone]    Office Hours:   M-TH 8-5, F: 8-12

## 2025-05-05 RX ORDER — SOTALOL HYDROCHLORIDE 80 MG/1
80 TABLET ORAL 2 TIMES DAILY
Qty: 180 TABLET | Refills: 3 | Status: SHIPPED | OUTPATIENT
Start: 2025-05-05

## 2025-05-09 RX ORDER — SEMAGLUTIDE 2.68 MG/ML
INJECTION, SOLUTION SUBCUTANEOUS
Qty: 3 ML | Refills: 5 | Status: SHIPPED | OUTPATIENT
Start: 2025-05-09

## 2025-05-10 PROBLEM — D72.10 EOSINOPHILIA: Status: ACTIVE | Noted: 2025-05-10

## 2025-05-16 ENCOUNTER — TELEPHONE (OUTPATIENT)
Dept: PREADMISSION TESTING | Age: 60
End: 2025-05-16

## 2025-05-16 ENCOUNTER — TELEPHONE (OUTPATIENT)
Dept: SURGERY | Age: 60
End: 2025-05-16

## 2025-05-16 PROBLEM — E66.01 OBESITY, MORBID, BMI 40.0-49.9 (HCC): Status: ACTIVE | Noted: 2024-12-12

## 2025-05-16 NOTE — TELEPHONE ENCOUNTER
Vanessa please confirm with anesthesia as well regarding the semaglutide on whether patient should be rescheduled

## 2025-05-16 NOTE — TELEPHONE ENCOUNTER
Writer attempted to contact patient regarding speaking to the [facility] department for upcoming colonoscopy. No answer and unable to leave a message. Writer called girlfriend in contacts. Girlfriend will having patient call the  office that will transfer call to the [facility].

## 2025-05-16 NOTE — PROGRESS NOTES
[facility]   Preadmission Testing    Name: Pedrito Hinton  : 1965  Patient Phone: [phone] (home) [phone] (work)    Procedure: Colonoscopy   Date of Procedure: 2025  Surgeon: Kai Interiano MD    Ht:  172.7 cm (5' 8\")  Wt: 115.2 kg (254 lb)  Wt method: Stated    Allergies: No Known Allergies             There were no vitals filed for this visit.    No LMP for male patient.    Do you take blood thinners?   [x] Yes    [] No         Instructed to stop blood thinners prior to procedure?    [] Yes    [] No      [x] N/A   Do you have sleep apnea?   [x] Yes    [] No     Do you have acid reflux ?   [] Yes    [x] No     Do you have  hiatal hernia?   [] Yes    [x] No    Do you ever experience motion sickness?   [] Yes    [x] No     Have you had a respiratory infection or sore throat in last 4 weeks before surgery?    [] Yes    [x] No     Do you have poorly controlled asthma or COPD?  Difficulty with intubation in past? [] Yes    [x] No      [] Yes    [x] No       Do you have a history of angina in the last month or symptomatic arrhythmia?   [] Yes    [x] No     Do you have significant central nervous system disease?   [] Yes    [x] No     Have you had an EKG, labs, or chest xray in last 12 months?  If yes provide copies to anesthesia   [x] Yes    [] No       [x] Lab    [x] EKG    [] CXR     Have you had a stress test?     [x] Yes    [] No    When/where:    Was it normal?    [] Yes    [] No     Do you or your family have a history of Malignant Hyperthermia?   [] Yes    [x] No           Do you smoke? [] Yes    [x] No      Please refrain from smoking on the day of surgery.      Patient instructed on: [x] NPO Status   [x] Meds to Take  [] Hold GLP-1 Receptor Agonist  [x] Ride Home  [x] No Jewelry/Contact Lenses/Nail Polish  [x] Prep/Lax/Clear Liquids    [] Chlorhexidene     DOS Patient Needs [] HCG   [] Blood Sugar  [] PT/INR    [] T&S       Do you have any metal allergies? [] Yes    [x] No

## 2025-05-19 ENCOUNTER — ANESTHESIA EVENT (OUTPATIENT)
Dept: ENDOSCOPY | Age: 60
End: 2025-05-19
Payer: COMMERCIAL

## 2025-05-19 NOTE — TELEPHONE ENCOUNTER
Per EDGARD Brizuela patient is okay to be done from an anesthesia perspective as long as there is no nausea or active reflux the morning of the procedure.

## 2025-05-21 ENCOUNTER — ANESTHESIA (OUTPATIENT)
Dept: ENDOSCOPY | Age: 60
End: 2025-05-21
Payer: COMMERCIAL

## 2025-05-21 ENCOUNTER — HOSPITAL ENCOUNTER (OUTPATIENT)
Age: 60
Setting detail: OUTPATIENT SURGERY
Discharge: HOME | End: 2025-05-21
Attending: SURGERY | Admitting: SURGERY
Payer: COMMERCIAL

## 2025-05-21 VITALS
HEART RATE: 61 BPM | WEIGHT: 257.9 LBS | SYSTOLIC BLOOD PRESSURE: 105 MMHG | OXYGEN SATURATION: 100 % | BODY MASS INDEX: 39.09 KG/M2 | HEIGHT: 68 IN | RESPIRATION RATE: 17 BRPM | DIASTOLIC BLOOD PRESSURE: 50 MMHG | TEMPERATURE: 96.8 F

## 2025-05-21 DIAGNOSIS — Z12.11 ENCOUNTER FOR SCREENING COLONOSCOPY: ICD-10-CM

## 2025-05-21 DIAGNOSIS — Z80.0 FAMILY HX OF COLON CANCER: ICD-10-CM

## 2025-05-21 PROCEDURE — 3609010600 HC COLONOSCOPY POLYPECTOMY SNARE/COLD BIOPSY: Performed by: SURGERY

## 2025-05-21 PROCEDURE — 2580000003 HC RX 258: Performed by: NURSE ANESTHETIST, CERTIFIED REGISTERED

## 2025-05-21 PROCEDURE — 3700000001 HC ADD 15 MINUTES (ANESTHESIA): Performed by: SURGERY

## 2025-05-21 PROCEDURE — 3700000000 HC ANESTHESIA ATTENDED CARE: Performed by: SURGERY

## 2025-05-21 PROCEDURE — 6360000002 HC RX W HCPCS: Performed by: NURSE ANESTHETIST, CERTIFIED REGISTERED

## 2025-05-21 PROCEDURE — 2580000003 HC RX 258: Performed by: SURGERY

## 2025-05-21 PROCEDURE — 7100000010 HC PHASE II RECOVERY - FIRST 15 MIN: Performed by: SURGERY

## 2025-05-21 PROCEDURE — 7100000011 HC PHASE II RECOVERY - ADDTL 15 MIN: Performed by: SURGERY

## 2025-05-21 PROCEDURE — 88305 TISSUE EXAM BY PATHOLOGIST: CPT

## 2025-05-21 PROCEDURE — 2709999900 HC NON-CHARGEABLE SUPPLY: Performed by: SURGERY

## 2025-05-21 PROCEDURE — 45380 COLONOSCOPY AND BIOPSY: CPT | Performed by: SURGERY

## 2025-05-21 RX ORDER — SODIUM CHLORIDE, SODIUM LACTATE, POTASSIUM CHLORIDE, CALCIUM CHLORIDE 600; 310; 30; 20 MG/100ML; MG/100ML; MG/100ML; MG/100ML
INJECTION, SOLUTION INTRAVENOUS
Status: DISCONTINUED | OUTPATIENT
Start: 2025-05-21 | End: 2025-05-21 | Stop reason: SDUPTHER

## 2025-05-21 RX ORDER — PROPOFOL 10 MG/ML
INJECTION, EMULSION INTRAVENOUS
Status: DISCONTINUED | OUTPATIENT
Start: 2025-05-21 | End: 2025-05-21 | Stop reason: SDUPTHER

## 2025-05-21 RX ORDER — SODIUM CHLORIDE, SODIUM LACTATE, POTASSIUM CHLORIDE, CALCIUM CHLORIDE 600; 310; 30; 20 MG/100ML; MG/100ML; MG/100ML; MG/100ML
INJECTION, SOLUTION INTRAVENOUS CONTINUOUS
Status: DISCONTINUED | OUTPATIENT
Start: 2025-05-21 | End: 2025-05-21 | Stop reason: HOSPADM

## 2025-05-21 RX ADMIN — SODIUM CHLORIDE, SODIUM LACTATE, POTASSIUM CHLORIDE, AND CALCIUM CHLORIDE: .6; .31; .03; .02 INJECTION, SOLUTION INTRAVENOUS at 09:44

## 2025-05-21 RX ADMIN — PROPOFOL 160 MCG/KG/MIN: 10 INJECTION, EMULSION INTRAVENOUS at 10:10

## 2025-05-21 RX ADMIN — SODIUM CHLORIDE, POTASSIUM CHLORIDE, SODIUM LACTATE AND CALCIUM CHLORIDE: 600; 310; 30; 20 INJECTION, SOLUTION INTRAVENOUS at 10:08

## 2025-05-21 RX ADMIN — PROPOFOL 50 MG: 10 INJECTION, EMULSION INTRAVENOUS at 10:09

## 2025-05-21 ASSESSMENT — PAIN - FUNCTIONAL ASSESSMENT: PAIN_FUNCTIONAL_ASSESSMENT: NONE - DENIES PAIN

## 2025-05-21 NOTE — ANESTHESIA POSTPROCEDURE EVALUATION
Department of Anesthesiology  Postprocedure Note    Patient: Pedrito Hinton  MRN: [id number]  YOB: 1965  Date of evaluation: 5/21/2025    Procedure Summary       Date: 05/21/25 Room / Location: [place] [id number] / [facility] ENDOSCOPY    Anesthesia Start: 1008 Anesthesia Stop: 1037    Procedure: COLONOSCOPY (Abdomen) Diagnosis:       Encounter for screening colonoscopy      Family hx of colon cancer      (Encounter for screening colonoscopy [Z12.11])      (Family hx of colon cancer [Z80.0])    Surgeons: Kai Interiano MD Responsible Provider: Krishna Conrad APRN - CRNA    Anesthesia Type: MAC ASA Status: 3            Anesthesia Type: No value filed.    Kirsty Phase I: Kirsty Score: 10    Kirsty Phase II: Kirsty Score: 10    Anesthesia Post Evaluation    Patient location during evaluation: bedside  Patient participation: complete - patient participated  Level of consciousness: awake and alert  Pain score: 0  Airway patency: patent  Nausea & Vomiting: no nausea and no vomiting  Cardiovascular status: blood pressure returned to baseline and hemodynamically stable  Respiratory status: acceptable, spontaneous ventilation, nonlabored ventilation and room air  Hydration status: euvolemic  Multimodal analgesia pain management approach  Pain management: adequate    No notable events documented.

## 2025-05-21 NOTE — OP NOTE
Patient: Pedrito Hinton  YOB: 1965  MRN: [id number]    Date of Procedure: 5/21/2025    Pre-operative Diagnosis: Family History of Colon Cancer    Post-operative Diagnosis:  Polyp x 4, rectosigmoid polyp was about 10 mm, Left sided diverticulosis and Internal Hemorrhoids    Procedure: Colonoscopy with piecemeal polypectomy x 4    Surgeon(s):  Kai Interiano MD    Assistant:   * No surgical staff found *    Anesthesia: Monitor Anesthesia Care    Estimated Blood Loss (mL): Minimal    Complications: None    Specimens:   ID Type Source Tests Collected by Time Destination   A : A. Proximal rectum polyp Tissue Rectum SURGICAL PATHOLOGY Kai Interiano MD 5/21/2025 1015    B : B. Sigmoid polyp Tissue Sigmoid Colon SURGICAL PATHOLOGY Kai Interiano MD 5/21/2025 1023    C : C. Recto sigmoid polyps x 2 Tissue Recto sigmoid SURGICAL PATHOLOGY Kai Interiano MD 5/21/2025 1030        Implants:  * No implants in log *      Drains: * No LDAs found *    Patient brought to the endoscopy area and IV sedation was induced by the CRNA.  Scope was put in his rectum passed proximally.  His bowel prep was good fair.  He had a lot of opaque liquid stool and some fairly significant particles which probably came out of his diverticula after he did his prep. BPPS Left 2, Transverse 3, Right 2 = 7.  Almost immediately putting the scope in the rectum and the proximal rectum there was a sessile polyp about 10 mm across.  This was removed with about 5 or 6 bites of the cold biopsy forceps.  He had a smaller polyp in the mid sigmoid which was removed with about 3 bites of the cold biopsy forceps.  He also has extensive sigmoid diverticulosis extending into his descending colon.  Multiple fairly large diverticula although I do not see any evidence of stenosis or significant haustral hypertrophy associated with them.  Scope was ultimately passed only around the cecum.  Both the ileocecal valve and the appendiceal orifice were

## 2025-05-21 NOTE — ANESTHESIA PRE PROCEDURE
04/28/25 115.8 kg (255 lb 6.4 oz)   04/22/25 117.9 kg (259 lb 14.8 oz)     Body mass index is 38.62 kg/m².    CBC:   Lab Results   Component Value Date/Time    WBC 5.1 03/26/2025 07:29 AM    RBC 4.62 03/26/2025 07:29 AM    HGB 13.7 03/26/2025 07:29 AM    HCT 41.7 03/26/2025 07:29 AM    MCV 90.3 03/26/2025 07:29 AM    RDW 12.6 03/26/2025 07:29 AM     03/26/2025 07:29 AM       CMP:   Lab Results   Component Value Date/Time     03/26/2025 07:29 AM    K 3.9 03/26/2025 07:29 AM    K 3.9 02/27/2021 03:58 AM     03/26/2025 07:29 AM    CO2 31 03/26/2025 07:29 AM    BUN 33 03/26/2025 07:29 AM    CREATININE 1.0 03/26/2025 07:29 AM    GFRAA >60 04/24/2022 05:30 AM    LABGLOM 85 03/26/2025 07:29 AM    LABGLOM 77 04/12/2024 12:01 PM    GLUCOSE 108 03/26/2025 07:29 AM    GLUCOSE 115 10/01/2011 07:42 AM    CALCIUM 9.5 03/26/2025 07:29 AM    BILITOT 0.4 03/26/2025 07:29 AM    ALKPHOS 92 03/26/2025 07:29 AM    AST 17 03/26/2025 07:29 AM    ALT 22 03/26/2025 07:29 AM       POC Tests: No results for input(s): \"POCGLU\", \"POCNA\", \"POCK\", \"POCCL\", \"POCBUN\", \"POCHEMO\", \"POCHCT\" in the last 72 hours.    Coags:   Lab Results   Component Value Date/Time    PROTIME 12.6 03/27/2024 10:45 AM    INR 1.0 03/27/2024 10:45 AM    APTT 25.2 03/27/2024 10:45 AM    APTT 27.0 08/30/2023 08:47 PM       HCG (If Applicable): No results found for: \"PREGTESTUR\", \"PREGSERUM\", \"HCG\", \"HCGQUANT\"     ABGs:   Lab Results   Component Value Date/Time    PHART 7.333 04/13/2023 11:03 AM    PO2ART 91.5 04/13/2023 11:03 AM    PSD4SDX 47.4 04/13/2023 11:03 AM    DBA6UES 24.5 04/13/2023 11:03 AM    S7UJZPAN 96.3 04/13/2023 11:03 AM        Type & Screen (If Applicable):  Lab Results   Component Value Date    ABORH O POSITIVE 05/03/2021    LABANTI NEGATIVE 05/03/2021       Drug/Infectious Status (If Applicable):  No results found for: \"HIV\", \"HEPCAB\"    COVID-19 Screening (If Applicable):   Lab Results   Component Value Date/Time    COVID19 Not

## 2025-05-21 NOTE — H&P
Name:  Pedrito Hinton  Age:  60 y.o.   :  1965    Physician: EMMA ZUNIGA MD       Chief Complaint: Family History Colon Cancer (mother)       HPI:  No symptoms at this time.  Colonoscopy in 2013 showed diverticulosis but otherwise was okay.        MEDICAL HISTORY:    Past Medical History:        Diagnosis Date    AAA (abdominal aortic aneurysm)     Thoracic - repaired    Aortic regurgitation     aortic regurg    Atrial fibrillation (HCC)     Atrial flutter (HCC)     Encounter for cardioversion procedure     Airam Garcia/ Dr. Olivarez, states 11 times, most recent 2021    Frequent urination     Pt states he's developed frequent urination with 3 times in last yr noting blood in urine as well.    H/O echocardiogram 2017    EF 50-55% Global LV systolic function appears preserved. Mildly increaseed LV wall thinckness with normal LV carvity size, No definite specific wall motion abnormalities identified, LA is moderately dilated (34-39) with LA volume index of 36ml/m2, Mild mitral and tricuspid regurgitation, moderate aortic regurgitation, Evidence of mild diastolic dysfunction seen    H/O echocardiogram 2021    EF 55% patient has sigmoid interventricular septim S/P aortic vlave repair with mild to mod aortic regurg Diastoligy cannot be properly assessed due to pts rhtyhm Aortic root is mildly dilated when corrected for body surface area    Hemorrhage of rectum and anus 2013    polypectomy    History of 24 hour EKG monitoring 2014    Unremarkable except for mild increased number of PVC's with 3 ventricular couplets, asymptomatic     History of echocardiogram 04/10/2014    EF 60%, mild to mod LVH, moderate AI    History of PFTs 2015    Consistent with mild obstructive ventilatory impairment. Lung volumes are normal,except mild increase in residual volume,which could be suggestive of airway trapping. Diffusion capacity is normal.    Hx of transesophageal echocardiography (SKYLAR) for         Prior to Admission medications    Medication Sig Start Date End Date Taking? Authorizing Provider   semaglutide, 2 MG/DOSE, (OZEMPIC, 2 MG/DOSE,) 8 MG/3ML SOPN sc injection INJECT 2 MG SUBCUTANEOUSLY  EVERY 7 DAYS 5/9/25  Yes Abimael Solano MD   sotalol (BETAPACE) 80 MG tablet Take 1 tablet by mouth 2 times daily 5/5/25   Kathleen Mccarty APRN - CNP   vitamin D (ERGOCALCIFEROL) 1.25 MG (37190 UT) CAPS capsule Take 1 capsule by mouth once a week 4/7/25   Abimael Solano MD   bumetanide (BUMEX) 2 MG tablet Take 1 tablet by mouth daily 4/1/25   Kathleen Mccarty APRN - CNP   dilTIAZem (CARDIZEM CD) 120 MG extended release capsule Take 1 capsule by mouth daily 4/1/25   Kathleen Mccarty APRN - CNP   losartan (COZAAR) 25 MG tablet Take 1 tablet by mouth daily 4/1/25   Kathleen Mccarty APRN - CNP   metOLazone (ZAROXOLYN) 5 MG tablet TAKE 1 TABLET BY MOUTH 30 MINUTES PRIOR TO LASIX TAKE ONLY MONDAY, WEDNESDAY AND FRIDAY 4/1/25   Kathleen Mccarty APRN - CNP   fluticasone-umeclidin-vilant (TRELEGY ELLIPTA) 200-62.5-25 MCG/ACT AEPB inhaler Inhale 1 puff into the lungs daily 1/13/25   Abimael Solano MD   albuterol sulfate HFA (VENTOLIN HFA) 108 (90 Base) MCG/ACT inhaler Inhale 2 puffs into the lungs 4 times daily as needed for Wheezing 12/29/24   Abimael Solano MD   empagliflozin (JARDIANCE) 10 MG tablet Take 1 tablet by mouth once daily 12/17/24   Abimael Solano MD   aspirin 81 MG EC tablet Take 1 tablet by mouth 2 times daily    ProviderEcho MD   traZODone (DESYREL) 50 MG tablet TAKE 1 TABLET BY MOUTH NIGHTLY AS NEEDED FOR SLEEP 8/21/23   Abimael Solano MD       No Known Allergies     reports that he quit smoking about 4 years ago. His smoking use included cigarettes. He started smoking about 42 years ago. He has a 38 pack-year smoking history. He has never used smokeless tobacco.  reports current alcohol use of about 6.0 standard drinks of alcohol per week.    Family History   Problem

## 2025-05-21 NOTE — DISCHARGE INSTRUCTIONS
Instructions.\"  Current as of: October 19, 2024  Content Version: 14.4  © 7333-1873 [employer].   Care instructions adapted under license by [employer]. If you have questions about a medical condition or this instruction, always ask your healthcare professional. [employer] disclaims any warranty or liability for your use of this information.

## 2025-05-23 ENCOUNTER — RESULTS FOLLOW-UP (OUTPATIENT)
Dept: SURGERY | Age: 60
End: 2025-05-23

## 2025-05-23 DIAGNOSIS — R06.02 SOB (SHORTNESS OF BREATH): ICD-10-CM

## 2025-05-23 LAB — SURGICAL PATHOLOGY REPORT: NORMAL

## 2025-05-26 RX ORDER — ALBUTEROL SULFATE 90 UG/1
INHALANT RESPIRATORY (INHALATION)
Qty: 9 G | Refills: 0 | Status: SHIPPED | OUTPATIENT
Start: 2025-05-26

## 2025-06-12 RX ORDER — EMPAGLIFLOZIN 10 MG/1
10 TABLET, FILM COATED ORAL DAILY
Qty: 90 TABLET | Refills: 0 | Status: SHIPPED | OUTPATIENT
Start: 2025-06-12

## 2025-06-15 DIAGNOSIS — R06.02 SOB (SHORTNESS OF BREATH): ICD-10-CM

## 2025-06-16 ENCOUNTER — APPOINTMENT (OUTPATIENT)
Dept: GENERAL RADIOLOGY | Age: 60
DRG: 389 | End: 2025-06-16
Payer: COMMERCIAL

## 2025-06-16 ENCOUNTER — HOSPITAL ENCOUNTER (INPATIENT)
Age: 60
LOS: 4 days | Discharge: HOME | DRG: 389 | End: 2025-06-20
Attending: STUDENT IN AN ORGANIZED HEALTH CARE EDUCATION/TRAINING PROGRAM | Admitting: STUDENT IN AN ORGANIZED HEALTH CARE EDUCATION/TRAINING PROGRAM
Payer: COMMERCIAL

## 2025-06-16 ENCOUNTER — APPOINTMENT (OUTPATIENT)
Dept: CT IMAGING | Age: 60
DRG: 389 | End: 2025-06-16
Payer: COMMERCIAL

## 2025-06-16 DIAGNOSIS — M10.00 ACUTE IDIOPATHIC GOUT, UNSPECIFIED SITE: ICD-10-CM

## 2025-06-16 DIAGNOSIS — K56.609 SBO (SMALL BOWEL OBSTRUCTION) (HCC): Primary | ICD-10-CM

## 2025-06-16 LAB
ALBUMIN SERPL-MCNC: 4.3 G/DL (ref 3.5–5.2)
ALBUMIN/GLOB SERPL: 1.6 {RATIO} (ref 1–2.5)
ALP SERPL-CCNC: 96 U/L (ref 40–129)
ALT SERPL-CCNC: 25 U/L (ref 10–50)
ANION GAP SERPL CALCULATED.3IONS-SCNC: 15 MMOL/L (ref 9–16)
AST SERPL-CCNC: 23 U/L (ref 10–50)
BASOPHILS # BLD: <0.03 K/UL (ref 0–0.2)
BASOPHILS NFR BLD: 0 % (ref 0–2)
BILIRUB SERPL-MCNC: 0.6 MG/DL (ref 0–1.2)
BILIRUB UR QL STRIP: NEGATIVE
BUN SERPL-MCNC: 17 MG/DL (ref 8–23)
BUN/CREAT SERPL: 17 (ref 9–20)
CALCIUM SERPL-MCNC: 9.8 MG/DL (ref 8.6–10.4)
CHLORIDE SERPL-SCNC: 98 MMOL/L (ref 98–107)
CLARITY UR: CLEAR
CO2 SERPL-SCNC: 28 MMOL/L (ref 20–31)
COLOR UR: YELLOW
CREAT SERPL-MCNC: 1 MG/DL (ref 0.7–1.2)
EOSINOPHIL # BLD: 0.16 K/UL (ref 0–0.44)
EOSINOPHILS RELATIVE PERCENT: 3 % (ref 1–4)
EPI CELLS #/AREA URNS HPF: NORMAL /HPF (ref 0–5)
ERYTHROCYTE [DISTWIDTH] IN BLOOD BY AUTOMATED COUNT: 13.4 % (ref 11.8–14.4)
GFR, ESTIMATED: 86 ML/MIN/1.73M2
GLUCOSE SERPL-MCNC: 108 MG/DL (ref 74–99)
GLUCOSE UR STRIP-MCNC: ABNORMAL MG/DL
HCT VFR BLD AUTO: 41.3 % (ref 40.7–50.3)
HGB BLD-MCNC: 14.2 G/DL (ref 13–17)
HGB UR QL STRIP.AUTO: NEGATIVE
IMM GRANULOCYTES # BLD AUTO: <0.03 K/UL (ref 0–0.3)
IMM GRANULOCYTES NFR BLD: 0 %
KETONES UR STRIP-MCNC: NEGATIVE MG/DL
LEUKOCYTE ESTERASE UR QL STRIP: NEGATIVE
LIPASE SERPL-CCNC: 24 U/L (ref 13–60)
LYMPHOCYTES NFR BLD: 0.87 K/UL (ref 1.1–3.7)
LYMPHOCYTES RELATIVE PERCENT: 16 % (ref 24–43)
MCH RBC QN AUTO: 30.7 PG (ref 25.2–33.5)
MCHC RBC AUTO-ENTMCNC: 34.4 G/DL (ref 28.4–34.8)
MCV RBC AUTO: 89.2 FL (ref 82.6–102.9)
MONOCYTES NFR BLD: 0.44 K/UL (ref 0.1–1.2)
MONOCYTES NFR BLD: 8 % (ref 3–12)
NEUTROPHILS NFR BLD: 73 % (ref 36–65)
NEUTS SEG NFR BLD: 3.79 K/UL (ref 1.5–8.1)
NITRITE UR QL STRIP: NEGATIVE
NRBC BLD-RTO: 0 PER 100 WBC
PH UR STRIP: 6.5 [PH] (ref 5–9)
PLATELET # BLD AUTO: 189 K/UL (ref 138–453)
PMV BLD AUTO: 10 FL (ref 8.1–13.5)
POTASSIUM SERPL-SCNC: 3.3 MMOL/L (ref 3.7–5.3)
PROT SERPL-MCNC: 7.1 G/DL (ref 6.6–8.7)
PROT UR STRIP-MCNC: NEGATIVE MG/DL
RBC # BLD AUTO: 4.63 M/UL (ref 4.21–5.77)
RBC #/AREA URNS HPF: NORMAL /HPF (ref 0–2)
SODIUM SERPL-SCNC: 141 MMOL/L (ref 136–145)
SP GR UR STRIP: 1.01 (ref 1.01–1.02)
TROPONIN I SERPL HS-MCNC: 14 NG/L (ref 0–22)
TROPONIN I SERPL HS-MCNC: 15 NG/L (ref 0–22)
UROBILINOGEN UR STRIP-ACNC: NORMAL EU/DL (ref 0–1)
WBC #/AREA URNS HPF: NORMAL /HPF (ref 0–5)
WBC OTHER # BLD: 5.3 K/UL (ref 3.5–11.3)

## 2025-06-16 PROCEDURE — 6360000002 HC RX W HCPCS: Performed by: STUDENT IN AN ORGANIZED HEALTH CARE EDUCATION/TRAINING PROGRAM

## 2025-06-16 PROCEDURE — 80053 COMPREHEN METABOLIC PANEL: CPT

## 2025-06-16 PROCEDURE — 6360000002 HC RX W HCPCS

## 2025-06-16 PROCEDURE — 83690 ASSAY OF LIPASE: CPT

## 2025-06-16 PROCEDURE — 94761 N-INVAS EAR/PLS OXIMETRY MLT: CPT

## 2025-06-16 PROCEDURE — 84484 ASSAY OF TROPONIN QUANT: CPT

## 2025-06-16 PROCEDURE — 6360000002 HC RX W HCPCS: Performed by: PHYSICIAN ASSISTANT

## 2025-06-16 PROCEDURE — 2580000003 HC RX 258: Performed by: PHYSICIAN ASSISTANT

## 2025-06-16 PROCEDURE — 99285 EMERGENCY DEPT VISIT HI MDM: CPT

## 2025-06-16 PROCEDURE — 2580000003 HC RX 258: Performed by: STUDENT IN AN ORGANIZED HEALTH CARE EDUCATION/TRAINING PROGRAM

## 2025-06-16 PROCEDURE — 81001 URINALYSIS AUTO W/SCOPE: CPT

## 2025-06-16 PROCEDURE — 94664 DEMO&/EVAL PT USE INHALER: CPT

## 2025-06-16 PROCEDURE — 74018 RADEX ABDOMEN 1 VIEW: CPT

## 2025-06-16 PROCEDURE — 2500000003 HC RX 250 WO HCPCS: Performed by: STUDENT IN AN ORGANIZED HEALTH CARE EDUCATION/TRAINING PROGRAM

## 2025-06-16 PROCEDURE — 6360000004 HC RX CONTRAST MEDICATION: Performed by: PHYSICIAN ASSISTANT

## 2025-06-16 PROCEDURE — 93005 ELECTROCARDIOGRAM TRACING: CPT | Performed by: PHYSICIAN ASSISTANT

## 2025-06-16 PROCEDURE — 74177 CT ABD & PELVIS W/CONTRAST: CPT

## 2025-06-16 PROCEDURE — 85025 COMPLETE CBC W/AUTO DIFF WBC: CPT

## 2025-06-16 PROCEDURE — 1200000000 HC SEMI PRIVATE

## 2025-06-16 RX ORDER — BUDESONIDE AND FORMOTEROL FUMARATE DIHYDRATE 160; 4.5 UG/1; UG/1
2 AEROSOL RESPIRATORY (INHALATION)
Status: DISCONTINUED | OUTPATIENT
Start: 2025-06-16 | End: 2025-06-20 | Stop reason: HOSPADM

## 2025-06-16 RX ORDER — SODIUM CHLORIDE AND POTASSIUM CHLORIDE 150; 900 MG/100ML; MG/100ML
INJECTION, SOLUTION INTRAVENOUS CONTINUOUS
Status: DISCONTINUED | OUTPATIENT
Start: 2025-06-16 | End: 2025-06-19

## 2025-06-16 RX ORDER — SODIUM CHLORIDE 9 MG/ML
INJECTION, SOLUTION INTRAVENOUS PRN
Status: DISCONTINUED | OUTPATIENT
Start: 2025-06-16 | End: 2025-06-20 | Stop reason: HOSPADM

## 2025-06-16 RX ORDER — HYDROMORPHONE HYDROCHLORIDE 1 MG/ML
0.5 INJECTION, SOLUTION INTRAMUSCULAR; INTRAVENOUS; SUBCUTANEOUS ONCE
Status: COMPLETED | OUTPATIENT
Start: 2025-06-16 | End: 2025-06-16

## 2025-06-16 RX ORDER — ACETAMINOPHEN 325 MG/1
650 TABLET ORAL EVERY 6 HOURS PRN
Status: DISCONTINUED | OUTPATIENT
Start: 2025-06-16 | End: 2025-06-20 | Stop reason: HOSPADM

## 2025-06-16 RX ORDER — ONDANSETRON 2 MG/ML
4 INJECTION INTRAMUSCULAR; INTRAVENOUS EVERY 6 HOURS PRN
Status: DISCONTINUED | OUTPATIENT
Start: 2025-06-16 | End: 2025-06-19

## 2025-06-16 RX ORDER — SODIUM CHLORIDE, SODIUM LACTATE, POTASSIUM CHLORIDE, CALCIUM CHLORIDE 600; 310; 30; 20 MG/100ML; MG/100ML; MG/100ML; MG/100ML
INJECTION, SOLUTION INTRAVENOUS CONTINUOUS
Status: DISCONTINUED | OUTPATIENT
Start: 2025-06-16 | End: 2025-06-16

## 2025-06-16 RX ORDER — POTASSIUM CHLORIDE 1500 MG/1
40 TABLET, EXTENDED RELEASE ORAL PRN
Status: DISCONTINUED | OUTPATIENT
Start: 2025-06-16 | End: 2025-06-20 | Stop reason: HOSPADM

## 2025-06-16 RX ORDER — HYDROMORPHONE HYDROCHLORIDE 2 MG/ML
0.5 INJECTION, SOLUTION INTRAMUSCULAR; INTRAVENOUS; SUBCUTANEOUS EVERY 4 HOURS PRN
Status: DISCONTINUED | OUTPATIENT
Start: 2025-06-16 | End: 2025-06-19

## 2025-06-16 RX ORDER — ONDANSETRON 4 MG/1
4 TABLET, ORALLY DISINTEGRATING ORAL EVERY 8 HOURS PRN
Status: DISCONTINUED | OUTPATIENT
Start: 2025-06-16 | End: 2025-06-19

## 2025-06-16 RX ORDER — HYDROMORPHONE HYDROCHLORIDE 2 MG/ML
1 INJECTION, SOLUTION INTRAMUSCULAR; INTRAVENOUS; SUBCUTANEOUS EVERY 4 HOURS PRN
Status: DISCONTINUED | OUTPATIENT
Start: 2025-06-16 | End: 2025-06-19

## 2025-06-16 RX ORDER — ALBUTEROL SULFATE 90 UG/1
INHALANT RESPIRATORY (INHALATION)
Qty: 9 G | Refills: 0 | Status: SHIPPED | OUTPATIENT
Start: 2025-06-16

## 2025-06-16 RX ORDER — ACETAMINOPHEN 650 MG/1
650 SUPPOSITORY RECTAL EVERY 6 HOURS PRN
Status: DISCONTINUED | OUTPATIENT
Start: 2025-06-16 | End: 2025-06-20 | Stop reason: HOSPADM

## 2025-06-16 RX ORDER — MAGNESIUM SULFATE IN WATER 40 MG/ML
2000 INJECTION, SOLUTION INTRAVENOUS PRN
Status: DISCONTINUED | OUTPATIENT
Start: 2025-06-16 | End: 2025-06-20 | Stop reason: HOSPADM

## 2025-06-16 RX ORDER — SODIUM CHLORIDE 0.9 % (FLUSH) 0.9 %
10 SYRINGE (ML) INJECTION PRN
Status: DISCONTINUED | OUTPATIENT
Start: 2025-06-16 | End: 2025-06-20 | Stop reason: HOSPADM

## 2025-06-16 RX ORDER — SODIUM CHLORIDE 0.9 % (FLUSH) 0.9 %
10 SYRINGE (ML) INJECTION EVERY 12 HOURS SCHEDULED
Status: DISCONTINUED | OUTPATIENT
Start: 2025-06-16 | End: 2025-06-20 | Stop reason: HOSPADM

## 2025-06-16 RX ORDER — ALBUTEROL SULFATE 90 UG/1
2 INHALANT RESPIRATORY (INHALATION) EVERY 6 HOURS PRN
Status: DISCONTINUED | OUTPATIENT
Start: 2025-06-16 | End: 2025-06-20 | Stop reason: HOSPADM

## 2025-06-16 RX ORDER — POTASSIUM CHLORIDE 7.45 MG/ML
10 INJECTION INTRAVENOUS PRN
Status: DISCONTINUED | OUTPATIENT
Start: 2025-06-16 | End: 2025-06-20 | Stop reason: HOSPADM

## 2025-06-16 RX ORDER — IOPAMIDOL 755 MG/ML
75 INJECTION, SOLUTION INTRAVASCULAR
Status: COMPLETED | OUTPATIENT
Start: 2025-06-16 | End: 2025-06-16

## 2025-06-16 RX ORDER — ONDANSETRON 2 MG/ML
4 INJECTION INTRAMUSCULAR; INTRAVENOUS ONCE
Status: COMPLETED | OUTPATIENT
Start: 2025-06-16 | End: 2025-06-16

## 2025-06-16 RX ORDER — ENOXAPARIN SODIUM 100 MG/ML
30 INJECTION SUBCUTANEOUS 2 TIMES DAILY
Status: DISCONTINUED | OUTPATIENT
Start: 2025-06-16 | End: 2025-06-20 | Stop reason: HOSPADM

## 2025-06-16 RX ORDER — POLYETHYLENE GLYCOL 3350 17 G/17G
17 POWDER, FOR SOLUTION ORAL DAILY PRN
Status: DISCONTINUED | OUTPATIENT
Start: 2025-06-16 | End: 2025-06-20 | Stop reason: HOSPADM

## 2025-06-16 RX ORDER — 0.9 % SODIUM CHLORIDE 0.9 %
1000 INTRAVENOUS SOLUTION INTRAVENOUS ONCE
Status: COMPLETED | OUTPATIENT
Start: 2025-06-16 | End: 2025-06-16

## 2025-06-16 RX ADMIN — SODIUM CHLORIDE, PRESERVATIVE FREE 10 ML: 5 INJECTION INTRAVENOUS at 22:17

## 2025-06-16 RX ADMIN — HYDROMORPHONE HYDROCHLORIDE 0.5 MG: 1 INJECTION, SOLUTION INTRAMUSCULAR; INTRAVENOUS; SUBCUTANEOUS at 20:09

## 2025-06-16 RX ADMIN — ONDANSETRON 4 MG: 2 INJECTION, SOLUTION INTRAMUSCULAR; INTRAVENOUS at 20:08

## 2025-06-16 RX ADMIN — ENOXAPARIN SODIUM 30 MG: 100 INJECTION SUBCUTANEOUS at 22:17

## 2025-06-16 RX ADMIN — POTASSIUM CHLORIDE AND SODIUM CHLORIDE: 900; 150 INJECTION, SOLUTION INTRAVENOUS at 23:16

## 2025-06-16 RX ADMIN — IOPAMIDOL 75 ML: 755 INJECTION, SOLUTION INTRAVENOUS at 18:03

## 2025-06-16 RX ADMIN — FAMOTIDINE 20 MG: 10 INJECTION, SOLUTION INTRAVENOUS at 22:17

## 2025-06-16 RX ADMIN — SODIUM CHLORIDE, SODIUM LACTATE, POTASSIUM CHLORIDE, AND CALCIUM CHLORIDE: .6; .31; .03; .02 INJECTION, SOLUTION INTRAVENOUS at 22:19

## 2025-06-16 RX ADMIN — SODIUM CHLORIDE 1000 ML: 9 INJECTION, SOLUTION INTRAVENOUS at 20:00

## 2025-06-16 ASSESSMENT — LIFESTYLE VARIABLES
HOW OFTEN DO YOU HAVE A DRINK CONTAINING ALCOHOL: NEVER
HOW MANY STANDARD DRINKS CONTAINING ALCOHOL DO YOU HAVE ON A TYPICAL DAY: PATIENT DOES NOT DRINK
HOW OFTEN DO YOU HAVE A DRINK CONTAINING ALCOHOL: MONTHLY OR LESS

## 2025-06-16 NOTE — ED PROVIDER NOTES
Emergency Department Encounter    Note to patient: The 21st Century Cures Act requires that medical notes like this one to be available to patients in the interest of transparency. Please be advised, this is a medical document. It is intended as [id number] communication. It is written in medical language and may contain abbreviations and verbiage that may be unfamiliar. It may appear to read blunt or direct. Medical documents are intended to carry relevant medical information, facts as evident and the clinical opinion of the practitioner.      Chief Complaint  Chief Complaint   Patient presents with    Abdominal Pain     Mid/upper abdominal pains since Friday. Punching feeling to stomach. Thought he was constipated and took laxatives and stool softeners Friday, had large bm Saturday but still having pains. Decreased appetite. Pains worsening after eating.         HPI  This is a 60-year-old male states that he has pain in his upper abdomen feels like somebody punched him in the stomach like a dull ache or like something started to push its way out from the inside mostly in the epigastric and periumbilical area centrally in the abdomen.  This made worse with eating or getting up and moving around and worse when pushing on the area.  He drank magnesium citrate at midnight Friday night which is 3 days ago and then Saturday morning he had multiple episodes of watery diarrhea which have now decreased but continued to be watery.  Denies blood or mucus in the stool he denies vomiting but did have some nausea but really just has no appetite.           Past Medical History  Past Medical History:   Diagnosis Date    AAA (abdominal aortic aneurysm) 2008    Thoracic - repaired    Aortic regurgitation     aortic regurg    Atrial fibrillation (HCC)     Atrial flutter (HCC)     Encounter for cardioversion procedure     Airam Garcia/ Dr. Olivarez, states 11 times, most recent 1/2021    Frequent urination     Pt states he's developed

## 2025-06-17 ENCOUNTER — APPOINTMENT (OUTPATIENT)
Dept: GENERAL RADIOLOGY | Age: 60
DRG: 389 | End: 2025-06-17
Payer: COMMERCIAL

## 2025-06-17 LAB
ANION GAP SERPL CALCULATED.3IONS-SCNC: 13 MMOL/L (ref 9–16)
BASOPHILS # BLD: <0.03 K/UL (ref 0–0.2)
BASOPHILS NFR BLD: 0 % (ref 0–2)
BUN SERPL-MCNC: 16 MG/DL (ref 8–23)
BUN/CREAT SERPL: 18 (ref 9–20)
CALCIUM SERPL-MCNC: 9.3 MG/DL (ref 8.6–10.4)
CHLORIDE SERPL-SCNC: 102 MMOL/L (ref 98–107)
CO2 SERPL-SCNC: 26 MMOL/L (ref 20–31)
CREAT SERPL-MCNC: 0.9 MG/DL (ref 0.7–1.2)
EKG ATRIAL RATE: 67 BPM
EKG P AXIS: 14 DEGREES
EKG P-R INTERVAL: 130 MS
EKG Q-T INTERVAL: 322 MS
EKG Q-T INTERVAL: 448 MS
EKG QRS DURATION: 100 MS
EKG QRS DURATION: 96 MS
EKG QTC CALCULATION (BAZETT): 425 MS
EKG QTC CALCULATION (BAZETT): 473 MS
EKG R AXIS: -5 DEGREES
EKG R AXIS: -8 DEGREES
EKG T AXIS: -3 DEGREES
EKG T AXIS: 197 DEGREES
EKG VENTRICULAR RATE: 105 BPM
EKG VENTRICULAR RATE: 67 BPM
EOSINOPHIL # BLD: 0.13 K/UL (ref 0–0.44)
EOSINOPHILS RELATIVE PERCENT: 2 % (ref 1–4)
ERYTHROCYTE [DISTWIDTH] IN BLOOD BY AUTOMATED COUNT: 13.4 % (ref 11.8–14.4)
GFR, ESTIMATED: >90 ML/MIN/1.73M2
GLUCOSE SERPL-MCNC: 97 MG/DL (ref 74–99)
HCT VFR BLD AUTO: 41.1 % (ref 40.7–50.3)
HGB BLD-MCNC: 13.6 G/DL (ref 13–17)
IMM GRANULOCYTES # BLD AUTO: <0.03 K/UL (ref 0–0.3)
IMM GRANULOCYTES NFR BLD: 0 %
LYMPHOCYTES NFR BLD: 0.81 K/UL (ref 1.1–3.7)
LYMPHOCYTES RELATIVE PERCENT: 15 % (ref 24–43)
MAGNESIUM SERPL-MCNC: 2 MG/DL (ref 1.6–2.4)
MCH RBC QN AUTO: 30.4 PG (ref 25.2–33.5)
MCHC RBC AUTO-ENTMCNC: 33.1 G/DL (ref 28.4–34.8)
MCV RBC AUTO: 91.7 FL (ref 82.6–102.9)
MONOCYTES NFR BLD: 0.49 K/UL (ref 0.1–1.2)
MONOCYTES NFR BLD: 9 % (ref 3–12)
NEUTROPHILS NFR BLD: 74 % (ref 36–65)
NEUTS SEG NFR BLD: 3.88 K/UL (ref 1.5–8.1)
NRBC BLD-RTO: 0 PER 100 WBC
PLATELET # BLD AUTO: 167 K/UL (ref 138–453)
PMV BLD AUTO: 10.4 FL (ref 8.1–13.5)
POTASSIUM SERPL-SCNC: 3.5 MMOL/L (ref 3.7–5.3)
RBC # BLD AUTO: 4.48 M/UL (ref 4.21–5.77)
SODIUM SERPL-SCNC: 141 MMOL/L (ref 136–145)
WBC OTHER # BLD: 5.3 K/UL (ref 3.5–11.3)

## 2025-06-17 PROCEDURE — 93010 ELECTROCARDIOGRAM REPORT: CPT | Performed by: INTERNAL MEDICINE

## 2025-06-17 PROCEDURE — 1200000000 HC SEMI PRIVATE

## 2025-06-17 PROCEDURE — 74018 RADEX ABDOMEN 1 VIEW: CPT

## 2025-06-17 PROCEDURE — 6360000002 HC RX W HCPCS: Performed by: STUDENT IN AN ORGANIZED HEALTH CARE EDUCATION/TRAINING PROGRAM

## 2025-06-17 PROCEDURE — 2500000003 HC RX 250 WO HCPCS: Performed by: NURSE PRACTITIONER

## 2025-06-17 PROCEDURE — 83735 ASSAY OF MAGNESIUM: CPT

## 2025-06-17 PROCEDURE — 6360000002 HC RX W HCPCS

## 2025-06-17 PROCEDURE — 85025 COMPLETE CBC W/AUTO DIFF WBC: CPT

## 2025-06-17 PROCEDURE — 80048 BASIC METABOLIC PNL TOTAL CA: CPT

## 2025-06-17 PROCEDURE — 2500000003 HC RX 250 WO HCPCS

## 2025-06-17 PROCEDURE — 94640 AIRWAY INHALATION TREATMENT: CPT

## 2025-06-17 PROCEDURE — 2500000003 HC RX 250 WO HCPCS: Performed by: STUDENT IN AN ORGANIZED HEALTH CARE EDUCATION/TRAINING PROGRAM

## 2025-06-17 PROCEDURE — 94664 DEMO&/EVAL PT USE INHALER: CPT

## 2025-06-17 PROCEDURE — 99231 SBSQ HOSP IP/OBS SF/LOW 25: CPT | Performed by: SURGERY

## 2025-06-17 PROCEDURE — 93005 ELECTROCARDIOGRAM TRACING: CPT | Performed by: NURSE PRACTITIONER

## 2025-06-17 PROCEDURE — 6370000000 HC RX 637 (ALT 250 FOR IP)

## 2025-06-17 PROCEDURE — 94761 N-INVAS EAR/PLS OXIMETRY MLT: CPT

## 2025-06-17 PROCEDURE — 6360000002 HC RX W HCPCS: Performed by: NURSE PRACTITIONER

## 2025-06-17 RX ORDER — METOPROLOL TARTRATE 1 MG/ML
5 INJECTION, SOLUTION INTRAVENOUS EVERY 8 HOURS
Status: DISCONTINUED | OUTPATIENT
Start: 2025-06-17 | End: 2025-06-19

## 2025-06-17 RX ORDER — DILTIAZEM HYDROCHLORIDE 5 MG/ML
5 INJECTION INTRAVENOUS ONCE
Status: COMPLETED | OUTPATIENT
Start: 2025-06-17 | End: 2025-06-17

## 2025-06-17 RX ORDER — KETOROLAC TROMETHAMINE 30 MG/ML
30 INJECTION, SOLUTION INTRAMUSCULAR; INTRAVENOUS ONCE
Status: COMPLETED | OUTPATIENT
Start: 2025-06-17 | End: 2025-06-17

## 2025-06-17 RX ADMIN — DILTIAZEM HYDROCHLORIDE 5 MG: 5 INJECTION, SOLUTION INTRAVENOUS at 21:59

## 2025-06-17 RX ADMIN — METOPROLOL TARTRATE 5 MG: 5 INJECTION INTRAVENOUS at 12:05

## 2025-06-17 RX ADMIN — POTASSIUM CHLORIDE AND SODIUM CHLORIDE: 900; 150 INJECTION, SOLUTION INTRAVENOUS at 12:38

## 2025-06-17 RX ADMIN — HYDROMORPHONE HYDROCHLORIDE 0.5 MG: 2 INJECTION INTRAMUSCULAR; INTRAVENOUS; SUBCUTANEOUS at 00:40

## 2025-06-17 RX ADMIN — FAMOTIDINE 20 MG: 10 INJECTION, SOLUTION INTRAVENOUS at 09:20

## 2025-06-17 RX ADMIN — BUDESONIDE AND FORMOTEROL FUMARATE DIHYDRATE 2 PUFF: 160; 4.5 AEROSOL RESPIRATORY (INHALATION) at 20:20

## 2025-06-17 RX ADMIN — TIOTROPIUM BROMIDE INHALATION SPRAY 5 MCG: 3.12 SPRAY, METERED RESPIRATORY (INHALATION) at 10:08

## 2025-06-17 RX ADMIN — ENOXAPARIN SODIUM 30 MG: 100 INJECTION SUBCUTANEOUS at 09:18

## 2025-06-17 RX ADMIN — SODIUM CHLORIDE, PRESERVATIVE FREE 10 ML: 5 INJECTION INTRAVENOUS at 09:18

## 2025-06-17 RX ADMIN — ALBUTEROL SULFATE 2 PUFF: 90 AEROSOL, METERED RESPIRATORY (INHALATION) at 10:08

## 2025-06-17 RX ADMIN — SODIUM CHLORIDE, PRESERVATIVE FREE 10 ML: 5 INJECTION INTRAVENOUS at 19:34

## 2025-06-17 RX ADMIN — KETOROLAC TROMETHAMINE 30 MG: 30 INJECTION, SOLUTION INTRAMUSCULAR at 09:20

## 2025-06-17 RX ADMIN — FAMOTIDINE 20 MG: 10 INJECTION, SOLUTION INTRAVENOUS at 20:43

## 2025-06-17 RX ADMIN — BUDESONIDE AND FORMOTEROL FUMARATE DIHYDRATE 2 PUFF: 160; 4.5 AEROSOL RESPIRATORY (INHALATION) at 10:08

## 2025-06-17 RX ADMIN — METOPROLOL TARTRATE 5 MG: 5 INJECTION INTRAVENOUS at 19:34

## 2025-06-17 RX ADMIN — ENOXAPARIN SODIUM 30 MG: 100 INJECTION SUBCUTANEOUS at 20:43

## 2025-06-17 ASSESSMENT — PAIN SCALES - GENERAL
PAINLEVEL_OUTOF10: 4
PAINLEVEL_OUTOF10: 5

## 2025-06-17 ASSESSMENT — PAIN - FUNCTIONAL ASSESSMENT: PAIN_FUNCTIONAL_ASSESSMENT: ACTIVITIES ARE NOT PREVENTED

## 2025-06-17 ASSESSMENT — PAIN DESCRIPTION - LOCATION: LOCATION: ABDOMEN

## 2025-06-17 ASSESSMENT — PAIN DESCRIPTION - DESCRIPTORS: DESCRIPTORS: DISCOMFORT

## 2025-06-17 NOTE — H&P
History and Physical    Patient:  Pedrito Hinton  MRN: [id number]    Chief Complaint:    Chief Complaint   Patient presents with    Abdominal Pain     Mid/upper abdominal pains since Friday. Punching feeling to stomach. Thought he was constipated and took laxatives and stool softeners Friday, had large bm Saturday but still having pains. Decreased appetite. Pains worsening after eating.          History Obtained From:  patient, electronic medical record    PCP: Abimael Solano MD    History of Present Illness:   The patient is a 60 y.o. male who presents with abdominal pain.  Patient states the pain started on Friday.  He took magnesium citrate and stool softener with the pain started.  He states he was able to have some watery diarrhea following this but continued to complain of pain.  He denies any vomiting.  His appetite has been poor.  The pain feels like pressure and is located in the upper abdomen.  He denies any previous history of bowel obstructions in the past.  He does have a significant past medical history including a AAA repair, atrial fibrillation, sleep apnea.  He has had a ventral hernia repairs x 2 in the past.  He underwent colonoscopy on 5/21/2025 which did show extensive sigmoid diverticulosis as well as internal hemorrhoids.  He did have a few small polyps removed.  CT of the abdomen pelvis today showed diffuse small bowel dilation with a transition in the right lower quadrant likely due to an adhesion.  CBC unremarkable.  Potassium 3.3.  LFTs within normal limits.      Past Medical History:        Diagnosis Date    AAA (abdominal aortic aneurysm) 2008    Thoracic - repaired    Aortic regurgitation     aortic regurg    Atrial fibrillation (HCC)     Atrial flutter (HCC)     Encounter for cardioversion procedure     Airam Garcia/ Dr. Olivarez, states 11 times, most recent 1/2021    Frequent urination     Pt states he's developed frequent urination with 3 times in last yr noting blood in urine as well.

## 2025-06-17 NOTE — PLAN OF CARE
Problem: Chronic Conditions and Co-morbidities  Goal: Patient's chronic conditions and co-morbidity symptoms are monitored and maintained or improved  Outcome: Progressing  Flowsheets  Taken 6/16/2025 2301  Care Plan - Patient's Chronic Conditions and Co-Morbidity Symptoms are Monitored and Maintained or Improved: Monitor and assess patient's chronic conditions and comorbid symptoms for stability, deterioration, or improvement  Taken 6/16/2025 2158  Care Plan - Patient's Chronic Conditions and Co-Morbidity Symptoms are Monitored and Maintained or Improved: Monitor and assess patient's chronic conditions and comorbid symptoms for stability, deterioration, or improvement     Problem: Safety - Adult  Goal: Free from fall injury  Outcome: Progressing  Note: Bed alarm on, bed locked, side rails up, gripper socks on, call light within reach and able to use appropriately. Plan of care ongoing.

## 2025-06-17 NOTE — PLAN OF CARE
Problem: Chronic Conditions and Co-morbidities  Goal: Patient's chronic conditions and co-morbidity symptoms are monitored and maintained or improved  6/17/2025 1031 by Cintia Torres RN  Outcome: Progressing  6/16/2025 2305 by Tami Sung RN  Outcome: Progressing  Flowsheets (Taken 6/16/2025 2305)  Care Plan - Patient's Chronic Conditions and Co-Morbidity Symptoms are Monitored and Maintained or Improved: Monitor and assess patient's chronic conditions and comorbid symptoms for stability, deterioration, or improvement  6/16/2025 2301 by Tami Sung RN  Outcome: Progressing  Flowsheets  Taken 6/16/2025 2301  Care Plan - Patient's Chronic Conditions and Co-Morbidity Symptoms are Monitored and Maintained or Improved: Monitor and assess patient's chronic conditions and comorbid symptoms for stability, deterioration, or improvement  Taken 6/16/2025 2158  Care Plan - Patient's Chronic Conditions and Co-Morbidity Symptoms are Monitored and Maintained or Improved: Monitor and assess patient's chronic conditions and comorbid symptoms for stability, deterioration, or improvement     Problem: Discharge Planning  Goal: Discharge to home or other facility with appropriate resources  Outcome: Progressing  Flowsheets (Taken 6/16/2025 2158 by Tami Sung RN)  Discharge to home or other facility with appropriate resources: Identify barriers to discharge with patient and caregiver     Problem: Safety - Adult  Goal: Free from fall injury  6/17/2025 1031 by Cintia Torres, RN  Outcome: Progressing  6/16/2025 2305 by Tami Sung RN  Outcome: Progressing  Note: Bed alarm on, bed locked, side rails up, gripper socks on, call light within reach and able to use appropriately. Plan of care ongoing.  6/16/2025 2301 by Tami Sung RN  Outcome: Progressing  Note: Bed alarm on, bed locked, side rails up, gripper socks on, call light within reach and able to use appropriately. Plan of care ongoing.     Problem: Nutrition

## 2025-06-17 NOTE — CARE COORDINATION
Case Management Assessment  Initial Evaluation    Date/Time of Evaluation: 6/17/2025 2:05 PM  Assessment Completed by: BRENDEN Kee    If patient is discharged prior to next notation, then this note serves as note for discharge by case management.    Patient Name: Pedrito Hinton                   YOB: 1965  Diagnosis: SBO (small bowel obstruction) (HCC) [K56.609]                   Date / Time: 6/16/2025  4:25 PM    Patient Admission Status: Inpatient   Readmission Risk (Low < 19, Mod (19-27), High > 27): Readmission Risk Score: 7.7    Current PCP: Abimael Solano MD  PCP verified by CM? Yes    Chart Reviewed: Yes      History Provided by: Patient, Medical Record  Patient Orientation: Alert and Oriented, Person, Place, Situation, Self    Patient Cognition: Alert    Hospitalization in the last 30 days (Readmission):  No    If yes, Readmission Assessment in CM Navigator will be completed.    Advance Directives:      Code Status: Full Code   Patient's Primary Decision Maker is: Legal Next of Kin    Primary Decision Maker: Sanjay Hinton - Child - [phone]    Discharge Planning:    Patient lives with: Spouse/Significant Other Type of Home: House  Primary Care Giver: Self  Patient Support Systems include: Spouse/Significant Other, Family Members, Friends/Neighbors   Current Financial resources: Other (Comment) (Commercial:  [employer])  Current community resources: None  Current services prior to admission: C-pap            Current DME:              Type of Home Care services:  None    ADLS  Prior functional level: Independent in ADLs/IADLs  Current functional level: Independent in ADLs/IADLs    PT AM-PAC:   /24  OT AM-PAC:   /24    Family can provide assistance at DC: Yes  Would you like Case Management to discuss the discharge plan with any other family members/significant others, and if so, who? No  Plans to Return to Present Housing: Yes  Other Identified Issues/Barriers to RETURNING to current

## 2025-06-18 ENCOUNTER — APPOINTMENT (OUTPATIENT)
Dept: GENERAL RADIOLOGY | Age: 60
DRG: 389 | End: 2025-06-18
Payer: COMMERCIAL

## 2025-06-18 LAB
ANION GAP SERPL CALCULATED.3IONS-SCNC: 15 MMOL/L (ref 9–16)
BASOPHILS # BLD: 0.03 K/UL (ref 0–0.2)
BASOPHILS NFR BLD: 0 % (ref 0–2)
BUN SERPL-MCNC: 16 MG/DL (ref 8–23)
BUN/CREAT SERPL: 18 (ref 9–20)
CALCIUM SERPL-MCNC: 8.4 MG/DL (ref 8.6–10.4)
CHLORIDE SERPL-SCNC: 103 MMOL/L (ref 98–107)
CO2 SERPL-SCNC: 22 MMOL/L (ref 20–31)
CREAT SERPL-MCNC: 0.9 MG/DL (ref 0.7–1.2)
EKG ATRIAL RATE: 326 BPM
EKG ATRIAL RATE: 90 BPM
EKG P AXIS: -82 DEGREES
EKG P AXIS: 31 DEGREES
EKG P-R INTERVAL: 160 MS
EKG Q-T INTERVAL: 286 MS
EKG Q-T INTERVAL: 378 MS
EKG QRS DURATION: 100 MS
EKG QRS DURATION: 90 MS
EKG QTC CALCULATION (BAZETT): 462 MS
EKG QTC CALCULATION (BAZETT): 465 MS
EKG R AXIS: -4 DEGREES
EKG R AXIS: 5 DEGREES
EKG T AXIS: 165 DEGREES
EKG T AXIS: 206 DEGREES
EKG VENTRICULAR RATE: 159 BPM
EKG VENTRICULAR RATE: 90 BPM
EOSINOPHIL # BLD: 0.11 K/UL (ref 0–0.44)
EOSINOPHILS RELATIVE PERCENT: 2 % (ref 1–4)
ERYTHROCYTE [DISTWIDTH] IN BLOOD BY AUTOMATED COUNT: 13.2 % (ref 11.8–14.4)
GFR, ESTIMATED: >90 ML/MIN/1.73M2
GLUCOSE SERPL-MCNC: 91 MG/DL (ref 74–99)
HCT VFR BLD AUTO: 41.2 % (ref 40.7–50.3)
HGB BLD-MCNC: 13.9 G/DL (ref 13–17)
IMM GRANULOCYTES # BLD AUTO: <0.03 K/UL (ref 0–0.3)
IMM GRANULOCYTES NFR BLD: 0 %
LYMPHOCYTES NFR BLD: 0.86 K/UL (ref 1.1–3.7)
LYMPHOCYTES RELATIVE PERCENT: 12 % (ref 24–43)
MCH RBC QN AUTO: 30.3 PG (ref 25.2–33.5)
MCHC RBC AUTO-ENTMCNC: 33.7 G/DL (ref 28.4–34.8)
MCV RBC AUTO: 90 FL (ref 82.6–102.9)
MONOCYTES NFR BLD: 0.66 K/UL (ref 0.1–1.2)
MONOCYTES NFR BLD: 10 % (ref 3–12)
NEUTROPHILS NFR BLD: 76 % (ref 36–65)
NEUTS SEG NFR BLD: 5.26 K/UL (ref 1.5–8.1)
NRBC BLD-RTO: 0 PER 100 WBC
PLATELET # BLD AUTO: 169 K/UL (ref 138–453)
PMV BLD AUTO: 10.7 FL (ref 8.1–13.5)
POTASSIUM SERPL-SCNC: 3.7 MMOL/L (ref 3.7–5.3)
RBC # BLD AUTO: 4.58 M/UL (ref 4.21–5.77)
SODIUM SERPL-SCNC: 140 MMOL/L (ref 136–145)
WBC OTHER # BLD: 6.9 K/UL (ref 3.5–11.3)

## 2025-06-18 PROCEDURE — 80048 BASIC METABOLIC PNL TOTAL CA: CPT

## 2025-06-18 PROCEDURE — 6360000002 HC RX W HCPCS

## 2025-06-18 PROCEDURE — 6370000000 HC RX 637 (ALT 250 FOR IP): Performed by: NURSE PRACTITIONER

## 2025-06-18 PROCEDURE — 6370000000 HC RX 637 (ALT 250 FOR IP)

## 2025-06-18 PROCEDURE — 2580000003 HC RX 258

## 2025-06-18 PROCEDURE — 93010 ELECTROCARDIOGRAM REPORT: CPT | Performed by: INTERNAL MEDICINE

## 2025-06-18 PROCEDURE — 2500000003 HC RX 250 WO HCPCS

## 2025-06-18 PROCEDURE — 94664 DEMO&/EVAL PT USE INHALER: CPT

## 2025-06-18 PROCEDURE — 93005 ELECTROCARDIOGRAM TRACING: CPT | Performed by: NURSE PRACTITIONER

## 2025-06-18 PROCEDURE — 94761 N-INVAS EAR/PLS OXIMETRY MLT: CPT

## 2025-06-18 PROCEDURE — 74018 RADEX ABDOMEN 1 VIEW: CPT

## 2025-06-18 PROCEDURE — 2000000000 HC ICU R&B

## 2025-06-18 PROCEDURE — 99223 1ST HOSP IP/OBS HIGH 75: CPT | Performed by: INTERNAL MEDICINE

## 2025-06-18 PROCEDURE — 94640 AIRWAY INHALATION TREATMENT: CPT

## 2025-06-18 PROCEDURE — 93005 ELECTROCARDIOGRAM TRACING: CPT

## 2025-06-18 PROCEDURE — 85025 COMPLETE CBC W/AUTO DIFF WBC: CPT

## 2025-06-18 PROCEDURE — 94669 MECHANICAL CHEST WALL OSCILL: CPT

## 2025-06-18 RX ORDER — 0.9 % SODIUM CHLORIDE 0.9 %
500 INTRAVENOUS SOLUTION INTRAVENOUS ONCE
Status: COMPLETED | OUTPATIENT
Start: 2025-06-18 | End: 2025-06-18

## 2025-06-18 RX ORDER — DILTIAZEM HYDROCHLORIDE 5 MG/ML
10 INJECTION INTRAVENOUS ONCE
Status: COMPLETED | OUTPATIENT
Start: 2025-06-18 | End: 2025-06-18

## 2025-06-18 RX ADMIN — DILTIAZEM HYDROCHLORIDE 10 MG: 5 INJECTION, SOLUTION INTRAVENOUS at 01:32

## 2025-06-18 RX ADMIN — SODIUM CHLORIDE, PRESERVATIVE FREE 10 ML: 5 INJECTION INTRAVENOUS at 20:18

## 2025-06-18 RX ADMIN — AMIODARONE HYDROCHLORIDE 1 MG/MIN: 50 INJECTION, SOLUTION INTRAVENOUS at 02:48

## 2025-06-18 RX ADMIN — DICLOFENAC 2 G: 10 GEL TOPICAL at 20:13

## 2025-06-18 RX ADMIN — METOPROLOL TARTRATE 5 MG: 5 INJECTION INTRAVENOUS at 05:20

## 2025-06-18 RX ADMIN — TIOTROPIUM BROMIDE INHALATION SPRAY 5 MCG: 3.12 SPRAY, METERED RESPIRATORY (INHALATION) at 10:05

## 2025-06-18 RX ADMIN — AMIODARONE HYDROCHLORIDE 0.5 MG/MIN: 50 INJECTION, SOLUTION INTRAVENOUS at 13:12

## 2025-06-18 RX ADMIN — BUDESONIDE AND FORMOTEROL FUMARATE DIHYDRATE 2 PUFF: 160; 4.5 AEROSOL RESPIRATORY (INHALATION) at 20:43

## 2025-06-18 RX ADMIN — DICLOFENAC 2 G: 10 GEL TOPICAL at 10:44

## 2025-06-18 RX ADMIN — BUDESONIDE AND FORMOTEROL FUMARATE DIHYDRATE 2 PUFF: 160; 4.5 AEROSOL RESPIRATORY (INHALATION) at 10:05

## 2025-06-18 RX ADMIN — METOPROLOL TARTRATE 5 MG: 5 INJECTION INTRAVENOUS at 14:05

## 2025-06-18 RX ADMIN — AMIODARONE HYDROCHLORIDE 0.5 MG/MIN: 50 INJECTION, SOLUTION INTRAVENOUS at 09:00

## 2025-06-18 RX ADMIN — METOPROLOL TARTRATE 5 MG: 5 INJECTION INTRAVENOUS at 20:07

## 2025-06-18 RX ADMIN — FAMOTIDINE 20 MG: 10 INJECTION, SOLUTION INTRAVENOUS at 09:00

## 2025-06-18 RX ADMIN — SODIUM CHLORIDE 500 ML: 0.9 INJECTION, SOLUTION INTRAVENOUS at 01:34

## 2025-06-18 RX ADMIN — POTASSIUM CHLORIDE AND SODIUM CHLORIDE: 900; 150 INJECTION, SOLUTION INTRAVENOUS at 01:15

## 2025-06-18 RX ADMIN — ENOXAPARIN SODIUM 30 MG: 100 INJECTION SUBCUTANEOUS at 20:18

## 2025-06-18 RX ADMIN — FAMOTIDINE 20 MG: 10 INJECTION, SOLUTION INTRAVENOUS at 20:17

## 2025-06-18 RX ADMIN — ALBUTEROL SULFATE 2 PUFF: 90 AEROSOL, METERED RESPIRATORY (INHALATION) at 10:05

## 2025-06-18 RX ADMIN — ENOXAPARIN SODIUM 30 MG: 100 INJECTION SUBCUTANEOUS at 09:00

## 2025-06-18 ASSESSMENT — PAIN SCALES - GENERAL: PAINLEVEL_OUTOF10: 4

## 2025-06-18 ASSESSMENT — PAIN DESCRIPTION - LOCATION: LOCATION: FOOT

## 2025-06-18 ASSESSMENT — PAIN DESCRIPTION - DESCRIPTORS: DESCRIPTORS: ACHING;DISCOMFORT

## 2025-06-18 ASSESSMENT — PAIN DESCRIPTION - ORIENTATION: ORIENTATION: LEFT

## 2025-06-18 NOTE — RT PROTOCOL NOTE
RESPIRATORY ASSESSMENT PROTOCOL                                                                                              Patient Name: Pedrito Hinton Room#: I305/I305-01 : 1965     Admitting diagnosis: SBO (small bowel obstruction) ([facility]) [K56.609]       Medical History:   Past Medical History:   Diagnosis Date    Aortic regurgitation     aortic regurg    Atrial fibrillation (HCC)     Atrial flutter ([facility])     [facility] states 11 times, most recent 2021    Frequent urination     Pt states he's developed frequent urination with 3 times in last yr noting blood in urine as well.    H/O echocardiogram 2017    EF 50-55% Global LV systolic function appears preserved. Mildly increaseed LV wall thinckness with normal LV carvity size, No definite specific wall motion abnormalities identified, LA is moderately dilated (34-39) with LA volume index of 36ml/m2, Mild mitral and tricuspid regurgitation, moderate aortic regurgitation, Evidence of mild diastolic dysfunction seen    H/O echocardiogram 2021    EF 55% patient has sigmoid interventricular septim S/P aortic vlave repair with mild to mod aortic regurg Diastoligy cannot be properly assessed due to pts rhtyhm Aortic root is mildly dilated when corrected for body surface area    Hemorrhage of rectum and anus 2013    polypectomy    History of 24 hour EKG monitoring 2014    Unremarkable except for mild increased number of PVC's with 3 ventricular couplets, asymptomatic     History of echocardiogram 04/10/2014    EF 60%, mild to mod LVH, moderate AI    History of PFTs 2015    Consistent with mild obstructive ventilatory impairment. Lung volumes are normal,except mild increase in residual volume,which could be suggestive of airway trapping. Diffusion capacity is normal.    Hx of transesophageal echocardiography (SKYLAR) for monitoring 2015    Dr Olivarez/[facility]

## 2025-06-18 NOTE — CONSULTS
Patient comes in with abdominal pain, nausea and diarrhea.  He states is started Friday.  He felt \"blocked\".  His pain is epigastric, substernal and bilateral subcostal. He used magnesium citrate and Dulcolax Friday evening.  Had loose stools Saturday morning.  No bowel movement since then.      He has had considerable NG drainage since it was placed.  He has begun passing a lot of flatus.  His abdominal pain is gone.    Past Medical History:   Diagnosis Date    Aortic regurgitation     aortic regurg    Atrial fibrillation (HCC)     Atrial flutter (HCC)     Encounter for cardioversion procedure     [facility]/ Dr. Olivarez, states 11 times, most recent 1/2021    Frequent urination     Pt states he's developed frequent urination with 3 times in last yr noting blood in urine as well.    H/O echocardiogram 12/04/2017    EF 50-55% Global LV systolic function appears preserved. Mildly increaseed LV wall thinckness with normal LV carvity size, No definite specific wall motion abnormalities identified, LA is moderately dilated (34-39) with LA volume index of 36ml/m2, Mild mitral and tricuspid regurgitation, moderate aortic regurgitation, Evidence of mild diastolic dysfunction seen    H/O echocardiogram 02/02/2021    EF 55% patient has sigmoid interventricular septim S/P aortic vlave repair with mild to mod aortic regurg Diastoligy cannot be properly assessed due to pts rhtyhm Aortic root is mildly dilated when corrected for body surface area    Hemorrhage of rectum and anus 2013    polypectomy    History of 24 hour EKG monitoring 04/03/2014    Unremarkable except for mild increased number of PVC's with 3 ventricular couplets, asymptomatic     History of echocardiogram 04/10/2014    EF 60%, mild to mod LVH, moderate AI    History of PFTs 06/25/2015    Consistent with mild obstructive ventilatory impairment. Lung volumes are normal,except mild increase in residual volume,which could be suggestive of airway trapping. Diffusion 
Stress test done on 8/1/2023: Abnormal study suggestive of non-ischemic cardiomyopathy but can not rule out a small degree of ischemia. Dilated left ventricle with moderately reduced left ventricular systolic function. Gated SPECT showed calculated EF is 42% with moderate global hypokinesis. No segmental abnormalities. No evidence of transient ischemic dilatation. Increased right ventricular free wall uptake suggestive of right ventricular hypertrophy. Patient developed atrial fibrillation with rapid ventricular response during stress and converted to sinus rhythm spontaneously.    16- Admitted in [facility] for Tikosyn Loading. He failed that and was sent to [place] [facility]. While there it was decided to not do the procedure and instead do Sotalol at this time.     17- CAM done on 12/6/23: 9 days and 17 hours recorded. Baseline rhythm is sinus with average heart rate of 82 bpm, ranging between 58 and 127 bpm. Sinus tachycardia represented 5% of the study duration. Occasional PACs, PACs burden 1.1% which she with frequent runs of ectopic atrial tachycardia, the longest being 56 minutes at an average heart rate of 172 bpm. Occasional PVCs, PVCs burden 2.6% with occasional ventricular couplets.  No ventricular runs.  No patient activated events recorded.    18- Echo done on 3/27/24: Left Ventricle: Normal left ventricular systolic function with a visually estimated EF of 55 - 60%. Severely increased wall thickness. Findings consistent with concentric remodeling. Grade I diastolic dysfunction with normal LAP.  Aortic Valve: Moderate regurgitation. Mild stenosis of the aortic valve. AV mean gradient is 11 mmHg. Mitral Valve: Mild regurgitation. Tricuspid Valve: Mild to moderate regurgitation with a centrally directed jet. Left Atrium: Left atrium is mildly dilated. Left atrial volume index is mildly increased (35-41 mL/m2). LA Vol Index is  36 ml/m2. Interatrial Septum: What appears to be a small PFO is present

## 2025-06-18 NOTE — PLAN OF CARE
Problem: Chronic Conditions and Co-morbidities  Goal: Patient's chronic conditions and co-morbidity symptoms are monitored and maintained or improved  Outcome: Progressing     Problem: Discharge Planning  Goal: Discharge to home or other facility with appropriate resources  Outcome: Progressing     Problem: Safety - Adult  Goal: Free from fall injury  6/18/2025 0740 by Sarah Sol, RN  Outcome: Progressing     Problem: Nutrition Deficit:  Goal: Optimize nutritional status  6/18/2025 0740 by Sarah Sol, RN  Outcome: Progressing     Problem: Gastrointestinal - Adult  Goal: Minimal or absence of nausea and vomiting  Outcome: Progressing     Problem: Gastrointestinal - Adult  Goal: Maintains or returns to baseline bowel function  Outcome: Progressing     Problem: Gastrointestinal - Adult  Goal: Maintains adequate nutritional intake  Outcome: Progressing

## 2025-06-18 NOTE — PLAN OF CARE
Problem: Safety - Adult  Goal: Free from fall injury  6/17/2025 2206 by Ingrid Navarrete, RN  Flowsheets (Taken 6/17/2025 2206)  Free From Fall Injury:   Instruct family/caregiver on patient safety   Based on caregiver fall risk screen, instruct family/caregiver to ask for assistance with transferring infant if caregiver noted to have fall risk factors  Note: Patient educated on importance of use of call light to ambulate. Gripper socks on & call light within reach.     Problem: Nutrition Deficit:  Goal: Optimize nutritional status  6/17/2025 2208 by Ingrid Navarrete, RN  Flowsheets (Taken 6/17/2025 2208)  Nutrient intake appropriate for improving, restoring, or maintaining nutritional needs:   Monitor oral intake, labs, and treatment plans   Assess nutritional status and recommend course of action  Note: NG in place, IV fluids on at this time for IV hydration. Monitoring labs and intake/output.

## 2025-06-19 ENCOUNTER — APPOINTMENT (OUTPATIENT)
Dept: GENERAL RADIOLOGY | Age: 60
DRG: 389 | End: 2025-06-19
Payer: COMMERCIAL

## 2025-06-19 LAB
ANION GAP SERPL CALCULATED.3IONS-SCNC: 13 MMOL/L (ref 9–16)
BASOPHILS # BLD: <0.03 K/UL (ref 0–0.2)
BASOPHILS NFR BLD: 0 % (ref 0–2)
BUN SERPL-MCNC: 12 MG/DL (ref 8–23)
BUN/CREAT SERPL: 13 (ref 9–20)
CALCIUM SERPL-MCNC: 8.4 MG/DL (ref 8.6–10.4)
CHLORIDE SERPL-SCNC: 104 MMOL/L (ref 98–107)
CO2 SERPL-SCNC: 23 MMOL/L (ref 20–31)
CREAT SERPL-MCNC: 0.9 MG/DL (ref 0.7–1.2)
CRP SERPL HS-MCNC: 182 MG/L (ref 0–5)
EKG Q-T INTERVAL: 354 MS
EKG QRS DURATION: 110 MS
EKG QTC CALCULATION (BAZETT): 504 MS
EKG R AXIS: -22 DEGREES
EKG T AXIS: 155 DEGREES
EKG VENTRICULAR RATE: 122 BPM
EOSINOPHIL # BLD: 0.05 K/UL (ref 0–0.44)
EOSINOPHILS RELATIVE PERCENT: 1 % (ref 1–4)
ERYTHROCYTE [DISTWIDTH] IN BLOOD BY AUTOMATED COUNT: 13.2 % (ref 11.8–14.4)
ERYTHROCYTE [SEDIMENTATION RATE] IN BLOOD BY PHOTOMETRIC METHOD: 30 MM/HR (ref 0–20)
GFR, ESTIMATED: >90 ML/MIN/1.73M2
GLUCOSE SERPL-MCNC: 113 MG/DL (ref 74–99)
HCT VFR BLD AUTO: 40.1 % (ref 40.7–50.3)
HGB BLD-MCNC: 13.4 G/DL (ref 13–17)
IMM GRANULOCYTES # BLD AUTO: 0.05 K/UL (ref 0–0.3)
IMM GRANULOCYTES NFR BLD: 1 %
LYMPHOCYTES NFR BLD: 0.96 K/UL (ref 1.1–3.7)
LYMPHOCYTES RELATIVE PERCENT: 12 % (ref 24–43)
MCH RBC QN AUTO: 30 PG (ref 25.2–33.5)
MCHC RBC AUTO-ENTMCNC: 33.4 G/DL (ref 28.4–34.8)
MCV RBC AUTO: 89.7 FL (ref 82.6–102.9)
MONOCYTES NFR BLD: 0.56 K/UL (ref 0.1–1.2)
MONOCYTES NFR BLD: 7 % (ref 3–12)
NEUTROPHILS NFR BLD: 79 % (ref 36–65)
NEUTS SEG NFR BLD: 6.71 K/UL (ref 1.5–8.1)
NRBC BLD-RTO: 0 PER 100 WBC
PLATELET # BLD AUTO: 152 K/UL (ref 138–453)
PMV BLD AUTO: 10.5 FL (ref 8.1–13.5)
POTASSIUM SERPL-SCNC: 3.7 MMOL/L (ref 3.7–5.3)
RBC # BLD AUTO: 4.47 M/UL (ref 4.21–5.77)
SODIUM SERPL-SCNC: 140 MMOL/L (ref 136–145)
URATE SERPL-MCNC: 9 MG/DL (ref 3.4–7)
WBC OTHER # BLD: 8.4 K/UL (ref 3.5–11.3)

## 2025-06-19 PROCEDURE — 6370000000 HC RX 637 (ALT 250 FOR IP): Performed by: NURSE PRACTITIONER

## 2025-06-19 PROCEDURE — 93005 ELECTROCARDIOGRAM TRACING: CPT | Performed by: STUDENT IN AN ORGANIZED HEALTH CARE EDUCATION/TRAINING PROGRAM

## 2025-06-19 PROCEDURE — 84550 ASSAY OF BLOOD/URIC ACID: CPT

## 2025-06-19 PROCEDURE — 99232 SBSQ HOSP IP/OBS MODERATE 35: CPT | Performed by: INTERNAL MEDICINE

## 2025-06-19 PROCEDURE — 73630 X-RAY EXAM OF FOOT: CPT

## 2025-06-19 PROCEDURE — 94669 MECHANICAL CHEST WALL OSCILL: CPT

## 2025-06-19 PROCEDURE — 94664 DEMO&/EVAL PT USE INHALER: CPT

## 2025-06-19 PROCEDURE — 74018 RADEX ABDOMEN 1 VIEW: CPT

## 2025-06-19 PROCEDURE — 36415 COLL VENOUS BLD VENIPUNCTURE: CPT

## 2025-06-19 PROCEDURE — 6360000002 HC RX W HCPCS

## 2025-06-19 PROCEDURE — 85025 COMPLETE CBC W/AUTO DIFF WBC: CPT

## 2025-06-19 PROCEDURE — 2500000003 HC RX 250 WO HCPCS

## 2025-06-19 PROCEDURE — 6360000002 HC RX W HCPCS: Performed by: NURSE PRACTITIONER

## 2025-06-19 PROCEDURE — 6370000000 HC RX 637 (ALT 250 FOR IP)

## 2025-06-19 PROCEDURE — 2000000000 HC ICU R&B

## 2025-06-19 PROCEDURE — 94640 AIRWAY INHALATION TREATMENT: CPT

## 2025-06-19 PROCEDURE — 80048 BASIC METABOLIC PNL TOTAL CA: CPT

## 2025-06-19 PROCEDURE — 93010 ELECTROCARDIOGRAM REPORT: CPT | Performed by: INTERNAL MEDICINE

## 2025-06-19 PROCEDURE — 85652 RBC SED RATE AUTOMATED: CPT

## 2025-06-19 PROCEDURE — 86140 C-REACTIVE PROTEIN: CPT

## 2025-06-19 PROCEDURE — 2580000003 HC RX 258

## 2025-06-19 PROCEDURE — 94761 N-INVAS EAR/PLS OXIMETRY MLT: CPT

## 2025-06-19 PROCEDURE — 2500000003 HC RX 250 WO HCPCS: Performed by: NURSE PRACTITIONER

## 2025-06-19 RX ORDER — ERGOCALCIFEROL 1.25 MG/1
50000 CAPSULE, LIQUID FILLED ORAL WEEKLY
Status: DISCONTINUED | OUTPATIENT
Start: 2025-06-19 | End: 2025-06-20 | Stop reason: HOSPADM

## 2025-06-19 RX ORDER — ASPIRIN 81 MG/1
81 TABLET ORAL 2 TIMES DAILY
Status: DISCONTINUED | OUTPATIENT
Start: 2025-06-19 | End: 2025-06-20 | Stop reason: HOSPADM

## 2025-06-19 RX ORDER — SOTALOL HYDROCHLORIDE 80 MG/1
80 TABLET ORAL 2 TIMES DAILY
Status: DISCONTINUED | OUTPATIENT
Start: 2025-06-19 | End: 2025-06-20 | Stop reason: HOSPADM

## 2025-06-19 RX ORDER — TRAZODONE HYDROCHLORIDE 50 MG/1
50 TABLET ORAL NIGHTLY PRN
Status: DISCONTINUED | OUTPATIENT
Start: 2025-06-19 | End: 2025-06-20 | Stop reason: HOSPADM

## 2025-06-19 RX ORDER — COLCHICINE 0.6 MG/1
0.6 TABLET ORAL DAILY
Status: DISCONTINUED | OUTPATIENT
Start: 2025-06-19 | End: 2025-06-20 | Stop reason: HOSPADM

## 2025-06-19 RX ORDER — BUMETANIDE 1 MG/1
2 TABLET ORAL DAILY
Status: DISCONTINUED | OUTPATIENT
Start: 2025-06-19 | End: 2025-06-20 | Stop reason: HOSPADM

## 2025-06-19 RX ORDER — TRAMADOL HYDROCHLORIDE 50 MG/1
100 TABLET ORAL EVERY 6 HOURS PRN
Status: DISCONTINUED | OUTPATIENT
Start: 2025-06-19 | End: 2025-06-20 | Stop reason: HOSPADM

## 2025-06-19 RX ORDER — LOSARTAN POTASSIUM 25 MG/1
25 TABLET ORAL DAILY
Status: DISCONTINUED | OUTPATIENT
Start: 2025-06-19 | End: 2025-06-20 | Stop reason: HOSPADM

## 2025-06-19 RX ORDER — TRAMADOL HYDROCHLORIDE 50 MG/1
50 TABLET ORAL EVERY 6 HOURS PRN
Status: DISCONTINUED | OUTPATIENT
Start: 2025-06-19 | End: 2025-06-20 | Stop reason: HOSPADM

## 2025-06-19 RX ORDER — LACTULOSE 10 G/15ML
20 SOLUTION ORAL ONCE
Status: COMPLETED | OUTPATIENT
Start: 2025-06-19 | End: 2025-06-19

## 2025-06-19 RX ORDER — SOTALOL HYDROCHLORIDE 80 MG/1
80 TABLET ORAL 2 TIMES DAILY
Status: DISCONTINUED | OUTPATIENT
Start: 2025-06-19 | End: 2025-06-19

## 2025-06-19 RX ORDER — COLCHICINE 0.6 MG/1
1.2 TABLET ORAL ONCE
Status: COMPLETED | OUTPATIENT
Start: 2025-06-19 | End: 2025-06-19

## 2025-06-19 RX ORDER — METOLAZONE 2.5 MG/1
5 TABLET ORAL
Status: DISCONTINUED | OUTPATIENT
Start: 2025-06-20 | End: 2025-06-20 | Stop reason: HOSPADM

## 2025-06-19 RX ORDER — DILTIAZEM HYDROCHLORIDE 120 MG/1
120 CAPSULE, COATED, EXTENDED RELEASE ORAL DAILY
Status: DISCONTINUED | OUTPATIENT
Start: 2025-06-19 | End: 2025-06-20 | Stop reason: HOSPADM

## 2025-06-19 RX ADMIN — SOTALOL HYDROCHLORIDE 80 MG: 80 TABLET ORAL at 19:51

## 2025-06-19 RX ADMIN — ENOXAPARIN SODIUM 30 MG: 100 INJECTION SUBCUTANEOUS at 08:16

## 2025-06-19 RX ADMIN — HYDROMORPHONE HYDROCHLORIDE 0.5 MG: 2 INJECTION INTRAMUSCULAR; INTRAVENOUS; SUBCUTANEOUS at 02:10

## 2025-06-19 RX ADMIN — AMIODARONE HYDROCHLORIDE 0.5 MG/MIN: 50 INJECTION, SOLUTION INTRAVENOUS at 06:58

## 2025-06-19 RX ADMIN — COLCHICINE 1.2 MG: 0.6 TABLET ORAL at 18:05

## 2025-06-19 RX ADMIN — BUDESONIDE AND FORMOTEROL FUMARATE DIHYDRATE 2 PUFF: 160; 4.5 AEROSOL RESPIRATORY (INHALATION) at 10:05

## 2025-06-19 RX ADMIN — ERGOCALCIFEROL 50000 UNITS: 1.25 CAPSULE ORAL at 10:31

## 2025-06-19 RX ADMIN — SOTALOL HYDROCHLORIDE 80 MG: 80 TABLET ORAL at 13:29

## 2025-06-19 RX ADMIN — FAMOTIDINE 20 MG: 10 INJECTION, SOLUTION INTRAVENOUS at 08:16

## 2025-06-19 RX ADMIN — LOSARTAN POTASSIUM 25 MG: 25 TABLET, FILM COATED ORAL at 10:31

## 2025-06-19 RX ADMIN — FAMOTIDINE 20 MG: 10 INJECTION, SOLUTION INTRAVENOUS at 19:51

## 2025-06-19 RX ADMIN — ASPIRIN 81 MG: 81 TABLET, COATED ORAL at 10:31

## 2025-06-19 RX ADMIN — LACTULOSE 20 G: 20 SOLUTION ORAL at 10:31

## 2025-06-19 RX ADMIN — DILTIAZEM HYDROCHLORIDE 120 MG: 120 CAPSULE, COATED, EXTENDED RELEASE ORAL at 10:31

## 2025-06-19 RX ADMIN — METOPROLOL TARTRATE 5 MG: 5 INJECTION INTRAVENOUS at 04:38

## 2025-06-19 RX ADMIN — TIOTROPIUM BROMIDE INHALATION SPRAY 5 MCG: 3.12 SPRAY, METERED RESPIRATORY (INHALATION) at 10:05

## 2025-06-19 RX ADMIN — COLCHICINE 0.6 MG: 0.6 TABLET ORAL at 19:51

## 2025-06-19 RX ADMIN — POTASSIUM CHLORIDE AND SODIUM CHLORIDE: 900; 150 INJECTION, SOLUTION INTRAVENOUS at 05:01

## 2025-06-19 RX ADMIN — ENOXAPARIN SODIUM 30 MG: 100 INJECTION SUBCUTANEOUS at 19:50

## 2025-06-19 RX ADMIN — Medication 5 MG: at 02:05

## 2025-06-19 RX ADMIN — METOPROLOL TARTRATE 5 MG: 5 INJECTION INTRAVENOUS at 11:31

## 2025-06-19 RX ADMIN — EMPAGLIFLOZIN 10 MG: 10 TABLET, FILM COATED ORAL at 10:31

## 2025-06-19 RX ADMIN — ASPIRIN 81 MG: 81 TABLET, COATED ORAL at 19:51

## 2025-06-19 RX ADMIN — SODIUM CHLORIDE, PRESERVATIVE FREE 10 ML: 5 INJECTION INTRAVENOUS at 08:19

## 2025-06-19 RX ADMIN — BUMETANIDE 2 MG: 1 TABLET ORAL at 10:31

## 2025-06-19 RX ADMIN — DICLOFENAC 2 G: 10 GEL TOPICAL at 08:18

## 2025-06-19 RX ADMIN — ALBUTEROL SULFATE 2 PUFF: 90 AEROSOL, METERED RESPIRATORY (INHALATION) at 10:05

## 2025-06-19 RX ADMIN — WATER 125 MG: 1 INJECTION INTRAMUSCULAR; INTRAVENOUS; SUBCUTANEOUS at 13:29

## 2025-06-19 RX ADMIN — HYDROMORPHONE HYDROCHLORIDE 0.5 MG: 2 INJECTION INTRAMUSCULAR; INTRAVENOUS; SUBCUTANEOUS at 11:34

## 2025-06-19 RX ADMIN — BUDESONIDE AND FORMOTEROL FUMARATE DIHYDRATE 2 PUFF: 160; 4.5 AEROSOL RESPIRATORY (INHALATION) at 20:49

## 2025-06-19 RX ADMIN — HYDROMORPHONE HYDROCHLORIDE 0.5 MG: 2 INJECTION INTRAMUSCULAR; INTRAVENOUS; SUBCUTANEOUS at 07:09

## 2025-06-19 RX ADMIN — ACETAMINOPHEN 650 MG: 325 TABLET ORAL at 19:51

## 2025-06-19 ASSESSMENT — PAIN SCALES - GENERAL
PAINLEVEL_OUTOF10: 2
PAINLEVEL_OUTOF10: 2
PAINLEVEL_OUTOF10: 5
PAINLEVEL_OUTOF10: 5
PAINLEVEL_OUTOF10: 6
PAINLEVEL_OUTOF10: 0
PAINLEVEL_OUTOF10: 2
PAINLEVEL_OUTOF10: 4
PAINLEVEL_OUTOF10: 2
PAINLEVEL_OUTOF10: 2
PAINLEVEL_OUTOF10: 4

## 2025-06-19 ASSESSMENT — PAIN DESCRIPTION - PAIN TYPE
TYPE: ACUTE PAIN;CHRONIC PAIN
TYPE: ACUTE PAIN
TYPE: ACUTE PAIN
TYPE: ACUTE PAIN;CHRONIC PAIN

## 2025-06-19 ASSESSMENT — PAIN - FUNCTIONAL ASSESSMENT
PAIN_FUNCTIONAL_ASSESSMENT: ACTIVITIES ARE NOT PREVENTED
PAIN_FUNCTIONAL_ASSESSMENT: ACTIVITIES ARE NOT PREVENTED
PAIN_FUNCTIONAL_ASSESSMENT: PREVENTS OR INTERFERES SOME ACTIVE ACTIVITIES AND ADLS

## 2025-06-19 ASSESSMENT — PAIN DESCRIPTION - DESCRIPTORS
DESCRIPTORS: ACHING
DESCRIPTORS: THROBBING

## 2025-06-19 ASSESSMENT — PAIN DESCRIPTION - ONSET
ONSET: ON-GOING

## 2025-06-19 ASSESSMENT — PAIN DESCRIPTION - ORIENTATION
ORIENTATION: LEFT
ORIENTATION: LEFT
ORIENTATION: LEFT;MID
ORIENTATION: LEFT

## 2025-06-19 ASSESSMENT — PAIN DESCRIPTION - FREQUENCY
FREQUENCY: INTERMITTENT
FREQUENCY: CONTINUOUS
FREQUENCY: INTERMITTENT
FREQUENCY: CONTINUOUS

## 2025-06-19 ASSESSMENT — PAIN DESCRIPTION - LOCATION
LOCATION: FOOT

## 2025-06-19 NOTE — RT PROTOCOL NOTE
RESPIRATORY ASSESSMENT PROTOCOL                                                                                              Patient Name: Pedrito Hinton Room#: I305/I305-01 : 1965     Admitting diagnosis: SBO (small bowel obstruction) ([facility]) [K56.609]       Medical History:   Past Medical History:   Diagnosis Date    Aortic regurgitation     aortic regurg    Atrial fibrillation (HCC)     Atrial flutter ([facility])     [facility], states 11 times, most recent 2021    Frequent urination     Pt states he's developed frequent urination with 3 times in last yr noting blood in urine as well.    H/O echocardiogram 2017    EF 50-55% Global LV systolic function appears preserved. Mildly increaseed LV wall thinckness with normal LV carvity size, No definite specific wall motion abnormalities identified, LA is moderately dilated (34-39) with LA volume index of 36ml/m2, Mild mitral and tricuspid regurgitation, moderate aortic regurgitation, Evidence of mild diastolic dysfunction seen    H/O echocardiogram 2021    EF 55% patient has sigmoid interventricular septim S/P aortic vlave repair with mild to mod aortic regurg Diastoligy cannot be properly assessed due to pts rhtyhm Aortic root is mildly dilated when corrected for body surface area    Hemorrhage of rectum and anus 2013    polypectomy    History of 24 hour EKG monitoring 2014    Unremarkable except for mild increased number of PVC's with 3 ventricular couplets, asymptomatic     History of echocardiogram 04/10/2014    EF 60%, mild to mod LVH, moderate AI    History of PFTs 2015    Consistent with mild obstructive ventilatory impairment. Lung volumes are normal,except mild increase in residual volume,which could be suggestive of airway trapping. Diffusion capacity is normal.    Hx of transesophageal echocardiography (SKYLAR) for monitoring 2015    Dr Olivarez/[facility]

## 2025-06-19 NOTE — PLAN OF CARE
Problem: Chronic Conditions and Co-morbidities  Goal: Patient's chronic conditions and co-morbidity symptoms are monitored and maintained or improved  Outcome: Progressing     Problem: Discharge Planning  Goal: Discharge to home or other facility with appropriate resources  Outcome: Progressing  Flowsheets  Taken 6/19/2025 0000  Discharge to home or other facility with appropriate resources: Identify barriers to discharge with patient and caregiver  Taken 6/18/2025 1810  Discharge to home or other facility with appropriate resources: Identify barriers to discharge with patient and caregiver     Problem: Safety - Adult  Goal: Free from fall injury  Outcome: Progressing  Note: Call light and bedside table with in reach. Bed and chair wheels locked at all times, with bed in lowest position. Frequent checks and needs meet in appropriate timing.      Problem: Nutrition Deficit:  Goal: Optimize nutritional status  Outcome: Progressing     Problem: Gastrointestinal - Adult  Goal: Minimal or absence of nausea and vomiting  Outcome: Progressing  Flowsheets (Taken 6/18/2025 1810)  Minimal or absence of nausea and vomiting:   Administer IV fluids as ordered to ensure adequate hydration   Provide nonpharmacologic comfort measures as appropriate  Goal: Maintains or returns to baseline bowel function  Recent Flowsheet Documentation  Taken 6/18/2025 1810 by Roxy Freedman, RN  Maintains or returns to baseline bowel function:   Assess bowel function   Administer IV fluids as ordered to ensure adequate hydration   Administer ordered medications as needed   Encourage mobilization and activity  Goal: Maintains adequate nutritional intake  Recent Flowsheet Documentation  Taken 6/18/2025 1810 by Roxy Freedman, RN  Maintains adequate nutritional intake: Monitor intake and output, weight and lab values     Problem: Pain  Goal: Verbalizes/displays adequate comfort level or baseline comfort level  Outcome: Progressing  Note: Notify  Parent/Support Person

## 2025-06-19 NOTE — PLAN OF CARE
Problem: Chronic Conditions and Co-morbidities  Goal: Patient's chronic conditions and co-morbidity symptoms are monitored and maintained or improved  6/19/2025 1546 by Ingrid Shelton  Outcome: Progressing  Flowsheets (Taken 6/19/2025 1546)  Care Plan - Patient's Chronic Conditions and Co-Morbidity Symptoms are Monitored and Maintained or Improved:   Monitor and assess patient's chronic conditions and comorbid symptoms for stability, deterioration, or improvement   Collaborate with multidisciplinary team to address chronic and comorbid conditions and prevent exacerbation or deterioration     Problem: Gastrointestinal - Adult  Goal: Minimal or absence of nausea and vomiting  6/19/2025 1546 by Ingrid Shelton  Outcome: Progressing  Flowsheets (Taken 6/19/2025 1546)  Minimal or absence of nausea and vomiting:   Provide nonpharmacologic comfort measures as appropriate   Advance diet as tolerated, if ordered  Note: Patient tolerating NG tube removal     Problem: Gastrointestinal - Adult  Goal: Maintains or returns to baseline bowel function  Outcome: Progressing  Flowsheets (Taken 6/19/2025 1546)  Maintains or returns to baseline bowel function:   Assess bowel function   Encourage oral fluids to ensure adequate hydration   Administer ordered medications as needed   Encourage mobilization and activity  Note: Passing gas     Problem: Gastrointestinal - Adult  Goal: Maintains adequate nutritional intake  Outcome: Progressing  Flowsheets (Taken 6/19/2025 1546)  Maintains adequate nutritional intake:   Monitor percentage of each meal consumed   Monitor intake and output, weight and lab values     Problem: Pain  Goal: Verbalizes/displays adequate comfort level or baseline comfort level  6/19/2025 1546 by Ingrid Shelton  Outcome: Progressing  Flowsheets (Taken 6/19/2025 1546)  Verbalizes/displays adequate comfort level or baseline comfort level:   Encourage patient to monitor pain and request assistance   Assess pain using appropriate

## 2025-06-20 ENCOUNTER — APPOINTMENT (OUTPATIENT)
Dept: GENERAL RADIOLOGY | Age: 60
DRG: 389 | End: 2025-06-20
Payer: COMMERCIAL

## 2025-06-20 VITALS
HEIGHT: 68 IN | BODY MASS INDEX: 39.6 KG/M2 | HEART RATE: 80 BPM | WEIGHT: 261.31 LBS | OXYGEN SATURATION: 95 % | DIASTOLIC BLOOD PRESSURE: 55 MMHG | RESPIRATION RATE: 22 BRPM | SYSTOLIC BLOOD PRESSURE: 107 MMHG | TEMPERATURE: 97.7 F

## 2025-06-20 PROBLEM — M10.9 ACUTE GOUT OF LEFT FOOT: Status: ACTIVE | Noted: 2025-06-20

## 2025-06-20 LAB
ANION GAP SERPL CALCULATED.3IONS-SCNC: 15 MMOL/L (ref 9–16)
BASOPHILS # BLD: <0.03 K/UL (ref 0–0.2)
BASOPHILS NFR BLD: 0 % (ref 0–2)
BUN SERPL-MCNC: 17 MG/DL (ref 8–23)
BUN/CREAT SERPL: 19 (ref 9–20)
CALCIUM SERPL-MCNC: 8.6 MG/DL (ref 8.6–10.4)
CHLORIDE SERPL-SCNC: 103 MMOL/L (ref 98–107)
CO2 SERPL-SCNC: 22 MMOL/L (ref 20–31)
CREAT SERPL-MCNC: 0.9 MG/DL (ref 0.7–1.2)
CRP SERPL HS-MCNC: 206 MG/L (ref 0–5)
EOSINOPHIL # BLD: <0.03 K/UL (ref 0–0.44)
EOSINOPHILS RELATIVE PERCENT: 0 % (ref 1–4)
ERYTHROCYTE [DISTWIDTH] IN BLOOD BY AUTOMATED COUNT: 13.2 % (ref 11.8–14.4)
ERYTHROCYTE [SEDIMENTATION RATE] IN BLOOD BY PHOTOMETRIC METHOD: 41 MM/HR (ref 0–20)
GFR, ESTIMATED: >90 ML/MIN/1.73M2
GLUCOSE SERPL-MCNC: 133 MG/DL (ref 74–99)
HCT VFR BLD AUTO: 39.4 % (ref 40.7–50.3)
HGB BLD-MCNC: 13.2 G/DL (ref 13–17)
IMM GRANULOCYTES # BLD AUTO: 0.05 K/UL (ref 0–0.3)
IMM GRANULOCYTES NFR BLD: 1 %
LYMPHOCYTES NFR BLD: 0.61 K/UL (ref 1.1–3.7)
LYMPHOCYTES RELATIVE PERCENT: 7 % (ref 24–43)
MCH RBC QN AUTO: 30.1 PG (ref 25.2–33.5)
MCHC RBC AUTO-ENTMCNC: 33.5 G/DL (ref 28.4–34.8)
MCV RBC AUTO: 89.7 FL (ref 82.6–102.9)
MONOCYTES NFR BLD: 0.53 K/UL (ref 0.1–1.2)
MONOCYTES NFR BLD: 6 % (ref 3–12)
NEUTROPHILS NFR BLD: 86 % (ref 36–65)
NEUTS SEG NFR BLD: 8.15 K/UL (ref 1.5–8.1)
NRBC BLD-RTO: 0 PER 100 WBC
PLATELET # BLD AUTO: 156 K/UL (ref 138–453)
PMV BLD AUTO: 10.3 FL (ref 8.1–13.5)
POTASSIUM SERPL-SCNC: 3.7 MMOL/L (ref 3.7–5.3)
RBC # BLD AUTO: 4.39 M/UL (ref 4.21–5.77)
SODIUM SERPL-SCNC: 140 MMOL/L (ref 136–145)
WBC OTHER # BLD: 9.4 K/UL (ref 3.5–11.3)

## 2025-06-20 PROCEDURE — 80048 BASIC METABOLIC PNL TOTAL CA: CPT

## 2025-06-20 PROCEDURE — 94761 N-INVAS EAR/PLS OXIMETRY MLT: CPT

## 2025-06-20 PROCEDURE — 94640 AIRWAY INHALATION TREATMENT: CPT

## 2025-06-20 PROCEDURE — 2580000003 HC RX 258

## 2025-06-20 PROCEDURE — 94669 MECHANICAL CHEST WALL OSCILL: CPT

## 2025-06-20 PROCEDURE — 6360000002 HC RX W HCPCS

## 2025-06-20 PROCEDURE — 85652 RBC SED RATE AUTOMATED: CPT

## 2025-06-20 PROCEDURE — 6370000000 HC RX 637 (ALT 250 FOR IP)

## 2025-06-20 PROCEDURE — 6370000000 HC RX 637 (ALT 250 FOR IP): Performed by: NURSE PRACTITIONER

## 2025-06-20 PROCEDURE — 74250 X-RAY XM SM INT 1CNTRST STD: CPT

## 2025-06-20 PROCEDURE — 85025 COMPLETE CBC W/AUTO DIFF WBC: CPT

## 2025-06-20 PROCEDURE — 94664 DEMO&/EVAL PT USE INHALER: CPT

## 2025-06-20 PROCEDURE — 86140 C-REACTIVE PROTEIN: CPT

## 2025-06-20 PROCEDURE — 74018 RADEX ABDOMEN 1 VIEW: CPT

## 2025-06-20 PROCEDURE — 6360000004 HC RX CONTRAST MEDICATION: Performed by: SURGERY

## 2025-06-20 PROCEDURE — 2500000003 HC RX 250 WO HCPCS

## 2025-06-20 RX ORDER — MIDODRINE HYDROCHLORIDE 5 MG/1
5 TABLET ORAL 2 TIMES DAILY WITH MEALS
Status: DISCONTINUED | OUTPATIENT
Start: 2025-06-20 | End: 2025-06-20 | Stop reason: HOSPADM

## 2025-06-20 RX ORDER — MIDODRINE HYDROCHLORIDE 5 MG/1
5 TABLET ORAL 2 TIMES DAILY WITH MEALS
Qty: 90 TABLET | Refills: 3 | Status: SHIPPED | OUTPATIENT
Start: 2025-06-20

## 2025-06-20 RX ORDER — COLCHICINE 0.6 MG/1
TABLET ORAL
Qty: 8 TABLET | Refills: 0 | Status: SHIPPED | OUTPATIENT
Start: 2025-06-20

## 2025-06-20 RX ORDER — TRAMADOL HYDROCHLORIDE 50 MG/1
50 TABLET ORAL EVERY 6 HOURS PRN
Qty: 12 TABLET | Refills: 0 | Status: SHIPPED | OUTPATIENT
Start: 2025-06-20 | End: 2025-06-23

## 2025-06-20 RX ORDER — 0.9 % SODIUM CHLORIDE 0.9 %
500 INTRAVENOUS SOLUTION INTRAVENOUS ONCE
Status: COMPLETED | OUTPATIENT
Start: 2025-06-20 | End: 2025-06-20

## 2025-06-20 RX ORDER — COLCHICINE 0.6 MG/1
0.6 TABLET ORAL DAILY
Qty: 30 TABLET | Refills: 3 | Status: SHIPPED | OUTPATIENT
Start: 2025-06-21 | End: 2025-06-24

## 2025-06-20 RX ORDER — DIATRIZOATE MEGLUMINE AND DIATRIZOATE SODIUM 660; 100 MG/ML; MG/ML
120 SOLUTION ORAL; RECTAL
Status: DISCONTINUED | OUTPATIENT
Start: 2025-06-20 | End: 2025-06-20 | Stop reason: HOSPADM

## 2025-06-20 RX ADMIN — ACETAMINOPHEN 650 MG: 325 TABLET ORAL at 06:51

## 2025-06-20 RX ADMIN — EMPAGLIFLOZIN 10 MG: 10 TABLET, FILM COATED ORAL at 08:37

## 2025-06-20 RX ADMIN — DIATRIZOATE MEGLUMINE AND DIATRIZOATE SODIUM 120 ML: 660; 100 LIQUID ORAL; RECTAL at 11:00

## 2025-06-20 RX ADMIN — BUDESONIDE AND FORMOTEROL FUMARATE DIHYDRATE 2 PUFF: 160; 4.5 AEROSOL RESPIRATORY (INHALATION) at 07:39

## 2025-06-20 RX ADMIN — FAMOTIDINE 20 MG: 10 INJECTION, SOLUTION INTRAVENOUS at 08:37

## 2025-06-20 RX ADMIN — SODIUM CHLORIDE 500 ML: 0.9 INJECTION, SOLUTION INTRAVENOUS at 00:08

## 2025-06-20 RX ADMIN — MIDODRINE HYDROCHLORIDE 5 MG: 5 TABLET ORAL at 05:40

## 2025-06-20 RX ADMIN — ASPIRIN 81 MG: 81 TABLET, COATED ORAL at 08:37

## 2025-06-20 RX ADMIN — COLCHICINE 0.6 MG: 0.6 TABLET ORAL at 08:37

## 2025-06-20 RX ADMIN — SOTALOL HYDROCHLORIDE 80 MG: 80 TABLET ORAL at 08:37

## 2025-06-20 RX ADMIN — DILTIAZEM HYDROCHLORIDE 120 MG: 120 CAPSULE, COATED, EXTENDED RELEASE ORAL at 08:37

## 2025-06-20 RX ADMIN — ENOXAPARIN SODIUM 30 MG: 100 INJECTION SUBCUTANEOUS at 08:37

## 2025-06-20 RX ADMIN — ALBUTEROL SULFATE 2 PUFF: 90 AEROSOL, METERED RESPIRATORY (INHALATION) at 07:39

## 2025-06-20 RX ADMIN — TIOTROPIUM BROMIDE INHALATION SPRAY 5 MCG: 3.12 SPRAY, METERED RESPIRATORY (INHALATION) at 07:40

## 2025-06-20 ASSESSMENT — PAIN DESCRIPTION - LOCATION: LOCATION: FOOT

## 2025-06-20 ASSESSMENT — PAIN DESCRIPTION - ORIENTATION: ORIENTATION: LEFT

## 2025-06-20 ASSESSMENT — PAIN SCALES - GENERAL
PAINLEVEL_OUTOF10: 3
PAINLEVEL_OUTOF10: 0

## 2025-06-20 ASSESSMENT — PAIN DESCRIPTION - PAIN TYPE: TYPE: ACUTE PAIN

## 2025-06-20 ASSESSMENT — PAIN - FUNCTIONAL ASSESSMENT: PAIN_FUNCTIONAL_ASSESSMENT: ACTIVITIES ARE NOT PREVENTED

## 2025-06-20 ASSESSMENT — PAIN DESCRIPTION - FREQUENCY: FREQUENCY: CONTINUOUS

## 2025-06-20 ASSESSMENT — PAIN DESCRIPTION - ONSET: ONSET: ON-GOING

## 2025-06-20 ASSESSMENT — PAIN DESCRIPTION - DESCRIPTORS: DESCRIPTORS: ACHING

## 2025-06-20 NOTE — DISCHARGE INSTR - DIET
Good nutrition is important when healing from an illness, injury, or surgery.  Follow any nutrition recommendations given to you during your hospital stay.   If you were given an oral nutrition supplement while in the hospital, continue to take this supplement at home.  You can take it with meals, in-between meals, and/or before bedtime. These supplements can be purchased at most local grocery stores, pharmacies, and chain [employer]stores.   If you have any questions about your diet or nutrition, call the hospital and ask for the dietitian.  Cardiac:   low sodium  Low fiber diet, see/review handout from Dietician

## 2025-06-20 NOTE — PLAN OF CARE
Problem: Chronic Conditions and Co-morbidities  Goal: Patient's chronic conditions and co-morbidity symptoms are monitored and maintained or improved  6/19/2025 2338 by Jose Zamudio RN  Outcome: Progressing  6/19/2025 1546 by Ingrid Shelton  Outcome: Progressing  Flowsheets (Taken 6/19/2025 1546)  Care Plan - Patient's Chronic Conditions and Co-Morbidity Symptoms are Monitored and Maintained or Improved:   Monitor and assess patient's chronic conditions and comorbid symptoms for stability, deterioration, or improvement   Collaborate with multidisciplinary team to address chronic and comorbid conditions and prevent exacerbation or deterioration     Problem: Discharge Planning  Goal: Discharge to home or other facility with appropriate resources  Outcome: Progressing     Problem: Safety - Adult  Goal: Free from fall injury  Outcome: Progressing     Problem: Nutrition Deficit:  Goal: Optimize nutritional status  Outcome: Progressing     Problem: Gastrointestinal - Adult  Goal: Minimal or absence of nausea and vomiting  6/19/2025 2338 by Jose Zamudio RN  Outcome: Progressing  6/19/2025 1546 by Ingrid Shelton  Outcome: Progressing  Flowsheets (Taken 6/19/2025 1546)  Minimal or absence of nausea and vomiting:   Provide nonpharmacologic comfort measures as appropriate   Advance diet as tolerated, if ordered  Note: Patient tolerating NG tube removal  Goal: Maintains or returns to baseline bowel function  6/19/2025 2338 by Jose Zamudio RN  Outcome: Progressing  6/19/2025 1546 by Ingrid Shelton  Outcome: Progressing  Flowsheets (Taken 6/19/2025 1546)  Maintains or returns to baseline bowel function:   Assess bowel function   Encourage oral fluids to ensure adequate hydration   Administer ordered medications as needed   Encourage mobilization and activity  Note: Passing gas  Goal: Maintains adequate nutritional intake  6/19/2025 2338 by Jose Zamudio RN  Outcome: Progressing  6/19/2025 1546 by Ingrid Shelton  Outcome:

## 2025-06-20 NOTE — PROGRESS NOTES
Patient: Pedrito Hinton  : 1965  Date of Visit: 2025    REASON FOR VISIT / CONSULTATION: Abdominal Pain (Mid/upper abdominal pains since Friday. Punching feeling to stomach. Thought he was constipated and took laxatives and stool softeners Friday, had large bm Saturday but still having pains. Decreased appetite. Pains worsening after eating. )    Dear Abimael Nina MD,     I had the opportunity to see Pedrito Hinton at the office today for follow up.    1-He has a history of thoracic aorta surgery at the [facility] in . He said he had a repair with no artificial grafts put in. He had a heart catheterization prior to the aorta surgery and was told his coronary arteries were fine. No history of MI or angina.    2-He does have a history of atrial fibrillation/Atypical flutter which predates his thoracic aorta surgery but after his thoracic aorta surgery he had a couple of recurrences, multiple cardioversion and finally underwent atypical flutter ablation on 2015 at [facility].    3-Echocardiogram done 17 EF 55-60% Mildly increased left ventricular wall thickness with a normal left ventricular cavity size. No definite specific wall motion abnormalities were identified. The left atrium is moderately dilated (34-39) with a left atrial volume index of 36 ml/m2. Mild mitral and tricuspid regurgitation. Moderate aortic regurgitation. Evidence of mild diastolic dysfunction is seen     4-Another SKYLAR and cardioversion of atrial flutter on 2020.  This episode seems to be precipitated by binge drinking.    5- He presented again to [facility] on 2021 with atrial flutter with rapid ventricular response.  We ended up doing a cardioversion on 2/3/2021.  No SKYLAR was needed because patient symptoms were less than 48 hours.  Patient discharged home that day on anticoagulation.    6- EKG done in office on 2021 showed atrial flutter with heart rate of 135 to 140 
Attempted to call Dr Castle office, no answer. Will try again  
Cardizem 10mg IVP given for increased heart rate of 150'-170's.   
Cardizem 5mg IVP given for increased heart rate of 130'-140's.   
Cecilia JAFFE at bedside providing updates to patient.   
Cecilia JAFFE notified of patients foot pain. She is at bedside.   
Comprehensive Nutrition Assessment    Type and Reason for Visit:  Initial    Nutrition Recommendations/Plan:   Continue NPO diet.   Progress to a low fiber, 2 gm sodium diet as medically appropriate.   Recommend supplemental vitamin D due to low vitamin D level.     Malnutrition Assessment:  Malnutrition Status:  At risk for malnutrition (06/17/25 0752)    Context:  Acute Illness     Findings of the 6 clinical characteristics of malnutrition:  Energy Intake:  Mild decrease in energy intake (4 days with poor PO/NPO)  Weight Loss:  No weight loss     Body Fat Loss:  No body fat loss     Muscle Mass Loss:  No muscle mass loss    Fluid Accumulation:  Mild Extremities (non pitting BLE)   Strength:  Not Performed    Nutrition Assessment:    Inadequate oral intakes r/t altered GI function aeb NPO, decreased PO x 4 days (includes today per Pt), abdominal pain after eating, + SBO. Altered nutrition related labs r/t impaired nutrient utilization aeb vitamin D 18.4, no supplemental vitamin D in place. Pt reports stable weight, had been eating more fruits and vegetables, states he chews food well. Discussed chewing foods to 20 times/bite to applesauce consistency. States abdominal pain better with NG tube. Provided low fiber diet instruction. Recommend addition of supplemental vitamin D. Pt with Hx CHF and COPD. Recommend to progress to a 2 gm sodium, low fiber diet as medically appropriate. Provided low fiber diet instruction.    Nutrition Related Findings:    hyperactive RUQ, hypoactive L upper and lower quadrants, active RLQ. non pitting BLE edema Wound Type: None       Current Nutrition Intake & Therapies:    Average Meal Intake: NPO  Average Supplements Intake: NPO  Diet NPO    Anthropometric Measures:  Height: 172.7 cm (5' 8\")  Ideal Body Weight (IBW): 154 lbs (70 kg)    Admission Body Weight: 120.6 kg (265 lb 13 oz)  Current Body Weight: 120.6 kg (265 lb 14 oz), 172.6 % IBW. Weight Source: Bed scale  Current BMI 
Comprehensive Nutrition Assessment    Type and Reason for Visit:  Reassess    Nutrition Recommendations/Plan:   Modify diet to low fiber due to SBO.  Provided purine nutrition therapy.     Malnutrition Assessment:  Malnutrition Status:  At risk for malnutrition (06/17/25 7512)    Context:  Acute Illness     Findings of the 6 clinical characteristics of malnutrition:  Energy Intake:  50% or less of estimated energy requirements for 5 or more days  Weight Loss:  No weight loss     Body Fat Loss:  No body fat loss     Muscle Mass Loss:  No muscle mass loss    Fluid Accumulation:  Mild Extremities (+ 2 pitting BLE)   Strength:  Not Performed    Nutrition Assessment:    Continued inadequate oral intakes aeb PO 51-75%, decreased PO > 5 days, Hx abdominal pain and SBO. Continued altered nutrition related labs aeb low vitamin D level-on supplemental vitamin D. Recommend to modify diet to low fiber in lieu of SBO. Pt states he went into Afib twice, then he had an episode of gout. Pt provided purine nutrition therapy. Pt hopes to go home later today.    Nutrition Related Findings:    active bowel sounds x 4, + 2 pitting edema BLE. Wound Type: None       Current Nutrition Intake & Therapies:    Average Meal Intake: 51-75%  Average Supplements Intake: None Ordered  ADULT DIET; Regular; Low Fiber; low fat/low cholesterol/TAL    Anthropometric Measures:  Height: 172.7 cm (5' 8\")  Ideal Body Weight (IBW): 154 lbs (70 kg)    Admission Body Weight: 120.6 kg (265 lb 13 oz)  Current Body Weight: 118.5 kg (261 lb 3.9 oz), 172.6 % IBW. Weight Source: Bed scale  Current BMI (kg/m2): 39.7  Usual Body Weight: 127.8 kg (281 lb 13 oz) (10/11/24)     % Weight Change (Calculated): -5.7  Weight Adjustment For: No Adjustment                 BMI Categories: Obese Class 3 (BMI 40.0 or greater)    Estimated Daily Nutrient Needs:  Energy Requirements Based On: Kcal/kg  Weight Used for Energy Requirements: Current  Energy (kcal/day): 4075-0808 
Contacted Dr. Olivarez's office for consult.  
Dr. Interiano at bedside.   
Dr. Olivarez at bedside  
Dr. Solano at bedside.  
EKG done at this time per LD Quintero order.   
[facility] -- Progress Note    SUBJECTIVE:    Patient seen for f/u of SBO (small bowel obstruction) (HCC).  He resting in bed alert oriented no acute distress.  Patient has had no BM but does continue to pass flatus.  He states his abdomen does feel better.  Patient does continue in atrial fibrillation with RVR but has no other complaints.      ROS:   Constitutional: negative  for fevers, and negative for chills.  Respiratory: negative for shortness of breath, negative for cough, and negative for wheezing  Cardiovascular: negative for chest pain, and negative for palpitations  Gastrointestinal: negative for abdominal pain, negative for nausea,negative for vomiting, negative for diarrhea, and negative for constipation     All other systems were reviewed with the patient and are negative unless otherwise stated in HPI      OBJECTIVE:      Vitals:   Vitals:    06/18/25 0945   BP: (!) 148/81   Pulse: (!) 107   Resp: 27   Temp:    SpO2: (!) 79%     Weight - Scale: 121.8 kg (268 lb 8 oz)   Height: 172.7 cm (5' 8\")     Weight  Wt Readings from Last 3 Encounters:   06/18/25 121.8 kg (268 lb 8 oz)   05/21/25 117 kg (257 lb 14.4 oz)   04/29/25 119.3 kg (263 lb)     Body mass index is 40.83 kg/m².    24HR INTAKE/OUTPUT:      Intake/Output Summary (Last 24 hours) at 6/18/2025 1034  Last data filed at 6/18/2025 0741  Gross per 24 hour   Intake 5330.43 ml   Output 2350 ml   Net 2980.43 ml     -----------------------------------------------------------------  Exam:    GEN:    Awake, alert and oriented x3.   EYES:  EOMI, pupils equal   NECK: Supple. No lymphadenopathy.  No carotid bruit  CVS:   Irregular rhythm, no audible murmur  PULM:  CTA, no wheezes, rales or rhonchi, no acute respiratory distress  ABD:    Bowels sounds hypoactive.  Abdomen is soft.  No distention.  no tenderness to palpation.   EXT:   no edema bilaterally .  No calf tenderness.   NEURO: Moves all extremities.  Motor and sensory are grossly intact  SKIN:  No rashes.  
[facility] -- Progress Note    SUBJECTIVE:    Patient seen for f/u of SBO (small bowel obstruction) ([facility]).  He resting in bed alert oriented no acute distress.  Patient is currently in normal sinus rhythm.  He states he did have a bowel movement and continues to pass gas.  No nausea or vomiting reported.  Left foot pain improved and able to walk easier.  We did discuss gout.      ROS:   Constitutional: negative  for fevers, and negative for chills.  Respiratory: negative for shortness of breath, negative for cough, and negative for wheezing  Cardiovascular: negative for chest pain, and negative for palpitations  Gastrointestinal: negative for abdominal pain, negative for nausea,negative for vomiting, negative for diarrhea, and negative for constipation     All other systems were reviewed with the patient and are negative unless otherwise stated in HPI      OBJECTIVE:      Vitals:   Vitals:    06/20/25 0551   BP: (!) 78/30   Pulse: 70   Resp: 15   Temp: 97.7 °F (36.5 °C)   SpO2: 90%     Weight - Scale: 118.5 kg (261 lb 5 oz)   Height: 172.7 cm (5' 8\")     Weight  Wt Readings from Last 3 Encounters:   06/19/25 118.5 kg (261 lb 5 oz)   05/21/25 117 kg (257 lb 14.4 oz)   04/29/25 119.3 kg (263 lb)     Body mass index is 39.73 kg/m².    24HR INTAKE/OUTPUT:      Intake/Output Summary (Last 24 hours) at 6/20/2025 0638  Last data filed at 6/19/2025 1806  Gross per 24 hour   Intake 1714.85 ml   Output 2450 ml   Net -735.15 ml     -----------------------------------------------------------------  Exam:    GEN:    Awake, alert and oriented x3.   EYES:  EOMI, pupils equal   NECK: Supple. No lymphadenopathy.  No carotid bruit  CVS:   Regular rhythm, no audible murmur  PULM:  CTA, no wheezes, rales or rhonchi, no acute respiratory distress  ABD:    Bowels sounds hypoactive.  Abdomen is soft.  No distention.  no tenderness to palpation.   EXT:   no edema bilaterally .  No calf tenderness. Left foot less tenderness to dorsal and medial 
LD Quintero at bedside reviewing EKG and discussing transfer to ICU.   
[facility]  Inpatient/Observation/Outpatient Rehabilitation    Date: 2025  Patient Name: Pedrito Hinton       [x] Inpatient Acute/Observation       []  Outpatient  : 1965       [] Pt refused/declined therapy at this time due to:           [] Pt cancelled due to:  [] No Reason Given   [] Sick/ill   [] Other:      [] Evaluation held by RN/Provider/Physical Therapist due to:    [] High Heart Rate   [] High Blood Pressure   [] Orthopedic Consult   [] Hgb < 7   [] Other:    [] Pt ordered brace per physician request:  [] Proper fit will be completed and education for wearing/skin checks    [x] Pt does not require skilled services due to:  Pt reports he is independent in room and is at baseline level of function. No skilled occupational therapy services recommended at this time. Please re-order if pt's status changes.         Christin Enciso OT Date: 2025   
NG clamped at this time, per order, offered popsicle, and ice water, care on going.  
NG removed at this time. Face washed. Patient stated his throat was a bit sore but otherwise felt good to be removed.   
NG tube pulled back 2 in and is now secured at 75, see Dr. Interiano's order. Patient tolerated well, contents continue to suction to wall canister.     
Nutrition Education    Educated on low fiber diet  Learners: Patient  Readiness: Acceptance  Method: Explanation and Handout  Response: Verbalizes Understanding  Contact name and number provided.    BRISEIDA MOBLEY RD, LD  Contact Number: [phone]    
Nutrition Education    Educated on purine nutrition therapy  Learners: Patient  Readiness: Acceptance  Method: Explanation and Handout  Response: Verbalizes Understanding  Contact name and number provided.    BRISEIDA MOBLEY RD, LD  Contact Number: [phone]    
Office to have Dr hermosillo call up when available.   
[facility]  Inpatient/Observation/Outpatient Rehabilitation    Date: 2025  Patient Name: Pedrito Hinton       [x] Inpatient Acute/Observation       []  Outpatient  : 1965         [] Pt refused/declined therapy at this time due to:           [] Pt cancelled due to:  [] No Reason Given   [] Sick/ill   [] Other:      [] Evaluation held by RN/Provider/Physical Therapist due to:    [] High Heart Rate   [] High Blood Pressure   [] Orthopedic Consult   [] Hgb < 7   [] Other:    [] Pt ordered brace per physician request:  [] Proper fit will be completed and education for wearing/skin checks    [x] Pt does not require skilled services due to:  Pt being independent. He reports no mobility issues and is at baseline. If status changes, please enter new orders.    Therapist/Assistant will attempt to see this patient, at our earliest opportunity.       Amara Freedman, PT, DPT  Date: 2025     
Patient ambulated in [place] down to [address] and back, tolerated well.  
Patient ambulating throughout halls with girlfriend, tolerates well.   
Patient arrived to writer from ED. Writer received report from ED nurse Vandana VIVAS RN. Patient ambulated to the bed with assistance and tolerated well. Patient alert and oriented x4. Able to answer questions appropriately and follow commands. Vitals and assessment as charted. Admission navigator completed. Medications reviewed with patient. Patient denies abominal pain at this time and BSAx4. Bed alarm on, bed locked, gripper socks on, call light within reach and able to use appropriately. Plan of care ongoing. Patient denies any further needs at this time.    
Patient awake and in bed. C/o pain in left foot, stated it feels like his arthritis did in his other foot. Pain medications given, ice applied, and compression stockings also applied. Patient repositioned in bed. Stated he does feel a bit SOB when his HR gets elevated otherwise no complaints. Plan of care discussed. Waiting for xray results for NG. Denies any other needs at this time  
Patient awake and in bed. Dr Solano and team finishing rounds. C/o very mild pain in left foot, see mar. Patient stated it is improved significantly from yesterday. Patient educated by rounding team on gout and his hypotension. Hoping for D/C later this afternoon.   
Patient awake upon entry. Alert and oriented x 4. Patient reports no pain at this moment. NG tube is draining well and connected to wall suction, canister shows green/brown fluid. Irritation noted to left nare from NG tube. No bowel movements noted, by patient states he is passing gas. Vital signs and assessment completed, see flowsheets. No further needs at this time.  Call light within reach. Care ongoing.   
Patient given IV medication to help with HR. Also c/o continued L foot pain, see mar. Patient continues to pass gas, stated \"one felt like he was going to poop himself.\" Patient declines walk d/t foot pain. Writer also wants to get patients HR under control.   
Patient had home cpap brought in  
Patient noted to have converted to normal sinus rhythm on monitor, EKG completed and noted normal sinus, Cecilia MAKI notified.  
Patient resting comfortably at this time. Patient alert & oriented x4. Patient able to answer all questions appropriately and follow commands. Patient denies any pain at this time and any other needs. Breathing regular and unlabored, on room air. NG in place.  Bed locked, gripper socks on, call light within reach and able to use appropriately. Plan of care ongoing.    
Patient resting in bed, alert and orient x4, assessment completed, see flow sheet for documentation, NG tube noted in left nare, connected to LIWS, green stomach content noted in tubing, canister empty at this time, noted to have active bowel sounds, states he is passing gas, cardiac monitor noted A-fib at this time, patient continues on Amiodarone drip, tolerating well. Patient denies pain at this time, no other complaints voiced, all needs met, call light within reach.  
Patient resting in bed, re-assessment complete, see flow sheet for documentation, patient's NG continues to be clamped, denies any nausea, had one popsicle and a sip of water, bowel sounds are slightly active, abdomen soft, non-tender, continues on amiodarone drip, denies pain, all needs met, call light within reach.  
Patient stated his foot is feeling much better after pain medications, is a bit drowsy and going to try to rest. Aware of diet change to clear liquids. Doesn't want anything at this time but will notify staff when he does. He was educated on what a clear liquids choices.   
Patient states he feels better since NG was hooked to LIS.  
Patient transferred to [facility]. Cecilia JAFFE updated with Bms. She will recheck him later this afternoon.   
Patient up to the bathroom to get washed up. Stated he had a large BM that was mixed of loose and solid with lots of gas. He also was educated on scan ordered. Denies any needs or questions at this time  
Patients HR goes to 260-170s when sitting side of bed to urinate. Patient given oral medications. Tolerated well. Resting HR in 130-140s  
Progress Note    SUBJECTIVE:    Patient seen for f/u of SBO (small bowel obstruction) ([facility]).  He lying in bed on left side no distress.  Complains of HA from NG tube. State he is starting to pass flatus.  No BM . Much less abdominal pain stated it is \"sore\"    ROS:   Constitutional: negative  for fevers, and negative for chills.  Respiratory: negative for shortness of breath, negative for cough, and negative for wheezing  Cardiovascular: negative for chest pain, and negative for palpitations  Gastrointestinal: negative for abdominal pain, negative for nausea,negative for vomiting, negative for diarrhea, and negative for constipation     All other systems were reviewed with the patient and are negative unless otherwise stated in HPI      OBJECTIVE:      Vitals:   Vitals:    06/17/25 0645   BP: 116/61   Pulse: 76   Resp: 18   Temp: 98 °F (36.7 °C)   SpO2: 91%     Weight - Scale: 120.6 kg (265 lb 12.8 oz)   Height: 172.7 cm (5' 8\")     Weight  Wt Readings from Last 3 Encounters:   06/17/25 120.6 kg (265 lb 12.8 oz)   05/21/25 117 kg (257 lb 14.4 oz)   04/29/25 119.3 kg (263 lb)     Body mass index is 40.41 kg/m².    24HR INTAKE/OUTPUT:      Intake/Output Summary (Last 24 hours) at 6/17/2025 0827  Last data filed at 6/17/2025 0502  Gross per 24 hour   Intake 498 ml   Output 1575 ml   Net -1077 ml     -----------------------------------------------------------------  Exam:    GEN:    Awake, alert and oriented x3.   EYES:  EOMI, pupils equal   NECK: Supple. No lymphadenopathy.  No carotid bruit  CVS:   Irregular rhythm, no audible murmur  PULM:  CTA, no wheezes, rales or rhonchi, no acute respiratory distress  ABD:    Bowels sounds hypoactive left lower and upper .  Abdomen is soft.  No distention.  no tenderness to palpation.   EXT:   no edema bilaterally .  No calf tenderness.   NEURO: Moves all extremities.  Motor and sensory are grossly intact  SKIN:  No rashes.  No skin lesions.  
Pt transferred to  from [facility] for amiodarone gtt. All belongings with pt. NG tube remains intact and hooked to suction at LIWS in room. Assessment and vital signs as charted. Pt denies pain. Pt is A & O x 4. ICU monitor hooked up to pt. Writer asked pt if he would like anyone contacted to let them know he moved to ICU and pt states no. Pt denies any other needs. Call light in reach.   
Pt upset that his heart rate is not staying under control. LD Quintero aware, see orders.   
RESPIRATORY ASSESSMENT PROTOCOL                                                                                              Patient Name: Pedrito Hinton Room#: [id number] : 1965     Admitting diagnosis: SBO (small bowel obstruction) ([facility]) [K56.609]       Medical History:   Past Medical History:   Diagnosis Date    AAA (abdominal aortic aneurysm)     Thoracic - repaired    Aortic regurgitation     aortic regurg    Atrial fibrillation (HCC)     Atrial flutter ([facility])     Encounter for cardioversion procedure     [facility] Garcia/ Dr. Olivarez, states 11 times, most recent 2021    Frequent urination     Pt states he's developed frequent urination with 3 times in last yr noting blood in urine as well.    H/O echocardiogram 2017    EF 50-55% Global LV systolic function appears preserved. Mildly increaseed LV wall thinckness with normal LV carvity size, No definite specific wall motion abnormalities identified, LA is moderately dilated (34-39) with LA volume index of 36ml/m2, Mild mitral and tricuspid regurgitation, moderate aortic regurgitation, Evidence of mild diastolic dysfunction seen    H/O echocardiogram 2021    EF 55% patient has sigmoid interventricular septim S/P aortic vlave repair with mild to mod aortic regurg Diastoligy cannot be properly assessed due to pts rhtyhm Aortic root is mildly dilated when corrected for body surface area    Hemorrhage of rectum and anus     polypectomy    History of 24 hour EKG monitoring 2014    Unremarkable except for mild increased number of PVC's with 3 ventricular couplets, asymptomatic     History of echocardiogram 04/10/2014    EF 60%, mild to mod LVH, moderate AI    History of PFTs 2015    Consistent with mild obstructive ventilatory impairment. Lung volumes are normal,except mild increase in residual volume,which could be suggestive of airway trapping. Diffusion capacity is normal.    Hx of transesophageal echocardiography (SKYLAR) for 
RESPIRATORY ASSESSMENT PROTOCOL                                                                                              Patient Name: Pedrito Hinton Room#: [id number] : 1965     Admitting diagnosis: SBO (small bowel obstruction) ([facility]) [K56.609]       Medical History:   Past Medical History:   Diagnosis Date    Aortic regurgitation     aortic regurg    Atrial fibrillation (HCC)     Atrial flutter ([facility])     [facility] states [facility], most recent 2021    Frequent urination     Pt states he's developed frequent urination with 3 times in last yr noting blood in urine as well.    H/O echocardiogram 2017    EF 50-55% Global LV systolic function appears preserved. Mildly increaseed LV wall thinckness with normal LV carvity size, No definite specific wall motion abnormalities identified, LA is moderately dilated (34-39) with LA volume index of 36ml/m2, Mild mitral and tricuspid regurgitation, moderate aortic regurgitation, Evidence of mild diastolic dysfunction seen    H/O echocardiogram 2021    EF 55% patient has sigmoid interventricular septim S/P aortic vlave repair with mild to mod aortic regurg Diastoligy cannot be properly assessed due to pts rhtyhm Aortic root is mildly dilated when corrected for body surface area    Hemorrhage of rectum and anus 2013    polypectomy    History of 24 hour EKG monitoring 2014    Unremarkable except for mild increased number of PVC's with 3 ventricular couplets, asymptomatic     History of echocardiogram 04/10/2014    EF 60%, mild to mod LVH, moderate AI    History of PFTs 2015    Consistent with mild obstructive ventilatory impairment. Lung volumes are normal,except mild increase in residual volume,which could be suggestive of airway trapping. Diffusion capacity is normal.    Hx of transesophageal echocardiography (SKYLAR) for monitoring 2015    Dr Olivarez/[facility]    
RESPIRATORY ASSESSMENT PROTOCOL                                                                                              Patient Name: Pedrito Hinton Room#: [id number] : 1965     Admitting diagnosis: SBO (small bowel obstruction) ([facility]) [K56.609]       Medical History:   Past Medical History:   Diagnosis Date    Aortic regurgitation     aortic regurg    Atrial fibrillation (HCC)     Atrial flutter ([facility])     [facility], states 11 times, most recent 2021    Frequent urination     Pt states he's developed frequent urination with 3 times in last yr noting blood in urine as well.    H/O echocardiogram 2017    EF 50-55% Global LV systolic function appears preserved. Mildly increaseed LV wall thinckness with normal LV carvity size, No definite specific wall motion abnormalities identified, LA is moderately dilated (34-39) with LA volume index of 36ml/m2, Mild mitral and tricuspid regurgitation, moderate aortic regurgitation, Evidence of mild diastolic dysfunction seen    H/O echocardiogram 2021    EF 55% patient has sigmoid interventricular septim S/P aortic vlave repair with mild to mod aortic regurg Diastoligy cannot be properly assessed due to pts rhtyhm Aortic root is mildly dilated when corrected for body surface area    Hemorrhage of rectum and anus 2013    polypectomy    History of 24 hour EKG monitoring 2014    Unremarkable except for mild increased number of PVC's with 3 ventricular couplets, asymptomatic     History of echocardiogram 04/10/2014    EF 60%, mild to mod LVH, moderate AI    History of PFTs 2015    Consistent with mild obstructive ventilatory impairment. Lung volumes are normal,except mild increase in residual volume,which could be suggestive of airway trapping. Diffusion capacity is normal.    Hx of transesophageal echocardiography (SKYLAR) for monitoring 2015    Dr Olivarez/[facility]    
Re-assessment completed, patient resting in bed, alert and orient x4, NG continues in left nare on LIWS, hypoactive bowel sounds noted in all 4 quadrants at this time, patient continues on amiodarone drip with normal sinus on monitor, patient denies pain at this time, all needs met, call light within reach.  
Reviewed discharge instructions with patient.  Patient aware of need to  new prescriptions.   Reviewed new medications and side effects to monitor for.   Reviewed home medications and when next dose is due.   Patient aware of which medications to hold taking until his provider informs him of when to start again.  Informed of need for repeat labs for 7/4/25.  Educated patient to follow a low sodium, low fiber diet.   Educational handouts given on Gout and Bowel Obstruction.   Questions answered.   Verbalizes understanding.  Copy of discharge instructions given to patient.    
Spiritual Services Interventions  I305/I305-01   6/20/2025  Andrea Hinton  60 y.o. year old male    Encounter Summary  Encounter Overview/Reason: (P) Spiritual/Emotional Needs  Service Provided For: (P) Patient  Referral/Consult From: (P) Constance  Last Encounter : (P) 06/20/25  Complexity of Encounter: (P) Moderate  Begin Time: (P) 1030  End Time : (P) 1040  Total Time Calculated: (P) 10 min     Spiritual/Emotional needs  Type: (P) Spiritual Support                    Assessment/Intervention/Outcome  Assessment: (P) Calm  Intervention: (P) Discussed belief system/Rastafarian practices/rosario, Discussed illness injury and it’s impact, Prayer (assurance of)/[place]  Outcome: (P) Encouraged, Engaged in conversation     
Writer at bedside, vitals and assessment as charted. Patient sitting up at bedside, a/o. Respirations even and unlabored. Bilateral lower leg edema present, patient encouraged to elevate legs when laying in bed. Patient c/o tenderness to lower abdomen, states has improved from earlier. Bowel sounds present but are hypoactive, patient states has been passing gas. Patient denies any pain, nausea or needs at this time. Call light and bedside table within reach.  
Writer called Dr. Interiano for consultation and verified that NG will be hooked up to LIS. See new orders entered.   
condition  Treatment plan:   Appreciate Dr. Olivarez  Telemetry monitoring  Monitor labs and replace electrolytes  Imaging: no further imaging studies ordered today  Medications:   Lovenox  P.o. sotalol and diltiazem on hold-awaiting cardiology  IV Lopressor will stop when back on sotalol  IV amiodarone  Restart Cardizem CD    Chronic CHF  Condition is a chronic stable condition  Treatment plan:   I&O, daily weights  Monitor labs  Telemetry monitoring  Imaging: no further imaging studies ordered today  Medications:   Restart Cardizem CD, Bumex, Jardiance, losartan, Zaroxolyn  Continue IV Lopressor  Sotalol on hold    Nutrition status:   Well developed, well nourished with no malnutrition  Dietician consult initiated  I/O  Daily weight  Monitor Daily intake  Nutritional Supplements as tolerated    MALNUTRITION ASSESSMENT AND PLAN    The following was documented by the Dietitian:    Malnutrition Assessment  Context of Malnutrition: Acute Illness (06/17/25 0752)  Acute Illness - Energy Intake : Mild decrease in energy intake (4 days with poor PO/NPO) (06/17/25 0752)  Acute Illness - Weight Loss : No weight loss (06/17/25 0752)  Acute Illness - Body Fat Loss: No body fat loss (06/17/25 0752)  Acute Illness - Muscle Mass Loss: No muscle mass loss (06/17/25 0752)  Acute Illness - Fluid Accumulation : Mild (06/17/25 0752)  Acute Illness - Fluid Accumulation Location: Extremities (non pitting BLE) (06/17/25 0752)  Acute Illness -  Strength: Not Performed (06/17/25 0752)  Acute Illness - Malnutrition Score: 1 (06/17/25 0752)  Malnutrition Status: At risk for malnutrition (06/17/25 0752)    I agree with the dietitian's malnutrition assessment.    Medical Nutrition Therapy: continue current nutrition therapy    Electronically signed by AMADOR Abreu CNP on 6/17/25 at 8:27 AM EDT     Hospital Prophylaxis:   DVT: Lovenox   Stress Ulcer: H2 Blocker     Disposition:  Shared decision making: All test results, 
or diaphoresis.   Psychiatric: He has a normal mood and affect. His speech is normal and behavior is normal.        Lab Results   Component Value Date    WBC 8.4 06/19/2025    HGB 13.4 06/19/2025    HCT 40.1 (L) 06/19/2025     06/19/2025    CHOL 213 (H) 03/26/2025    TRIG 154 (H) 03/26/2025    HDL 45 03/26/2025    ALT 25 06/16/2025    AST 23 06/16/2025     06/19/2025    K 3.7 06/19/2025     06/19/2025    CREATININE 0.9 06/19/2025    BUN 12 06/19/2025    CO2 23 06/19/2025    TSH 2.46 03/27/2024    PSA 1.58 12/22/2016    INR 1.0 03/27/2024    LABA1C 5.1 08/19/2024     ASSESSMENT:     1. SBO (small bowel obstruction) ([facility])      PLAN:     Paroxysmal Atrial Fibrillation:   Long history of atrial arrhythmia.  History of atrial flutter ablation at the [facility] back in 2015.  A-fib ablation in 2021 at [facility].  Did very well up until 2023 when he had recurrence of atrial fibrillation and required external cardioversion.  Failed Tikosyn therapy.  Was sent to [facility] and started on sotalol 80 mg twice daily.  Escalation of sotalol therapy was limited due to bradycardia.  History of profound bradycardia prompting the need of alternative antiarrhythmic medication versus repeat catheter ablation therapy, evaluated at the [facility] back in December 2024 for that.  Found to have a minimal A-fib burden of 2.5 to 5% according to his loop recorder.  I reviewed his telemetry, episodes of paroxysmal atrial fibrillation/flutter with variable block likely secondary to electrolyte abnormality in the setting of small bowel obstruction and interruption of his antiarrhythmic therapy.  His small bowel obstruction is improving.  His NG tube is removed.  Restarted in the his oral medications.  Will continue telemetry monitoring for now.  We may need elective cardioversion if he continued to have persistent atrial fibrillation/atrial flutter with variable block.

## 2025-06-20 NOTE — DISCHARGE SUMMARY
Discharge Summary    Pedrito Hinton  :  1965  MRN:  [id number]    Admit date:  2025      Discharge date: 2025     Admitting Physician:  Abimael Solano MD    Discharge Diagnoses:    Principal Problem:    SBO (small bowel obstruction) ([facility])  Active Problems:    Hypertension    Chronic diastolic heart failure (HCC)    PAF (paroxysmal atrial fibrillation) (HCC)    NICM (nonischemic cardiomyopathy) (HCC)    MARIA TERESA treated with BiPAP    Stage 2 moderate COPD by GOLD classification (HCC)    Acute gout of left foot  Resolved Problems:    * No resolved hospital problems. *      Hospital Course:   Pedrito Hinton is a 60 y.o. male admitted with SBO.  He presented with abdominal pain.  Patient reported he took magnesium citrate and stool softeners when the pain started.  He stated he was having some watery diarrhea but continued to have pain.  He denied vomiting.  He reported his appetite was poor.  He complained of pressure located in his upper abdomen.  He does have history of obstructions in the past.  Patient also has history of ventral hernia repair x 2 in the past.  Last colonoscopy was 2025 and did show extensive sigmoid diverticulosis as well as internal hemorrhoids.  He did have a few small polyps removed.  CT abdomen and pelvis showed diffuse small bowel dilation with a transition in the right lower quadrant likely due to an adhesion.  Labs were unremarkable.  During patient's admission labs were monitored electrolytes replaced.  Patient had NG to low intermittent suction for several days followed by routine x-rays.  General surgery was consulted.  Patient unfortunately developed atrial fibrillation with RVR and normally on sotalol however had been on hold.  Patient was placed in intensive care and placed on IV amiodarone.  Obstruction resolved NG was removed and diet was challenged.  He tolerated well.  He was placed back on his sotalol and converted back to normal sinus rhythm.  Patient did have some

## 2025-06-23 ENCOUNTER — TELEPHONE (OUTPATIENT)
Dept: PRIMARY CARE CLINIC | Age: 60
End: 2025-06-23

## 2025-06-23 NOTE — TELEPHONE ENCOUNTER
Care Transitions Initial Follow Up Call    Outreach made within 2 business days of discharge: Yes    Patient: Pedrito Hinton Patient : 1965   MRN: [id number]  Reason for Admission: There are no discharge diagnoses documented for the most recent discharge.  Discharge Date: 25       Spoke with: Pedrito    Discharge department/facility: [facility] Interactive Patient Contact:  Was patient able to fill all prescriptions: Yes  Was patient instructed to bring all medications to the follow-up visit: Yes  Is patient taking all medications as directed in the discharge summary? Yes  Does patient understand their discharge instructions: Yes  Does patient have questions or concerns that need addressed prior to 7-14 day follow up office visit: no    Scheduled appointment with PCP within 7-14 days    Follow Up  Future Appointments   Date Time Provider Department Center   2025  1:45 PM Abimael Solano MD [facility]   10/1/2025 11:00 AM Marco Olivarez MD [facility]   10/27/2025  3:30 PM [facility]       Rosy Ta MA

## 2025-06-26 ENCOUNTER — OFFICE VISIT (OUTPATIENT)
Dept: PRIMARY CARE CLINIC | Age: 60
End: 2025-06-26

## 2025-06-26 VITALS
WEIGHT: 260 LBS | HEART RATE: 64 BPM | HEIGHT: 68 IN | RESPIRATION RATE: 16 BRPM | SYSTOLIC BLOOD PRESSURE: 106 MMHG | DIASTOLIC BLOOD PRESSURE: 60 MMHG | BODY MASS INDEX: 39.4 KG/M2 | OXYGEN SATURATION: 95 %

## 2025-06-26 DIAGNOSIS — H60.501 ACUTE OTITIS EXTERNA OF RIGHT EAR, UNSPECIFIED TYPE: ICD-10-CM

## 2025-06-26 DIAGNOSIS — Z09 HOSPITAL DISCHARGE FOLLOW-UP: Primary | ICD-10-CM

## 2025-06-26 DIAGNOSIS — K56.609 SBO (SMALL BOWEL OBSTRUCTION) (HCC): ICD-10-CM

## 2025-06-26 DIAGNOSIS — M1A.9XX1 CHRONIC GOUT WITH TOPHUS, UNSPECIFIED CAUSE, UNSPECIFIED SITE: ICD-10-CM

## 2025-06-26 RX ORDER — OFLOXACIN 3 MG/ML
10 SOLUTION AURICULAR (OTIC) DAILY
Qty: 10 ML | Refills: 0 | Status: SHIPPED | OUTPATIENT
Start: 2025-06-26 | End: 2025-07-06

## 2025-06-26 NOTE — PROGRESS NOTES
[facility] PRIMARY CARE  [address] [place] [address] , [address]  [place], [place], [address]       Post-Discharge Transitional Care  Follow Up      Pedrito Hinton   YOB: 1965    Date of Office Visit:  6/26/2025  Date of Hospital Admission: 6/16/25  Date of Hospital Discharge: 6/20/25  Risk of hospital readmission (high >=14%. Medium >=10%) :Readmission Risk Score: 9.6      Care management risk score Rising risk (score 2-5) and Complex Care (Scores >=6): No Risk Score On File     Non face to face  following discharge, date last encounter closed (first attempt may have been earlier): 06/23/2025    Call initiated 2 business days of discharge: Yes    ASSESSMENT/PLAN:   Hospital discharge follow-up  -     [place] DISCHARGE MEDS RECONCILED W/ CURRENT OUTPATIENT MED LIST  SBO (small bowel obstruction) (HCC)  Chronic gout with tophus, unspecified cause, unspecified site  Acute otitis externa of right ear, unspecified type  -     ofloxacin (FLOXIN) 0.3 % otic solution; Place 10 drops into both ears daily for 10 days, Disp-10 mL, R-0Normal    Medical Decision Making: high complexity  Return in 3 months (on 9/26/2025) for F/U Med Management.      Pending labs at this time  Colchicine for now  Allopurinol could be considered.  May consider repeating KUB if needed.  Start Ofloxacin    Ear wax removed today    Ear Cerumen Removal     Indication: ear cerumen impaction and unable to visualize tympanic membrane Risks and benefits discussed with patient He and consented.    Procedure: After placing the patient's head in the appropriate position, the patient's left/right ear canal was irrigated with the appropriate solution until the majority of the cerumen was flushed out of the canal.  At this point, the procedure was complete.     The patient tolerated the procedure with difficulty.    Complications: None         Subjective:   HPI:  Follow up of Hospital problems/diagnosis(es):   Inpatient course: Discharge summary

## 2025-07-01 ENCOUNTER — HOSPITAL ENCOUNTER (OUTPATIENT)
Age: 60
Discharge: HOME | End: 2025-07-01
Payer: COMMERCIAL

## 2025-07-01 ENCOUNTER — RESULTS FOLLOW-UP (OUTPATIENT)
Dept: PRIMARY CARE CLINIC | Age: 60
End: 2025-07-01

## 2025-07-01 DIAGNOSIS — K56.609 SBO (SMALL BOWEL OBSTRUCTION) (HCC): ICD-10-CM

## 2025-07-01 DIAGNOSIS — M10.00 ACUTE IDIOPATHIC GOUT, UNSPECIFIED SITE: ICD-10-CM

## 2025-07-01 LAB
ANION GAP SERPL CALCULATED.3IONS-SCNC: 14 MMOL/L (ref 9–16)
BASOPHILS # BLD: 0.06 K/UL (ref 0–0.2)
BASOPHILS NFR BLD: 1 % (ref 0–2)
BUN SERPL-MCNC: 21 MG/DL (ref 8–23)
BUN/CREAT SERPL: 19 (ref 9–20)
CALCIUM SERPL-MCNC: 8.9 MG/DL (ref 8.6–10.4)
CHLORIDE SERPL-SCNC: 104 MMOL/L (ref 98–107)
CO2 SERPL-SCNC: 23 MMOL/L (ref 20–31)
CREAT SERPL-MCNC: 1.1 MG/DL (ref 0.7–1.2)
CRP SERPL HS-MCNC: 3.3 MG/L (ref 0–5)
EOSINOPHIL # BLD: 0.18 K/UL (ref 0–0.44)
EOSINOPHILS RELATIVE PERCENT: 3 % (ref 1–4)
ERYTHROCYTE [DISTWIDTH] IN BLOOD BY AUTOMATED COUNT: 13.3 % (ref 11.8–14.4)
ERYTHROCYTE [SEDIMENTATION RATE] IN BLOOD BY PHOTOMETRIC METHOD: 12 MM/HR (ref 0–20)
GFR, ESTIMATED: 78 ML/MIN/1.73M2
GLUCOSE SERPL-MCNC: 86 MG/DL (ref 74–99)
HCT VFR BLD AUTO: 39.2 % (ref 40.7–50.3)
HGB BLD-MCNC: 12.9 G/DL (ref 13–17)
IMM GRANULOCYTES # BLD AUTO: <0.03 K/UL (ref 0–0.3)
IMM GRANULOCYTES NFR BLD: 0 %
LYMPHOCYTES NFR BLD: 1.44 K/UL (ref 1.1–3.7)
LYMPHOCYTES RELATIVE PERCENT: 25 % (ref 24–43)
MCH RBC QN AUTO: 29.5 PG (ref 25.2–33.5)
MCHC RBC AUTO-ENTMCNC: 32.9 G/DL (ref 28.4–34.8)
MCV RBC AUTO: 89.7 FL (ref 82.6–102.9)
MONOCYTES NFR BLD: 0.44 K/UL (ref 0.1–1.2)
MONOCYTES NFR BLD: 8 % (ref 3–12)
NEUTROPHILS NFR BLD: 63 % (ref 36–65)
NEUTS SEG NFR BLD: 3.72 K/UL (ref 1.5–8.1)
NRBC BLD-RTO: 0 PER 100 WBC
PLATELET # BLD AUTO: 228 K/UL (ref 138–453)
PMV BLD AUTO: 9.9 FL (ref 8.1–13.5)
POTASSIUM SERPL-SCNC: 3.5 MMOL/L (ref 3.7–5.3)
RBC # BLD AUTO: 4.37 M/UL (ref 4.21–5.77)
SODIUM SERPL-SCNC: 141 MMOL/L (ref 136–145)
URATE SERPL-MCNC: 8.1 MG/DL (ref 3.4–7)
WBC OTHER # BLD: 5.9 K/UL (ref 3.5–11.3)

## 2025-07-01 PROCEDURE — 85025 COMPLETE CBC W/AUTO DIFF WBC: CPT

## 2025-07-01 PROCEDURE — 84550 ASSAY OF BLOOD/URIC ACID: CPT

## 2025-07-01 PROCEDURE — 36415 COLL VENOUS BLD VENIPUNCTURE: CPT

## 2025-07-01 PROCEDURE — 86140 C-REACTIVE PROTEIN: CPT

## 2025-07-01 PROCEDURE — 85652 RBC SED RATE AUTOMATED: CPT

## 2025-07-01 PROCEDURE — 80048 BASIC METABOLIC PNL TOTAL CA: CPT

## 2025-07-06 DIAGNOSIS — R06.02 SOB (SHORTNESS OF BREATH): ICD-10-CM

## 2025-07-07 RX ORDER — ALBUTEROL SULFATE 90 UG/1
INHALANT RESPIRATORY (INHALATION)
Qty: 9 G | Refills: 0 | Status: SHIPPED | OUTPATIENT
Start: 2025-07-07

## 2025-07-08 ENCOUNTER — TELEPHONE (OUTPATIENT)
Dept: PRIMARY CARE CLINIC | Age: 60
End: 2025-07-08

## 2025-07-08 DIAGNOSIS — M10.9 ACUTE GOUT, UNSPECIFIED CAUSE, UNSPECIFIED SITE: Primary | ICD-10-CM

## 2025-07-08 RX ORDER — ALLOPURINOL 300 MG/1
300 TABLET ORAL DAILY
Qty: 90 TABLET | Refills: 0 | Status: SHIPPED | OUTPATIENT
Start: 2025-07-08 | End: 2025-10-06

## 2025-07-08 NOTE — TELEPHONE ENCOUNTER
Please notify the patient that their lab results are reviewed     The uric acid remains elevated - I would start Allopurinol daily (pend), and then recheck this again in 2 week's time thank you.  Electronically signed by Abimael Solano MD on 7/1/2025 at 6:54 PM    Pt is agreeable to medications and lab both pended for provider to review and adjust

## 2025-07-26 PROBLEM — Z09 HOSPITAL DISCHARGE FOLLOW-UP: Status: RESOLVED | Noted: 2025-06-26 | Resolved: 2025-07-26

## 2025-08-12 ENCOUNTER — HOSPITAL ENCOUNTER (OUTPATIENT)
Dept: LAB | Age: 60
Discharge: HOME | End: 2025-08-12
Payer: COMMERCIAL

## 2025-08-12 DIAGNOSIS — M10.9 ACUTE GOUT, UNSPECIFIED CAUSE, UNSPECIFIED SITE: ICD-10-CM

## 2025-08-12 LAB — URATE SERPL-MCNC: 5.3 MG/DL (ref 3.4–7)

## 2025-08-12 PROCEDURE — 84550 ASSAY OF BLOOD/URIC ACID: CPT

## 2025-08-12 PROCEDURE — 36415 COLL VENOUS BLD VENIPUNCTURE: CPT

## 2025-08-23 DIAGNOSIS — R06.02 SOB (SHORTNESS OF BREATH): ICD-10-CM

## 2025-08-25 RX ORDER — ALBUTEROL SULFATE 90 UG/1
INHALANT RESPIRATORY (INHALATION)
Qty: 9 G | Refills: 0 | Status: SHIPPED | OUTPATIENT
Start: 2025-08-25

## (undated) DEVICE — PENCIL ES L3M BTTN SWCH HOLSTER W/ BLDE ELECTRD EDGE

## (undated) DEVICE — GLOVE ORANGE PI 8 1/2   MSG9085

## (undated) DEVICE — SOLUTION IV IRRIG POUR BRL 0.9% SODIUM CHL 2F7124

## (undated) DEVICE — GAUZE,SPONGE,4"X4",12PLY,STERILE,LF,2'S: Brand: MEDLINE

## (undated) DEVICE — NEEDLE SPNL 20GA L3.5IN YEL HUB S STL REG WALL FIT STYL W/

## (undated) DEVICE — GLOVE ORANGE PI 7 1/2   MSG9075

## (undated) DEVICE — SUTURE PDS II SZ 0 L27IN ABSRB VLT L36MM CT-1 1/2 CIR Z340H

## (undated) DEVICE — SUTURE NRLN SZ 0 L18IN NONABSORBABLE BLK L26MM CT-2 1/2 CIR C527D

## (undated) DEVICE — SVMMC ORTH SPL DRP PK

## (undated) DEVICE — 3M™ TEGADERM™ TRANSPARENT FILM DRESSING FRAME STYLE, 1626W, 4 IN X 4-3/4 IN (10 CM X 12 CM), 50/CT 4CT/CASE: Brand: 3M™ TEGADERM™

## (undated) DEVICE — TOWEL SURG SM W12XL18IN CLR PLAS TEAR RESIST REINF ADH FRST

## (undated) DEVICE — CHLORAPREP 26ML ORANGE

## (undated) DEVICE — 6617 IOBAN II PATIENT ISOLATION DRAPE 5/BX,4BX/CS: Brand: STERI-DRAPE™ IOBAN™ 2

## (undated) DEVICE — STERILE POLYISOPRENE POWDER-FREE SURGICAL GLOVES WITH EMOLLIENT COATING: Brand: PROTEXIS

## (undated) DEVICE — 3 BONE CEMENT MIXER WITH SMALL SPATULA: Brand: MIXEVAC

## (undated) DEVICE — ZIPPERED TOGA, X-LARGE: Brand: FLYTE, SURGICOOL

## (undated) DEVICE — GUIDE PIN 3.2MM X 343MM: Brand: TRIGEN

## (undated) DEVICE — SYRINGE, LUER LOCK, 10ML: Brand: MEDLINE

## (undated) DEVICE — NEEDLE SPNL 18GA L3.5IN W/ QNCKE SHARPER BVL DURA CLICK

## (undated) DEVICE — SUTURE DEV SZ 2-0 WND CLSR ABSRB GS-22 VLOC COVIDIEN VLOCM2145

## (undated) DEVICE — SCISSOR SURG METZ CRV TIP

## (undated) DEVICE — ELECTRODE ES AD CRD L15FT DISP FOR PT BELOW 30LB REM

## (undated) DEVICE — ZIPPERED TOGA, 2X LARGE: Brand: FLYTE

## (undated) DEVICE — SYRINGE MED 10ML TRNSLUC BRL PLUNG BLK MRK POLYPR CTRL

## (undated) DEVICE — ELECTRODE PT RET AD L9FT HI MOIST COND ADH HYDRGEL CORDED

## (undated) DEVICE — GLOVE SURG SZ 6 THK91MIL LTX FREE SYN POLYISOPRENE ANTI

## (undated) DEVICE — SPONGE LAP W18XL18IN WHT COT 4 PLY FLD STRUNG RADPQ DISP ST 2 PER PACK

## (undated) DEVICE — APPLICATOR MEDICATED 26 CC SOLUTION HI LT ORNG CHLORAPREP

## (undated) DEVICE — BLADE SAW W12.5XL70MM THK0.8MM CUT THK1.12MM S STL RECIP

## (undated) DEVICE — COVER,LIGHT HANDLE,FLX,2/PK: Brand: MEDLINE INDUSTRIES, INC.

## (undated) DEVICE — BLADE CLIPPER GEN PURP NS

## (undated) DEVICE — BLADE SAGITAL 18X90X1.27MM

## (undated) DEVICE — DRESSING FOAM W4XL4IN AG SIL FACE BORD IONIC ANTIMIC ADH

## (undated) DEVICE — APPLICATOR MEDICATED 3 CC SOLUTION CLR STRL CHLORAPREP 930400

## (undated) DEVICE — DUAL CUT SAGITTAL BLADE

## (undated) DEVICE — DRESSING FOAM W4XL10IN AG SIL ADH ANTIMIC POSTOP OPTIFOAM

## (undated) DEVICE — UNDERGLOVE SURG SZ 8 BLU LTX FREE SYN POLYISOPRENE POLYMER

## (undated) DEVICE — PEN: MARKING STD 100/CS: Brand: MEDICAL ACTION INDUSTRIES

## (undated) DEVICE — GARMENT,MEDLINE,DVT,INT,CALF,MED, GEN2: Brand: MEDLINE

## (undated) DEVICE — HANDPIECE SET WITH COAXIAL HIGH FLOW TIP AND SUCTION TUBE: Brand: INTERPULSE

## (undated) DEVICE — COLONOSCOPE ENDOSCP ABSORBENT SM PEDIATRIC 104 MM VISION

## (undated) DEVICE — GOWN,AURORA,NONRNF,XL,30/CS: Brand: MEDLINE

## (undated) DEVICE — ROD RMR L950MM DIA2.5MM W/ EXTN BALL TIP

## (undated) DEVICE — SUTURE VCRL + SZ 2-0 L27IN ABSRB WHT SH 1/2 CIR TAPERCUT VCP417H

## (undated) DEVICE — YANKAUER,FLEXIBLE HANDLE,REGLR CAPACITY: Brand: MEDLINE INDUSTRIES, INC.

## (undated) DEVICE — STERILE PATIENT PROTECTIVE PAD FOR IMP® KNEE POSITIONERS & COHESIVE WRAP (10 / CASE): Brand: DE MAYO KNEE POSITIONER®

## (undated) DEVICE — DRAPE,U/ SHT,SPLIT,PLAS,STERIL: Brand: MEDLINE

## (undated) DEVICE — THE STERILE LIGHT HANDLE COVER IS USED WITH STERIS SURGICAL LIGHTING AND VISUALIZATION SYSTEMS.

## (undated) DEVICE — STRIP,CLOSURE,WOUND,MEDI-STRIP,1/2X4: Brand: MEDLINE

## (undated) DEVICE — Device

## (undated) DEVICE — GLOVE SURG SZ 65 THK91MIL LTX FREE SYN POLYISOPRENE

## (undated) DEVICE — SOLUTION IV 50ML 0.9% SOD CHL PLAS CONT USP VIAFLX

## (undated) DEVICE — SUTURE V-LOC SZ 0 L18IN NONABSORBABLE BLU L37MM GS-21 1/2 VLOCN0326

## (undated) DEVICE — 3M™ IOBAN™ 2 ANTIMICROBIAL INCISE DRAPE 6651EZ: Brand: IOBAN™ 2

## (undated) DEVICE — DRESSING FOAM W3.5XL6IN POSTOP STRP GENTLE OPTIFOAM AG

## (undated) DEVICE — DRAPE,REIN 53X77,STERILE: Brand: MEDLINE

## (undated) DEVICE — PILLOW POS W15XH6XL22IN RASPBERRY FOAM ABD W/ STRP DISP FOR

## (undated) DEVICE — APPLICATOR MEDICATED 10.5 CC SOLUTION HI LT ORNG CHLORAPREP

## (undated) DEVICE — GLOVE ORANGE PI 8   MSG9080

## (undated) DEVICE — SUTURE VCRL SZ 3 0 L36IN ABSRB UD CT L40MM 1 2 CIR TAPR PNT VCP956H

## (undated) DEVICE — 3M™ IOBAN™ 2 ANTIMICROBIAL INCISE DRAPE 6650EZ: Brand: IOBAN™ 2

## (undated) DEVICE — ENDO KIT W/SYRINGE: Brand: MEDLINE INDUSTRIES, INC.

## (undated) DEVICE — SUTURE ETHBND EXCEL SZ 0 L30IN NONABSORBABLE GRN CT1 L36MM X424H

## (undated) DEVICE — 3M™ STERI-DRAPE™ U-DRAPE 1015: Brand: STERI-DRAPE™

## (undated) DEVICE — SHOULDER STABILIZATION KIT,                                    DISPOSABLE 12 PER BOX

## (undated) DEVICE — SUTURE MCRYL + SZ 4-0 L18IN ABSRB UD L19MM PS-2 3/8 CIR MCP496G

## (undated) DEVICE — LIQUIBAND RAPID ADHESIVE 36/CS 0.8ML: Brand: MEDLINE

## (undated) DEVICE — ZIPPERED TOGA, X-LARGE: Brand: FLYTE

## (undated) DEVICE — TOWEL SURG W17XL27IN STD BLU COT NONFENESTRATED PREWASHED

## (undated) DEVICE — BANDAGE COBAN 4 IN COMPR W4INXL5YD FOAM COHESIVE QUIK STK SELF ADH SFT

## (undated) DEVICE — DECANTER VI FOR RAPID DISPNS OF SOL LF DISP

## (undated) DEVICE — GOWN,AURORA,NONREINFORCED,LARGE: Brand: MEDLINE

## (undated) DEVICE — GOWN,SIRUS,POLYRNF,BRTHSLV,2XL,18/CS: Brand: MEDLINE

## (undated) DEVICE — SUTURE MCRYL SZ 2-0 L27IN ABSRB UD SH L26MM TAPERPOINT NDL Y417H

## (undated) DEVICE — STRYKER PERFORMANCE SERIES SAGITTAL BLADE: Brand: STRYKER PERFORMANCE SERIES

## (undated) DEVICE — SOLUTION IRRIG 3000ML 0.9% SOD CHL USP UROMATIC PLAS CONT

## (undated) DEVICE — BASIC PACK: Brand: MEDLINE INDUSTRIES, INC.

## (undated) DEVICE — 4.0MM LONG AO PILOT DRILL: Brand: TRIGEN

## (undated) DEVICE — SOLUTION SCRB 4OZ 4% CHG CLN BASE FOR PT SKIN ANTISEPSIS

## (undated) DEVICE — NAIL EXTRACTOR: Brand: IMHS

## (undated) DEVICE — GLOVE ORANGE PI 7   MSG9070

## (undated) DEVICE — BANDAGE COMPR M W6INXL10YD WHT BGE VELC E MTRX HK AND LOOP

## (undated) DEVICE — INTENDED FOR TISSUE SEPARATION, AND OTHER PROCEDURES THAT REQUIRE A SHARP SURGICAL BLADE TO PUNCTURE OR CUT.: Brand: BARD-PARKER ® CARBON RIB-BACK BLADES

## (undated) DEVICE — SUTURE VCRL + SZ 2-0 L54IN ABSRB VLT TIE POLYGLACTIN 910 VCP286G

## (undated) DEVICE — GLOVE ORTHO 8   MSG9480

## (undated) DEVICE — WAX SURG 2.5GM HEMSTAT BNE BEESWAX PARAFFIN ISO PALMITATE

## (undated) DEVICE — DECANTER FLD 9IN ST BG FOR ASEP TRNSF OF FLD

## (undated) DEVICE — SYRINGE IRRIG 60ML SFT PLIABLE BLB EZ TO GRP 1 HND USE W/

## (undated) DEVICE — SOLUTION ANTIFOG VIS SYS CLEARIFY LAPSCP

## (undated) DEVICE — BANDAGE COBAN 6 IN WND 6INX5YD FOAM

## (undated) DEVICE — SUTURE SZ 0 27IN 5/8 CIR UR-6  TAPER PT VIOLET ABSRB VICRYL J603H

## (undated) DEVICE — SUTURE ETHBND EXCEL SZ 5 L30IN NONABSORBABLE GRN L48MM CCS MB47G

## (undated) DEVICE — COUNTER NDL 40 COUNT HLD 70 FOAM BLK ADH W/ MAG

## (undated) DEVICE — ARM DRAPE

## (undated) DEVICE — ADHESIVE SKIN CLSR 0.7ML TOP DERMBND ADV

## (undated) DEVICE — 4-PORT MANIFOLD: Brand: NEPTUNE 2

## (undated) DEVICE — BLADE SURG NO10 C STL STR DISP GLASSVAN

## (undated) DEVICE — HYPODERMIC SAFETY NEEDLE: Brand: MAGELLAN

## (undated) DEVICE — CANNULA SEAL

## (undated) DEVICE — GLOVE SURG SZ 75 L12IN FNGR THK87MIL DK GRN LTX FREE ISOLEX

## (undated) DEVICE — SHEET DRAPE FULL 70X100

## (undated) DEVICE — DRESSING TRNSPAR W4XL4.8IN W/ N ADH PD SURESITE-123 PLUSPD

## (undated) DEVICE — KIT EVAC 0.13IN RECT TB DIA10FR 400CC PVC 3 SPR Y CONN DRN

## (undated) DEVICE — FAN SPRAY KIT: Brand: PULSAVAC®

## (undated) DEVICE — SPONGE LAP W18XL18IN WHT COT 4 PLY FLD STRUNG RADPQ DISP ST

## (undated) DEVICE — SUTURE VCRL SZ 0 L36IN ABSRB UD L36MM CT-1 1/2 CIR J946H

## (undated) DEVICE — INSUFFLATION NEEDLE TO ESTABLISH PNEUMOPERITONEUM.: Brand: INSUFFLATION NEEDLE

## (undated) DEVICE — SUTURE MCRYL SZ 4-0 L18IN ABSRB UD L16MM PC-3 3/8 CIR PRIM Y845G

## (undated) DEVICE — MARKER,SKIN,WI/RULER AND LABELS: Brand: MEDLINE

## (undated) DEVICE — BLADELESS OBTURATOR: Brand: WECK VISTA

## (undated) DEVICE — NEEDLE SUT SZ 3 MAYO 1 2 CIR TAPR PNT DISPOSABLE

## (undated) DEVICE — SUTURE MCRYL SZ 3-0 L27IN ABSRB UD L24MM PS-1 3/8 CIR PRIM Y936H

## (undated) DEVICE — TIP COVER ACCESSORY

## (undated) DEVICE — SUTURE VCRL SZ 2-0 L36IN ABSRB UD L40MM CT 1/2 CIR J957H

## (undated) DEVICE — SYRINGE MED 50ML LUERSLIP TIP

## (undated) DEVICE — SCREW BNE L48MM CONSTRN CNDYL KNEE HEX HD STEM FOR LEG NXGN
Type: IMPLANTABLE DEVICE | Site: KNEE | Status: NON-FUNCTIONAL
Removed: 2021-05-10

## (undated) DEVICE — PROTECTOR ULN NRV PUR FOAM HK LOOP STRP ANATOMICALLY

## (undated) DEVICE — 3000CC GUARDIAN II: Brand: GUARDIAN

## (undated) DEVICE — POUCH INSTR W6.75XL11.5IN FRST 2 PKT ADH FOR ORTH AND

## (undated) DEVICE — OPTIFOAM GENTLE AG,POST-OP STRIP,3.5X14: Brand: MEDLINE

## (undated) DEVICE — STAPLER EXT SKIN 35 WIDE S STL STPL SQUEEZE HNDL VISISTAT

## (undated) DEVICE — SOLUTION IV IRRIG 0.9% NACL 3000ML BAG 2B7477

## (undated) DEVICE — 35 ML SYRINGE LUER-LOCK TIP: Brand: MONOJECT

## (undated) DEVICE — HYBRID TUBING WITH CO2 CONNECTION FOR OLYMPUS 140/160/180/190 SERIES GI ENDOSCOPES WITH OLYMPUS AFU-100 , OLYMPUS OFP: Brand: ENDOGATOR HYBRID IRRIGATION TUBING

## (undated) DEVICE — PADDING,UNDERCAST,COTTON, 4"X4YD STERILE: Brand: MEDLINE

## (undated) DEVICE — SUTURE VCRL SZ 1 L36IN ABSRB UD L36MM CT-1 1/2 CIR J947H

## (undated) DEVICE — THREE-QUARTER SHEET: Brand: CONVERTORS

## (undated) DEVICE — INTERTAN LAG SCREW DRILL: Brand: TRIGEN

## (undated) DEVICE — CUFF TOURNIQUET 34 SNG BLADDER DUAL PORT

## (undated) DEVICE — SPONGE DRN W4XL4IN RAYON/POLYESTER 6 PLY NONWOVEN PRECUT 2 PER PK

## (undated) DEVICE — PADDING UNDERCAST W4INXL4YD COT FBR LO LINTING WYTEX

## (undated) DEVICE — INSUFFLATION TUBING SET WITH FILTER, FUNNEL CONNECTOR AND LUER LOCK: Brand: JOSNOE MEDICAL INC

## (undated) DEVICE — SOLUTION IRRIG 1000ML H2O PIC PLAS SHATTERPROOF CONTAINER

## (undated) DEVICE — ADHESIVE SKIN CLOSURE TOP 36 CC HI VISC DERMBND MINI

## (undated) DEVICE — SHEET,DRAPE,53X77,STERILE: Brand: MEDLINE

## (undated) DEVICE — 1016 S-DRAPE IRRIG POUCH 10/BOX: Brand: STERI-DRAPE™

## (undated) DEVICE — NEPTUNE E-SEP 165MM SUCTION SLEEVE: Brand: NEPTUNE E-SEP

## (undated) DEVICE — BANDAGE,GAUZE,BULKEE II,4.5"X4.1YD,STRL: Brand: MEDLINE

## (undated) DEVICE — STOCKINETTE,IMPERVIOUS,12X48,STERILE: Brand: MEDLINE

## (undated) DEVICE — Z DISCONTINUED BY MEDLINE USE 2711682 TRAY SKIN PREP DRY W/ PREM GLV

## (undated) DEVICE — TOWEL,OR,DSP,ST,NATURAL,DLX,4/PK,20PK/CS: Brand: MEDLINE

## (undated) DEVICE — STRAP,POSITIONING,KNEE/BODY,FOAM,4X60": Brand: MEDLINE

## (undated) DEVICE — SUTURE VCRL + SZ 0 L27IN ABSRB VLT L26MM UR-6 5/8 CIR VCP603H

## (undated) DEVICE — FORCEPS BX JUMBO L240CM DIA2.8MM WRK CHN 3.2MM ORNG W/ NDL

## (undated) DEVICE — COVER,TABLE,HEAVY DUTY,77"X90",STRL: Brand: MEDLINE

## (undated) DEVICE — BLADE ES L6IN ELASTOMERIC COAT EXT DURABLE BEND UPTO 90DEG

## (undated) DEVICE — 450 ML BOTTLE OF 0.05% CHLORHEXIDINE GLUCONATE IN 99.95% STERILE WATER FOR IRRIGATION, USP AND APPLICATOR.: Brand: IRRISEPT ANTIMICROBIAL WOUND LAVAGE

## (undated) DEVICE — 3M™ STERI-DRAPE™ SMALL DRAPE WITH ADHESIVE APERTURE 1092 25/BX,4/CS&#X20;: Brand: STERI-DRAPE™

## (undated) DEVICE — TROCAR: Brand: KII FIOS FIRST ENTRY

## (undated) DEVICE — SUTURE VCRL + SZ 3-0 L27IN ABSRB UD L26MM SH 1/2 CIR VCP416H

## (undated) DEVICE — Device: Brand: DEFENDO VALVE AND CONNECTOR KIT

## (undated) DEVICE — DEVICE TRCR 12X9X3IN WHT CLSR DISP OMNICLOSE

## (undated) DEVICE — NEEDLE HYPO 22GA L1.5IN BLK S STL HUB POLYPR SHLD REG BVL